# Patient Record
Sex: FEMALE | Race: WHITE | Employment: OTHER | URBAN - METROPOLITAN AREA
[De-identification: names, ages, dates, MRNs, and addresses within clinical notes are randomized per-mention and may not be internally consistent; named-entity substitution may affect disease eponyms.]

---

## 2017-01-06 PROBLEM — I48.0 PAROXYSMAL A-FIB (HCC): Status: ACTIVE | Noted: 2017-01-06

## 2017-02-14 ENCOUNTER — OFFICE VISIT (OUTPATIENT)
Dept: CARDIOLOGY CLINIC | Age: 62
End: 2017-02-14

## 2017-02-14 VITALS
BODY MASS INDEX: 26.87 KG/M2 | HEIGHT: 62 IN | OXYGEN SATURATION: 93 % | WEIGHT: 146 LBS | DIASTOLIC BLOOD PRESSURE: 80 MMHG | HEART RATE: 51 BPM | SYSTOLIC BLOOD PRESSURE: 130 MMHG

## 2017-02-14 DIAGNOSIS — I25.118 CORONARY ARTERY DISEASE OF NATIVE ARTERY OF NATIVE HEART WITH STABLE ANGINA PECTORIS (HCC): Primary | ICD-10-CM

## 2017-02-14 DIAGNOSIS — Z79.899 ON AMIODARONE THERAPY: ICD-10-CM

## 2017-02-14 DIAGNOSIS — E78.2 MIXED HYPERLIPIDEMIA: ICD-10-CM

## 2017-02-14 DIAGNOSIS — R06.02 SOB (SHORTNESS OF BREATH): ICD-10-CM

## 2017-02-14 DIAGNOSIS — I50.42 CHRONIC COMBINED SYSTOLIC AND DIASTOLIC CONGESTIVE HEART FAILURE (HCC): ICD-10-CM

## 2017-02-14 DIAGNOSIS — I48.0 PAROXYSMAL A-FIB (HCC): ICD-10-CM

## 2017-02-14 PROCEDURE — 99205 OFFICE O/P NEW HI 60 MIN: CPT | Performed by: INTERNAL MEDICINE

## 2017-02-14 RX ORDER — LISINOPRIL 40 MG/1
40 TABLET ORAL DAILY
Status: ON HOLD | COMMUNITY
End: 2017-02-22 | Stop reason: HOSPADM

## 2017-02-14 RX ORDER — CARVEDILOL 12.5 MG/1
12.5 TABLET ORAL 2 TIMES DAILY WITH MEALS
Status: ON HOLD | COMMUNITY
End: 2017-02-22 | Stop reason: HOSPADM

## 2017-02-20 PROBLEM — R27.0 ATAXIA: Status: ACTIVE | Noted: 2017-02-20

## 2017-02-20 PROBLEM — I95.9 HYPOTENSION: Status: ACTIVE | Noted: 2017-02-20

## 2017-02-20 PROBLEM — R47.1 DYSARTHRIA: Status: ACTIVE | Noted: 2017-02-20

## 2017-02-20 PROBLEM — R53.1 RIGHT SIDED WEAKNESS: Status: ACTIVE | Noted: 2017-02-20

## 2017-02-20 PROBLEM — I63.9 CVA (CEREBRAL VASCULAR ACCIDENT) (HCC): Status: ACTIVE | Noted: 2017-02-20

## 2017-02-20 PROBLEM — N17.9 ARF (ACUTE RENAL FAILURE) (HCC): Status: ACTIVE | Noted: 2017-02-20

## 2017-02-20 PROBLEM — G47.33 OSA (OBSTRUCTIVE SLEEP APNEA): Status: ACTIVE | Noted: 2017-02-20

## 2017-03-06 ENCOUNTER — TELEPHONE (OUTPATIENT)
Dept: CARDIOLOGY CLINIC | Age: 62
End: 2017-03-06

## 2017-03-09 ENCOUNTER — HOSPITAL ENCOUNTER (OUTPATIENT)
Dept: CARDIOLOGY | Facility: CLINIC | Age: 62
Discharge: HOME | End: 2017-03-09
Attending: INTERNAL MEDICINE | Admitting: INTERNAL MEDICINE

## 2017-03-14 ENCOUNTER — HOSPITAL ENCOUNTER (OUTPATIENT)
Dept: OTHER | Age: 62
Discharge: HOME | End: 2017-03-14
Attending: NEUROLOGICAL SURGERY | Admitting: NEUROLOGICAL SURGERY

## 2017-03-14 LAB
ALBUMIN SERPL-MCNC: 4.7 G/DL (ref 3.4–5)
ANION GAP SERPL CALCULATED.3IONS-SCNC: 7 MMOL/L (ref 3–16)
BUN BLDV-MCNC: 14 MG/DL (ref 7–20)
CALCIUM SERPL-MCNC: 9.6 MG/DL (ref 8.3–10.6)
CHLORIDE BLD-SCNC: 103 MMOL/L (ref 99–110)
CO2: 35 MMOL/L (ref 21–32)
CREAT SERPL-MCNC: 0.6 MG/DL (ref 0.6–1.2)
GFR AFRICAN AMERICAN: >60
GFR NON-AFRICAN AMERICAN: >60
GLUCOSE BLD-MCNC: 97 MG/DL (ref 70–99)
PHOSPHORUS: 3.5 MG/DL (ref 2.5–4.9)
POTASSIUM SERPL-SCNC: 4.4 MMOL/L (ref 3.5–5.1)
SODIUM BLD-SCNC: 145 MMOL/L (ref 136–145)

## 2017-04-10 ENCOUNTER — OFFICE VISIT (OUTPATIENT)
Dept: CARDIOLOGY CLINIC | Age: 62
End: 2017-04-10

## 2017-04-10 VITALS
HEIGHT: 62 IN | HEART RATE: 64 BPM | BODY MASS INDEX: 26.13 KG/M2 | SYSTOLIC BLOOD PRESSURE: 136 MMHG | WEIGHT: 142 LBS | DIASTOLIC BLOOD PRESSURE: 82 MMHG

## 2017-04-10 DIAGNOSIS — I48.0 PAROXYSMAL A-FIB (HCC): Primary | ICD-10-CM

## 2017-04-10 PROCEDURE — 99214 OFFICE O/P EST MOD 30 MIN: CPT | Performed by: INTERNAL MEDICINE

## 2017-04-10 PROCEDURE — 93000 ELECTROCARDIOGRAM COMPLETE: CPT | Performed by: INTERNAL MEDICINE

## 2017-04-10 RX ORDER — CARVEDILOL 3.12 MG/1
3.12 TABLET ORAL 2 TIMES DAILY WITH MEALS
Qty: 60 TABLET | Refills: 11 | Status: ON HOLD | OUTPATIENT
Start: 2017-04-10 | End: 2018-06-11 | Stop reason: SDUPTHER

## 2017-04-25 ENCOUNTER — OFFICE VISIT (OUTPATIENT)
Dept: PULMONOLOGY | Age: 62
End: 2017-04-25

## 2017-04-25 ENCOUNTER — TELEPHONE (OUTPATIENT)
Dept: PULMONOLOGY | Age: 62
End: 2017-04-25

## 2017-04-25 VITALS
WEIGHT: 149.2 LBS | OXYGEN SATURATION: 92 % | RESPIRATION RATE: 16 BRPM | HEIGHT: 62 IN | DIASTOLIC BLOOD PRESSURE: 64 MMHG | TEMPERATURE: 98.6 F | HEART RATE: 64 BPM | BODY MASS INDEX: 27.46 KG/M2 | SYSTOLIC BLOOD PRESSURE: 126 MMHG

## 2017-04-25 DIAGNOSIS — J44.9 CHRONIC OBSTRUCTIVE PULMONARY DISEASE, UNSPECIFIED COPD TYPE (HCC): ICD-10-CM

## 2017-04-25 DIAGNOSIS — R93.89 ABNORMAL CXR: Primary | ICD-10-CM

## 2017-04-25 PROCEDURE — 99204 OFFICE O/P NEW MOD 45 MIN: CPT | Performed by: INTERNAL MEDICINE

## 2017-04-25 RX ORDER — ALBUTEROL SULFATE 90 UG/1
2 AEROSOL, METERED RESPIRATORY (INHALATION) EVERY 6 HOURS PRN
Qty: 1 INHALER | Refills: 5 | Status: SHIPPED | OUTPATIENT
Start: 2017-04-25 | End: 2018-05-17 | Stop reason: SDUPTHER

## 2017-04-25 RX ORDER — ALBUTEROL SULFATE 2.5 MG/3ML
2.5 SOLUTION RESPIRATORY (INHALATION) EVERY 6 HOURS PRN
COMMUNITY

## 2017-05-11 ENCOUNTER — HOSPITAL ENCOUNTER (OUTPATIENT)
Dept: PULMONOLOGY | Age: 62
Discharge: HOME | End: 2017-05-11
Attending: INTERNAL MEDICINE | Admitting: INTERNAL MEDICINE

## 2017-05-11 DIAGNOSIS — R93.89 ABNORMAL CXR: ICD-10-CM

## 2017-05-11 DIAGNOSIS — J44.9 CHRONIC OBSTRUCTIVE PULMONARY DISEASE (HCC): ICD-10-CM

## 2017-05-11 RX ORDER — ALBUTEROL SULFATE 2.5 MG/3ML
2.5 SOLUTION RESPIRATORY (INHALATION) ONCE
Status: COMPLETED | OUTPATIENT
Start: 2017-05-11 | End: 2017-05-11

## 2017-05-11 RX ADMIN — ALBUTEROL SULFATE 2.5 MG: 2.5 SOLUTION RESPIRATORY (INHALATION) at 11:55

## 2017-05-12 ENCOUNTER — OFFICE VISIT (OUTPATIENT)
Dept: PULMONOLOGY | Age: 62
End: 2017-05-12

## 2017-05-12 VITALS
SYSTOLIC BLOOD PRESSURE: 142 MMHG | WEIGHT: 149 LBS | DIASTOLIC BLOOD PRESSURE: 76 MMHG | TEMPERATURE: 98.2 F | HEIGHT: 62 IN | BODY MASS INDEX: 27.42 KG/M2 | OXYGEN SATURATION: 95 % | RESPIRATION RATE: 16 BRPM | HEART RATE: 82 BPM

## 2017-05-12 DIAGNOSIS — J44.9 COPD, SEVERE (HCC): Primary | ICD-10-CM

## 2017-05-12 DIAGNOSIS — J96.11 CHRONIC RESPIRATORY FAILURE WITH HYPOXIA (HCC): ICD-10-CM

## 2017-05-12 PROCEDURE — 99214 OFFICE O/P EST MOD 30 MIN: CPT | Performed by: INTERNAL MEDICINE

## 2017-06-06 ENCOUNTER — TELEPHONE (OUTPATIENT)
Dept: PULMONOLOGY | Age: 62
End: 2017-06-06

## 2017-06-07 ENCOUNTER — HOSPITAL ENCOUNTER (OUTPATIENT)
Dept: OTHER | Age: 62
Discharge: HOME | End: 2017-06-30
Attending: INTERNAL MEDICINE | Admitting: INTERNAL MEDICINE

## 2017-06-13 ENCOUNTER — TELEPHONE (OUTPATIENT)
Dept: PULMONOLOGY | Age: 62
End: 2017-06-13

## 2017-06-13 ENCOUNTER — OFFICE VISIT (OUTPATIENT)
Dept: CARDIOLOGY CLINIC | Age: 62
End: 2017-06-13

## 2017-06-13 VITALS
HEART RATE: 72 BPM | SYSTOLIC BLOOD PRESSURE: 122 MMHG | BODY MASS INDEX: 25.76 KG/M2 | OXYGEN SATURATION: 92 % | WEIGHT: 140 LBS | HEIGHT: 62 IN | DIASTOLIC BLOOD PRESSURE: 78 MMHG

## 2017-06-13 DIAGNOSIS — I48.0 PAROXYSMAL A-FIB (HCC): ICD-10-CM

## 2017-06-13 DIAGNOSIS — I50.42 CHRONIC COMBINED SYSTOLIC AND DIASTOLIC CONGESTIVE HEART FAILURE (HCC): ICD-10-CM

## 2017-06-13 DIAGNOSIS — I25.118 CORONARY ARTERY DISEASE OF NATIVE ARTERY OF NATIVE HEART WITH STABLE ANGINA PECTORIS (HCC): Primary | ICD-10-CM

## 2017-06-13 PROCEDURE — 99214 OFFICE O/P EST MOD 30 MIN: CPT | Performed by: INTERNAL MEDICINE

## 2017-06-13 RX ORDER — ATORVASTATIN CALCIUM 80 MG/1
80 TABLET, FILM COATED ORAL NIGHTLY
Qty: 90 TABLET | Refills: 3 | Status: SHIPPED | OUTPATIENT
Start: 2017-06-13 | End: 2018-07-10 | Stop reason: SDUPTHER

## 2017-06-14 ENCOUNTER — HOSPITAL ENCOUNTER (OUTPATIENT)
Dept: SURGERY | Age: 62
Discharge: HOME | End: 2017-06-14
Attending: ANESTHESIOLOGY | Admitting: ANESTHESIOLOGY

## 2017-06-14 VITALS
DIASTOLIC BLOOD PRESSURE: 73 MMHG | RESPIRATION RATE: 16 BRPM | TEMPERATURE: 98.5 F | BODY MASS INDEX: 25.76 KG/M2 | SYSTOLIC BLOOD PRESSURE: 177 MMHG | HEIGHT: 62 IN | WEIGHT: 140 LBS | OXYGEN SATURATION: 94 % | HEART RATE: 72 BPM

## 2017-06-14 RX ORDER — OXYCODONE HYDROCHLORIDE AND ACETAMINOPHEN 5; 325 MG/1; MG/1
2 TABLET ORAL PRN
Status: ACTIVE | OUTPATIENT
Start: 2017-06-14 | End: 2017-06-14

## 2017-06-14 RX ORDER — OXYCODONE HYDROCHLORIDE 5 MG/1
TABLET ORAL
Status: COMPLETED
Start: 2017-06-14 | End: 2017-06-14

## 2017-06-14 RX ORDER — LABETALOL HYDROCHLORIDE 5 MG/ML
5 INJECTION, SOLUTION INTRAVENOUS EVERY 10 MIN PRN
Status: DISCONTINUED | OUTPATIENT
Start: 2017-06-14 | End: 2017-06-15 | Stop reason: HOSPADM

## 2017-06-14 RX ORDER — OXYCODONE HYDROCHLORIDE 5 MG/1
10 TABLET ORAL EVERY 4 HOURS PRN
Status: DISCONTINUED | OUTPATIENT
Start: 2017-06-14 | End: 2017-06-15 | Stop reason: HOSPADM

## 2017-06-14 RX ORDER — SODIUM CHLORIDE, SODIUM LACTATE, POTASSIUM CHLORIDE, CALCIUM CHLORIDE 600; 310; 30; 20 MG/100ML; MG/100ML; MG/100ML; MG/100ML
INJECTION, SOLUTION INTRAVENOUS CONTINUOUS
Status: DISCONTINUED | OUTPATIENT
Start: 2017-06-14 | End: 2017-06-15 | Stop reason: HOSPADM

## 2017-06-14 RX ORDER — DEXTROSE MONOHYDRATE 50 MG/ML
INJECTION, SOLUTION INTRAVENOUS
Status: DISPENSED
Start: 2017-06-14 | End: 2017-06-14

## 2017-06-14 RX ORDER — MEPERIDINE HYDROCHLORIDE 25 MG/ML
12.5 INJECTION INTRAMUSCULAR; INTRAVENOUS; SUBCUTANEOUS EVERY 5 MIN PRN
Status: DISCONTINUED | OUTPATIENT
Start: 2017-06-14 | End: 2017-06-15 | Stop reason: HOSPADM

## 2017-06-14 RX ORDER — HYDROMORPHONE HCL 110MG/55ML
0.25 PATIENT CONTROLLED ANALGESIA SYRINGE INTRAVENOUS EVERY 5 MIN PRN
Status: DISCONTINUED | OUTPATIENT
Start: 2017-06-14 | End: 2017-06-15 | Stop reason: HOSPADM

## 2017-06-14 RX ORDER — SODIUM CHLORIDE, SODIUM LACTATE, POTASSIUM CHLORIDE, CALCIUM CHLORIDE 600; 310; 30; 20 MG/100ML; MG/100ML; MG/100ML; MG/100ML
INJECTION, SOLUTION INTRAVENOUS
Status: COMPLETED
Start: 2017-06-14 | End: 2017-06-14

## 2017-06-14 RX ORDER — ONDANSETRON 2 MG/ML
4 INJECTION INTRAMUSCULAR; INTRAVENOUS
Status: ACTIVE | OUTPATIENT
Start: 2017-06-14 | End: 2017-06-14

## 2017-06-14 RX ORDER — DIPHENHYDRAMINE HYDROCHLORIDE 50 MG/ML
12.5 INJECTION INTRAMUSCULAR; INTRAVENOUS
Status: ACTIVE | OUTPATIENT
Start: 2017-06-14 | End: 2017-06-14

## 2017-06-14 RX ORDER — MORPHINE SULFATE 2 MG/ML
1 INJECTION, SOLUTION INTRAMUSCULAR; INTRAVENOUS EVERY 5 MIN PRN
Status: DISCONTINUED | OUTPATIENT
Start: 2017-06-14 | End: 2017-06-15 | Stop reason: HOSPADM

## 2017-06-14 RX ORDER — MORPHINE SULFATE 10 MG/ML
2 INJECTION, SOLUTION INTRAMUSCULAR; INTRAVENOUS EVERY 5 MIN PRN
Status: DISCONTINUED | OUTPATIENT
Start: 2017-06-14 | End: 2017-06-15 | Stop reason: HOSPADM

## 2017-06-14 RX ORDER — HYDRALAZINE HYDROCHLORIDE 20 MG/ML
5 INJECTION INTRAMUSCULAR; INTRAVENOUS EVERY 10 MIN PRN
Status: DISCONTINUED | OUTPATIENT
Start: 2017-06-14 | End: 2017-06-15 | Stop reason: HOSPADM

## 2017-06-14 RX ORDER — OXYCODONE HYDROCHLORIDE 10 MG/1
10-20 TABLET ORAL EVERY 4 HOURS PRN
Qty: 36 TABLET | Refills: 0 | Status: SHIPPED | OUTPATIENT
Start: 2017-06-14 | End: 2017-06-17

## 2017-06-14 RX ORDER — HYDROMORPHONE HCL 110MG/55ML
0.5 PATIENT CONTROLLED ANALGESIA SYRINGE INTRAVENOUS EVERY 5 MIN PRN
Status: DISCONTINUED | OUTPATIENT
Start: 2017-06-14 | End: 2017-06-15 | Stop reason: HOSPADM

## 2017-06-14 RX ORDER — OXYCODONE HYDROCHLORIDE AND ACETAMINOPHEN 5; 325 MG/1; MG/1
1 TABLET ORAL PRN
Status: ACTIVE | OUTPATIENT
Start: 2017-06-14 | End: 2017-06-14

## 2017-06-14 RX ORDER — LIDOCAINE HYDROCHLORIDE 10 MG/ML
1 INJECTION, SOLUTION EPIDURAL; INFILTRATION; INTRACAUDAL; PERINEURAL
Status: COMPLETED | OUTPATIENT
Start: 2017-06-14 | End: 2017-06-14

## 2017-06-14 RX ORDER — LIDOCAINE HYDROCHLORIDE 10 MG/ML
INJECTION, SOLUTION EPIDURAL; INFILTRATION; INTRACAUDAL; PERINEURAL
Status: COMPLETED
Start: 2017-06-14 | End: 2017-06-14

## 2017-06-14 RX ADMIN — LIDOCAINE HYDROCHLORIDE 0.1 ML: 10 INJECTION, SOLUTION EPIDURAL; INFILTRATION; INTRACAUDAL; PERINEURAL at 08:26

## 2017-06-14 RX ADMIN — SODIUM CHLORIDE, SODIUM LACTATE, POTASSIUM CHLORIDE, CALCIUM CHLORIDE: 600; 310; 30; 20 INJECTION, SOLUTION INTRAVENOUS at 08:26

## 2017-06-14 RX ADMIN — Medication 0.25 MG: at 12:08

## 2017-06-14 RX ADMIN — OXYCODONE HYDROCHLORIDE 10 MG: 5 TABLET ORAL at 11:59

## 2017-06-14 RX ADMIN — Medication 0.5 MG: at 12:02

## 2017-06-14 ASSESSMENT — PAIN DESCRIPTION - LOCATION
LOCATION: ABDOMEN

## 2017-06-14 ASSESSMENT — PAIN SCALES - GENERAL
PAINLEVEL_OUTOF10: 5
PAINLEVEL_OUTOF10: 3
PAINLEVEL_OUTOF10: 0
PAINLEVEL_OUTOF10: 7
PAINLEVEL_OUTOF10: 7
PAINLEVEL_OUTOF10: 3

## 2017-06-14 ASSESSMENT — PAIN DESCRIPTION - DESCRIPTORS
DESCRIPTORS: ACHING
DESCRIPTORS: ACHING;THROBBING
DESCRIPTORS: ACHING;SHOOTING
DESCRIPTORS: ACHING
DESCRIPTORS: ACHING;SHOOTING
DESCRIPTORS: ACHING

## 2017-06-14 ASSESSMENT — PAIN - FUNCTIONAL ASSESSMENT: PAIN_FUNCTIONAL_ASSESSMENT: 0-10

## 2017-06-14 ASSESSMENT — ENCOUNTER SYMPTOMS: SHORTNESS OF BREATH: 0

## 2017-06-14 ASSESSMENT — PAIN DESCRIPTION - PAIN TYPE: TYPE: SURGICAL PAIN

## 2017-09-19 PROBLEM — E78.5 HYPERLIPEMIA: Status: ACTIVE | Noted: 2017-09-19

## 2017-09-19 PROBLEM — I50.32 CHRONIC DIASTOLIC CHF (CONGESTIVE HEART FAILURE) (HCC): Status: ACTIVE | Noted: 2017-02-14

## 2017-09-19 PROBLEM — I48.20 CHRONIC A-FIB (HCC): Status: ACTIVE | Noted: 2017-09-19

## 2017-09-19 PROBLEM — F41.9 ANXIETY: Status: ACTIVE | Noted: 2017-09-19

## 2017-11-01 ENCOUNTER — HOSPITAL ENCOUNTER (OUTPATIENT)
Dept: GENERAL RADIOLOGY | Age: 62
Discharge: HOME | End: 2017-11-01
Attending: FAMILY MEDICINE | Admitting: FAMILY MEDICINE

## 2017-11-01 DIAGNOSIS — Z13.820 ENCOUNTER FOR SCREENING FOR OSTEOPOROSIS: ICD-10-CM

## 2017-11-01 DIAGNOSIS — Z13.820 OSTEOPOROSIS SCREENING: ICD-10-CM

## 2017-11-30 ENCOUNTER — TELEPHONE (OUTPATIENT)
Dept: PULMONOLOGY | Age: 62
End: 2017-11-30

## 2017-11-30 NOTE — TELEPHONE ENCOUNTER
Patient did not show for 6 month f/u  with Dr. Michaels Call on 11/30/17. Pt called and states that she is not feeling well and she will call back to r/s when she feels better. Patient was also no show on: N/A    LOV 5/12/17:    ASSESSMENT:  · COPD, severe  · Chronic hypoxic respiratory failure  · Prior tobacco abuse: quit 2012. 120 pack years  · PAF on amiodarone and eliquis  · H/o TIA, brain aneurysm that is monitored  · DEVI -not treated per pt unable to tolerate PAP     PLAN:   · Start supplemental 2lpm while walking [facility] portable concentrator with OCD  · She completed pulm rehab in 2014  · PRN albuterol INH and continue nebulizer  · Continue Dulera and Tudorza  · Pt is unsure if she wants to be reevaluated for DEVI  · F/u for test results  · F/u in 6 months  · Screening CT scan was considered in a lung cancer screening counseling and shared decision making visit today that included the following elements:   · Eligibility: Age: 62. There are no signs or symptoms of lung cancer. Tobacco History 120 pack-years, quit 5 years ago  · Verbal counseling has been performed by me to include benefits and harms of screening, follow-up diagnostic testing, over-diagnosis, false positive rate, and total radiation exposure;   · I have counseled on the importance of adherence to annual lung cancer LDCT screening, the impact of comorbidities and patient is willing to undergo diagnosis and treatment;   · I have provided counseling on the importance of maintaining cigarette smoking abstinence if former smoker; or the importance of smoking cessation if current smoker and, if appropriate, furnishing of information about tobacco cessation interventions; and   · I have furnished a written order for lung cancer screening with LDCT.    · Order for Screening chest CT scan should be placed with documentation as below:  · Beneficiary date of birth;   · Actual pack - year smoking history (number) from above;   · Current smoking status, and for

## 2017-12-13 ENCOUNTER — OFFICE VISIT (OUTPATIENT)
Dept: CARDIOLOGY CLINIC | Age: 62
End: 2017-12-13

## 2017-12-13 VITALS
WEIGHT: 138 LBS | HEIGHT: 62 IN | DIASTOLIC BLOOD PRESSURE: 100 MMHG | HEART RATE: 72 BPM | OXYGEN SATURATION: 94 % | SYSTOLIC BLOOD PRESSURE: 170 MMHG | BODY MASS INDEX: 25.4 KG/M2

## 2017-12-13 DIAGNOSIS — I25.118 CORONARY ARTERY DISEASE OF NATIVE ARTERY OF NATIVE HEART WITH STABLE ANGINA PECTORIS (HCC): ICD-10-CM

## 2017-12-13 DIAGNOSIS — I10 HTN (HYPERTENSION), BENIGN: ICD-10-CM

## 2017-12-13 DIAGNOSIS — I48.0 PAROXYSMAL A-FIB (HCC): Primary | ICD-10-CM

## 2017-12-13 PROCEDURE — 99214 OFFICE O/P EST MOD 30 MIN: CPT | Performed by: NURSE PRACTITIONER

## 2017-12-13 PROCEDURE — 93000 ELECTROCARDIOGRAM COMPLETE: CPT | Performed by: NURSE PRACTITIONER

## 2017-12-13 RX ORDER — AMLODIPINE BESYLATE 5 MG/1
5 TABLET ORAL DAILY
Qty: 90 TABLET | Refills: 3 | Status: SHIPPED | OUTPATIENT
Start: 2017-12-13 | End: 2018-12-11 | Stop reason: SDUPTHER
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
## 2017-12-27 NOTE — COMMUNICATION BODY
(PERCOCET)  MG per tablet Take 1 tablet by mouth three times daily . Yes Historical Provider, MD   diclofenac sodium 1 % GEL Apply 2 g topically 4 times daily   Yes Historical Provider, MD   atorvastatin (LIPITOR) 80 MG tablet Take 1 tablet by mouth nightly 6/13/17  Yes Tanisha Salcedo MD   albuterol (PROVENTIL) (2.5 MG/3ML) 0.083% nebulizer solution Take 2.5 mg by nebulization every 6 hours as needed for Wheezing   Yes Historical Provider, MD   albuterol sulfate HFA (PROVENTIL HFA) 108 (90 BASE) MCG/ACT inhaler Inhale 2 puffs into the lungs every 6 hours as needed for Wheezing or Shortness of Breath 4/25/17  Yes Helio Hagen MD   carvedilol (COREG) 3.125 MG tablet Take 1 tablet by mouth 2 times daily (with meals) 4/10/17  Yes Gustavo Marin MD   apixaban (ELIQUIS) 5 MG TABS tablet Take 1 tablet by mouth 2 times daily 4/10/17  Yes Gustavo Marin MD   polyethylene glycol (GLYCOLAX) packet Take 17 g by mouth daily as needed for Constipation   Yes Historical Provider, MD   OXYGEN Inhale 1.5 L into the lungs   Yes Historical Provider, MD   ondansetron (ZOFRAN-ODT) 4 MG disintegrating tablet Take 4 mg by mouth every 8 hours as needed for Nausea or Vomiting   Yes Historical Provider, MD   Mometasone Furo-Formoterol Fum (DULERA IN) Inhale into the lungs 2 times daily   Yes Historical Provider, MD   aclidinium (TUDORZA PRESSAIR) 400 MCG/ACT AEPB Inhale 2 each into the lungs daily    Yes Historical Provider, MD   nitroGLYCERIN (NITROSTAT) 0.4 MG SL tablet Place 0.4 mg under the tongue every 5 minutes as needed for Chest pain   Yes Historical Provider, MD   esomeprazole Magnesium (NEXIUM) 40 MG PACK Take 40 mg by mouth daily   Yes Historical Provider, MD   ALPRAZolam (XANAX) 0.5 MG tablet Take 0.5 mg by mouth nightly as needed for Sleep   Yes Historical Provider, MD   gabapentin (NEURONTIN) 300 MG capsule Take 300 mg by mouth 3 times daily.      Yes Historical Provider, MD       Allergies:  Codeine; Darvocet is normal with an ejection fraction of 55-60%.   No wall motion abnormalities noted. Normal left ventricular size and wall thickness.   Normal left ventricular diastolic filling pressure. Stress test 4/7/2016 [facility] THE [facility]): The result of this study is normal     The risk of this study is low  Normal myocardial perfusion     Normal LV systolic function  No  prior studies available for comparison    CARDIOLOGY LABS:   CBC: No results for input(s): WBC, HGB, HCT, PLT in the last 72 hours. BMP: No results for input(s): NA, K, CO2, BUN, CREATININE, LABGLOM, GLUCOSE in the last 72 hours. PT/INR: No results for input(s): PROTIME, INR in the last 72 hours. APTT:No results for input(s): APTT in the last 72 hours. FASTING LIPID PANEL:  Lab Results   Component Value Date    HDL 36 02/21/2017    LDLCALC 109 02/21/2017    TRIG 132 02/21/2017     LIVER PROFILE:No results for input(s): AST, ALT, ALB in the last 72 hours. Assessment:   1. Paroxysmal atrial fibrillation: in sinus today   -on Eliquis for [id number] score 4 (gender, HTN, TIA)   2. Bradycardia: noted during hospital stay in 2/2017 (amiodarone stopped at that time)  3. CAD: s/p SKYLA x3 to LAD in 2014   -on Coreg, lisinopril, and statin  4. HTN: BP elevated today  5. COPD: followed by Dr. Jaye Lomeli  6. DEVI: unable to tolerate CPAP  7. TIA    Plan:   1. Start amlodipine 5mg po QD for HTN  2. Monitor BP at home and call if consistently out of goal ranges  3. Unclear why patient is not taking ASA. Will discuss with her primary cardiologist, Dr. Durell Gaucher  4.  Follow up in 6 months    Arlin Norton, [address]  [phone]

## 2018-01-04 ENCOUNTER — OFFICE VISIT (OUTPATIENT)
Dept: PULMONOLOGY | Age: 63
End: 2018-01-04

## 2018-01-04 VITALS
TEMPERATURE: 98.3 F | DIASTOLIC BLOOD PRESSURE: 70 MMHG | HEIGHT: 62 IN | BODY MASS INDEX: 25.4 KG/M2 | SYSTOLIC BLOOD PRESSURE: 110 MMHG | RESPIRATION RATE: 18 BRPM | WEIGHT: 138 LBS | OXYGEN SATURATION: 92 % | HEART RATE: 72 BPM

## 2018-01-04 DIAGNOSIS — Z87.891 PERSONAL HISTORY OF TOBACCO USE: Primary | ICD-10-CM

## 2018-01-04 PROCEDURE — 99214 OFFICE O/P EST MOD 30 MIN: CPT | Performed by: INTERNAL MEDICINE

## 2018-01-04 PROCEDURE — G0296 VISIT TO DETERM LDCT ELIG: HCPCS | Performed by: INTERNAL MEDICINE

## 2018-01-04 RX ORDER — GUAIFENESIN/DEXTROMETHORPHAN 100-10MG/5
10 SYRUP ORAL 3 TIMES DAILY PRN
Qty: 120 ML | Refills: 0 | Status: SHIPPED | OUTPATIENT
Start: 2018-01-04 | End: 2018-01-14

## 2018-01-04 NOTE — PROGRESS NOTES
Nausea or Vomiting, Disp: , Rfl:     Mometasone Furo-Formoterol Fum (DULERA IN), Inhale into the lungs 2 times daily, Disp: , Rfl:     aclidinium (TUDORZA PRESSAIR) 400 MCG/ACT AEPB, Inhale 2 each into the lungs daily , Disp: , Rfl:     nitroGLYCERIN (NITROSTAT) 0.4 MG SL tablet, Place 0.4 mg under the tongue every 5 minutes as needed for Chest pain, Disp: , Rfl:     esomeprazole Magnesium (NEXIUM) 40 MG PACK, Take 40 mg by mouth daily, Disp: , Rfl:     ALPRAZolam (XANAX) 0.5 MG tablet, Take 0.5 mg by mouth nightly as needed for Sleep, Disp: , Rfl:     gabapentin (NEURONTIN) 300 MG capsule, Take 300 mg by mouth 3 times daily. , Disp: , Rfl:       Objective:   PHYSICAL EXAM:      VITALS:  /70   Pulse 72   Temp 98.3 °F (36.8 °C) (Oral)   Resp 18   Ht 5' 2\" (1.575 m)   Wt 138 lb (62.6 kg)   SpO2 92% Comment: RA  BMI 25.24 kg/m²   Constitutional: In no acute distress. Appears stated age. Well developed and nourished  Eyes: PERRL. No sclera icterus. No conjunctival injection. ENT: Oropharynx clear  Neck: Trachea midline. No thyroid tenderness. Lymph: No cervical LAD. No supraclavicular LAD. Resp: No accessory muscle use. No crackles. No wheezes. No rhonchi. CV: Regular rate. Regular rhythm. No murmur or rub. No lower extremity edema. Musc: No clubbing. No cyanosis. No synovitis or joint deformity in digits. Psych: Oriented x 3. Mood and affect normal. Intact judgment and insight. LABS:  Reviewed any pertinent new labs that are available.     PFTs 5/11/17  FVC  (78%) FEV1 0.74 (31%) FEV1/FVC ratio 31  TLC  (122%)  RV  (168%)   DLCO (34%) Bronchodilator response: +++     6MWT: 490ft, 2lpm O2    IMAGING: I personally reviewed and interpreted the following imaging today in the office:    5/11/17 HRCT:   Lungs/pleura: Mucous is present within the proximal trachea and subsegmental   right middle lobe bronchi.  Mild bronchial wall thickening involves the   bilateral lower lobes likely due to bronchitis.       There is no pneumothorax or pleural effusion. Zygmunt Mike is biapical scarring and   bibasilar atelectasis with extensive emphysema throughout the bilateral lungs. ASSESSMENT:  · COPD, severe  · Probably a viral URI  · Chronic hypoxic respiratory failure  · Prior tobacco abuse: quit 2012. 120 pack years  · PAF on amiodarone and eliquis  · H/o TIA, brain aneurysm that is monitored  · DEVI -not treated per pt unable to tolerate PAP     PLAN:   · Start supplemental 2lpm while walking wth portable concentrator with OCD  · She completed pulm rehab in 2014  · PRN albuterol INH and continue nebulizer  · Continue Dulera and Tudorza  · Pt is unsure if she wants to be reevaluated for DEVI  · F/u for test results  · F/u in May for LDCT  Call with CT results/ Screening CT scan was considered in a lung cancer screening counseling and shared decision making visit today that included the following elements:   Eligibility: Age: 62. There are no signs or symptoms of lung cancer. Tobacco History 120 pack-years, quit 5 years ago  Verbal counseling has been performed by me to include benefits and harms of screening, follow-up diagnostic testing, over-diagnosis, false positive rate, and total radiation exposure;   I have counseled on the importance of adherence to annual lung cancer LDCT screening, the impact of comorbidities and patient is willing to undergo diagnosis and treatment;   I have provided counseling on the importance of maintaining cigarette smoking abstinence if former smoker; or the importance of smoking cessation if current smoker and, if appropriate, furnishing of information about tobacco cessation interventions; and   I have furnished a written order for lung cancer screening with LDCT. Order for Screening chest CT scan should be placed with documentation as below:  Beneficiary date of birth;    Actual pack - year smoking history (number) from above;   Current smoking status, and for former smokers,

## 2018-02-26 ENCOUNTER — TELEPHONE (OUTPATIENT)
Dept: PULMONOLOGY | Age: 63
End: 2018-02-26

## 2018-02-26 DIAGNOSIS — J44.9 COPD, SEVERE (HCC): Primary | ICD-10-CM

## 2018-04-05 ENCOUNTER — OFFICE VISIT (OUTPATIENT)
Dept: CARDIOLOGY CLINIC | Age: 63
End: 2018-04-05

## 2018-04-05 VITALS
HEART RATE: 72 BPM | OXYGEN SATURATION: 94 % | HEIGHT: 62 IN | SYSTOLIC BLOOD PRESSURE: 120 MMHG | WEIGHT: 135 LBS | BODY MASS INDEX: 24.84 KG/M2 | DIASTOLIC BLOOD PRESSURE: 74 MMHG

## 2018-04-05 DIAGNOSIS — E78.2 MIXED HYPERLIPIDEMIA: ICD-10-CM

## 2018-04-05 DIAGNOSIS — I10 HTN (HYPERTENSION), BENIGN: ICD-10-CM

## 2018-04-05 DIAGNOSIS — I25.118 CORONARY ARTERY DISEASE OF NATIVE ARTERY OF NATIVE HEART WITH STABLE ANGINA PECTORIS (HCC): Primary | ICD-10-CM

## 2018-04-05 DIAGNOSIS — I50.32 CHRONIC DIASTOLIC CHF (CONGESTIVE HEART FAILURE) (HCC): ICD-10-CM

## 2018-04-05 DIAGNOSIS — I48.0 PAROXYSMAL A-FIB (HCC): ICD-10-CM

## 2018-04-05 PROCEDURE — 99214 OFFICE O/P EST MOD 30 MIN: CPT | Performed by: INTERNAL MEDICINE

## 2018-04-06 ENCOUNTER — TELEPHONE (OUTPATIENT)
Dept: CARDIOLOGY CLINIC | Age: 63
End: 2018-04-06

## 2018-04-06 ENCOUNTER — HOSPITAL ENCOUNTER (OUTPATIENT)
Dept: OTHER | Age: 63
Discharge: HOME | End: 2018-04-06
Attending: INTERNAL MEDICINE | Admitting: INTERNAL MEDICINE

## 2018-04-06 LAB
A/G RATIO: 1.7 (ref 1.1–2.2)
ALBUMIN SERPL-MCNC: 4.3 G/DL (ref 3.4–5)
ALP BLD-CCNC: 64 U/L (ref 40–129)
ALT SERPL-CCNC: 9 U/L (ref 10–40)
ANION GAP SERPL CALCULATED.3IONS-SCNC: 10 MMOL/L (ref 3–16)
AST SERPL-CCNC: 16 U/L (ref 15–37)
BILIRUB SERPL-MCNC: 0.5 MG/DL (ref 0–1)
BUN BLDV-MCNC: 17 MG/DL (ref 7–20)
CALCIUM SERPL-MCNC: 9.1 MG/DL (ref 8.3–10.6)
CHLORIDE BLD-SCNC: 103 MMOL/L (ref 99–110)
CHOLESTEROL, FASTING: 181 MG/DL (ref 0–199)
CO2: 32 MMOL/L (ref 21–32)
CREAT SERPL-MCNC: <0.5 MG/DL (ref 0.6–1.2)
GFR AFRICAN AMERICAN: >60
GFR NON-AFRICAN AMERICAN: >60
GLOBULIN: 2.6 G/DL
GLUCOSE FASTING: 106 MG/DL (ref 70–99)
HDLC SERPL-MCNC: 58 MG/DL (ref 40–60)
LDL CHOLESTEROL CALCULATED: 110 MG/DL
POTASSIUM SERPL-SCNC: 4.7 MMOL/L (ref 3.5–5.1)
SODIUM BLD-SCNC: 145 MMOL/L (ref 136–145)
TOTAL PROTEIN: 6.9 G/DL (ref 6.4–8.2)
TRIGLYCERIDE, FASTING: 63 MG/DL (ref 0–150)
VLDLC SERPL CALC-MCNC: 13 MG/DL

## 2018-04-10 ENCOUNTER — TELEPHONE (OUTPATIENT)
Dept: CARDIOLOGY CLINIC | Age: 63
End: 2018-04-10

## 2018-05-17 ENCOUNTER — OFFICE VISIT (OUTPATIENT)
Dept: PULMONOLOGY | Age: 63
End: 2018-05-17

## 2018-05-17 ENCOUNTER — HOSPITAL ENCOUNTER (OUTPATIENT)
Dept: CT IMAGING | Age: 63
Discharge: HOME | End: 2018-05-17
Attending: INTERNAL MEDICINE | Admitting: INTERNAL MEDICINE

## 2018-05-17 ENCOUNTER — TELEPHONE (OUTPATIENT)
Dept: PULMONOLOGY | Age: 63
End: 2018-05-17

## 2018-05-17 VITALS
DIASTOLIC BLOOD PRESSURE: 60 MMHG | SYSTOLIC BLOOD PRESSURE: 120 MMHG | TEMPERATURE: 98.1 F | RESPIRATION RATE: 18 BRPM | OXYGEN SATURATION: 94 % | BODY MASS INDEX: 24.03 KG/M2 | HEART RATE: 66 BPM | WEIGHT: 130.6 LBS | HEIGHT: 62 IN

## 2018-05-17 DIAGNOSIS — J44.9 COPD, SEVERE (HCC): ICD-10-CM

## 2018-05-17 DIAGNOSIS — Z87.891 PERSONAL HISTORY OF TOBACCO USE: ICD-10-CM

## 2018-05-17 DIAGNOSIS — Z87.891 PERSONAL HISTORY OF TOBACCO USE: Primary | ICD-10-CM

## 2018-05-17 DIAGNOSIS — Z87.891 PERSONAL HISTORY OF NICOTINE DEPENDENCE: ICD-10-CM

## 2018-05-17 DIAGNOSIS — R91.1 PULMONARY NODULE: Primary | ICD-10-CM

## 2018-05-17 DIAGNOSIS — J96.11 CHRONIC RESPIRATORY FAILURE WITH HYPOXIA (HCC): ICD-10-CM

## 2018-05-17 PROCEDURE — 99214 OFFICE O/P EST MOD 30 MIN: CPT | Performed by: INTERNAL MEDICINE

## 2018-05-17 PROCEDURE — G0296 VISIT TO DETERM LDCT ELIG: HCPCS | Performed by: INTERNAL MEDICINE

## 2018-05-17 RX ORDER — ALBUTEROL SULFATE 90 UG/1
2 AEROSOL, METERED RESPIRATORY (INHALATION) EVERY 6 HOURS PRN
Qty: 1 INHALER | Refills: 5 | Status: SHIPPED | OUTPATIENT
Start: 2018-05-17 | End: 2020-07-28 | Stop reason: SDUPTHER

## 2018-05-18 ENCOUNTER — TELEPHONE (OUTPATIENT)
Dept: CASE MANAGEMENT | Age: 63
End: 2018-05-18

## 2018-06-10 PROBLEM — R65.10 SIRS (SYSTEMIC INFLAMMATORY RESPONSE SYNDROME) (HCC): Status: ACTIVE | Noted: 2018-06-10

## 2018-06-10 PROBLEM — J18.9 PNEUMONIA: Status: ACTIVE | Noted: 2018-06-10

## 2018-06-10 PROBLEM — J18.9 COMMUNITY ACQUIRED PNEUMONIA OF RIGHT LUNG: Status: ACTIVE | Noted: 2018-06-10

## 2018-06-10 PROBLEM — J44.1 COPD EXACERBATION (HCC): Status: ACTIVE | Noted: 2018-06-10

## 2018-06-11 PROBLEM — R91.8 LUNG NODULES: Status: ACTIVE | Noted: 2018-06-11

## 2018-06-11 RX ORDER — CARVEDILOL 3.12 MG/1
3.12 TABLET ORAL 2 TIMES DAILY WITH MEALS
Qty: 60 TABLET | Refills: 5 | Status: SHIPPED | OUTPATIENT
Start: 2018-06-11 | End: 2018-06-14

## 2018-06-15 ENCOUNTER — TELEPHONE (OUTPATIENT)
Dept: CARDIAC REHAB | Age: 63
End: 2018-06-15

## 2018-07-10 RX ORDER — ATORVASTATIN CALCIUM 80 MG/1
80 TABLET, FILM COATED ORAL NIGHTLY
Qty: 90 TABLET | Refills: 3 | Status: SHIPPED | OUTPATIENT
Start: 2018-07-10 | End: 2019-06-20 | Stop reason: SDUPTHER

## 2018-07-10 NOTE — TELEPHONE ENCOUNTER
Medication Refill    When was your last appointment with cardiology? 04/05/18  (if 1year or longer, please schedule an appointment)    Medication needing refilled:atorvastatin (LIPITOR) 80 MG tablet    Doseage of the medication:80 mg    How are you taking this medication (QD, BID, TID, QID, PRN): 1 tab daily    Patient want a 30 or 90 day supply called in:90    Which Pharmacy are we sending the medication to:[facility] [address] [facility]    Pharmacy Phone number:    Pharmacy Fax number:

## 2018-07-19 ENCOUNTER — OFFICE VISIT (OUTPATIENT)
Dept: CARDIOLOGY CLINIC | Age: 63
End: 2018-07-19

## 2018-07-19 VITALS
HEART RATE: 56 BPM | WEIGHT: 126 LBS | BODY MASS INDEX: 23.19 KG/M2 | DIASTOLIC BLOOD PRESSURE: 68 MMHG | HEIGHT: 62 IN | SYSTOLIC BLOOD PRESSURE: 112 MMHG

## 2018-07-19 DIAGNOSIS — R00.1 BRADYCARDIA: ICD-10-CM

## 2018-07-19 DIAGNOSIS — I10 HTN (HYPERTENSION), BENIGN: ICD-10-CM

## 2018-07-19 DIAGNOSIS — I48.0 PAROXYSMAL A-FIB (HCC): Primary | ICD-10-CM

## 2018-07-19 PROCEDURE — 99213 OFFICE O/P EST LOW 20 MIN: CPT | Performed by: NURSE PRACTITIONER

## 2018-07-19 PROCEDURE — 93000 ELECTROCARDIOGRAM COMPLETE: CPT | Performed by: NURSE PRACTITIONER
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
## 2018-10-10 ENCOUNTER — HOSPITAL ENCOUNTER (EMERGENCY)
Age: 63
Discharge: HOME | End: 2018-10-10
Attending: EMERGENCY MEDICINE
Payer: MEDICARE

## 2018-10-10 ENCOUNTER — APPOINTMENT (OUTPATIENT)
Dept: GENERAL RADIOLOGY | Age: 63
End: 2018-10-10
Payer: MEDICARE

## 2018-10-10 VITALS
WEIGHT: 125 LBS | SYSTOLIC BLOOD PRESSURE: 158 MMHG | RESPIRATION RATE: 20 BRPM | TEMPERATURE: 98.1 F | OXYGEN SATURATION: 94 % | BODY MASS INDEX: 23 KG/M2 | HEIGHT: 62 IN | HEART RATE: 62 BPM | DIASTOLIC BLOOD PRESSURE: 86 MMHG

## 2018-10-10 DIAGNOSIS — J44.1 COPD EXACERBATION (HCC): Primary | ICD-10-CM

## 2018-10-10 LAB
A/G RATIO: 1.6 (ref 1.1–2.2)
ALBUMIN SERPL-MCNC: 4.5 G/DL (ref 3.4–5)
ALP BLD-CCNC: 75 U/L (ref 40–129)
ALT SERPL-CCNC: 17 U/L (ref 10–40)
ANION GAP SERPL CALCULATED.3IONS-SCNC: 10 MMOL/L (ref 3–16)
AST SERPL-CCNC: 22 U/L (ref 15–37)
BASOPHILS ABSOLUTE: 0 K/UL (ref 0–0.2)
BASOPHILS RELATIVE PERCENT: 0.5 %
BILIRUB SERPL-MCNC: 0.7 MG/DL (ref 0–1)
BUN BLDV-MCNC: 11 MG/DL (ref 7–20)
CALCIUM SERPL-MCNC: 9.2 MG/DL (ref 8.3–10.6)
CHLORIDE BLD-SCNC: 98 MMOL/L (ref 99–110)
CO2: 30 MMOL/L (ref 21–32)
CREAT SERPL-MCNC: <0.5 MG/DL (ref 0.6–1.2)
EOSINOPHILS ABSOLUTE: 0.3 K/UL (ref 0–0.6)
EOSINOPHILS RELATIVE PERCENT: 3.4 %
GFR AFRICAN AMERICAN: >60
GFR NON-AFRICAN AMERICAN: >60
GLOBULIN: 2.8 G/DL
GLUCOSE BLD-MCNC: 94 MG/DL (ref 70–99)
HCT VFR BLD CALC: 49.6 % (ref 36–48)
HEMOGLOBIN: 16.9 G/DL (ref 12–16)
INR BLD: 1.04 (ref 0.86–1.14)
LYMPHOCYTES ABSOLUTE: 1.5 K/UL (ref 1–5.1)
LYMPHOCYTES RELATIVE PERCENT: 15.5 %
MCH RBC QN AUTO: 30.8 PG (ref 26–34)
MCHC RBC AUTO-ENTMCNC: 34.1 G/DL (ref 31–36)
MCV RBC AUTO: 90.4 FL (ref 80–100)
MONOCYTES ABSOLUTE: 0.9 K/UL (ref 0–1.3)
MONOCYTES RELATIVE PERCENT: 9.5 %
NEUTROPHILS ABSOLUTE: 6.9 K/UL (ref 1.7–7.7)
NEUTROPHILS RELATIVE PERCENT: 71.1 %
PDW BLD-RTO: 14.1 % (ref 12.4–15.4)
PLATELET # BLD: 192 K/UL (ref 135–450)
PMV BLD AUTO: 9.7 FL (ref 5–10.5)
POTASSIUM SERPL-SCNC: 3.7 MMOL/L (ref 3.5–5.1)
PROTHROMBIN TIME: 11.9 SEC (ref 9.8–13)
RBC # BLD: 5.48 M/UL (ref 4–5.2)
SODIUM BLD-SCNC: 138 MMOL/L (ref 136–145)
TOTAL PROTEIN: 7.3 G/DL (ref 6.4–8.2)
TROPONIN: <0.01 NG/ML
WBC # BLD: 9.7 K/UL (ref 4–11)

## 2018-10-10 PROCEDURE — 85025 COMPLETE CBC W/AUTO DIFF WBC: CPT

## 2018-10-10 PROCEDURE — 6360000002 HC RX W HCPCS: Performed by: EMERGENCY MEDICINE

## 2018-10-10 PROCEDURE — 80053 COMPREHEN METABOLIC PANEL: CPT

## 2018-10-10 PROCEDURE — 93005 ELECTROCARDIOGRAM TRACING: CPT | Performed by: EMERGENCY MEDICINE

## 2018-10-10 PROCEDURE — 6360000002 HC RX W HCPCS

## 2018-10-10 PROCEDURE — 96374 THER/PROPH/DIAG INJ IV PUSH: CPT

## 2018-10-10 PROCEDURE — 99285 EMERGENCY DEPT VISIT HI MDM: CPT

## 2018-10-10 PROCEDURE — 85610 PROTHROMBIN TIME: CPT

## 2018-10-10 PROCEDURE — 71045 X-RAY EXAM CHEST 1 VIEW: CPT

## 2018-10-10 PROCEDURE — 84484 ASSAY OF TROPONIN QUANT: CPT

## 2018-10-10 RX ORDER — ONDANSETRON 2 MG/ML
4 INJECTION INTRAMUSCULAR; INTRAVENOUS ONCE
Status: COMPLETED | OUTPATIENT
Start: 2018-10-10 | End: 2018-10-10

## 2018-10-10 RX ORDER — ONDANSETRON 2 MG/ML
INJECTION INTRAMUSCULAR; INTRAVENOUS
Status: COMPLETED
Start: 2018-10-10 | End: 2018-10-10

## 2018-10-10 RX ORDER — ONDANSETRON 4 MG/1
4 TABLET, FILM COATED ORAL EVERY 8 HOURS PRN
Qty: 10 TABLET | Refills: 0 | Status: SHIPPED | OUTPATIENT
Start: 2018-10-10 | End: 2020-08-04

## 2018-10-10 RX ORDER — IPRATROPIUM BROMIDE AND ALBUTEROL SULFATE 2.5; .5 MG/3ML; MG/3ML
1 SOLUTION RESPIRATORY (INHALATION) ONCE
Status: DISCONTINUED | OUTPATIENT
Start: 2018-10-10 | End: 2018-10-10 | Stop reason: HOSPADM

## 2018-10-10 RX ORDER — DEXAMETHASONE SODIUM PHOSPHATE 4 MG/ML
10 INJECTION, SOLUTION INTRA-ARTICULAR; INTRALESIONAL; INTRAMUSCULAR; INTRAVENOUS; SOFT TISSUE ONCE
Status: COMPLETED | OUTPATIENT
Start: 2018-10-10 | End: 2018-10-10

## 2018-10-10 RX ORDER — PREDNISONE 20 MG/1
40 TABLET ORAL DAILY
Qty: 8 TABLET | Refills: 0 | Status: SHIPPED | OUTPATIENT
Start: 2018-10-10 | End: 2018-10-14

## 2018-10-10 RX ADMIN — DEXAMETHASONE SODIUM PHOSPHATE 10 MG: 4 INJECTION, SOLUTION INTRAMUSCULAR; INTRAVENOUS at 02:09

## 2018-10-10 RX ADMIN — ONDANSETRON 4 MG: 2 INJECTION INTRAMUSCULAR; INTRAVENOUS at 02:31

## 2018-10-10 RX ADMIN — ONDANSETRON HYDROCHLORIDE 4 MG: 2 INJECTION, SOLUTION INTRAMUSCULAR; INTRAVENOUS at 02:31

## 2018-10-10 ASSESSMENT — PAIN DESCRIPTION - LOCATION: LOCATION: ABDOMEN

## 2018-10-10 ASSESSMENT — PAIN SCALES - GENERAL
PAINLEVEL_OUTOF10: 5
PAINLEVEL_OUTOF10: 6
PAINLEVEL_OUTOF10: 5

## 2018-10-10 ASSESSMENT — PAIN DESCRIPTION - PAIN TYPE
TYPE: CHRONIC PAIN
TYPE: ACUTE PAIN
TYPE: CHRONIC PAIN

## 2018-10-10 ASSESSMENT — PAIN DESCRIPTION - ORIENTATION: ORIENTATION: MID;RIGHT

## 2018-10-11 LAB
EKG ATRIAL RATE: 55 BPM
EKG DIAGNOSIS: NORMAL
EKG P AXIS: 64 DEGREES
EKG P-R INTERVAL: 144 MS
EKG Q-T INTERVAL: 426 MS
EKG QRS DURATION: 86 MS
EKG QTC CALCULATION (BAZETT): 407 MS
EKG R AXIS: -66 DEGREES
EKG T AXIS: 61 DEGREES
EKG VENTRICULAR RATE: 55 BPM

## 2018-10-11 PROCEDURE — 93010 ELECTROCARDIOGRAM REPORT: CPT | Performed by: INTERNAL MEDICINE

## 2018-11-14 RX ORDER — CARVEDILOL 3.12 MG/1
3.12 TABLET ORAL 2 TIMES DAILY WITH MEALS
Qty: 180 TABLET | Refills: 2 | Status: SHIPPED | OUTPATIENT
Start: 2018-11-14 | End: 2019-06-20 | Stop reason: SDUPTHER

## 2018-11-27 ENCOUNTER — TELEPHONE (OUTPATIENT)
Dept: CASE MANAGEMENT | Age: 63
End: 2018-11-27

## 2018-11-28 ENCOUNTER — TELEPHONE (OUTPATIENT)
Dept: PULMONOLOGY | Age: 63
End: 2018-11-28

## 2018-11-28 NOTE — TELEPHONE ENCOUNTER
ok
of years since quitting smoking from above  · Beneficiary is asymptomatic   · National Provider Identifier (NPI) for Dr. Christa Mai

## 2018-12-04 ENCOUNTER — TELEPHONE (OUTPATIENT)
Dept: CASE MANAGEMENT | Age: 63
End: 2018-12-04

## 2019-02-01 ENCOUNTER — HOSPITAL ENCOUNTER (OUTPATIENT)
Dept: CT IMAGING | Age: 64
Discharge: HOME | End: 2019-02-01
Payer: MEDICARE

## 2019-02-01 ENCOUNTER — OFFICE VISIT (OUTPATIENT)
Dept: PULMONOLOGY | Age: 64
End: 2019-02-01
Payer: MEDICARE

## 2019-02-01 VITALS
RESPIRATION RATE: 16 BRPM | BODY MASS INDEX: 23.15 KG/M2 | TEMPERATURE: 98.3 F | SYSTOLIC BLOOD PRESSURE: 110 MMHG | OXYGEN SATURATION: 91 % | HEART RATE: 66 BPM | HEIGHT: 62 IN | DIASTOLIC BLOOD PRESSURE: 60 MMHG | WEIGHT: 125.8 LBS

## 2019-02-01 DIAGNOSIS — Z87.891 PERSONAL HISTORY OF TOBACCO USE: ICD-10-CM

## 2019-02-01 DIAGNOSIS — R91.1 PULMONARY NODULE: ICD-10-CM

## 2019-02-01 DIAGNOSIS — J96.11 CHRONIC RESPIRATORY FAILURE WITH HYPOXIA (HCC): ICD-10-CM

## 2019-02-01 DIAGNOSIS — J44.9 COPD, SEVERE (HCC): Primary | ICD-10-CM

## 2019-02-01 PROCEDURE — 71250 CT THORAX DX C-: CPT

## 2019-02-01 PROCEDURE — 99214 OFFICE O/P EST MOD 30 MIN: CPT | Performed by: INTERNAL MEDICINE

## 2019-06-13 RX ORDER — APIXABAN 5 MG/1
TABLET, FILM COATED ORAL
Qty: 60 TABLET | Refills: 0 | Status: SHIPPED | OUTPATIENT
Start: 2019-06-13 | End: 2019-06-20 | Stop reason: SDUPTHER
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
## 2019-06-20 ENCOUNTER — OFFICE VISIT (OUTPATIENT)
Dept: CARDIOLOGY CLINIC | Age: 64
End: 2019-06-20
Payer: MEDICARE

## 2019-06-20 VITALS
HEART RATE: 68 BPM | SYSTOLIC BLOOD PRESSURE: 120 MMHG | HEIGHT: 62 IN | BODY MASS INDEX: 23.19 KG/M2 | DIASTOLIC BLOOD PRESSURE: 74 MMHG | WEIGHT: 126 LBS | OXYGEN SATURATION: 93 %

## 2019-06-20 DIAGNOSIS — I25.10 CORONARY ARTERY DISEASE INVOLVING NATIVE CORONARY ARTERY OF NATIVE HEART WITHOUT ANGINA PECTORIS: Primary | ICD-10-CM

## 2019-06-20 DIAGNOSIS — I50.32 CHRONIC DIASTOLIC CHF (CONGESTIVE HEART FAILURE) (HCC): ICD-10-CM

## 2019-06-20 DIAGNOSIS — I48.0 PAROXYSMAL A-FIB (HCC): ICD-10-CM

## 2019-06-20 DIAGNOSIS — E78.2 MIXED HYPERLIPIDEMIA: ICD-10-CM

## 2019-06-20 DIAGNOSIS — I10 HTN (HYPERTENSION), BENIGN: ICD-10-CM

## 2019-06-20 PROCEDURE — 99214 OFFICE O/P EST MOD 30 MIN: CPT | Performed by: INTERNAL MEDICINE

## 2019-06-20 RX ORDER — CARVEDILOL 3.12 MG/1
3.12 TABLET ORAL 2 TIMES DAILY WITH MEALS
Qty: 180 TABLET | Refills: 2 | Status: SHIPPED | OUTPATIENT
Start: 2019-06-20 | End: 2020-04-15

## 2019-06-20 RX ORDER — ATORVASTATIN CALCIUM 80 MG/1
80 TABLET, FILM COATED ORAL NIGHTLY
Qty: 90 TABLET | Refills: 3 | Status: SHIPPED | OUTPATIENT
Start: 2019-06-20 | End: 2020-06-15

## 2019-06-20 RX ORDER — POTASSIUM CHLORIDE 750 MG/1
10 TABLET, EXTENDED RELEASE ORAL DAILY
Qty: 30 TABLET | Refills: 5 | Status: SHIPPED | OUTPATIENT
Start: 2019-06-20 | End: 2020-06-23 | Stop reason: SDUPTHER

## 2019-06-20 RX ORDER — AMLODIPINE BESYLATE 5 MG/1
5 TABLET ORAL DAILY
Qty: 90 TABLET | Refills: 3 | Status: SHIPPED | OUTPATIENT
Start: 2019-06-20 | End: 2020-06-23 | Stop reason: SDUPTHER

## 2019-06-20 RX ORDER — FUROSEMIDE 40 MG/1
40 TABLET ORAL DAILY
Qty: 30 TABLET | Refills: 5 | Status: SHIPPED | OUTPATIENT
Start: 2019-06-20 | End: 2019-12-12 | Stop reason: SDUPTHER

## 2019-06-27 ENCOUNTER — TELEPHONE (OUTPATIENT)
Dept: CARDIOLOGY CLINIC | Age: 64
End: 2019-06-27

## 2019-06-27 NOTE — TELEPHONE ENCOUNTER
LMOV INFORMING PT THAT SHE WAS APPROVED FOR PATIENT ASSISTANCE WITH ELIQUIS, AND THAT ONCE MEDICATION WAS SENT TO OFFICE I WILL CALL HER TO .

## 2019-07-01 ENCOUNTER — HOSPITAL ENCOUNTER (OUTPATIENT)
Age: 64
Discharge: HOME | End: 2019-07-01
Payer: MEDICARE

## 2019-07-01 DIAGNOSIS — E78.2 MIXED HYPERLIPIDEMIA: ICD-10-CM

## 2019-07-01 DIAGNOSIS — I25.10 CORONARY ARTERY DISEASE INVOLVING NATIVE CORONARY ARTERY OF NATIVE HEART WITHOUT ANGINA PECTORIS: ICD-10-CM

## 2019-07-01 DIAGNOSIS — I48.0 PAROXYSMAL A-FIB (HCC): ICD-10-CM

## 2019-07-01 DIAGNOSIS — I10 HTN (HYPERTENSION), BENIGN: ICD-10-CM

## 2019-07-01 DIAGNOSIS — I50.32 CHRONIC DIASTOLIC CHF (CONGESTIVE HEART FAILURE) (HCC): ICD-10-CM

## 2019-07-01 LAB
A/G RATIO: 1.6 (ref 1.1–2.2)
ALBUMIN SERPL-MCNC: 4.3 G/DL (ref 3.4–5)
ALP BLD-CCNC: 59 U/L (ref 40–129)
ALT SERPL-CCNC: 15 U/L (ref 10–40)
ANION GAP SERPL CALCULATED.3IONS-SCNC: 8 MMOL/L (ref 3–16)
AST SERPL-CCNC: 25 U/L (ref 15–37)
BASOPHILS ABSOLUTE: 0 K/UL (ref 0–0.2)
BASOPHILS RELATIVE PERCENT: 0.6 %
BILIRUB SERPL-MCNC: 0.4 MG/DL (ref 0–1)
BUN BLDV-MCNC: 14 MG/DL (ref 7–20)
CALCIUM SERPL-MCNC: 9.6 MG/DL (ref 8.3–10.6)
CHLORIDE BLD-SCNC: 101 MMOL/L (ref 99–110)
CHOLESTEROL, FASTING: 107 MG/DL (ref 0–199)
CO2: 33 MMOL/L (ref 21–32)
CREAT SERPL-MCNC: <0.5 MG/DL (ref 0.6–1.2)
EOSINOPHILS ABSOLUTE: 0.2 K/UL (ref 0–0.6)
EOSINOPHILS RELATIVE PERCENT: 3.5 %
GFR AFRICAN AMERICAN: >60
GFR NON-AFRICAN AMERICAN: >60
GLOBULIN: 2.7 G/DL
GLUCOSE FASTING: 120 MG/DL (ref 70–99)
HCT VFR BLD CALC: 43.2 % (ref 36–48)
HDLC SERPL-MCNC: 53 MG/DL (ref 40–60)
HEMOGLOBIN: 14.3 G/DL (ref 12–16)
LDL CHOLESTEROL CALCULATED: 45 MG/DL
LYMPHOCYTES ABSOLUTE: 1.7 K/UL (ref 1–5.1)
LYMPHOCYTES RELATIVE PERCENT: 27.8 %
MCH RBC QN AUTO: 30.5 PG (ref 26–34)
MCHC RBC AUTO-ENTMCNC: 33 G/DL (ref 31–36)
MCV RBC AUTO: 92.4 FL (ref 80–100)
MONOCYTES ABSOLUTE: 0.5 K/UL (ref 0–1.3)
MONOCYTES RELATIVE PERCENT: 8.5 %
NEUTROPHILS ABSOLUTE: 3.6 K/UL (ref 1.7–7.7)
NEUTROPHILS RELATIVE PERCENT: 59.6 %
PDW BLD-RTO: 13.4 % (ref 12.4–15.4)
PLATELET # BLD: 180 K/UL (ref 135–450)
PMV BLD AUTO: 10.4 FL (ref 5–10.5)
POTASSIUM SERPL-SCNC: 4.7 MMOL/L (ref 3.5–5.1)
RBC # BLD: 4.68 M/UL (ref 4–5.2)
SODIUM BLD-SCNC: 142 MMOL/L (ref 136–145)
TOTAL PROTEIN: 7 G/DL (ref 6.4–8.2)
TRIGLYCERIDE, FASTING: 47 MG/DL (ref 0–150)
VLDLC SERPL CALC-MCNC: 9 MG/DL
WBC # BLD: 6 K/UL (ref 4–11)

## 2019-07-01 PROCEDURE — 80061 LIPID PANEL: CPT

## 2019-07-01 PROCEDURE — 80053 COMPREHEN METABOLIC PANEL: CPT

## 2019-07-01 PROCEDURE — 85025 COMPLETE CBC W/AUTO DIFF WBC: CPT

## 2019-07-01 PROCEDURE — 36415 COLL VENOUS BLD VENIPUNCTURE: CPT
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
## 2019-07-16 ENCOUNTER — OFFICE VISIT (OUTPATIENT)
Dept: CARDIOLOGY CLINIC | Age: 64
End: 2019-07-16
Payer: MEDICARE

## 2019-07-16 VITALS
HEART RATE: 61 BPM | WEIGHT: 124 LBS | HEIGHT: 62 IN | SYSTOLIC BLOOD PRESSURE: 120 MMHG | BODY MASS INDEX: 22.82 KG/M2 | DIASTOLIC BLOOD PRESSURE: 74 MMHG | OXYGEN SATURATION: 97 %

## 2019-07-16 DIAGNOSIS — I48.0 PAROXYSMAL A-FIB (HCC): ICD-10-CM

## 2019-07-16 DIAGNOSIS — R60.0 LOCALIZED EDEMA: ICD-10-CM

## 2019-07-16 DIAGNOSIS — I50.32 CHRONIC DIASTOLIC CHF (CONGESTIVE HEART FAILURE) (HCC): Primary | ICD-10-CM

## 2019-07-16 PROCEDURE — 99214 OFFICE O/P EST MOD 30 MIN: CPT | Performed by: NURSE PRACTITIONER

## 2019-07-16 RX ORDER — NITROGLYCERIN 0.4 MG/1
0.4 TABLET SUBLINGUAL EVERY 5 MIN PRN
Qty: 25 TABLET | Refills: 1 | Status: SHIPPED | OUTPATIENT
Start: 2019-07-16 | End: 2020-06-23 | Stop reason: SDUPTHER

## 2019-07-16 ASSESSMENT — ENCOUNTER SYMPTOMS
CHEST TIGHTNESS: 0
SHORTNESS OF BREATH: 0
COUGH: 0
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
## 2019-08-08 ENCOUNTER — OFFICE VISIT (OUTPATIENT)
Dept: PULMONOLOGY | Age: 64
End: 2019-08-08
Payer: MEDICARE

## 2019-08-08 VITALS
BODY MASS INDEX: 22.45 KG/M2 | DIASTOLIC BLOOD PRESSURE: 72 MMHG | TEMPERATURE: 98.3 F | OXYGEN SATURATION: 96 % | SYSTOLIC BLOOD PRESSURE: 122 MMHG | WEIGHT: 122 LBS | RESPIRATION RATE: 16 BRPM | HEIGHT: 62 IN | HEART RATE: 62 BPM

## 2019-08-08 DIAGNOSIS — Z87.891 PERSONAL HISTORY OF TOBACCO USE: Primary | ICD-10-CM

## 2019-08-08 DIAGNOSIS — J96.11 CHRONIC RESPIRATORY FAILURE WITH HYPOXIA (HCC): ICD-10-CM

## 2019-08-08 DIAGNOSIS — J44.9 COPD, SEVERE (HCC): ICD-10-CM

## 2019-08-08 PROCEDURE — 99214 OFFICE O/P EST MOD 30 MIN: CPT | Performed by: INTERNAL MEDICINE

## 2019-08-08 NOTE — PROGRESS NOTES
Low Dose CT (LDCT) Lung Screening criteria met   Age 50-69   Pack year smoking >30   Still smoking or less than 15 year since quit   No sign or symptoms of lung cancer   > 11 months since last LDCT     Risks and benefits of lung cancer screening with LDCT scans discussed:    Significance of positive screen - False-positive LDCT results often occur. 95% of all positive results do not lead to a diagnosis of cancer. Usually further imaging can resolve most false-positive results; however, some patients may require invasive procedures. Over diagnosis risk - 10% to 12% of screen-detected lung cancer cases are over diagnosed--that is, the cancer would not have been detected in the patient's lifetime without the screening. Need for follow up screens annually to continue lung cancer screening effectiveness     Risks associated with radiation from annual LDCT- Radiation exposure is about the same as for a mammogram, which is about 1/3 of the annual background radiation exposure from everyday life. Starting screening at age 54 is not likely to increase cancer risk from radiation exposure. Patients with comorbidities resulting in life expectancy of < 10 years, or that would preclude treatment of an abnormality identified on CT, should not be screened due to lack of benefit.     To obtain maximal benefit from this screening, smoking cessation and long-term abstinence from smoking is critical
MCG/ACT inhaler, Inhale 2 puffs into the lungs every 6 hours as needed for Wheezing or Shortness of Breath, Disp: 1 Inhaler, Rfl: 5    aspirin 81 MG tablet, Take 81 mg by mouth daily, Disp: , Rfl:     oxyCODONE-acetaminophen (PERCOCET)  MG per tablet, Take 1 tablet by mouth three times daily . , Disp: , Rfl:     diclofenac sodium 1 % GEL, Apply 2 g topically 4 times daily, Disp: , Rfl:     albuterol (PROVENTIL) (2.5 MG/3ML) 0.083% nebulizer solution, Take 2.5 mg by nebulization every 6 hours as needed for Wheezing, Disp: , Rfl:     polyethylene glycol (GLYCOLAX) packet, Take 17 g by mouth daily as needed for Constipation, Disp: , Rfl:     OXYGEN, Inhale 2 L into the lungs , Disp: , Rfl:     ALPRAZolam (XANAX) 0.5 MG tablet, Take 0.5 mg by mouth three times daily. ., Disp: , Rfl:     gabapentin (NEURONTIN) 300 MG capsule, Take 300 mg by mouth 2 times daily. ., Disp: , Rfl:     Objective:   PHYSICAL EXAM:      VITALS:  /72   Pulse 62   Temp 98.3 °F (36.8 °C) (Oral)   Resp 16   Ht 5' 2\" (1.575 m)   Wt 122 lb (55.3 kg)   SpO2 96% Comment: ra  BMI 22.31 kg/m²   Constitutional: In no acute distress. Appears stated age. Well developed and nourished  Eyes: PERRL. No sclera icterus. No conjunctival injection. ENT: Oropharynx clear  Neck: Trachea midline. No thyroid tenderness. Lymph: No cervical LAD. No supraclavicular LAD. Resp: No accessory muscle use. Decreased Breath sounds. No crackles. No wheezes. No rhonchi. CV: Regular rate. Regular rhythm. No murmur or rub. No lower extremity edema. Musc: No clubbing. No cyanosis. No synovitis or joint deformity in digits. Psych: Oriented x 3. Mood and affect normal. Intact judgment and insight. LABS:  Reviewed any pertinent new labs that are available.     PFTs 5/11/17  FVC  (78%) FEV1 0.74 (31%) FEV1/FVC ratio 31  TLC  (122%)  RV  (168%)   DLCO (34%) Bronchodilator response: +++     6MWT: 490ft, 2lpm O2    IMAGING: I personally reviewed and

## 2019-12-12 RX ORDER — FUROSEMIDE 40 MG/1
40 TABLET ORAL DAILY
Qty: 30 TABLET | Refills: 5 | Status: SHIPPED | OUTPATIENT
Start: 2019-12-12 | End: 2020-06-23 | Stop reason: SDUPTHER

## 2020-02-04 ENCOUNTER — HOSPITAL ENCOUNTER (OUTPATIENT)
Dept: CT IMAGING | Age: 65
Discharge: HOME | End: 2020-02-04
Payer: MEDICARE

## 2020-02-04 ENCOUNTER — OFFICE VISIT (OUTPATIENT)
Dept: PULMONOLOGY | Age: 65
End: 2020-02-04
Payer: MEDICARE

## 2020-02-04 VITALS
RESPIRATION RATE: 14 BRPM | HEART RATE: 67 BPM | BODY MASS INDEX: 23.74 KG/M2 | OXYGEN SATURATION: 91 % | WEIGHT: 129 LBS | SYSTOLIC BLOOD PRESSURE: 132 MMHG | DIASTOLIC BLOOD PRESSURE: 70 MMHG | HEIGHT: 62 IN

## 2020-02-04 PROCEDURE — 99214 OFFICE O/P EST MOD 30 MIN: CPT | Performed by: INTERNAL MEDICINE

## 2020-02-04 PROCEDURE — G0297 LDCT FOR LUNG CA SCREEN: HCPCS

## 2020-02-04 NOTE — PROGRESS NOTES
[facility] Pulmonary, Critical Care, and Sleep    Outpatient Follow Up Note    CC: COPD  Consulting provider: Shelbie Lawson MD    Interval History: 62 y.o. female  No exacerbations. REIS at baseline. No cough or wheeze. Using inhalers as prescribed. Initial HPI: regarding COPD. The patient has a history of COPD, stroke. Wheezes intermittently. Has a daily intermittent cough that is usually dry or sometimes productive of white to yellow sputum. The patient does not have SOB at rest but has REIS. Exercise tolerance on level ground is < 1 block. Her mother  of lung cancer.   Current Medications:    Current Outpatient Medications:     furosemide (LASIX) 40 MG tablet, Take 1 tablet by mouth daily, Disp: 30 tablet, Rfl: 5    nitroGLYCERIN (NITROSTAT) 0.4 MG SL tablet, Place 1 tablet under the tongue every 5 minutes as needed for Chest pain, Disp: 25 tablet, Rfl: 1    apixaban (ELIQUIS) 5 MG TABS tablet, Take 1 tablet by mouth 2 times daily, Disp: 60 tablet, Rfl: 11    amLODIPine (NORVASC) 5 MG tablet, Take 1 tablet by mouth daily, Disp: 90 tablet, Rfl: 3    carvedilol (COREG) 3.125 MG tablet, Take 1 tablet by mouth 2 times daily (with meals), Disp: 180 tablet, Rfl: 2    atorvastatin (LIPITOR) 80 MG tablet, Take 1 tablet by mouth nightly, Disp: 90 tablet, Rfl: 3    potassium chloride (KLOR-CON M) 10 MEQ extended release tablet, Take 1 tablet by mouth daily, Disp: 30 tablet, Rfl: 5    ondansetron (ZOFRAN) 4 MG tablet, Take 1 tablet by mouth every 8 hours as needed for Nausea, Disp: 10 tablet, Rfl: 0    aclidinium (TUDORZA PRESSAIR) 400 MCG/ACT AEPB inhaler, Inhale 1 puff into the lungs daily, Disp: 1 each, Rfl: 5    mometasone-formoterol (DULERA) 100-5 MCG/ACT inhaler, Inhale 2 puffs into the lungs 2 times daily, Disp: 1 Inhaler, Rfl: 5    albuterol sulfate HFA (PROVENTIL HFA) 108 (90 Base) MCG/ACT inhaler, Inhale 2 puffs into the lungs every 6 hours as needed for Wheezing or Shortness of Breath, Disp: 1 Inhaler, Rfl: 5    aspirin 81 MG tablet, Take 81 mg by mouth daily, Disp: , Rfl:     oxyCODONE-acetaminophen (PERCOCET)  MG per tablet, Take 1 tablet by mouth three times daily . , Disp: , Rfl:     diclofenac sodium 1 % GEL, Apply 2 g topically 4 times daily, Disp: , Rfl:     albuterol (PROVENTIL) (2.5 MG/3ML) 0.083% nebulizer solution, Take 2.5 mg by nebulization every 6 hours as needed for Wheezing, Disp: , Rfl:     polyethylene glycol (GLYCOLAX) packet, Take 17 g by mouth daily as needed for Constipation, Disp: , Rfl:     OXYGEN, Inhale 2 L into the lungs , Disp: , Rfl:     ALPRAZolam (XANAX) 0.5 MG tablet, Take 0.5 mg by mouth three times daily. ., Disp: , Rfl:     gabapentin (NEURONTIN) 300 MG capsule, Take 300 mg by mouth 2 times daily. ., Disp: , Rfl:     apixaban (ELIQUIS) 5 MG TABS tablet, Take 1 tablet by mouth 2 times daily (Patient not taking: Reported on 2/4/2020), Disp: 180 tablet, Rfl: 3    Objective:   PHYSICAL EXAM:      VITALS:  /70   Pulse 67   Resp 14   Ht 5' 2\" (1.575 m)   Wt 129 lb (58.5 kg)   SpO2 91% Comment: 2lpm  BMI 23.59 kg/m²   Constitutional: In no acute distress. Appears stated age. Well developed and nourished  Eyes: PERRL. No sclera icterus. No conjunctival injection. ENT: Oropharynx clear  Neck: Trachea midline. No thyroid tenderness. Lymph: No cervical LAD. No supraclavicular LAD. Resp: No accessory muscle use. Decreased Breath sounds. No crackles. No wheezes. No rhonchi. CV: Regular rate. Regular rhythm. No murmur or rub. No lower extremity edema. Musc: No clubbing. No cyanosis. No synovitis or joint deformity in digits. Psych: Oriented x 3. Mood and affect normal. Intact judgment and insight. LABS:  Reviewed any pertinent new labs that are available.     PFTs 5/11/17  FVC  (78%) FEV1 0.74 (31%) FEV1/FVC ratio 31  TLC  (122%)  RV  (168%)   DLCO (34%) Bronchodilator response: +++     6MWT: 490ft, 2lpm O2    IMAGING: I personally reviewed and interpreted the following imaging today in the office:    5/11/17 HRCT:   Lungs/pleura: Mucous is present within the proximal trachea and subsegmental   right middle lobe bronchi.  Mild bronchial wall thickening involves the   bilateral lower lobes likely due to bronchitis.       There is no pneumothorax or pleural effusion. Arva Blunt is biapical scarring and   bibasilar atelectasis with extensive emphysema throughout the bilateral lungs. 2/1/19 LDCT Chest: emphysema  2/4/20 LDCT: stable    ASSESSMENT:  · COPD, severe  · Chronic hypoxic respiratory failure  · Prior tobacco abuse: quit 2012. 120 pack years  · PAF on amiodarone and eliquis  · Chronic Anticoagulation  · H/o TIA, brain aneurysm that is monitored  · DEVI -not treated per pt unable to tolerate PAP     PLAN:   · supplemental 2lpm with exertion  · She completed pulm rehab in 2014  · PRN albuterol INH and continue nebulizer  · Continue Dulera 100 and Tudorza  · On Eliquis for anticoagulation. · 6 mo for COPD and in 12 months for LDCT  Screening CT scan was considered in a lung cancer screening counseling and shared decision making visit today that included the following elements:   Eligibility: Age: 62. There are no signs or symptoms of lung cancer.   Tobacco History 120 pack-years, quit 8 years ago  Verbal counseling has been performed by me to include benefits and harms of screening, follow-up diagnostic testing, over-diagnosis, false positive rate, and total radiation exposure;   I have counseled on the importance of adherence to annual lung cancer LDCT screening, the impact of comorbidities and patient is willing to undergo diagnosis and treatment;   I have provided counseling on the importance of maintaining cigarette smoking abstinence if former smoker; or the importance of smoking cessation if current smoker and, if appropriate, furnishing of information about tobacco cessation interventions; and   I have furnished a written order for lung cancer screening with LDCT. Order for Screening chest CT scan should be placed with documentation as below:  Beneficiary date of birth;    Actual pack - year smoking history (number) from above;   Current smoking status, and for former smokers, the number of years since quitting smoking from above  Beneficiary is asymptomatic   National Provider Identifier (NPI) for Dr. Jalil Camarillo

## 2020-03-08 ENCOUNTER — HOSPITAL ENCOUNTER (INPATIENT)
Age: 65
LOS: 2 days | Discharge: HOME | DRG: 392 | End: 2020-03-10
Attending: EMERGENCY MEDICINE | Admitting: INTERNAL MEDICINE
Payer: MEDICARE

## 2020-03-08 ENCOUNTER — APPOINTMENT (OUTPATIENT)
Dept: CT IMAGING | Age: 65
DRG: 392 | End: 2020-03-08
Payer: MEDICARE

## 2020-03-08 PROBLEM — R19.7 DIARRHEA: Status: ACTIVE | Noted: 2020-03-08

## 2020-03-08 LAB
A/G RATIO: 1.5 (ref 1.1–2.2)
ALBUMIN SERPL-MCNC: 4.7 G/DL (ref 3.4–5)
ALP BLD-CCNC: 71 U/L (ref 40–129)
ALT SERPL-CCNC: 23 U/L (ref 10–40)
AMORPHOUS: ABNORMAL /HPF
ANION GAP SERPL CALCULATED.3IONS-SCNC: 12 MMOL/L (ref 3–16)
AST SERPL-CCNC: 36 U/L (ref 15–37)
BACTERIA: ABNORMAL /HPF
BASOPHILS ABSOLUTE: 0 K/UL (ref 0–0.2)
BASOPHILS RELATIVE PERCENT: 0.1 %
BILIRUB SERPL-MCNC: 0.8 MG/DL (ref 0–1)
BILIRUBIN URINE: NEGATIVE
BLOOD, URINE: ABNORMAL
BUN BLDV-MCNC: 15 MG/DL (ref 7–20)
C DIFF TOXIN/ANTIGEN: NORMAL
CALCIUM SERPL-MCNC: 9.7 MG/DL (ref 8.3–10.6)
CHLORIDE BLD-SCNC: 98 MMOL/L (ref 99–110)
CLARITY: CLEAR
CO2: 30 MMOL/L (ref 21–32)
COLOR: YELLOW
CREAT SERPL-MCNC: <0.5 MG/DL (ref 0.6–1.2)
EKG ATRIAL RATE: 64 BPM
EKG DIAGNOSIS: NORMAL
EKG P AXIS: 72 DEGREES
EKG P-R INTERVAL: 150 MS
EKG Q-T INTERVAL: 428 MS
EKG QRS DURATION: 86 MS
EKG QTC CALCULATION (BAZETT): 441 MS
EKG R AXIS: -64 DEGREES
EKG T AXIS: 60 DEGREES
EKG VENTRICULAR RATE: 64 BPM
EOSINOPHILS ABSOLUTE: 0.1 K/UL (ref 0–0.6)
EOSINOPHILS RELATIVE PERCENT: 0.9 %
EPITHELIAL CELLS, UA: ABNORMAL /HPF (ref 0–5)
GFR AFRICAN AMERICAN: >60
GFR NON-AFRICAN AMERICAN: >60
GLOBULIN: 3.2 G/DL
GLUCOSE BLD-MCNC: 163 MG/DL (ref 70–99)
GLUCOSE URINE: NEGATIVE MG/DL
HCT VFR BLD CALC: 47.5 % (ref 36–48)
HEMOGLOBIN: 15.3 G/DL (ref 12–16)
KETONES, URINE: 15 MG/DL
LACTIC ACID: 1.3 MMOL/L (ref 0.4–2)
LEUKOCYTE ESTERASE, URINE: NEGATIVE
LIPASE: 24 U/L (ref 13–60)
LYMPHOCYTES ABSOLUTE: 0.4 K/UL (ref 1–5.1)
LYMPHOCYTES RELATIVE PERCENT: 2.9 %
MCH RBC QN AUTO: 29.9 PG (ref 26–34)
MCHC RBC AUTO-ENTMCNC: 32.2 G/DL (ref 31–36)
MCV RBC AUTO: 92.6 FL (ref 80–100)
MICROSCOPIC EXAMINATION: YES
MONOCYTES ABSOLUTE: 0.7 K/UL (ref 0–1.3)
MONOCYTES RELATIVE PERCENT: 5.7 %
MUCUS: ABNORMAL /LPF
NEUTROPHILS ABSOLUTE: 11.2 K/UL (ref 1.7–7.7)
NEUTROPHILS RELATIVE PERCENT: 90.4 %
NITRITE, URINE: NEGATIVE
PDW BLD-RTO: 13.2 % (ref 12.4–15.4)
PH UA: 8.5 (ref 5–8)
PLATELET # BLD: 162 K/UL (ref 135–450)
PMV BLD AUTO: 9.6 FL (ref 5–10.5)
POTASSIUM REFLEX MAGNESIUM: 4.5 MMOL/L (ref 3.5–5.1)
PROTEIN UA: NEGATIVE MG/DL
RAPID INFLUENZA  B AGN: NEGATIVE
RAPID INFLUENZA A AGN: NEGATIVE
RBC # BLD: 5.13 M/UL (ref 4–5.2)
RBC UA: ABNORMAL /HPF (ref 0–4)
SODIUM BLD-SCNC: 140 MMOL/L (ref 136–145)
SPECIFIC GRAVITY UA: 1.02 (ref 1–1.03)
TOTAL PROTEIN: 7.9 G/DL (ref 6.4–8.2)
TROPONIN: <0.01 NG/ML
URINE REFLEX TO CULTURE: ABNORMAL
URINE TYPE: ABNORMAL
UROBILINOGEN, URINE: 0.2 E.U./DL
WBC # BLD: 12.4 K/UL (ref 4–11)
WBC UA: ABNORMAL /HPF (ref 0–5)

## 2020-03-08 PROCEDURE — 6370000000 HC RX 637 (ALT 250 FOR IP): Performed by: INTERNAL MEDICINE

## 2020-03-08 PROCEDURE — 96361 HYDRATE IV INFUSION ADD-ON: CPT

## 2020-03-08 PROCEDURE — 93010 ELECTROCARDIOGRAM REPORT: CPT | Performed by: INTERNAL MEDICINE

## 2020-03-08 PROCEDURE — 96365 THER/PROPH/DIAG IV INF INIT: CPT

## 2020-03-08 PROCEDURE — 96367 TX/PROPH/DG ADDL SEQ IV INF: CPT

## 2020-03-08 PROCEDURE — 96375 TX/PRO/DX INJ NEW DRUG ADDON: CPT

## 2020-03-08 PROCEDURE — 99285 EMERGENCY DEPT VISIT HI MDM: CPT

## 2020-03-08 PROCEDURE — 1200000000 HC SEMI PRIVATE

## 2020-03-08 PROCEDURE — 87493 C DIFF AMPLIFIED PROBE: CPT

## 2020-03-08 PROCEDURE — 2500000003 HC RX 250 WO HCPCS: Performed by: INTERNAL MEDICINE

## 2020-03-08 PROCEDURE — 80053 COMPREHEN METABOLIC PANEL: CPT

## 2020-03-08 PROCEDURE — 74176 CT ABD & PELVIS W/O CONTRAST: CPT

## 2020-03-08 PROCEDURE — 87324 CLOSTRIDIUM AG IA: CPT

## 2020-03-08 PROCEDURE — G0378 HOSPITAL OBSERVATION PER HR: HCPCS

## 2020-03-08 PROCEDURE — 83605 ASSAY OF LACTIC ACID: CPT

## 2020-03-08 PROCEDURE — 2500000003 HC RX 250 WO HCPCS: Performed by: EMERGENCY MEDICINE

## 2020-03-08 PROCEDURE — 96366 THER/PROPH/DIAG IV INF ADDON: CPT

## 2020-03-08 PROCEDURE — 6360000002 HC RX W HCPCS: Performed by: INTERNAL MEDICINE

## 2020-03-08 PROCEDURE — 81001 URINALYSIS AUTO W/SCOPE: CPT

## 2020-03-08 PROCEDURE — 93005 ELECTROCARDIOGRAM TRACING: CPT | Performed by: EMERGENCY MEDICINE

## 2020-03-08 PROCEDURE — 6360000002 HC RX W HCPCS: Performed by: EMERGENCY MEDICINE

## 2020-03-08 PROCEDURE — 85025 COMPLETE CBC W/AUTO DIFF WBC: CPT

## 2020-03-08 PROCEDURE — 84484 ASSAY OF TROPONIN QUANT: CPT

## 2020-03-08 PROCEDURE — 96376 TX/PRO/DX INJ SAME DRUG ADON: CPT

## 2020-03-08 PROCEDURE — 83690 ASSAY OF LIPASE: CPT

## 2020-03-08 PROCEDURE — 94761 N-INVAS EAR/PLS OXIMETRY MLT: CPT

## 2020-03-08 PROCEDURE — 94150 VITAL CAPACITY TEST: CPT

## 2020-03-08 PROCEDURE — 94640 AIRWAY INHALATION TREATMENT: CPT

## 2020-03-08 PROCEDURE — 87449 NOS EACH ORGANISM AG IA: CPT

## 2020-03-08 PROCEDURE — 2580000003 HC RX 258: Performed by: INTERNAL MEDICINE

## 2020-03-08 PROCEDURE — 2580000003 HC RX 258: Performed by: EMERGENCY MEDICINE

## 2020-03-08 PROCEDURE — 2700000000 HC OXYGEN THERAPY PER DAY

## 2020-03-08 PROCEDURE — 87804 INFLUENZA ASSAY W/OPTIC: CPT

## 2020-03-08 RX ORDER — SODIUM CHLORIDE 0.9 % (FLUSH) 0.9 %
10 SYRINGE (ML) INJECTION PRN
Status: DISCONTINUED | OUTPATIENT
Start: 2020-03-08 | End: 2020-03-10 | Stop reason: HOSPADM

## 2020-03-08 RX ORDER — ALBUTEROL SULFATE 2.5 MG/3ML
2.5 SOLUTION RESPIRATORY (INHALATION) EVERY 6 HOURS PRN
Status: DISCONTINUED | OUTPATIENT
Start: 2020-03-08 | End: 2020-03-08

## 2020-03-08 RX ORDER — OXYCODONE AND ACETAMINOPHEN 10; 325 MG/1; MG/1
1 TABLET ORAL EVERY 8 HOURS PRN
Status: DISCONTINUED | OUTPATIENT
Start: 2020-03-08 | End: 2020-03-10 | Stop reason: HOSPADM

## 2020-03-08 RX ORDER — BUDESONIDE AND FORMOTEROL FUMARATE DIHYDRATE 160; 4.5 UG/1; UG/1
2 AEROSOL RESPIRATORY (INHALATION) 2 TIMES DAILY
Status: DISCONTINUED | OUTPATIENT
Start: 2020-03-08 | End: 2020-03-10 | Stop reason: HOSPADM

## 2020-03-08 RX ORDER — SODIUM CHLORIDE 9 MG/ML
INJECTION, SOLUTION INTRAVENOUS CONTINUOUS
Status: DISCONTINUED | OUTPATIENT
Start: 2020-03-08 | End: 2020-03-10 | Stop reason: HOSPADM

## 2020-03-08 RX ORDER — CIPROFLOXACIN 2 MG/ML
400 INJECTION, SOLUTION INTRAVENOUS EVERY 12 HOURS SCHEDULED
Status: DISCONTINUED | OUTPATIENT
Start: 2020-03-08 | End: 2020-03-10 | Stop reason: HOSPADM

## 2020-03-08 RX ORDER — ONDANSETRON 2 MG/ML
4 INJECTION INTRAMUSCULAR; INTRAVENOUS ONCE
Status: COMPLETED | OUTPATIENT
Start: 2020-03-08 | End: 2020-03-08

## 2020-03-08 RX ORDER — POLYETHYLENE GLYCOL 3350 17 G/17G
17 POWDER, FOR SOLUTION ORAL DAILY PRN
Status: DISCONTINUED | OUTPATIENT
Start: 2020-03-08 | End: 2020-03-10 | Stop reason: HOSPADM

## 2020-03-08 RX ORDER — ACETAMINOPHEN 325 MG/1
650 TABLET ORAL EVERY 6 HOURS PRN
Status: DISCONTINUED | OUTPATIENT
Start: 2020-03-08 | End: 2020-03-10 | Stop reason: HOSPADM

## 2020-03-08 RX ORDER — POLYETHYLENE GLYCOL 3350 17 G/17G
17 POWDER, FOR SOLUTION ORAL DAILY PRN
Status: DISCONTINUED | OUTPATIENT
Start: 2020-03-08 | End: 2020-03-08 | Stop reason: SDUPTHER

## 2020-03-08 RX ORDER — ONDANSETRON 4 MG/1
4 TABLET, ORALLY DISINTEGRATING ORAL EVERY 8 HOURS PRN
Status: DISCONTINUED | OUTPATIENT
Start: 2020-03-08 | End: 2020-03-10 | Stop reason: HOSPADM

## 2020-03-08 RX ORDER — ALPRAZOLAM 0.5 MG/1
0.5 TABLET ORAL 3 TIMES DAILY
Status: DISCONTINUED | OUTPATIENT
Start: 2020-03-08 | End: 2020-03-10

## 2020-03-08 RX ORDER — SODIUM CHLORIDE 0.9 % (FLUSH) 0.9 %
10 SYRINGE (ML) INJECTION EVERY 12 HOURS SCHEDULED
Status: DISCONTINUED | OUTPATIENT
Start: 2020-03-08 | End: 2020-03-10 | Stop reason: HOSPADM

## 2020-03-08 RX ORDER — 0.9 % SODIUM CHLORIDE 0.9 %
500 INTRAVENOUS SOLUTION INTRAVENOUS ONCE
Status: COMPLETED | OUTPATIENT
Start: 2020-03-08 | End: 2020-03-08

## 2020-03-08 RX ORDER — ACETAMINOPHEN 650 MG/1
650 SUPPOSITORY RECTAL EVERY 6 HOURS PRN
Status: DISCONTINUED | OUTPATIENT
Start: 2020-03-08 | End: 2020-03-10 | Stop reason: HOSPADM

## 2020-03-08 RX ORDER — ALBUTEROL SULFATE 2.5 MG/3ML
2.5 SOLUTION RESPIRATORY (INHALATION) EVERY 4 HOURS PRN
Status: DISCONTINUED | OUTPATIENT
Start: 2020-03-08 | End: 2020-03-10 | Stop reason: HOSPADM

## 2020-03-08 RX ORDER — GABAPENTIN 300 MG/1
300 CAPSULE ORAL 2 TIMES DAILY
Status: DISCONTINUED | OUTPATIENT
Start: 2020-03-08 | End: 2020-03-10 | Stop reason: HOSPADM

## 2020-03-08 RX ORDER — ONDANSETRON 2 MG/ML
INJECTION INTRAMUSCULAR; INTRAVENOUS
Status: DISPENSED
Start: 2020-03-08 | End: 2020-03-09

## 2020-03-08 RX ORDER — ATORVASTATIN CALCIUM 40 MG/1
80 TABLET, FILM COATED ORAL NIGHTLY
Status: DISCONTINUED | OUTPATIENT
Start: 2020-03-08 | End: 2020-03-10 | Stop reason: HOSPADM

## 2020-03-08 RX ORDER — PROMETHAZINE HYDROCHLORIDE 25 MG/1
12.5 TABLET ORAL EVERY 6 HOURS PRN
Status: DISCONTINUED | OUTPATIENT
Start: 2020-03-08 | End: 2020-03-10 | Stop reason: HOSPADM

## 2020-03-08 RX ORDER — METOCLOPRAMIDE HYDROCHLORIDE 5 MG/ML
5 INJECTION INTRAMUSCULAR; INTRAVENOUS ONCE
Status: COMPLETED | OUTPATIENT
Start: 2020-03-08 | End: 2020-03-08

## 2020-03-08 RX ORDER — AMLODIPINE BESYLATE 5 MG/1
5 TABLET ORAL DAILY
Status: DISCONTINUED | OUTPATIENT
Start: 2020-03-08 | End: 2020-03-10 | Stop reason: HOSPADM

## 2020-03-08 RX ORDER — ONDANSETRON 2 MG/ML
4 INJECTION INTRAMUSCULAR; INTRAVENOUS EVERY 6 HOURS PRN
Status: DISCONTINUED | OUTPATIENT
Start: 2020-03-08 | End: 2020-03-10 | Stop reason: HOSPADM

## 2020-03-08 RX ORDER — CARVEDILOL 3.12 MG/1
3.12 TABLET ORAL 2 TIMES DAILY WITH MEALS
Status: DISCONTINUED | OUTPATIENT
Start: 2020-03-08 | End: 2020-03-10 | Stop reason: HOSPADM

## 2020-03-08 RX ADMIN — OXYCODONE HYDROCHLORIDE AND ACETAMINOPHEN 1 TABLET: 10; 325 TABLET ORAL at 23:36

## 2020-03-08 RX ADMIN — Medication 125 MG: at 21:08

## 2020-03-08 RX ADMIN — Medication 10 ML: at 21:08

## 2020-03-08 RX ADMIN — AMLODIPINE BESYLATE 5 MG: 5 TABLET ORAL at 18:10

## 2020-03-08 RX ADMIN — ACETAMINOPHEN 650 MG: 325 TABLET ORAL at 18:09

## 2020-03-08 RX ADMIN — ONDANSETRON HYDROCHLORIDE 4 MG: 2 INJECTION, SOLUTION INTRAMUSCULAR; INTRAVENOUS at 09:36

## 2020-03-08 RX ADMIN — PROMETHAZINE HYDROCHLORIDE 12.5 MG: 25 TABLET ORAL at 18:09

## 2020-03-08 RX ADMIN — METRONIDAZOLE 500 MG: 500 INJECTION, SOLUTION INTRAVENOUS at 23:34

## 2020-03-08 RX ADMIN — METRONIDAZOLE 500 MG: 500 INJECTION, SOLUTION INTRAVENOUS at 12:18

## 2020-03-08 RX ADMIN — APIXABAN 5 MG: 5 TABLET, FILM COATED ORAL at 21:08

## 2020-03-08 RX ADMIN — CIPROFLOXACIN 400 MG: 2 INJECTION, SOLUTION INTRAVENOUS at 21:34

## 2020-03-08 RX ADMIN — ONDANSETRON HYDROCHLORIDE 4 MG: 2 INJECTION, SOLUTION INTRAMUSCULAR; INTRAVENOUS at 21:32

## 2020-03-08 RX ADMIN — Medication 2 PUFF: at 19:59

## 2020-03-08 RX ADMIN — METOCLOPRAMIDE 5 MG: 5 INJECTION, SOLUTION INTRAMUSCULAR; INTRAVENOUS at 12:18

## 2020-03-08 RX ADMIN — SODIUM CHLORIDE: 900 INJECTION, SOLUTION INTRAVENOUS at 19:53

## 2020-03-08 RX ADMIN — SODIUM CHLORIDE 500 ML: 9 INJECTION, SOLUTION INTRAVENOUS at 12:47

## 2020-03-08 RX ADMIN — SODIUM CHLORIDE 500 ML: 9 INJECTION, SOLUTION INTRAVENOUS at 09:43

## 2020-03-08 RX ADMIN — ALPRAZOLAM 0.5 MG: 0.5 TABLET ORAL at 21:08

## 2020-03-08 RX ADMIN — ATORVASTATIN CALCIUM 80 MG: 40 TABLET, FILM COATED ORAL at 21:07

## 2020-03-08 RX ADMIN — ONDANSETRON HYDROCHLORIDE 4 MG: 2 INJECTION, SOLUTION INTRAMUSCULAR; INTRAVENOUS at 14:34

## 2020-03-08 ASSESSMENT — PAIN SCALES - GENERAL
PAINLEVEL_OUTOF10: 5
PAINLEVEL_OUTOF10: 5
PAINLEVEL_OUTOF10: 4
PAINLEVEL_OUTOF10: 7

## 2020-03-08 ASSESSMENT — PAIN DESCRIPTION - DESCRIPTORS: DESCRIPTORS: CONSTANT

## 2020-03-08 ASSESSMENT — PAIN DESCRIPTION - PAIN TYPE: TYPE: ACUTE PAIN

## 2020-03-08 ASSESSMENT — PAIN DESCRIPTION - LOCATION: LOCATION: ABDOMEN
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
## 2020-03-08 NOTE — ED NOTES
Report given to Jessenia Marinelli RN.  Transport  Put in for transport to [facility]  03/08/20 4661
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
## 2020-03-09 LAB
ALBUMIN SERPL-MCNC: 3.9 G/DL (ref 3.4–5)
ANION GAP SERPL CALCULATED.3IONS-SCNC: 11 MMOL/L (ref 3–16)
BASOPHILS ABSOLUTE: 0 K/UL (ref 0–0.2)
BASOPHILS RELATIVE PERCENT: 0.4 %
BUN BLDV-MCNC: 18 MG/DL (ref 7–20)
C. DIFFICILE TOXIN MOLECULAR: NORMAL
CALCIUM SERPL-MCNC: 8.7 MG/DL (ref 8.3–10.6)
CHLORIDE BLD-SCNC: 99 MMOL/L (ref 99–110)
CO2: 28 MMOL/L (ref 21–32)
CREAT SERPL-MCNC: 0.7 MG/DL (ref 0.6–1.2)
EOSINOPHILS ABSOLUTE: 0 K/UL (ref 0–0.6)
EOSINOPHILS RELATIVE PERCENT: 0 %
GFR AFRICAN AMERICAN: >60
GFR NON-AFRICAN AMERICAN: >60
GLUCOSE BLD-MCNC: 112 MG/DL (ref 70–99)
HCT VFR BLD CALC: 45 % (ref 36–48)
HEMOGLOBIN: 14.6 G/DL (ref 12–16)
LYMPHOCYTES ABSOLUTE: 0.6 K/UL (ref 1–5.1)
LYMPHOCYTES RELATIVE PERCENT: 6.6 %
MCH RBC QN AUTO: 30 PG (ref 26–34)
MCHC RBC AUTO-ENTMCNC: 32.4 G/DL (ref 31–36)
MCV RBC AUTO: 92.6 FL (ref 80–100)
MONOCYTES ABSOLUTE: 0.8 K/UL (ref 0–1.3)
MONOCYTES RELATIVE PERCENT: 8.8 %
NEUTROPHILS ABSOLUTE: 8 K/UL (ref 1.7–7.7)
NEUTROPHILS RELATIVE PERCENT: 84.2 %
PDW BLD-RTO: 13.5 % (ref 12.4–15.4)
PHOSPHORUS: 3.5 MG/DL (ref 2.5–4.9)
PLATELET # BLD: 162 K/UL (ref 135–450)
PMV BLD AUTO: 9.7 FL (ref 5–10.5)
POTASSIUM SERPL-SCNC: 3.6 MMOL/L (ref 3.5–5.1)
RBC # BLD: 4.86 M/UL (ref 4–5.2)
SODIUM BLD-SCNC: 138 MMOL/L (ref 136–145)
WBC # BLD: 9.4 K/UL (ref 4–11)

## 2020-03-09 PROCEDURE — 80069 RENAL FUNCTION PANEL: CPT

## 2020-03-09 PROCEDURE — 99232 SBSQ HOSP IP/OBS MODERATE 35: CPT | Performed by: INTERNAL MEDICINE

## 2020-03-09 PROCEDURE — 2500000003 HC RX 250 WO HCPCS: Performed by: INTERNAL MEDICINE

## 2020-03-09 PROCEDURE — 94640 AIRWAY INHALATION TREATMENT: CPT

## 2020-03-09 PROCEDURE — 94761 N-INVAS EAR/PLS OXIMETRY MLT: CPT

## 2020-03-09 PROCEDURE — 6360000002 HC RX W HCPCS: Performed by: INTERNAL MEDICINE

## 2020-03-09 PROCEDURE — 2700000000 HC OXYGEN THERAPY PER DAY

## 2020-03-09 PROCEDURE — 1200000000 HC SEMI PRIVATE

## 2020-03-09 PROCEDURE — 96376 TX/PRO/DX INJ SAME DRUG ADON: CPT

## 2020-03-09 PROCEDURE — 36415 COLL VENOUS BLD VENIPUNCTURE: CPT

## 2020-03-09 PROCEDURE — 85025 COMPLETE CBC W/AUTO DIFF WBC: CPT

## 2020-03-09 PROCEDURE — 6370000000 HC RX 637 (ALT 250 FOR IP): Performed by: INTERNAL MEDICINE

## 2020-03-09 PROCEDURE — 96366 THER/PROPH/DIAG IV INF ADDON: CPT

## 2020-03-09 PROCEDURE — 2580000003 HC RX 258: Performed by: INTERNAL MEDICINE

## 2020-03-09 PROCEDURE — G0378 HOSPITAL OBSERVATION PER HR: HCPCS

## 2020-03-09 PROCEDURE — 96368 THER/DIAG CONCURRENT INF: CPT

## 2020-03-09 RX ADMIN — METRONIDAZOLE 500 MG: 500 INJECTION, SOLUTION INTRAVENOUS at 15:39

## 2020-03-09 RX ADMIN — METRONIDAZOLE 500 MG: 500 INJECTION, SOLUTION INTRAVENOUS at 09:18

## 2020-03-09 RX ADMIN — GABAPENTIN 300 MG: 300 CAPSULE ORAL at 09:18

## 2020-03-09 RX ADMIN — SODIUM CHLORIDE: 900 INJECTION, SOLUTION INTRAVENOUS at 19:16

## 2020-03-09 RX ADMIN — Medication 2 PUFF: at 20:18

## 2020-03-09 RX ADMIN — APIXABAN 5 MG: 5 TABLET, FILM COATED ORAL at 09:09

## 2020-03-09 RX ADMIN — CIPROFLOXACIN 400 MG: 2 INJECTION, SOLUTION INTRAVENOUS at 09:18

## 2020-03-09 RX ADMIN — ONDANSETRON HYDROCHLORIDE 4 MG: 2 INJECTION, SOLUTION INTRAMUSCULAR; INTRAVENOUS at 09:17

## 2020-03-09 RX ADMIN — ONDANSETRON 4 MG: 4 TABLET, ORALLY DISINTEGRATING ORAL at 23:32

## 2020-03-09 RX ADMIN — Medication 10 ML: at 09:09

## 2020-03-09 RX ADMIN — OXYCODONE HYDROCHLORIDE AND ACETAMINOPHEN 1 TABLET: 10; 325 TABLET ORAL at 23:32

## 2020-03-09 RX ADMIN — OXYCODONE HYDROCHLORIDE AND ACETAMINOPHEN 1 TABLET: 10; 325 TABLET ORAL at 07:32

## 2020-03-09 RX ADMIN — CARVEDILOL 3.12 MG: 3.12 TABLET, FILM COATED ORAL at 17:10

## 2020-03-09 RX ADMIN — Medication 10 ML: at 20:27

## 2020-03-09 RX ADMIN — ALPRAZOLAM 0.5 MG: 0.5 TABLET ORAL at 09:09

## 2020-03-09 RX ADMIN — ONDANSETRON HYDROCHLORIDE 4 MG: 2 INJECTION, SOLUTION INTRAMUSCULAR; INTRAVENOUS at 04:07

## 2020-03-09 RX ADMIN — CIPROFLOXACIN 400 MG: 2 INJECTION, SOLUTION INTRAVENOUS at 20:26

## 2020-03-09 RX ADMIN — Medication 125 MG: at 17:10

## 2020-03-09 RX ADMIN — Medication 125 MG: at 06:15

## 2020-03-09 RX ADMIN — ONDANSETRON HYDROCHLORIDE 4 MG: 2 INJECTION, SOLUTION INTRAMUSCULAR; INTRAVENOUS at 15:38

## 2020-03-09 RX ADMIN — APIXABAN 5 MG: 5 TABLET, FILM COATED ORAL at 20:26

## 2020-03-09 RX ADMIN — Medication 2 PUFF: at 07:28

## 2020-03-09 RX ADMIN — GABAPENTIN 300 MG: 300 CAPSULE ORAL at 20:26

## 2020-03-09 RX ADMIN — ATORVASTATIN CALCIUM 80 MG: 40 TABLET, FILM COATED ORAL at 20:26

## 2020-03-09 RX ADMIN — CARVEDILOL 3.12 MG: 3.12 TABLET, FILM COATED ORAL at 09:08

## 2020-03-09 RX ADMIN — AMLODIPINE BESYLATE 5 MG: 5 TABLET ORAL at 09:09

## 2020-03-09 RX ADMIN — ALPRAZOLAM 0.5 MG: 0.5 TABLET ORAL at 13:08

## 2020-03-09 RX ADMIN — OXYCODONE HYDROCHLORIDE AND ACETAMINOPHEN 1 TABLET: 10; 325 TABLET ORAL at 15:38

## 2020-03-09 RX ADMIN — Medication 125 MG: at 12:37

## 2020-03-09 RX ADMIN — ALPRAZOLAM 0.5 MG: 0.5 TABLET ORAL at 20:26

## 2020-03-09 ASSESSMENT — PAIN DESCRIPTION - PAIN TYPE
TYPE: ACUTE PAIN
TYPE: ACUTE PAIN

## 2020-03-09 ASSESSMENT — PAIN SCALES - GENERAL
PAINLEVEL_OUTOF10: 0
PAINLEVEL_OUTOF10: 4
PAINLEVEL_OUTOF10: 2
PAINLEVEL_OUTOF10: 2
PAINLEVEL_OUTOF10: 4
PAINLEVEL_OUTOF10: 7

## 2020-03-09 ASSESSMENT — PAIN DESCRIPTION - LOCATION
LOCATION: ABDOMEN
LOCATION: ABDOMEN

## 2020-03-09 NOTE — FLOWSHEET NOTE
03/09/20 0900   Vital Signs   Temp 98.6 °F (37 °C)   Temp Source Oral   Pulse 67   Heart Rate Source Monitor   Resp 16   /64   BP Location Left upper arm   BP Upper/Lower Upper   Patient Position Semi fowlers   Level of Consciousness 0   MEWS Score 1   Oxygen Therapy   SpO2 97 %   O2 Device Nasal cannula   O2 Flow Rate (L/min) 2 L/min   Pt resting in bed, denies need. AM assessment complete. Call light in reach.

## 2020-03-09 NOTE — PROGRESS NOTES
Pt is resting in bed. No s/s of distress at this time. Call light and bedside table within reach. Will continue to monitor.

## 2020-03-09 NOTE — CARE COORDINATION
Case Management Assessment  Initial Evaluation    Date/Time of Evaluation: 3/9/2020 9:35 AM  Assessment Completed by: Shannan Snyder    Patient Name: Bhakti Cheek  YOB: 1955  Diagnosis: Diarrhea [R19.7]  Date / Time: 3/8/2020  9:05 AM  Admission status/Date: IP 03/08/2019 1400  Chart Reviewed: Yes      Patient Interviewed: Yes   Family Interviewed:  No      Hospitalization in the last 30 days:  No    Contacts  :     Relationship to Patient:   Phone Number:    Alternate Contact:     Relationship to Patient:     Phone Number:       Met with: patient    Current PCP: MD Pastora Dorsey required for SNF : YES       3 night stay required - NO    ADLS  Support Systems: Other (Comment), Home Care Staff  Transportation: self    Meal Preparation: self/common dining for dinner    Housing  Home Environment: [address] [place] [address] apt at [facility]  Steps:   Plans to Return to Present Housing: Yes  Other Identified Issues: If need STR wants to go to [facility] [place] [id number]  Currently active with [address] : No  Type of Home Care Services: Nursing Services  Passport/Waiver : No  :                      Phone Number:    Passport/Waiver Services: NA          Durable Medical Equipment   DME Provider: NA  Equipment: Walker_X__Cane___RTS___ BSC___Shower Chair___  02_X_ at ____Liter(s)---Uses________HHN___ CPAP___ BiPap___ Hospital Bed___W/C_X___Other________      Has Home O2 in place on admit:  yes  Informed of need to bring portable home O2 tank on day of discharge for nursing to connect prior to leaving:   Needs reinforcement - portable tank not with patient  Verbalized agreement/Understanding:   Needs reinforcement    Community Service Affiliation  Dialysis:  No    · Name:  · Location  · Dialysis Schedule:  · Phone:   · Fax:     Outpatient PT/OT: No    Cancer Center: No     CHF Clinic: No     Pulmonary Rehab: No  Pain Clinic: No  Community Mental Health: No    Wound Clinic: No     Other: NA    DISCHARGE PLAN: Chart reviewed. Met with pt at bedside and explained the role of the CM. IPTA. Lives in [place] [address] at [facility]. Unsure of DC needs. If she is in need of STR upon DC would prefer to go to [facility]. Will need pre-cert for rehab. +CM following. Explained Case Management role/services.

## 2020-03-09 NOTE — PROGRESS NOTES
Progress Note    Admit Date:  3/8/2020    Subjective:  Ms. Sanchez Presley has had just a small amount of diarrhea today, no vomiting, nausea persists    Objective:   Patient Vitals for the past 4 hrs:   BP Temp Temp src Pulse Resp SpO2   03/09/20 0900 112/64 98.6 °F (37 °C) Oral 67 16 97 %         Intake/Output Summary (Last 24 hours) at 3/9/2020 1209  Last data filed at 3/9/2020 0951  Gross per 24 hour   Intake 0 ml   Output 300 ml   Net -300 ml       Physical Exam:    Gen: Elderly female who appears older than stated age, chronic ill appearance. No distress. Alert. Eyes: PERRL. No sclera icterus. No conjunctival injection. ENT: No discharge. Pharynx clear. Neck: No JVD. Trachea midline. Resp: No accessory muscle use. No crackles. No wheezes. No rhonchi. Diminished breath sounds throughout. CV: Regular rate. Regular rhythm. No murmur. No rub. No edema. GI: Non-tender. Non-distended. Normal bowel sounds. Skin: Warm and dry. No nodule on exposed extremities. No rash on exposed extremities. M/S: No cyanosis. No joint deformity. No clubbing. Neuro: Awake. Grossly nonfocal    Psych: Oriented x 3. No anxiety or agitation. I Silvia Fleming have reviewed the chart on Namrata Watt and personally interviewed and examined patient, reviewed the data (labs and imaging) and after discussion with my PA formulated the plan. Agree with note with the following edits. HPI:     I reviewed the patient's Past Medical History, Past Surgical History, Medications, and Allergies. Still with diarrhea and nausea        General:  Elderly female sick appearing  Awake, alert and oriented.  Appears to be not in any distress  Mucous Membranes:  Pink , anicteric  Neck: No JVD, no carotid bruit, no thyromegaly  Chest:  Clear to auscultation bilaterally, no added sounds  Cardiovascular:  RRR S1S2 heard, no murmurs or gallops  Abdomen:  Soft, undistended, non tender, no organomegaly, BS present  Extremities: No edema or cyanosis. Distal pulses well felt  Neurological : grossly normal              Scheduled Meds:   tiotropium  2 puff Inhalation Daily    ALPRAZolam  0.5 mg Oral TID    amLODIPine  5 mg Oral Daily    apixaban  5 mg Oral BID    atorvastatin  80 mg Oral Nightly    carvedilol  3.125 mg Oral BID WC    gabapentin  300 mg Oral BID    budesonide-formoterol  2 puff Inhalation BID    vancomycin  125 mg Oral 4 times per day    sodium chloride flush  10 mL Intravenous 2 times per day    ciprofloxacin  400 mg Intravenous 2 times per day    metroNIDAZOLE  500 mg Intravenous Q8H       Continuous Infusions:   sodium chloride 75 mL/hr at 03/08/20 1953       PRN Meds:  ondansetron, oxyCODONE-acetaminophen, polyethylene glycol, sodium chloride flush, acetaminophen **OR** acetaminophen, promethazine **OR** ondansetron, albuterol      Data:  CBC:   Recent Labs     03/08/20  1040 03/09/20  0536   WBC 12.4* 9.4   HGB 15.3 14.6   HCT 47.5 45.0   MCV 92.6 92.6    162     BMP:   Recent Labs     03/08/20  0931 03/09/20  0536    138   K 4.5 3.6   CL 98* 99   CO2 30 28   PHOS  --  3.5   BUN 15 18   CREATININE <0.5* 0.7     LIVER PROFILE:   Recent Labs     03/08/20  0931   AST 36   ALT 23   LIPASE 24.0   BILITOT 0.8   ALKPHOS 71       CULTURES  C diff Ag: indeterminate  Rapid flu: negative      RADIOLOGY  CT ABDOMEN PELVIS WO CONTRAST Additional Contrast? None   Final Result   Limited exam due to artifact      Fluid-filled bowel loops in the abdomen and pelvis. This is indeterminate   may be related to ileus. Subtle wall thickening in the colon would be   difficult to exclude. Correlate clinically as relates to potential colitis      Hiatal hernia. Assessment/Plan:    #Nausea, vomiting, Diarrhea - high clinical suspicion for Cdiff. - C diff ag is indeterminate.   PCR pending.   - Get GI bact pathogens- pending   - Started Vanc PO.   - Will cont with cipro and flagyl IV pending C diff testing.   - Hold diuretic and gentle IVF to keep up with fluid loss.      #Chronic Diastolic CHF - no exacerbation.   - Lasix on hold. - IVF as above.      #COPD  - stable, uses O2 QHS and PRN.   Cont home inhalers    #HTN  - cont norvasc    #Chronic Pain   - cont PTA regimen, she has a morphine pump      #Hx of Paroxysmal Afib   - on Eliquis continued     DVT Prophylaxis: Eliquis   Diet: DIET FULL LIQUID;  Code Status: Full Code    Dakota Cannon PA-C  3/9/2020 12:09 PM      Agree with above  Changes made to note    Lali De Los Santos MD 3/9/2020 12:18 PM

## 2020-03-09 NOTE — PROGRESS NOTES
4 Eyes Skin Assessment     The patient is being assess for   low sam with Hever Genao RN    I agree that 2 RN's have performed a thorough Head to Toe Skin Assessment on the patient. ALL assessment sites listed below have been assessed. Areas assessed by both nurses:   [x]   Head, Face, and Ears   [x]   Shoulders, Back, and Chest, Abdomen  [x]   Arms, Elbows, and Hands   [x]   Coccyx, Sacrum, and Ischium  [x]   Legs, Feet, and Heels        Scattered bruises    **SHARE this note so that the co-signing nurse is able to place an eSignature**    Co-signer eSignature: Electronically signed by Fela Braxton RN on 2/0/61 at 3:03 PM EDT    Does the Patient have Skin Breakdown?   No          Sam Prevention initiated:  No   Wound Care Orders initiated:  No      [facility] nurse consulted for Pressure Injury (Stage 3,4, Unstageable, DTI, NWPT, Complex wounds)and New or Established Ostomies:  No      Primary Nurse eSignature: Electronically signed by Curt London RN on 3/9/20 at 11:37 AM EDT

## 2020-03-09 NOTE — PROGRESS NOTES
RESPIRATORY THERAPY ASSESSMENT    Name:  Norman Maya  Medical Record Number:  [id number]  Age: 62 y.o. Gender: female  : 1955  Today's Date:  3/8/2020  Room:  0221/0221-02    Assessment     Is the patient being admitted for a COPD or Asthma exacerbation? No   (If yes the patient will be seen every 4 hours for the first 24 hours and then reassessed)    Patient Admission Diagnosis      Allergies  Allergies   Allergen Reactions    Codeine      GI upset    Darvocet [Propoxyphene N-Acetaminophen]      GI upset    Navane [Thiothixene] Other (See Comments)     Lack of muscle control    Penicillins Hives    Trilafon [Perphenazine]     Aspirin Nausea And Vomiting     325 mg     Dye [Iodides] Rash       Minimum Predicted Vital Capacity:     748          Actual Vital Capacity:      860              Pulmonary History:COPD and CHF/Pulmonary Edema,  PNA, DEVI. Home Oxygen Therapy:  2 liters/min via nasal cannula PRN. Home Respiratory Therapy:Albuterol, Mometasone/Formoterol and Tudoroza   Current Respiratory Therapy:  Albuterol Q6PRN,  Spiriva Daily,  Symbicort 160-4.5   2puffs BID. Treatment Type: MDI  Medications: Budesonide/Formoterol    Respiratory Severity Index(RSI)   Patients with orders for inhalation medications, oxygen, or any therapeutic treatment modality will be placed on Respiratory Protocol. They will be assessed with the first treatment and at least every 72 hours thereafter. The following severity scale will be used to determine frequency of treatment intervention.     Smoking History: Pulmonary Disease or Smoking History, Greater than 15 pack year = 2    Social History  Social History     Tobacco Use    Smoking status: Former Smoker     Packs/day: 2.00     Years: 30.00     Pack years: 60.00     Types: Cigarettes     Last attempt to quit: 2012     Years since quittin.5    Smokeless tobacco: Never Used   Substance Use Topics    Alcohol use: No    Drug use: Never Recent Surgical History: None = 0  Past Surgical History  Past Surgical History:   Procedure Laterality Date    BREAST SURGERY      CARDIAC CATHETERIZATION      stents x3     COLONOSCOPY      DENTAL SURGERY  4/18/16    multiple teeth extracted/ Removal of BRANDIN    FRACTURE SURGERY Right     femur    HYSTERECTOMY      JOINT REPLACEMENT Bilateral     hips    NOSE SURGERY      for a broken nose    OTHER SURGICAL HISTORY      INTRATHECAL PAIN PUMP IMPLANT       Level of Consciousness: Alert, Oriented, and Cooperative = 0    Level of Activity: Walking with assistance = 1    Respiratory Pattern: Regular Pattern; RR 8-20 = 0    Breath Sounds: Diminshed bilaterally and/or crackles = 2    Sputum   ,  , Sputum How Obtained: Spontaneous cough  Cough: Strong, spontaneous, non-productive = 0    Vital Signs   /60   Pulse 62   Temp 99.4 °F (37.4 °C) (Oral)   Resp 16   Ht 5' 2\" (1.575 m)   Wt 127 lb 3.2 oz (57.7 kg)   SpO2 94%   BMI 23.27 kg/m²   SPO2 (COPD values may differ): 90-91% on room air or greater than 92% on FiO2 24- 28% = 1    Peak Flow (asthma only): not applicable = 0    RSI: 5-6 = Q4hr PRN (every four hours as needed) for dyspnea        Plan       Goals: medication delivery    Patient/caregiver was educated on the proper method of use for Respiratory Care Devices:  Yes      Level of patient/caregiver understanding able to:   ? Verbalize understanding   ? Demonstrate understanding       ? Teach back        ? Needs reinforcement       ? No available caregiver               ? Other:     Response to education:  Good     Is patient being placed on Home Treatment Regimen? No     Does the patient have everything they need prior to discharge? NA     Comments: Chart reviewed and patient assessed. Plan of Care: Albuterol Q4PRN,  Spiriva Daily,  Symbicort 160- 4.5  2PUFFS BID.      Electronically signed by Magda Carrasquillo RCP on 3/8/2020 at 9:30 PM    Respiratory Protocol Guidelines scheduled or as needed treatment for 72  Hrs. Patients Ordered on a Mucolytic Agent:    1. Must always be administered with a bronchodilator. 2. Discontinue if patient experiences worsened bronchospasm, or secretions have lessened to the point that the patient is able to clear them with a cough. Anti-inflammatory and Combination Medications:    1. If the patient lacks prior history of lung disease, is not using inhaled anti-inflammatory medication at home, and lacks wheezing by examination or by history for at least 24 hours, contact physician for possible discontinuation.

## 2020-03-09 NOTE — PLAN OF CARE
Problem: Falls - Risk of:  Goal: Will remain free from falls  Description: Will remain free from falls  3/9/2020 1019 by Kelly Coppola RN  Outcome: Ongoing  3/8/2020 2226 by Geoffrey Rousseau RN  Outcome: Ongoing     Problem: OXYGENATION/RESPIRATORY FUNCTION  Goal: Patient will maintain patent airway  Outcome: Ongoing     Problem: HEMODYNAMIC STATUS  Goal: Patient has stable vital signs and fluid balance  Outcome: Ongoing

## 2020-03-09 NOTE — PROGRESS NOTES
Shift assessment complete. Pt is alert and oriented. Vital signs are WNL. Respirations are even and easy. Complains of nausea, PRN Zofran administered. Pt denies other needs at this time. SR up x 2 and bed in low position. Call light is within reach. Will continue to monitor.

## 2020-03-10 VITALS
BODY MASS INDEX: 23.41 KG/M2 | HEART RATE: 59 BPM | SYSTOLIC BLOOD PRESSURE: 126 MMHG | HEIGHT: 62 IN | OXYGEN SATURATION: 94 % | DIASTOLIC BLOOD PRESSURE: 71 MMHG | WEIGHT: 127.2 LBS | TEMPERATURE: 97.3 F | RESPIRATION RATE: 16 BRPM

## 2020-03-10 LAB
ANION GAP SERPL CALCULATED.3IONS-SCNC: 8 MMOL/L (ref 3–16)
BASOPHILS ABSOLUTE: 0 K/UL (ref 0–0.2)
BASOPHILS RELATIVE PERCENT: 0.3 %
BUN BLDV-MCNC: 10 MG/DL (ref 7–20)
CALCIUM SERPL-MCNC: 8 MG/DL (ref 8.3–10.6)
CHLORIDE BLD-SCNC: 104 MMOL/L (ref 99–110)
CO2: 26 MMOL/L (ref 21–32)
CREAT SERPL-MCNC: <0.5 MG/DL (ref 0.6–1.2)
EOSINOPHILS ABSOLUTE: 0.1 K/UL (ref 0–0.6)
EOSINOPHILS RELATIVE PERCENT: 1.9 %
GFR AFRICAN AMERICAN: >60
GFR NON-AFRICAN AMERICAN: >60
GLUCOSE BLD-MCNC: 92 MG/DL (ref 70–99)
HCT VFR BLD CALC: 42.1 % (ref 36–48)
HEMOGLOBIN: 13.7 G/DL (ref 12–16)
LYMPHOCYTES ABSOLUTE: 1.7 K/UL (ref 1–5.1)
LYMPHOCYTES RELATIVE PERCENT: 26 %
MCH RBC QN AUTO: 30.5 PG (ref 26–34)
MCHC RBC AUTO-ENTMCNC: 32.5 G/DL (ref 31–36)
MCV RBC AUTO: 93.9 FL (ref 80–100)
MONOCYTES ABSOLUTE: 1.1 K/UL (ref 0–1.3)
MONOCYTES RELATIVE PERCENT: 16.8 %
NEUTROPHILS ABSOLUTE: 3.6 K/UL (ref 1.7–7.7)
NEUTROPHILS RELATIVE PERCENT: 55 %
PDW BLD-RTO: 13.4 % (ref 12.4–15.4)
PLATELET # BLD: 134 K/UL (ref 135–450)
PMV BLD AUTO: 9.6 FL (ref 5–10.5)
POTASSIUM REFLEX MAGNESIUM: 3.8 MMOL/L (ref 3.5–5.1)
RBC # BLD: 4.48 M/UL (ref 4–5.2)
SODIUM BLD-SCNC: 138 MMOL/L (ref 136–145)
WBC # BLD: 6.6 K/UL (ref 4–11)

## 2020-03-10 PROCEDURE — 94640 AIRWAY INHALATION TREATMENT: CPT

## 2020-03-10 PROCEDURE — 6370000000 HC RX 637 (ALT 250 FOR IP): Performed by: INTERNAL MEDICINE

## 2020-03-10 PROCEDURE — 94760 N-INVAS EAR/PLS OXIMETRY 1: CPT

## 2020-03-10 PROCEDURE — G0378 HOSPITAL OBSERVATION PER HR: HCPCS

## 2020-03-10 PROCEDURE — 6360000002 HC RX W HCPCS: Performed by: INTERNAL MEDICINE

## 2020-03-10 PROCEDURE — 36415 COLL VENOUS BLD VENIPUNCTURE: CPT

## 2020-03-10 PROCEDURE — 2700000000 HC OXYGEN THERAPY PER DAY

## 2020-03-10 PROCEDURE — 85025 COMPLETE CBC W/AUTO DIFF WBC: CPT

## 2020-03-10 PROCEDURE — 80048 BASIC METABOLIC PNL TOTAL CA: CPT

## 2020-03-10 PROCEDURE — 96368 THER/DIAG CONCURRENT INF: CPT

## 2020-03-10 PROCEDURE — 99238 HOSP IP/OBS DSCHRG MGMT 30/<: CPT | Performed by: INTERNAL MEDICINE

## 2020-03-10 PROCEDURE — 96366 THER/PROPH/DIAG IV INF ADDON: CPT

## 2020-03-10 PROCEDURE — 2500000003 HC RX 250 WO HCPCS: Performed by: INTERNAL MEDICINE

## 2020-03-10 RX ORDER — ALPRAZOLAM 0.5 MG/1
0.5 TABLET ORAL 3 TIMES DAILY PRN
Status: DISCONTINUED | OUTPATIENT
Start: 2020-03-10 | End: 2020-03-10 | Stop reason: HOSPADM

## 2020-03-10 RX ORDER — METRONIDAZOLE 500 MG/1
500 TABLET ORAL 3 TIMES DAILY
Qty: 15 TABLET | Refills: 0 | Status: SHIPPED | OUTPATIENT
Start: 2020-03-10 | End: 2020-03-15

## 2020-03-10 RX ORDER — CIPROFLOXACIN 500 MG/1
500 TABLET, FILM COATED ORAL 2 TIMES DAILY
Qty: 10 TABLET | Refills: 0 | Status: SHIPPED | OUTPATIENT
Start: 2020-03-10 | End: 2020-03-15

## 2020-03-10 RX ADMIN — ONDANSETRON HYDROCHLORIDE 4 MG: 2 INJECTION, SOLUTION INTRAMUSCULAR; INTRAVENOUS at 09:16

## 2020-03-10 RX ADMIN — Medication 125 MG: at 00:20

## 2020-03-10 RX ADMIN — CIPROFLOXACIN 400 MG: 2 INJECTION, SOLUTION INTRAVENOUS at 10:23

## 2020-03-10 RX ADMIN — METRONIDAZOLE 500 MG: 500 INJECTION, SOLUTION INTRAVENOUS at 08:56

## 2020-03-10 RX ADMIN — AMLODIPINE BESYLATE 5 MG: 5 TABLET ORAL at 10:12

## 2020-03-10 RX ADMIN — Medication 125 MG: at 05:29

## 2020-03-10 RX ADMIN — ALPRAZOLAM 0.5 MG: 0.5 TABLET ORAL at 08:58

## 2020-03-10 RX ADMIN — Medication 2 PUFF: at 07:56

## 2020-03-10 RX ADMIN — METRONIDAZOLE 500 MG: 500 INJECTION, SOLUTION INTRAVENOUS at 00:14

## 2020-03-10 RX ADMIN — CARVEDILOL 3.12 MG: 3.12 TABLET, FILM COATED ORAL at 10:12

## 2020-03-10 RX ADMIN — GABAPENTIN 300 MG: 300 CAPSULE ORAL at 08:58

## 2020-03-10 RX ADMIN — OXYCODONE HYDROCHLORIDE AND ACETAMINOPHEN 1 TABLET: 10; 325 TABLET ORAL at 09:16

## 2020-03-10 RX ADMIN — APIXABAN 5 MG: 5 TABLET, FILM COATED ORAL at 08:58

## 2020-03-10 RX ADMIN — TIOTROPIUM BROMIDE INHALATION SPRAY 2 PUFF: 3.12 SPRAY, METERED RESPIRATORY (INHALATION) at 07:56

## 2020-03-10 ASSESSMENT — PAIN SCALES - GENERAL
PAINLEVEL_OUTOF10: 4
PAINLEVEL_OUTOF10: 5

## 2020-03-10 NOTE — PROGRESS NOTES
28 26   PHOS  --  3.5  --    BUN 15 18 10   CREATININE <0.5* 0.7 <0.5*     LIVER PROFILE:   Recent Labs     03/08/20  0931   AST 36   ALT 23   LIPASE 24.0   BILITOT 0.8   ALKPHOS 71       CULTURES  C diff Ag: indeterminate  Rapid flu: negative      RADIOLOGY  CT ABDOMEN PELVIS WO CONTRAST Additional Contrast? None   Final Result   Limited exam due to artifact      Fluid-filled bowel loops in the abdomen and pelvis. This is indeterminate   may be related to ileus. Subtle wall thickening in the colon would be   difficult to exclude. Correlate clinically as relates to potential colitis      Hiatal hernia. Assessment/Plan:    #Nausea, vomiting, Diarrhea -  - likely gastroenteritis  - C diff ag is indeterminate. PCR neg  - Get GI bact pathogens- pending   - treated with cipro and flagyl  - improved symptoms , tolerating diet well  - dc home      #Chronic Diastolic CHF - no exacerbation.   - Lasix on hold. - IVF given while having volume loss, resume at dc     #COPD  - stable, uses O2 QHS and PRN.   Cont home inhalers    #HTN  - cont norvasc    #Chronic Pain   - cont PTA regimen, she has a morphine pump      #Hx of Paroxysmal Afib   - on Eliquis continued     DVT Prophylaxis: Eliquis   Diet: DIET GENERAL;  Code Status: Full Code        Isabela Gay MD 3/10/2020 2:10 PM

## 2020-03-10 NOTE — DISCHARGE SUMMARY
No edema or cyanosis. Distal pulses well felt  Neurological : grossly normal      Radiology (Please Review Full Report for Details)     c diff neg     CULTURES  C diff Ag- neg   Rapid flu: negative       RADIOLOGY  CT ABDOMEN PELVIS WO CONTRAST Additional Contrast? None   Final Result   Limited exam due to artifact       Fluid-filled bowel loops in the abdomen and pelvis. This is indeterminate   may be related to ileus. Subtle wall thickening in the colon would be   difficult to exclude. Correlate clinically as relates to potential colitis       Hiatal hernia.                Recent Labs     03/08/20  1040 03/09/20  0536 03/10/20  0634   WBC 12.4* 9.4 6.6   HGB 15.3 14.6 13.7   HCT 47.5 45.0 42.1   MCV 92.6 92.6 93.9    162 134*       Recent Labs     03/08/20  0931 03/09/20  0536 03/10/20  0634    138 138   K 4.5 3.6 3.8   CL 98* 99 104   CO2 30 28 26   PHOS  --  3.5  --    BUN 15 18 10   CREATININE <0.5* 0.7 <0.5*       CBC:  Lab Results   Component Value Date    WBC 6.6 03/10/2020    HGB 13.7 03/10/2020    HCT 42.1 03/10/2020    MCV 93.9 03/10/2020     03/10/2020    NEUTOPHILPCT 55.0 03/10/2020    LYMPHOPCT 26.0 03/10/2020    MONOPCT 16.8 03/10/2020    BASOPCT 0.3 03/10/2020    NEUTROABS 3.6 03/10/2020    LYMPHSABS 1.7 03/10/2020    MONOSABS 1.1 03/10/2020    EOSABS 0.1 03/10/2020    BASOSABS 0.0 03/10/2020     BNP:   Lab Results   Component Value Date     03/10/2020    K 3.8 03/10/2020    CO2 26 03/10/2020    BUN 10 03/10/2020    CREATININE <0.5 03/10/2020    CALCIUM 8.0 03/10/2020                Medication List      START taking these medications    ciprofloxacin 500 MG tablet  Commonly known as:  Cipro  Take 1 tablet by mouth 2 times daily for 5 days     metroNIDAZOLE 500 MG tablet  Commonly known as:  Flagyl  Take 1 tablet by mouth 3 times daily for 5 days        CHANGE how you take these medications    apixaban 5 MG Tabs tablet  Commonly known as:  Eliquis  Take 1 tablet by mouth 2 times daily  What changed:  Another medication with the same name was removed. Continue taking this medication, and follow the directions you see here. CONTINUE taking these medications    aclidinium 400 MCG/ACT Aepb inhaler  Commonly known as: Tudorza Pressair  Inhale 1 puff into the lungs daily     * albuterol (2.5 MG/3ML) 0.083% nebulizer solution  Commonly known as:  PROVENTIL     * albuterol sulfate  (90 Base) MCG/ACT inhaler  Commonly known as:  Proventil HFA  Inhale 2 puffs into the lungs every 6 hours as needed for Wheezing or Shortness of Breath     ALPRAZolam 0.5 MG tablet  Commonly known as:  XANAX     amLODIPine 5 MG tablet  Commonly known as:  NORVASC  Take 1 tablet by mouth daily     aspirin 81 MG tablet     atorvastatin 80 MG tablet  Commonly known as:  LIPITOR  Take 1 tablet by mouth nightly     carvedilol 3.125 MG tablet  Commonly known as:  COREG  Take 1 tablet by mouth 2 times daily (with meals)     diclofenac sodium 1 % Gel  Commonly known as:  VOLTAREN     furosemide 40 MG tablet  Commonly known as:  LASIX  Take 1 tablet by mouth daily     gabapentin 300 MG capsule  Commonly known as:  NEURONTIN     mometasone-formoterol 100-5 MCG/ACT inhaler  Commonly known as:  Dulera  Inhale 2 puffs into the lungs 2 times daily     nitroGLYCERIN 0.4 MG SL tablet  Commonly known as:  NITROSTAT  Place 1 tablet under the tongue every 5 minutes as needed for Chest pain     ondansetron 4 MG tablet  Commonly known as:  Zofran  Take 1 tablet by mouth every 8 hours as needed for Nausea     oxyCODONE-acetaminophen  MG per tablet  Commonly known as:  PERCOCET     OXYGEN     polyethylene glycol packet  Commonly known as:  GLYCOLAX     potassium chloride 10 MEQ extended release tablet  Commonly known as:  KLOR-CON M  Take 1 tablet by mouth daily         * This list has 2 medication(s) that are the same as other medications prescribed for you.  Read the directions carefully, and ask your doctor or other care provider to review them with you. Where to Get Your Medications      [facility] [address] [facility]  [address] [facility], [address] [place] [address]    Phone:  [phone]   · ciprofloxacin 500 MG tablet  · metroNIDAZOLE 500 MG tablet           Discharge Condition/Location: stable/home     Follow Up:    PCP in 1 weeks      Time Spent on discharge is more than 28 minutes in the examination, evaluation, counseling and review of medications and discharge plan.       Cristobal Ozuna MD 3/10/2020 2:12 PM

## 2020-03-10 NOTE — PLAN OF CARE
Problem: Falls - Risk of:  Goal: Will remain free from falls  Description: Will remain free from falls  8/12/6360 8202 by Roxanna Reed RN  Outcome: Ongoing  3/9/2020 2348 by Linda Leonard RN  Outcome: Ongoing  Goal: Absence of physical injury  Description: Absence of physical injury  7/97/9450 9110 by Roxanna Reed RN  Outcome: Ongoing  3/9/2020 2348 by Linda Leonard RN  Outcome: Ongoing     Problem: OXYGENATION/RESPIRATORY FUNCTION  Goal: Patient will maintain patent airway  2/85/5622 7870 by Roxanna Reed RN  Outcome: Ongoing  3/9/2020 2348 by Linda Leonard RN  Outcome: Ongoing  Goal: Patient will achieve/maintain normal respiratory rate/effort  Description: Respiratory rate and effort will be within normal limits for the patient  7/81/8170 8971 by Roxanna Reed RN  Outcome: Ongoing  3/9/2020 2348 by Linda Leonard RN  Outcome: Ongoing     Problem: HEMODYNAMIC STATUS  Goal: Patient has stable vital signs and fluid balance  3/00/8124 3671 by Roxanna Reed RN  Outcome: Ongoing  3/9/2020 2348 by Linda Leonard RN  Outcome: Ongoing     Problem: FLUID AND ELECTROLYTE IMBALANCE  Goal: Fluid and electrolyte balance are achieved/maintained  2/72/2598 9260 by Roxanna Reed RN  Outcome: Ongoing  3/9/2020 2348 by Linda Leonard RN  Outcome: Ongoing     Problem: ACTIVITY INTOLERANCE/IMPAIRED MOBILITY  Goal: Mobility/activity is maintained at optimum level for patient  4/20/3668 6680 by Roxanna Reed RN  Outcome: Ongoing  3/9/2020 2348 by Linda Leonard RN  Outcome: Ongoing     Problem: Pain:  Goal: Pain level will decrease  Description: Pain level will decrease  Outcome: Ongoing  Goal: Control of acute pain  Description: Control of acute pain  Outcome: Ongoing  Goal: Control of chronic pain  Description: Control of chronic pain  Outcome: Ongoing

## 2020-03-10 NOTE — PLAN OF CARE
Problem: Falls - Risk of:  Goal: Will remain free from falls  Description: Will remain free from falls  3/9/2020 2348 by Alie Davis RN  Outcome: Ongoing     Problem: OXYGENATION/RESPIRATORY FUNCTION  Goal: Patient will maintain patent airway  3/9/2020 2348 by Alie Davis RN  Outcome: Ongoing     Problem: FLUID AND ELECTROLYTE IMBALANCE  Goal: Fluid and electrolyte balance are achieved/maintained  Outcome: Ongoing

## 2020-03-10 NOTE — PROGRESS NOTES
Pt requested that her new meds be filled with meds to beds. Spoke with Lella Cockayne in pharmacy she will call [facility] to release electronic script release.

## 2020-03-10 NOTE — PROGRESS NOTES
Pt up to chair. Nausea has subsided. Currently no vomiting. Unable to collect stool sample at this time.

## 2020-03-11 ENCOUNTER — CARE COORDINATION (OUTPATIENT)
Dept: CASE MANAGEMENT | Age: 65
End: 2020-03-11

## 2020-03-11 NOTE — CARE COORDINATION
[facility] Transitions Initial Follow Up Call    Call within 2 business days of discharge: Yes    Patient:  Amandeep Keller Patient :  1955  MRN:  [id number]   Reason for Admission: CHF    Discharge Date:  3/10/20  [facility]:  [facility]      Non-face-to-face services provided:      1ST CTC attempt to reach Pt regarding recent hospital discharge. CTC left voice recording with call back number requesting a call back.     Follow up appointments:    Future Appointments   Date Time Provider Genna Rizo   2020  1:00 PM Blayne Amanda MD [facility]   2020 10:45 AM Jazmin Ahumada MD [facility]       Thank You,    Marcelene Kehr, RN  Care Transition Coordinator  Contact MRJAYF:[phone]

## 2020-03-12 ENCOUNTER — CARE COORDINATION (OUTPATIENT)
Dept: CASE MANAGEMENT | Age: 65
End: 2020-03-12

## 2020-03-12 PROCEDURE — 1111F DSCHRG MED/CURRENT MED MERGE: CPT | Performed by: FAMILY MEDICINE

## 2020-03-12 NOTE — CARE COORDINATION
[facility] Transitions Initial Follow Up Call    Call within 2 business days of discharge: Yes    Patient:  Amandeep Keller Patient :  1955  MRN:  [id number]   Reason for Admission: CHF   Discharge Date:  3/10/20  RARS:  [facility]      Non-face-to-face services provided:      2ND CTC attempt to reach Pt regarding recent hospital discharge. CTC left voice recording with call back number requesting a call back.     Follow up appointments:    Future Appointments   Date Time Provider Genna Rizo   2020  1:00 PM Blayne Amanda MD [facility]   2020 10:45 AM Jazmin Ahumada MD [facility]       Thank You,    Marcelene Kehr, RN  Care Transition Coordinator  Contact VAPRCU:[phone]

## 2020-03-12 NOTE — CARE COORDINATION
[facility] Transitions Initial Follow Up Call    Call within 2 business days of discharge: Yes    Patient: Ovi Garcia Patient : 1955   MRN: [id number]   Reason for Admission: CHF   Discharge Date: 3/10/20 RARS: Readmission Risk Score: 15      Last Discharge [facility]       Complaint Diagnosis Description Type Department Provider    3/8/20 Illness Nausea vomiting and diarrhea ED to Hosp-Admission (Discharged) (ADMITTED) [facility] Isac Turk MD; [place]. .. Spoke with: [address]: [facility] [employer]     Non-face-to-face services provided:  Obtained and reviewed discharge summary and/or continuity of care documents  Education of patient/family/caregiver/guardian to support self-management-. Assessment and support for treatment adherence and medication management-.    Care Transitions 24 Hour Call    Schedule Follow Up Appointment with PCP:  Declined  Do you have any ongoing symptoms?:  Yes  Patient-reported symptoms:  Weakness, Other  Do you have a copy of your discharge instructions?:  Yes  Do you have all of your prescriptions and are they filled?:  Yes  Have you been contacted by a [address]?:  No  Have you scheduled your follow up appointment?:  No  Were you discharged with any Home Care or Post Acute Services:  No  Do you feel like you have everything you need to keep you well at home?:  Yes  Care Transitions Interventions  No Identified Needs       Summary  CTN LVM for patient yesterday and this am, patient returned CTN call this afternoon. Patient states she is doing well, still very tired and weak. CTN instructed patient to rest as needed, not to over exert herself and try and eat at least 3 meals per day, as well as drinking plenty of fluids, staying within any fluid restrictions, of course. No complaints of any nausea, vomiting, fevers, chills, SOB or chest pain. SOB is at baseline, Home O2 at night is still effective.    No LE Edema, weight this am was 131 lbs, states she does weigh daily and keeps a log. No difficulty with urination, BM's or Appetite. CTN and Patient completed 1111f, all medications filled and in home. CTN provided education on s/s that require medical attention and when to seek medical attention. CTN advised Pt of use of urgent care or physicians 24 hr access line if assistance is needed after hours or weekend. Pt denies any needs or concerns and is agreeable with additional calls.     Follow Up  Future Appointments   Date Time Provider Genna Rizo   6/23/2020  1:00 PM Blanka Loyola MD P [facility]   8/4/2020 10:45 AM [facility]       Yusuf Lubin RN

## 2020-03-17 ENCOUNTER — CARE COORDINATION (OUTPATIENT)
Dept: CASE MANAGEMENT | Age: 65
End: 2020-03-17

## 2020-03-17 NOTE — CARE COORDINATION
[facility] Transitions Follow Up Call    3/17/2020    Patient: Connor Lowe  Patient : 1955   MRN: [id number]  Reason for Admission: gastroenteritis  Discharge Date: 3/10/20 RARS: Readmission Risk Score: 15         Spoke with: Connor Lowe (patient)    Connor Lowe returned CTN call. Reports she feels well. Occasional diarrhea twice daily at most - usually after morning coffee. Doesn't feel concerned. Denies abd pain/cramping, fever, chills, SOB, edema. Weighs daily - states stable weights. States she has not eaten in the common area since her discharge back to [facility]. She has been preparing her own meals in her apartment. She always eats a low salt diet. CHF education:  -weigh daily in the morning at the same time and in the same clothing  -report weight gain of >3#/day or >5#/week  -report increased SOB, edema, abd fullness, difficulty lying flat  -report palpitations, cough, difficulty urinating  -review of red/yellow/green CHF Zone Tool    She will call Dr Bernard Mittal office Vicente Mclean with above s/s. She follows PCP Dr Preethi Amaro in [facility] and does not plan to follow up with him until after the COVID-19 pandemic calms. Reviewed with her hand hygiene. She confirms her facility is already observing the governor's recommendation for no visitors and no outgoing unless medically necessary. Reviewed s/s of flu to report and provided her with CTN contact in the event she needs recommendation since her PCP is so far away. She voices understanding, thankful for information, denies needs. CTN outreach complete. Patient sees non-[facility] PCP.          Follow Up  Future Appointments   Date Time Provider Genna Rizo   2020  1:00 PM Kami Remy MD P [facility]   2020 10:45 AM Massiel Temple[facility]       Vivek Martínez RN

## 2020-03-17 NOTE — CARE COORDINATION
[facility] Transitions Follow Up Call    3/17/2020    Patient: Dea Gallego  Patient : 1955   MRN: [id number]  Reason for Admission: n/v/d, gastroenteritis  Discharge Date: 3/10/20 RARS: Readmission Risk Score: 15       CTN outreach unsuccessful. Message left requesting return call.        Follow Up  Future Appointments   Date Time Provider Genna Rizo   2020  1:00 PM Tolu Resendez MD [facility] CAR [facility]   2020 10:45 AM Pedro Grewal MD [facility]       Jose M Odonnell RN

## 2020-03-24 ENCOUNTER — TELEPHONE (OUTPATIENT)
Dept: CARDIOLOGY CLINIC | Age: 65
End: 2020-03-24

## 2020-03-25 NOTE — TELEPHONE ENCOUNTER
Spoke with patient. She is ok, she is not sure why we received the communication. She is calling the insurance company today.

## 2020-04-15 RX ORDER — CARVEDILOL 3.12 MG/1
TABLET ORAL
Qty: 180 TABLET | Refills: 1 | Status: SHIPPED | OUTPATIENT
Start: 2020-04-15 | End: 2020-10-23

## 2020-06-15 RX ORDER — ATORVASTATIN CALCIUM 80 MG/1
TABLET, FILM COATED ORAL
Qty: 90 TABLET | Refills: 3 | Status: SHIPPED | OUTPATIENT
Start: 2020-06-15 | End: 2021-06-09
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
## 2020-06-23 ENCOUNTER — OFFICE VISIT (OUTPATIENT)
Dept: CARDIOLOGY CLINIC | Age: 65
End: 2020-06-23
Payer: MEDICARE

## 2020-06-23 VITALS
OXYGEN SATURATION: 95 % | WEIGHT: 127 LBS | TEMPERATURE: 97.5 F | SYSTOLIC BLOOD PRESSURE: 120 MMHG | BODY MASS INDEX: 23.37 KG/M2 | DIASTOLIC BLOOD PRESSURE: 70 MMHG | HEART RATE: 72 BPM | HEIGHT: 62 IN

## 2020-06-23 PROCEDURE — 99214 OFFICE O/P EST MOD 30 MIN: CPT | Performed by: INTERNAL MEDICINE

## 2020-06-23 RX ORDER — AMLODIPINE BESYLATE 5 MG/1
5 TABLET ORAL DAILY
Qty: 90 TABLET | Refills: 3 | Status: SHIPPED | OUTPATIENT
Start: 2020-06-23 | End: 2021-10-06

## 2020-06-23 RX ORDER — POTASSIUM CHLORIDE 750 MG/1
10 TABLET, EXTENDED RELEASE ORAL DAILY PRN
Qty: 30 TABLET | Refills: 5 | Status: SHIPPED | OUTPATIENT
Start: 2020-06-23 | End: 2021-08-03 | Stop reason: SDUPTHER

## 2020-06-23 RX ORDER — FUROSEMIDE 40 MG/1
40 TABLET ORAL DAILY PRN
Qty: 30 TABLET | Refills: 5 | Status: SHIPPED | OUTPATIENT
Start: 2020-06-23 | End: 2021-08-03 | Stop reason: SDUPTHER

## 2020-06-23 RX ORDER — NITROGLYCERIN 0.4 MG/1
0.4 TABLET SUBLINGUAL EVERY 5 MIN PRN
Qty: 25 TABLET | Refills: 3 | Status: SHIPPED | OUTPATIENT
Start: 2020-06-23

## 2020-06-23 NOTE — PATIENT INSTRUCTIONS
Plan:  1. Will recheck fasting lipids  2. OK to take Lasix as needed for swelling or weight gain  3. Advised to watch low salt diet, no more than 2 liters fluid per day, keep feet elevated while sitting. 4.  Healthy lifestyle education reviewed including nutrition, controlling BP, lipids, exercise  activity,      5. meds reviewed and refilled as warranted  6.  Follow up with me in 12 months

## 2020-07-08 ENCOUNTER — HOSPITAL ENCOUNTER (OUTPATIENT)
Age: 65
Discharge: HOME | End: 2020-07-08
Payer: MEDICARE

## 2020-07-08 LAB
ALBUMIN SERPL-MCNC: 4.3 G/DL (ref 3.4–5)
ALP BLD-CCNC: 67 U/L (ref 40–129)
ALT SERPL-CCNC: 13 U/L (ref 10–40)
AST SERPL-CCNC: 22 U/L (ref 15–37)
BILIRUB SERPL-MCNC: 0.4 MG/DL (ref 0–1)
BILIRUBIN DIRECT: <0.2 MG/DL (ref 0–0.3)
BILIRUBIN, INDIRECT: NORMAL MG/DL (ref 0–1)
CHOLESTEROL, TOTAL: 108 MG/DL (ref 0–199)
FERRITIN: 294.4 NG/ML (ref 15–150)
HDLC SERPL-MCNC: 61 MG/DL (ref 40–60)
LDL CHOLESTEROL CALCULATED: 39 MG/DL
TOTAL PROTEIN: 6.9 G/DL (ref 6.4–8.2)
TRIGL SERPL-MCNC: 40 MG/DL (ref 0–150)
VLDLC SERPL CALC-MCNC: 8 MG/DL

## 2020-07-08 PROCEDURE — 82728 ASSAY OF FERRITIN: CPT

## 2020-07-08 PROCEDURE — 80076 HEPATIC FUNCTION PANEL: CPT

## 2020-07-08 PROCEDURE — 36415 COLL VENOUS BLD VENIPUNCTURE: CPT

## 2020-07-08 PROCEDURE — 80061 LIPID PANEL: CPT

## 2020-07-27 ENCOUNTER — TELEPHONE (OUTPATIENT)
Dept: PULMONOLOGY | Age: 65
End: 2020-07-27

## 2020-07-27 NOTE — TELEPHONE ENCOUNTER
Within this Telehealth Consent, the terms you and yours refer to the person using the Telehealth Service (Service), or in the case of a use of the Service by or on behalf of a minor, you and yours refer to and include (i) the parent or legal guardian who provides consent to the use of the Service by such minor or uses the Service on behalf of such minor, and (ii) the minor for whom consent is being provided or on whose behalf the Service is being utilized. When using Service, you will be consulting with your health care providers via the use of Telehealth.   Telehealth involves the delivery of healthcare services using electronic communications, information technology or other means between a healthcare provider and a patient who are not in the same physical location. Telehealth may be used for diagnosis, treatment, follow-up and/or patient education, and may include, but is not limited to, one or more of the following:    Electronic transmission of medical records, photo images, personal health information or other data between a patient and a healthcare provider    Interactions between a patient and healthcare provider via audio, video and/or data communications    Use of output data from medical devices, sound and video files    Anticipated Benefits   The use of Telehealth by your Provider(s) through the Service may have the following possible benefits:    Making it easier and more efficient for you to access medical care and treatment for the conditions treated by such Provider(s) utilizing the Service    Allowing you to obtain medical care and treatment by Provider(s) at times that are convenient for you    Enabling you to interact with Provider(s) without the necessity of an in-office appointment     Possible Risks   While the use of Telehealth can provide potential benefits for you, there are also potential risks associated with the use of Telehealth.  These risks include, but may not be limited to the following:    Your Provider(s) may not able to provide medical treatment for your particular condition and you may be required to seek alternative healthcare or emergency care services.  The electronic systems or other security protocols or safeguards used in the Service could fail, causing a breach of privacy of your medical or other information.  Given regulatory requirements in certain jurisdictions, your Provider(s) diagnosis and/or treatment options, especially pertaining to certain prescriptions, may be limited. Acceptance   1. You understand that Services will be provided via Telehealth. This process involves the use of HIPAA compliant and secure, real-time audio-visual interfacing with a qualified and appropriately trained provider located at [facility]. 2. You understand that, under no circumstances, will this session be recorded. 3. You understand that the Provider(s) at [facility] and other clinical participants will be party to the information obtained during the Telehealth session in accordance with best medical practices. 4. You understand that the information obtained during the Telehealth session will be used to help determine the most appropriate treatment options. 5. You understand that You have the right to revoke this consent at any point in time. 6. You understand that Telehealth is voluntary, and that continued treatment is not dependent upon consent. 7. You understand that, in the event of non-consent to Telehealth services and/or technical difficulties, you will obtain services as typically provided in the absence of Telehealth technology. 8. You understand that this consent will be kept in Your medical record. 9. No potential benefits from the use of Telehealth or specific results can be guaranteed. Your condition may not be cured or improved and, in some cases, may get worse.    10. There are limitations in the provision of medical care and treatment via Telehealth and the Service and you may not be able to receive diagnosis and/or treatment through the Service for every condition for which you seek diagnosis and/or treatment. 11. There are potential risks to the use of Telehealth, including but not limited to the risks described in this Telehealth Consent. 12. Your Provider(s) have discussed the use of Telehealth and the Service with you, including the benefits and risks of such and you have provided oral consent to your Provider(s) for the use of Telehealth and the Service. 15. You understand that it is your duty to provide your Provider(s) truthful, accurate and complete information, including all relevant information regarding care that you may have received or may be receiving from other healthcare providers outside of the Service. 14. You understand that each of your Provider(s) may determine in his or sole discretion that your condition is not suitable for diagnosis and/or treatment using the Service, and that you may need to seek medical care and treatment a specialist or other healthcare provider, outside of the Service. 15. You understand that you are fully responsible for payment for all services provided by Provider(s) or through use of the Service and that you may not be able to use third-party insurance. 16. You represent that (a) you have read this Telehealth Consent carefully, (b) you understand the risks and benefits of the Service and the use of Telehealth in the medical care and treatment provided to you by Provider(s) using the Service, and (c) you have the legal capacity and authority to provide this consent for yourself and/or the minor for which you are consenting under applicable federal and state laws, including laws relating to the age of [de-identified] and/or parental/guardian consent.    17. You give your informed consent to the use of Telehealth by Provider(s) using the Service under the terms described in the Terms of Service and this Telehealth Consent. The patient was read the following statement and has consented to the visit as of 7/27/20. The patient has been scheduled for their first telehealth visit on 7/28/20 with Dr cooper.

## 2020-07-28 ENCOUNTER — TELEPHONE (OUTPATIENT)
Dept: INTERNAL MEDICINE CLINIC | Age: 65
End: 2020-07-28

## 2020-07-28 ENCOUNTER — VIRTUAL VISIT (OUTPATIENT)
Dept: PULMONOLOGY | Age: 65
End: 2020-07-28
Payer: MEDICARE

## 2020-07-28 PROCEDURE — 99214 OFFICE O/P EST MOD 30 MIN: CPT | Performed by: INTERNAL MEDICINE

## 2020-07-28 RX ORDER — ACLIDINIUM BROMIDE 400 UG/1
1 POWDER, METERED RESPIRATORY (INHALATION) DAILY
Qty: 1 EACH | Refills: 5 | Status: SHIPPED | OUTPATIENT
Start: 2020-07-28 | End: 2022-07-05 | Stop reason: SDUPTHER

## 2020-07-28 RX ORDER — ALBUTEROL SULFATE 90 UG/1
2 AEROSOL, METERED RESPIRATORY (INHALATION) EVERY 6 HOURS PRN
Qty: 1 INHALER | Refills: 5 | Status: SHIPPED | OUTPATIENT
Start: 2020-07-28

## 2020-07-28 NOTE — TELEPHONE ENCOUNTER
----- Message from Jose Antonio Vo MD sent at 7/28/2020  1:29 PM EDT -----  Contact: [phone]  Ok to do at the flu clinic  ----- Message -----  From: Corona Beto  Sent: 7/28/2020  12:33 PM EDT  To: Jose Antonio Vo MD    Pt called stating that she just got back from pain management and needs to get her morphone pump refilled on Thursday. Pt said she may need to get tested for COVID before she can get her pump filled. Pt wanted to know if you would go ahead and order a test for her. Please advise.       [phone]    SE

## 2020-07-28 NOTE — TELEPHONE ENCOUNTER
----- Message from Lashonda Perea sent at 7/28/2020  4:57 PM EDT -----  Contact: [phone]    ----- Message -----  From: Lashonda Perea  Sent: 7/28/2020   1:46 PM EDT  To: Brady Gonzalez MD    Pt wants to know if she should quarantine herself? Also states she has her new pt appt with you on 8/4 and wanted to know if you wanted her to come in for that since she is a new pt or televisit?  ----- Message -----  From: Brady Gonzalez MD  Sent: 7/28/2020   1:29 PM EDT  To: Luann Ribera to do at the flu clinic  ----- Message -----  From: Andre Miller  Sent: 7/28/2020  12:33 PM EDT  To: Brady Gonzalez MD    Pt called stating that she just got back from pain management and needs to get her morphone pump refilled on Thursday. Pt said she may need to get tested for COVID before she can get her pump filled. Pt wanted to know if you would go ahead and order a test for her. Please advise.       [phone]    SE

## 2020-07-28 NOTE — PROGRESS NOTES
[facility] Pulmonary, Critical Care, and Sleep    Outpatient Follow Up Note    CC: COPD  Consulting provider: No ref. provider found    Interval History: 62 y.o. female  No exacerbations. REIS at baseline. No cough or wheeze. Using inhalers as prescribed. Initial HPI: regarding COPD. The patient has a history of COPD, stroke. Wheezes intermittently. Has a daily intermittent cough that is usually dry or sometimes productive of white to yellow sputum. The patient does not have SOB at rest but has REIS. Exercise tolerance on level ground is < 1 block. Her mother  of lung cancer.   Current Medications:    Current Outpatient Medications:     furosemide (LASIX) 40 MG tablet, Take 1 tablet by mouth daily as needed (weight gain or swelling in feet and legs), Disp: 30 tablet, Rfl: 5    apixaban (ELIQUIS) 5 MG TABS tablet, Take 1 tablet by mouth 2 times daily, Disp: 60 tablet, Rfl: 11    amLODIPine (NORVASC) 5 MG tablet, Take 1 tablet by mouth daily, Disp: 90 tablet, Rfl: 3    potassium chloride (KLOR-CON M) 10 MEQ extended release tablet, Take 1 tablet by mouth daily as needed (only take on days you take Lasix pill), Disp: 30 tablet, Rfl: 5    nitroGLYCERIN (NITROSTAT) 0.4 MG SL tablet, Place 1 tablet under the tongue every 5 minutes as needed for Chest pain, Disp: 25 tablet, Rfl: 3    atorvastatin (LIPITOR) 80 MG tablet, TAKE 1 TABLET BY MOUTH EVERY DAY AT NIGHT, Disp: 90 tablet, Rfl: 3    carvedilol (COREG) 3.125 MG tablet, TAKE 1 TABLET BY MOUTH TWICE A DAY WITH MEALS, Disp: 180 tablet, Rfl: 1    ondansetron (ZOFRAN) 4 MG tablet, Take 1 tablet by mouth every 8 hours as needed for Nausea, Disp: 10 tablet, Rfl: 0    aclidinium (TUDORZA PRESSAIR) 400 MCG/ACT AEPB inhaler, Inhale 1 puff into the lungs daily, Disp: 1 each, Rfl: 5    mometasone-formoterol (DULERA) 100-5 MCG/ACT inhaler, Inhale 2 puffs into the lungs 2 times daily, Disp: 1 Inhaler, Rfl: 5    albuterol sulfate HFA (PROVENTIL HFA) 108 (90 Base) MCG/ACT inhaler, Inhale 2 puffs into the lungs every 6 hours as needed for Wheezing or Shortness of Breath, Disp: 1 Inhaler, Rfl: 5    aspirin 81 MG tablet, Take 81 mg by mouth daily, Disp: , Rfl:     oxyCODONE-acetaminophen (PERCOCET)  MG per tablet, Take 1 tablet by mouth three times daily . , Disp: , Rfl:     diclofenac sodium 1 % GEL, Apply 2 g topically 4 times daily, Disp: , Rfl:     albuterol (PROVENTIL) (2.5 MG/3ML) 0.083% nebulizer solution, Take 2.5 mg by nebulization every 6 hours as needed for Wheezing, Disp: , Rfl:     polyethylene glycol (GLYCOLAX) packet, Take 17 g by mouth daily as needed for Constipation, Disp: , Rfl:     OXYGEN, Inhale 2 L into the lungs , Disp: , Rfl:     ALPRAZolam (XANAX) 0.5 MG tablet, Take 0.5 mg by mouth three times daily. ., Disp: , Rfl:     gabapentin (NEURONTIN) 300 MG capsule, Take 300 mg by mouth 2 times daily. ., Disp: , Rfl:     Objective:   PHYSICAL EXAM:      VITALS:  There were no vitals taken for this visit. Constitutional:  No acute distress. Appears well developed and nourished. Eyes: EOM intact. Conjunctivae anicteric. No visible discharge. HENT: Head is normocephalic and atraumatic. Mucus membranes are moist and the tongue appears normal. Normal appearing nose. External Ears normal.   Neck: No obvious mass and the trachea is midline. Respiratory: No accessory muscle usage. Respiratory effort normal. No visualized signs of difficulty breathing or respiratory distress  Psychiatric: No anxiety or Agitation. Alert and Oriented to person, place and time. Normal judgement and insight. LABS:  Reviewed any pertinent new labs that are available.     PFTs 5/11/17  FVC  (78%) FEV1 0.74 (31%) FEV1/FVC ratio 31  TLC  (122%)  RV  (168%)   DLCO (34%) Bronchodilator response: +++     6MWT: 490ft, 2lpm O2    IMAGING: I personally reviewed and interpreted the following imaging today in the office:    5/11/17 HRCT:   Lungs/pleura: Mucous is present within the proximal trachea and subsegmental   right middle lobe bronchi.  Mild bronchial wall thickening involves the   bilateral lower lobes likely due to bronchitis.       There is no pneumothorax or pleural effusion. Levada Glaser is biapical scarring and   bibasilar atelectasis with extensive emphysema throughout the bilateral lungs. 2/1/19 LDCT Chest: emphysema  2/4/20 LDCT: stable    ASSESSMENT:  · COPD, severe  · Chronic hypoxic respiratory failure  · Prior tobacco abuse: quit 2012. 120 pack years  · PAF on amiodarone and eliquis  · Chronic Anticoagulation  · H/o TIA, brain aneurysm that is monitored  · DEVI -not treated per pt unable to tolerate PAP     PLAN:   · supplemental 2lpm with exertion  · She completed pulm rehab in 2014  · PRN albuterol INH and continue nebulizer  · Continue Dulera 100 and Tudorza - rx for refills  · On Eliquis for anticoagulation. · 6 mo for COPD and LDCT in Feb  Screening CT scan was considered in a lung cancer screening counseling and shared decision making visit today that included the following elements:   Eligibility: Age: 62. There are no signs or symptoms of lung cancer. Tobacco History 120 pack-years, quit 8 years ago  Verbal counseling has been performed by me to include benefits and harms of screening, follow-up diagnostic testing, over-diagnosis, false positive rate, and total radiation exposure;   I have counseled on the importance of adherence to annual lung cancer LDCT screening, the impact of comorbidities and patient is willing to undergo diagnosis and treatment;   I have provided counseling on the importance of maintaining cigarette smoking abstinence if former smoker; or the importance of smoking cessation if current smoker and, if appropriate, furnishing of information about tobacco cessation interventions; and   I have furnished a written order for lung cancer screening with LDCT.    Order for Screening chest CT scan should be placed with documentation as below:  Beneficiary date of birth; Actual pack - year smoking history (number) from above;   Current smoking status, and for former smokers, the number of years since quitting smoking from above  Beneficiary is asymptomatic   National Provider Identifier (NPI) for Dr. Inga Lechuga. [place]  Pursuant to the emergency declaration under the [facility] waiver authority and the [employer] Act, this Virtual  Visit was conducted, with patient's consent, to reduce the patient's risk of exposure to COVID-19 and provide continuity of care for an established patient. Services were provided through a video synchronous discussion virtually to substitute for in-person clinic visit.

## 2020-07-29 ENCOUNTER — TELEPHONE (OUTPATIENT)
Dept: PULMONOLOGY | Age: 65
End: 2020-07-29

## 2020-07-29 ENCOUNTER — OFFICE VISIT (OUTPATIENT)
Dept: PRIMARY CARE CLINIC | Age: 65
End: 2020-07-29
Payer: MEDICARE

## 2020-07-29 PROCEDURE — 99211 OFF/OP EST MAY X REQ PHY/QHP: CPT | Performed by: NURSE PRACTITIONER

## 2020-07-30 LAB
REPORT: NORMAL
SARS-COV-2: NOT DETECTED
THIS TEST SENT TO: NORMAL

## 2020-08-04 ENCOUNTER — VIRTUAL VISIT (OUTPATIENT)
Dept: INTERNAL MEDICINE CLINIC | Age: 65
End: 2020-08-04

## 2020-08-04 PROBLEM — J18.9 PNEUMONIA: Status: RESOLVED | Noted: 2018-06-10 | Resolved: 2020-08-04

## 2020-08-04 PROBLEM — J18.9 COMMUNITY ACQUIRED PNEUMONIA OF RIGHT LUNG: Status: RESOLVED | Noted: 2018-06-10 | Resolved: 2020-08-04

## 2020-08-04 PROBLEM — N17.9 ARF (ACUTE RENAL FAILURE) (HCC): Status: RESOLVED | Noted: 2017-02-20 | Resolved: 2020-08-04

## 2020-08-04 PROBLEM — I95.9 HYPOTENSION: Status: RESOLVED | Noted: 2017-02-20 | Resolved: 2020-08-04

## 2020-08-04 PROBLEM — Z86.73 HISTORY OF CVA (CEREBROVASCULAR ACCIDENT): Status: ACTIVE | Noted: 2017-02-20

## 2020-08-04 PROBLEM — R65.10 SIRS (SYSTEMIC INFLAMMATORY RESPONSE SYNDROME) (HCC): Status: RESOLVED | Noted: 2018-06-10 | Resolved: 2020-08-04

## 2020-08-04 PROCEDURE — 99204 OFFICE O/P NEW MOD 45 MIN: CPT | Performed by: INTERNAL MEDICINE

## 2020-08-04 ASSESSMENT — ENCOUNTER SYMPTOMS
NAUSEA: 0
SHORTNESS OF BREATH: 0
VOMITING: 0
RHINORRHEA: 0
WHEEZING: 0
ABDOMINAL PAIN: 0
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
## 2020-08-14 ENCOUNTER — TELEPHONE (OUTPATIENT)
Dept: INTERNAL MEDICINE CLINIC | Age: 65
End: 2020-08-14

## 2020-08-14 NOTE — TELEPHONE ENCOUNTER
----- Message from Barbara Toro sent at 8/14/2020  1:47 PM EDT -----  Contact: [phone]  Waiting on Dr. Rocael York to sign.  ----- Message -----  From: Leigh Del Cid MD  Sent: 8/14/2020  12:50 PM EDT  To: Jimenez Cortez    ok  ----- Message -----  From: Chavo Johns  Sent: 8/14/2020  12:24 PM EDT  To: Leigh Del Cid MD    Pt called because she needs a prescription for a handicapped placard. She will pick it up at the office.      [phone]

## 2020-10-23 RX ORDER — CARVEDILOL 3.12 MG/1
TABLET ORAL
Qty: 180 TABLET | Refills: 1 | Status: SHIPPED | OUTPATIENT
Start: 2020-10-23 | End: 2021-04-23

## 2020-11-05 ENCOUNTER — HOSPITAL ENCOUNTER (EMERGENCY)
Age: 65
Discharge: HOME | End: 2020-11-05
Attending: EMERGENCY MEDICINE
Payer: MEDICARE

## 2020-11-05 ENCOUNTER — APPOINTMENT (OUTPATIENT)
Dept: GENERAL RADIOLOGY | Age: 65
End: 2020-11-05
Payer: MEDICARE

## 2020-11-05 VITALS
TEMPERATURE: 98.5 F | DIASTOLIC BLOOD PRESSURE: 85 MMHG | HEIGHT: 62 IN | RESPIRATION RATE: 18 BRPM | HEART RATE: 73 BPM | SYSTOLIC BLOOD PRESSURE: 140 MMHG | BODY MASS INDEX: 23.92 KG/M2 | WEIGHT: 130 LBS | OXYGEN SATURATION: 93 %

## 2020-11-05 PROCEDURE — 6360000002 HC RX W HCPCS: Performed by: EMERGENCY MEDICINE

## 2020-11-05 PROCEDURE — 73501 X-RAY EXAM HIP UNI 1 VIEW: CPT

## 2020-11-05 PROCEDURE — 96372 THER/PROPH/DIAG INJ SC/IM: CPT

## 2020-11-05 PROCEDURE — 99283 EMERGENCY DEPT VISIT LOW MDM: CPT

## 2020-11-05 RX ORDER — HYDROMORPHONE HCL 110MG/55ML
2 PATIENT CONTROLLED ANALGESIA SYRINGE INTRAVENOUS ONCE
Status: COMPLETED | OUTPATIENT
Start: 2020-11-05 | End: 2020-11-05

## 2020-11-05 RX ADMIN — HYDROMORPHONE HYDROCHLORIDE 2 MG: 2 INJECTION, SOLUTION INTRAMUSCULAR; INTRAVENOUS; SUBCUTANEOUS at 10:28

## 2020-11-05 ASSESSMENT — PAIN DESCRIPTION - LOCATION: LOCATION: HIP

## 2020-11-05 ASSESSMENT — PAIN DESCRIPTION - PAIN TYPE: TYPE: ACUTE PAIN

## 2020-11-05 ASSESSMENT — PAIN SCALES - GENERAL
PAINLEVEL_OUTOF10: 5
PAINLEVEL_OUTOF10: 6

## 2020-11-05 ASSESSMENT — PAIN DESCRIPTION - ORIENTATION: ORIENTATION: RIGHT

## 2020-11-05 ASSESSMENT — PAIN DESCRIPTION - DESCRIPTORS: DESCRIPTORS: SHOOTING

## 2020-11-05 NOTE — ED NOTES
Pt stable for DC to home with friend. Reviewed DC instructions; pt verbalized understanding and exited unit via wheelchair under no duress.       Sara Grewal RN  11/05/20 [id number]

## 2020-11-05 NOTE — ED TRIAGE NOTES
Chief Complaint   Patient presents with    Hip Pain     pt c/o right hip pain x 1 week, states pain is intermittently worse, hx of several hip surgeries
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
## 2020-11-06 ENCOUNTER — OFFICE VISIT (OUTPATIENT)
Dept: INTERNAL MEDICINE CLINIC | Age: 65
End: 2020-11-06

## 2020-11-06 ENCOUNTER — CARE COORDINATION (OUTPATIENT)
Dept: CARE COORDINATION | Age: 65
End: 2020-11-06

## 2020-11-06 VITALS
HEART RATE: 70 BPM | DIASTOLIC BLOOD PRESSURE: 80 MMHG | BODY MASS INDEX: 23.78 KG/M2 | RESPIRATION RATE: 12 BRPM | HEIGHT: 62 IN | SYSTOLIC BLOOD PRESSURE: 130 MMHG

## 2020-11-06 DIAGNOSIS — I25.10 CORONARY ARTERY DISEASE INVOLVING NATIVE CORONARY ARTERY OF NATIVE HEART WITHOUT ANGINA PECTORIS: ICD-10-CM

## 2020-11-06 DIAGNOSIS — M25.551 RIGHT HIP PAIN: ICD-10-CM

## 2020-11-06 LAB
A/G RATIO: 2 (ref 1.1–2.2)
ALBUMIN SERPL-MCNC: 4.2 G/DL (ref 3.4–5)
ALP BLD-CCNC: 74 U/L (ref 40–129)
ALT SERPL-CCNC: 11 U/L (ref 10–40)
ANION GAP SERPL CALCULATED.3IONS-SCNC: 9 MMOL/L (ref 3–16)
AST SERPL-CCNC: 20 U/L (ref 15–37)
BASOPHILS ABSOLUTE: 0 K/UL (ref 0–0.2)
BASOPHILS RELATIVE PERCENT: 0.3 %
BILIRUB SERPL-MCNC: 0.4 MG/DL (ref 0–1)
BUN BLDV-MCNC: 18 MG/DL (ref 7–20)
C-REACTIVE PROTEIN: 0.9 MG/L (ref 0–5.1)
CALCIUM SERPL-MCNC: 9.1 MG/DL (ref 8.3–10.6)
CHLORIDE BLD-SCNC: 100 MMOL/L (ref 99–110)
CO2: 32 MMOL/L (ref 21–32)
CREAT SERPL-MCNC: <0.5 MG/DL (ref 0.6–1.2)
EOSINOPHILS ABSOLUTE: 0.2 K/UL (ref 0–0.6)
EOSINOPHILS RELATIVE PERCENT: 3.1 %
GFR AFRICAN AMERICAN: >60
GFR NON-AFRICAN AMERICAN: >60
GLOBULIN: 2.1 G/DL
GLUCOSE BLD-MCNC: 125 MG/DL (ref 70–99)
HCT VFR BLD CALC: 43 % (ref 36–48)
HEMOGLOBIN: 14.1 G/DL (ref 12–16)
LYMPHOCYTES ABSOLUTE: 1.8 K/UL (ref 1–5.1)
LYMPHOCYTES RELATIVE PERCENT: 26.2 %
MCH RBC QN AUTO: 29.7 PG (ref 26–34)
MCHC RBC AUTO-ENTMCNC: 32.7 G/DL (ref 31–36)
MCV RBC AUTO: 90.7 FL (ref 80–100)
MONOCYTES ABSOLUTE: 0.6 K/UL (ref 0–1.3)
MONOCYTES RELATIVE PERCENT: 8.6 %
NEUTROPHILS ABSOLUTE: 4.2 K/UL (ref 1.7–7.7)
NEUTROPHILS RELATIVE PERCENT: 61.8 %
PDW BLD-RTO: 13.4 % (ref 12.4–15.4)
PLATELET # BLD: 180 K/UL (ref 135–450)
PMV BLD AUTO: 10 FL (ref 5–10.5)
POTASSIUM SERPL-SCNC: 4.3 MMOL/L (ref 3.5–5.1)
RBC # BLD: 4.74 M/UL (ref 4–5.2)
SODIUM BLD-SCNC: 141 MMOL/L (ref 136–145)
TOTAL PROTEIN: 6.3 G/DL (ref 6.4–8.2)
WBC # BLD: 6.8 K/UL (ref 4–11)

## 2020-11-06 PROCEDURE — 99214 OFFICE O/P EST MOD 30 MIN: CPT | Performed by: INTERNAL MEDICINE

## 2020-11-06 RX ORDER — ALPRAZOLAM 0.25 MG/1
TABLET ORAL
Qty: 21 TABLET | Refills: 0 | Status: SHIPPED | OUTPATIENT
Start: 2020-11-06 | End: 2020-12-04

## 2020-11-06 ASSESSMENT — PATIENT HEALTH QUESTIONNAIRE - PHQ9
1. LITTLE INTEREST OR PLEASURE IN DOING THINGS: 0
SUM OF ALL RESPONSES TO PHQ QUESTIONS 1-9: 0
SUM OF ALL RESPONSES TO PHQ QUESTIONS 1-9: 0
2. FEELING DOWN, DEPRESSED OR HOPELESS: 0
SUM OF ALL RESPONSES TO PHQ9 QUESTIONS 1 & 2: 0
SUM OF ALL RESPONSES TO PHQ QUESTIONS 1-9: 0

## 2020-11-06 ASSESSMENT — ENCOUNTER SYMPTOMS
SHORTNESS OF BREATH: 0
VOMITING: 0
NAUSEA: 0
WHEEZING: 0
RHINORRHEA: 0
ABDOMINAL PAIN: 0

## 2020-11-06 NOTE — CARE COORDINATION
Brief outreach completed. Patient was seen in ER on 11/5/20 for right hip pain and continues to be uncomfortable. She was provided orthopedic follow up. Patient was on her way to follow up with PCP. Denies any flu like symptoms at this time. Reviewed s/s of covid to watch for due to ER visit. Plan to follow up next week to further discuss care coordination enrollment.

## 2020-11-07 LAB — SEDIMENTATION RATE, ERYTHROCYTE: 9 MM/HR (ref 0–30)

## 2020-11-09 ENCOUNTER — TELEPHONE (OUTPATIENT)
Dept: INTERNAL MEDICINE CLINIC | Age: 65
End: 2020-11-09

## 2020-11-09 NOTE — TELEPHONE ENCOUNTER
----- Message from Tanya Hawkins MD sent at 11/9/2020 10:37 AM EST -----  First one  ----- Message -----  From: Tim Paz  Sent: 11/9/2020  10:20 AM EST  To: Tanya Hawkins MD    Pharmacy wanting to know which set of directions is correct for pt's alprazolam    1 tablet daily for 14 days then 1 tablet every other day for 14 days     0.5 mg tid prn    [facility] [phone]

## 2020-11-09 NOTE — TELEPHONE ENCOUNTER
----- Message from Sudhakar Guerrero MD sent at 11/9/2020 11:20 AM EST -----  See previous message. Tell patient we called pharmacy and fixed it  ----- Message -----  From: Vasquez Hillman  Sent: 11/9/2020  10:40 AM EST  To: Sudhakar Guerrero MD    When her medicine was called in last week there were two different directions on how to take she said pharmacy sent over paper to leave instructions on how to take.  Her number is [phone]  Thanks [facility]

## 2020-11-11 ENCOUNTER — HOSPITAL ENCOUNTER (OUTPATIENT)
Age: 65
Discharge: HOME | End: 2020-11-11
Payer: MEDICARE

## 2020-11-11 LAB
C-REACTIVE PROTEIN: 1 MG/L (ref 0–5.1)
HCT VFR BLD CALC: 42.6 % (ref 36–48)
HEMOGLOBIN: 14 G/DL (ref 12–16)
MCH RBC QN AUTO: 30.3 PG (ref 26–34)
MCHC RBC AUTO-ENTMCNC: 32.8 G/DL (ref 31–36)
MCV RBC AUTO: 92.2 FL (ref 80–100)
PDW BLD-RTO: 13.4 % (ref 12.4–15.4)
PLATELET # BLD: 180 K/UL (ref 135–450)
PMV BLD AUTO: 9.9 FL (ref 5–10.5)
RBC # BLD: 4.62 M/UL (ref 4–5.2)
SEDIMENTATION RATE, ERYTHROCYTE: 12 MM/HR (ref 0–30)
WBC # BLD: 8.7 K/UL (ref 4–11)

## 2020-11-11 PROCEDURE — 36415 COLL VENOUS BLD VENIPUNCTURE: CPT

## 2020-11-11 PROCEDURE — 85027 COMPLETE CBC AUTOMATED: CPT

## 2020-11-11 PROCEDURE — 85652 RBC SED RATE AUTOMATED: CPT

## 2020-11-11 PROCEDURE — 86140 C-REACTIVE PROTEIN: CPT

## 2020-11-17 ENCOUNTER — CARE COORDINATION (OUTPATIENT)
Dept: CARE COORDINATION | Age: 65
End: 2020-11-17

## 2020-11-18 ENCOUNTER — CARE COORDINATION (OUTPATIENT)
Dept: CARE COORDINATION | Age: 65
End: 2020-11-18

## 2020-11-23 ENCOUNTER — CARE COORDINATION (OUTPATIENT)
Dept: CARE COORDINATION | Age: 65
End: 2020-11-23

## 2020-11-23 SDOH — SOCIAL STABILITY: SOCIAL INSECURITY
WITHIN THE LAST YEAR, HAVE TO BEEN RAPED OR FORCED TO HAVE ANY KIND OF SEXUAL ACTIVITY BY YOUR PARTNER OR EX-PARTNER?: NO

## 2020-11-23 SDOH — ECONOMIC STABILITY: FOOD INSECURITY: WITHIN THE PAST 12 MONTHS, YOU WORRIED THAT YOUR FOOD WOULD RUN OUT BEFORE YOU GOT MONEY TO BUY MORE.: NEVER TRUE

## 2020-11-23 SDOH — ECONOMIC STABILITY: FOOD INSECURITY: WITHIN THE PAST 12 MONTHS, THE FOOD YOU BOUGHT JUST DIDN'T LAST AND YOU DIDN'T HAVE MONEY TO GET MORE.: NEVER TRUE

## 2020-11-23 SDOH — SOCIAL STABILITY: SOCIAL NETWORK: ARE YOU MARRIED, WIDOWED, DIVORCED, SEPARATED, NEVER MARRIED, OR LIVING WITH A PARTNER?: WIDOWED

## 2020-11-23 SDOH — SOCIAL STABILITY: SOCIAL INSECURITY: WITHIN THE LAST YEAR, HAVE YOU BEEN HUMILIATED OR EMOTIONALLY ABUSED IN OTHER WAYS BY YOUR PARTNER OR EX-PARTNER?: NO

## 2020-11-23 SDOH — SOCIAL STABILITY: SOCIAL INSECURITY
WITHIN THE LAST YEAR, HAVE YOU BEEN KICKED, HIT, SLAPPED, OR OTHERWISE PHYSICALLY HURT BY YOUR PARTNER OR EX-PARTNER?: NO

## 2020-11-23 SDOH — SOCIAL STABILITY: SOCIAL NETWORK: IN A TYPICAL WEEK, HOW MANY TIMES DO YOU TALK ON THE PHONE WITH FAMILY, FRIENDS, OR NEIGHBORS?: THREE TIMES A WEEK

## 2020-11-23 SDOH — HEALTH STABILITY: MENTAL HEALTH: HOW OFTEN DO YOU HAVE A DRINK CONTAINING ALCOHOL?: NEVER

## 2020-11-23 SDOH — HEALTH STABILITY: MENTAL HEALTH
STRESS IS WHEN SOMEONE FEELS TENSE, NERVOUS, ANXIOUS, OR CAN'T SLEEP AT NIGHT BECAUSE THEIR MIND IS TROUBLED. HOW STRESSED ARE YOU?: NOT AT ALL

## 2020-11-23 SDOH — ECONOMIC STABILITY: INCOME INSECURITY: HOW HARD IS IT FOR YOU TO PAY FOR THE VERY BASICS LIKE FOOD, HOUSING, MEDICAL CARE, AND HEATING?: NOT HARD AT ALL

## 2020-11-23 SDOH — ECONOMIC STABILITY: TRANSPORTATION INSECURITY
IN THE PAST 12 MONTHS, HAS THE LACK OF TRANSPORTATION KEPT YOU FROM MEDICAL APPOINTMENTS OR FROM GETTING MEDICATIONS?: NO

## 2020-11-23 SDOH — SOCIAL STABILITY: SOCIAL NETWORK: HOW OFTEN DO YOU GET TOGETHER WITH FRIENDS OR RELATIVES?: MORE THAN THREE TIMES A WEEK

## 2020-11-23 SDOH — SOCIAL STABILITY: SOCIAL INSECURITY: WITHIN THE LAST YEAR, HAVE YOU BEEN AFRAID OF YOUR PARTNER OR EX-PARTNER?: NO

## 2020-11-23 SDOH — ECONOMIC STABILITY: TRANSPORTATION INSECURITY
IN THE PAST 12 MONTHS, HAS LACK OF TRANSPORTATION KEPT YOU FROM MEETINGS, WORK, OR FROM GETTING THINGS NEEDED FOR DAILY LIVING?: NO

## 2020-11-23 SDOH — HEALTH STABILITY: PHYSICAL HEALTH: ON AVERAGE, HOW MANY MINUTES DO YOU ENGAGE IN EXERCISE AT THIS LEVEL?: 0 MIN

## 2020-11-23 SDOH — HEALTH STABILITY: PHYSICAL HEALTH: ON AVERAGE, HOW MANY DAYS PER WEEK DO YOU ENGAGE IN MODERATE TO STRENUOUS EXERCISE (LIKE A BRISK WALK)?: 0 DAYS

## 2020-11-23 NOTE — CARE COORDINATION
Ambulatory Care Coordination Note  11/23/2020  CM Risk Score: 2  Charlson 10 Year Mortality Risk Score: 79%     ACC: Carlitos Ricketts RN    Summary Note: ACM was able to complete enrollment call with the patient. She is going to need hip surgery most likely upcoming. Being followed Dr. Bart Meyers for this. She resides at the [facility] in [place] and currently is managing and tolerating pain. We discussed role of care coordination. She hopes that she is able to have surgery and she will be able to rehab at the [facility]. Currently she is independent there. Her daughter has been driving her to appointments. Patient still drives but is not able to. Using walker and no new falls. We discussed future needs from senior service. Currently denies any issues with CHF and COPD. Agreeable to care coordination for additional supports and to monitor for changes in care needs. Plan   Fall safety assessment ongoing   Possible future transportation needs  Refresh education on CHF and COPD       Ambulatory Care Coordination Assessment    Care Coordination Protocol  Program Enrollment:  Complex Care  Referral from Primary Care Provider:  No  Week 1 - Initial Assessment     Do you have all of your prescriptions and are they filled?:  Yes  Barriers to medication adherence:  None  Are you able to afford your medications?:  Yes  How often do you have trouble taking your medications the way you have been told to take them?:  I always take them as prescribed. Do you have Home O2 Therapy?:  No      Ability to seek help/take action for Emergent Urgent situations i.e. fire, crime, inclement weather or health crisis. :  Independent  Ability to ambulate to restroom:  Independent  Ability handle personal hygeine needs (bathing/dressing/grooming): Independent  Ability to manage Medications: Independent  Ability to prepare Food Preparation:  Independent  Ability to maintain home (clean home, laundry):   Independent  Ability to drive and/or has transportation:  Dependent  Ability to do shopping:  Needs Assistance  Ability to manage finances: Independent  Is patient able to live independently?:  Yes     Current Housing:  Independent Living/Senior Housing        Per the [address], did the patient have 2 or more falls or 1 fall with injury in the past year?:  Yes  How often do you think you are about to fall and you do NOT fall? For example, you grab something to stabilize yourself or hold onto a wall/furniture?:  Occasionally  Use of a Mobility Aid:  Yes (Comment: walker)  Difficulty walking/impaired gait:  Yes (Comment: needs hip replacment )  Issues with feet or shoes like numbness, edema, shoes not fitting:  No  Changes in vision, poor vision or poor lighting in environment:  No  Dizziness:  No  Other Fall Risk:  No     Frequent urination at night?:  No  Do you use rails/bars?:  Yes  Do you have a non-slip tub mat?:  Yes     Are you experiencing loss of meaning?:  No  Are you experiencing loss of hope and peace?:  No     Thinking about your patient's physical health needs, are there any symptoms or problems (risk indicators) you are unsure about that require further investigation?:  Moderate to severe symptoms or problems that impact on daily life   Are the patients physical health problems impacting on their mental well-being?:  No identified areas of concern   Are there any problems with your patients lifestyle behaviors (alcohol, drugs, diet, exercise) that are impacting on physical or mental well-being?:  Some mild concern of potential negative impact on well-being   Do you have any other concerns about your patients mental well-being?  How would you rate their severity and impact on the patient?:  No identified areas of concern   How would you rate their home environment in terms of safety and stability (including domestic violence, insecure housing, neighbor harassment)?:  Consistently safe, supportive, stable, no identified problems How do daily activities impact on the patient's well-being? (include current or anticipated unemployment, work, caregiving, access to transportation or other):  No identified problems or perceived positive benefits   How would you rate their social network (family, work, friends)?:  Adequate participation with social networks   How would you rate their financial resources (including ability to afford all required medical care)?:  Financially secure, some resource challenges   How wells does the patient now understand their health and well-being (symptoms, signs or risk factors) and what they need to do to manage their health?:  Reasonable to good understanding and already engages in managing health or is willing to undertake better management   How well do you think your patient can engage in healthcare discussions? (Barriers include language, deafness, aphasia, alcohol or drug problems, learning difficulties, concentration):  Clear and open communication, no identified barriers   Do other services need to be involved to help this patient?:  Other care/services not required at this time   Are current services involved with this patient well-coordinated? (Include coordination with other services you are now recommendation): All required care/services in place and well-coordinated   Suggested Interventions and Community Resources  Fall Risk Prevention: In Process Other Services or Interventions:  upcoming possible hip surgery and care needs are going to change. Lives in [facility] in [address] or Pill Pack:  Declined   Senior Services:  Not Started   Social Work:  Declined   Transportation Services:  Declined   Zone Management Tools:  Not Started                  Prior to Admission medications    Medication Sig Start Date End Date Taking? Authorizing Provider   ALPRAZolam (XANAX) 0.25 MG tablet Take 1 tablet by mouth daily for 14 days, THEN 1 tablet every other day for 14 days.  Take 0.5 mg by mouth 3 times daily as needed. . 11/6/20 12/4/20  Ton Park MD   carvedilol (COREG) 3.125 MG tablet TAKE 1 TABLET BY MOUTH TWICE A DAY WITH MEALS 10/23/20   Rebeka Otero MD   albuterol sulfate HFA (PROVENTIL HFA) 108 (90 Base) MCG/ACT inhaler Inhale 2 puffs into the lungs every 6 hours as needed for Wheezing or Shortness of Breath 7/28/20   Loretto Najjar, MD   mometasone-formoterol [facility]) 100-5 MCG/ACT inhaler Inhale 2 puffs into the lungs 2 times daily 7/28/20   Loretto Najjar, MD   aclidinium (TUDORZA PRESSAIR) 400 MCG/ACT AEPB inhaler Inhale 1 puff into the lungs daily 7/28/20   Loretto Najjar, MD   furosemide (LASIX) 40 MG tablet Take 1 tablet by mouth daily as needed (weight gain or swelling in feet and legs) 6/23/20   Rebeka Otero MD   apixaban [facility]) 5 MG TABS tablet Take 1 tablet by mouth 2 times daily 6/23/20   Rebeka Otero MD   amLODIPine (NORVASC) 5 MG tablet Take 1 tablet by mouth daily 6/23/20   Rebeka Otero MD   potassium chloride (KLOR-CON M) 10 MEQ extended release tablet Take 1 tablet by mouth daily as needed (only take on days you take Lasix pill) 6/23/20   Rebeka Otero MD   nitroGLYCERIN (NITROSTAT) 0.4 MG SL tablet Place 1 tablet under the tongue every 5 minutes as needed for Chest pain 6/23/20   Rebeka Otero MD   atorvastatin (LIPITOR) 80 MG tablet TAKE 1 TABLET BY MOUTH EVERY DAY AT NIGHT 6/15/20   Rebeka Otero MD   aspirin 81 MG tablet Take 81 mg by mouth daily    Historical Provider, MD   oxyCODONE-acetaminophen (PERCOCET)  MG per tablet Take 1 tablet by mouth three times daily .     Historical Provider, MD   diclofenac sodium 1 % GEL Apply 2 g topically 4 times daily    Historical Provider, MD   albuterol (PROVENTIL) (2.5 MG/3ML) 0.083% nebulizer solution Take 2.5 mg by nebulization every 6 hours as needed for Wheezing    Historical Provider, MD   polyethylene glycol (GLYCOLAX) packet Take 17 g by mouth daily as needed for Constipation    Historical Provider, MD   OXYGEN Inhale 2 L into the lungs     Historical Provider, MD   gabapentin (NEURONTIN) 300 MG capsule Take 300 mg by mouth 2 times daily. Heddy      Historical Provider, MD       Future Appointments   Date Time Provider Genna Sallie   2/12/2021  2:00 PM Mario Elliott MD [facility] None   2/16/2021 10:00 AM MHC CT [facility]   2/16/2021 10:45 AM Viktoria An MD [facility]

## 2020-11-30 ENCOUNTER — HOSPITAL ENCOUNTER (OUTPATIENT)
Dept: CT IMAGING | Age: 65
Discharge: HOME | End: 2020-11-30
Payer: MEDICARE

## 2020-11-30 PROCEDURE — 73700 CT LOWER EXTREMITY W/O DYE: CPT

## 2021-01-25 ENCOUNTER — CARE COORDINATION (OUTPATIENT)
Dept: CARE COORDINATION | Age: 66
End: 2021-01-25
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
## 2021-02-09 ENCOUNTER — TELEPHONE (OUTPATIENT)
Dept: INTERNAL MEDICINE CLINIC | Age: 66
End: 2021-02-09

## 2021-02-09 NOTE — TELEPHONE ENCOUNTER
----- Message from Judyann Moritz, MD sent at 2/9/2021  2:56 PM EST -----  Contact: [phone]  When is her pre op  ----- Message -----  From: Francheska Cheung  Sent: 2/9/2021  12:52 PM EST  To: Judyann Moritz, MD    Should she keep the appointment on 2/12/21?   ----- Message -----  From: Judyann Moritz, MD  Sent: 2/9/2021  12:44 PM EST  To: Francheska Cheung    Around medicare  ----- Message -----  From: Luis Antonio Martinez  Sent: 2/8/2021   1:41 PM EST  To: Judyann Moritz, MD    What date would you like me to schedule her?  ----- Message -----  From: Judyann Moritz, MD  Sent: 2/8/2021   1:19 PM EST  To: Luis Antonio Martinez    It will have to be within 30 days of her surgery. ----- Message -----  From: Luis Antonio Martinez  Sent: 2/8/2021  12:28 PM EST  To: Judyann Moritz, MD    Pt needs pre op she has hip surgery 3/29/21 she has an appointment scheduled with you already on 2-12-21 but I know the pre op needs to be within 30 days can we reschedule her later closer to the date of surgery or will the 2-12-21 appointment work.

## 2021-02-12 ENCOUNTER — TELEPHONE (OUTPATIENT)
Dept: PULMONOLOGY | Age: 66
End: 2021-02-12

## 2021-02-21 ENCOUNTER — TELEPHONE (OUTPATIENT)
Dept: CASE MANAGEMENT | Age: 66
End: 2021-02-21

## 2021-02-21 NOTE — TELEPHONE ENCOUNTER
Patient due for annual CT Lung Screening. Reminder letter mailed.     Melania Alberto [address] Lung Navigator  [phone]

## 2021-02-22 ENCOUNTER — TELEPHONE (OUTPATIENT)
Dept: CARDIOLOGY CLINIC | Age: 66
End: 2021-02-22

## 2021-02-22 NOTE — TELEPHONE ENCOUNTER
Nicole Job, 1955    Cardiac Risk Assessment    What type of procedure are you having?:  RIGHT HIP SX    PT NEEDS TO STOP BLOOD THINNERS ELIQUIS /ASA 5 DAYS PRIOR TO PROCEDURE. IS THIS OK? When is your procedure scheduled for?:  3.29.21    What physician is performing your procedure?:  DR. Evelyn Ryder    Phone Number:  [phone]    Fax number to send the letter:  [phone]. LAST OV 6.23.20          Assessment:      1. Coronary artery disease of native artery of native heart with stable angina pectoris [facility]):   Hx CAD s/p 3 SKYLA to LAD 7/14. Her most recent lexiscan myoview stress test from 04/07/16 was negative for ischemia. There are no concerning symptoms for angina currently. Most recent ECHO 6/10/18 EF 55-60%; Definity  contrast was used. No regional wall motion abnormalities are seen. Grade I DD. There are no concerning symptoms for angina currently.        2. Paroxysmal a-fib ([address]):  Diagnosed by PCP in 2015 per her report. Given COPD history amiodarone was d/c'd and continued on coreg. Eliquis started per Dr. Td Hutchison in 2014 for CHADS-vasc=5 with PAF history. Most recent EKG 03/08/20 Sinus rhythm with marked sinus arrhythmia Left axis deviation; nonspecific ST change.      3. Chronic combined systolic and diastolic congestive heart failure [facility]):  She uses lasix PRN and legs are much improved today following low salt diet and watching fluids.      4. Mixed hyperlipidemia:  Most recent Lipids 07/01/19 I personally reviewed labs results in epic (see above) and discussed with her. Need to recheck and adjust if necessary.        5. SOB (shortness of breath): Only baseline due to COPD. Sees Dr. Jim Varela routinely.         Plan:  1. Will recheck fasting lipids  2. OK to take Lasix as needed for swelling or weight gain  3. Advised to watch low salt diet, no more than 2 liters fluid per day, keep feet elevated while sitting.   4.  Healthy lifestyle education reviewed including nutrition,

## 2021-03-01 NOTE — TELEPHONE ENCOUNTER
Pt called back message/results given. Pt verbalized understanding. Pt will call Dr. Alex Devine ([facility]) and speak with them.

## 2021-03-01 NOTE — TELEPHONE ENCOUNTER
Intermediate risk clinically for intermediate risk type of surgery. OK to proceed from cardiac standpoint and to hold eliquis for least amount of time possible. Note she has severe COPD and should ask her pulm doc about lung status prior to surgery as well. Thanks.

## 2021-03-01 NOTE — TELEPHONE ENCOUNTER
Letter printed and will be faxed at the number provided. Also pt will be called concerning her getting clearance from pulm doctor.

## 2021-03-02 ENCOUNTER — APPOINTMENT (OUTPATIENT)
Dept: CT IMAGING | Age: 66
End: 2021-03-02
Payer: MEDICARE

## 2021-03-02 ENCOUNTER — APPOINTMENT (OUTPATIENT)
Dept: GENERAL RADIOLOGY | Age: 66
End: 2021-03-02
Payer: MEDICARE

## 2021-03-02 ENCOUNTER — HOSPITAL ENCOUNTER (EMERGENCY)
Age: 66
Discharge: HOME | End: 2021-03-02
Payer: MEDICARE

## 2021-03-02 ENCOUNTER — HOSPITAL ENCOUNTER (EMERGENCY)
Age: 66
Discharge: ANOTHER HOSPITAL | End: 2021-03-03
Attending: EMERGENCY MEDICINE
Payer: MEDICARE

## 2021-03-02 VITALS
BODY MASS INDEX: 25.4 KG/M2 | HEART RATE: 69 BPM | TEMPERATURE: 98.2 F | OXYGEN SATURATION: 94 % | HEIGHT: 62 IN | WEIGHT: 138 LBS | RESPIRATION RATE: 18 BRPM | DIASTOLIC BLOOD PRESSURE: 67 MMHG | SYSTOLIC BLOOD PRESSURE: 121 MMHG

## 2021-03-02 DIAGNOSIS — M25.552 LEFT HIP PAIN: Primary | ICD-10-CM

## 2021-03-02 DIAGNOSIS — D72.829 LEUKOCYTOSIS, UNSPECIFIED TYPE: ICD-10-CM

## 2021-03-02 DIAGNOSIS — Z96.642 HISTORY OF LEFT HIP REPLACEMENT: ICD-10-CM

## 2021-03-02 DIAGNOSIS — R26.2 INABILITY TO AMBULATE DUE TO HIP: ICD-10-CM

## 2021-03-02 LAB
ANION GAP SERPL CALCULATED.3IONS-SCNC: 11 MMOL/L (ref 3–16)
BASOPHILS ABSOLUTE: 0.1 K/UL (ref 0–0.2)
BASOPHILS RELATIVE PERCENT: 0.7 %
BUN BLDV-MCNC: 13 MG/DL (ref 7–20)
CALCIUM SERPL-MCNC: 10.1 MG/DL (ref 8.3–10.6)
CHLORIDE BLD-SCNC: 98 MMOL/L (ref 99–110)
CO2: 30 MMOL/L (ref 21–32)
CREAT SERPL-MCNC: <0.5 MG/DL (ref 0.6–1.2)
EOSINOPHILS ABSOLUTE: 0 K/UL (ref 0–0.6)
EOSINOPHILS RELATIVE PERCENT: 0.2 %
GFR AFRICAN AMERICAN: >60
GFR NON-AFRICAN AMERICAN: >60
GLUCOSE BLD-MCNC: 117 MG/DL (ref 70–99)
HCT VFR BLD CALC: 44.8 % (ref 36–48)
HEMOGLOBIN: 14.4 G/DL (ref 12–16)
LYMPHOCYTES ABSOLUTE: 1 K/UL (ref 1–5.1)
LYMPHOCYTES RELATIVE PERCENT: 6.5 %
MCH RBC QN AUTO: 29.2 PG (ref 26–34)
MCHC RBC AUTO-ENTMCNC: 32.2 G/DL (ref 31–36)
MCV RBC AUTO: 90.7 FL (ref 80–100)
MONOCYTES ABSOLUTE: 1.2 K/UL (ref 0–1.3)
MONOCYTES RELATIVE PERCENT: 7.8 %
NEUTROPHILS ABSOLUTE: 13.1 K/UL (ref 1.7–7.7)
NEUTROPHILS RELATIVE PERCENT: 84.8 %
PDW BLD-RTO: 14 % (ref 12.4–15.4)
PLATELET # BLD: 198 K/UL (ref 135–450)
PMV BLD AUTO: 10.7 FL (ref 5–10.5)
POTASSIUM REFLEX MAGNESIUM: 3.9 MMOL/L (ref 3.5–5.1)
RBC # BLD: 4.94 M/UL (ref 4–5.2)
SODIUM BLD-SCNC: 139 MMOL/L (ref 136–145)
WBC # BLD: 15.5 K/UL (ref 4–11)

## 2021-03-02 PROCEDURE — 96372 THER/PROPH/DIAG INJ SC/IM: CPT

## 2021-03-02 PROCEDURE — 73502 X-RAY EXAM HIP UNI 2-3 VIEWS: CPT

## 2021-03-02 PROCEDURE — 99285 EMERGENCY DEPT VISIT HI MDM: CPT

## 2021-03-02 PROCEDURE — 71045 X-RAY EXAM CHEST 1 VIEW: CPT

## 2021-03-02 PROCEDURE — 73700 CT LOWER EXTREMITY W/O DYE: CPT

## 2021-03-02 PROCEDURE — 96375 TX/PRO/DX INJ NEW DRUG ADDON: CPT

## 2021-03-02 PROCEDURE — 6370000000 HC RX 637 (ALT 250 FOR IP): Performed by: PHYSICIAN ASSISTANT

## 2021-03-02 PROCEDURE — 96374 THER/PROPH/DIAG INJ IV PUSH: CPT

## 2021-03-02 PROCEDURE — 6360000002 HC RX W HCPCS: Performed by: PHYSICIAN ASSISTANT

## 2021-03-02 PROCEDURE — 6370000000 HC RX 637 (ALT 250 FOR IP): Performed by: NURSE PRACTITIONER

## 2021-03-02 PROCEDURE — 96376 TX/PRO/DX INJ SAME DRUG ADON: CPT

## 2021-03-02 PROCEDURE — 6360000002 HC RX W HCPCS: Performed by: NURSE PRACTITIONER

## 2021-03-02 PROCEDURE — 85025 COMPLETE CBC W/AUTO DIFF WBC: CPT

## 2021-03-02 PROCEDURE — 80048 BASIC METABOLIC PNL TOTAL CA: CPT

## 2021-03-02 RX ORDER — CYCLOBENZAPRINE HCL 10 MG
10 TABLET ORAL 3 TIMES DAILY PRN
Qty: 21 TABLET | Refills: 0 | Status: SHIPPED | OUTPATIENT
Start: 2021-03-02 | End: 2021-03-12

## 2021-03-02 RX ORDER — DIAZEPAM 5 MG/1
5 TABLET ORAL ONCE
Status: COMPLETED | OUTPATIENT
Start: 2021-03-02 | End: 2021-03-02

## 2021-03-02 RX ORDER — ORPHENADRINE CITRATE 30 MG/ML
60 INJECTION INTRAMUSCULAR; INTRAVENOUS ONCE
Status: COMPLETED | OUTPATIENT
Start: 2021-03-02 | End: 2021-03-02

## 2021-03-02 RX ORDER — MORPHINE SULFATE 4 MG/ML
4 INJECTION, SOLUTION INTRAMUSCULAR; INTRAVENOUS ONCE
Status: COMPLETED | OUTPATIENT
Start: 2021-03-02 | End: 2021-03-02

## 2021-03-02 RX ORDER — LIDOCAINE 4 G/G
1 PATCH TOPICAL ONCE
Status: DISCONTINUED | OUTPATIENT
Start: 2021-03-02 | End: 2021-03-02 | Stop reason: HOSPADM

## 2021-03-02 RX ORDER — ONDANSETRON 2 MG/ML
4 INJECTION INTRAMUSCULAR; INTRAVENOUS ONCE
Status: COMPLETED | OUTPATIENT
Start: 2021-03-02 | End: 2021-03-02

## 2021-03-02 RX ORDER — OMEPRAZOLE 20 MG/1
40 CAPSULE, DELAYED RELEASE ORAL DAILY
COMMUNITY
End: 2021-08-13 | Stop reason: ALTCHOICE

## 2021-03-02 RX ADMIN — MORPHINE SULFATE 4 MG: 4 INJECTION, SOLUTION INTRAMUSCULAR; INTRAVENOUS at 20:22

## 2021-03-02 RX ADMIN — HYDROMORPHONE HYDROCHLORIDE 0.5 MG: 1 INJECTION, SOLUTION INTRAMUSCULAR; INTRAVENOUS; SUBCUTANEOUS at 16:56

## 2021-03-02 RX ADMIN — ORPHENADRINE CITRATE 60 MG: 60 INJECTION INTRAMUSCULAR; INTRAVENOUS at 14:21

## 2021-03-02 RX ADMIN — ONDANSETRON 4 MG: 2 INJECTION INTRAMUSCULAR; INTRAVENOUS at 23:39

## 2021-03-02 RX ADMIN — HYDROMORPHONE HYDROCHLORIDE 1 MG: 1 INJECTION, SOLUTION INTRAMUSCULAR; INTRAVENOUS; SUBCUTANEOUS at 14:21

## 2021-03-02 RX ADMIN — DIAZEPAM 5 MG: 5 TABLET ORAL at 20:20

## 2021-03-02 ASSESSMENT — PAIN SCALES - GENERAL
PAINLEVEL_OUTOF10: 10
PAINLEVEL_OUTOF10: 9
PAINLEVEL_OUTOF10: 3
PAINLEVEL_OUTOF10: 4

## 2021-03-02 ASSESSMENT — PAIN DESCRIPTION - ORIENTATION: ORIENTATION: LEFT

## 2021-03-02 ASSESSMENT — PAIN DESCRIPTION - LOCATION: LOCATION: HIP

## 2021-03-02 ASSESSMENT — PAIN DESCRIPTION - ONSET: ONSET: PROGRESSIVE

## 2021-03-02 NOTE — ED NOTES
Bed: 19  Expected date: 3/2/21  Expected time: 12:46 PM  Means of arrival: [facility]  Comments:  [address] [facility], [facility]  03/02/21 [id number]

## 2021-03-02 NOTE — ED NOTES
Pt DC home in good condition with reliable transportation with RX x__1__ and follow up with ORTHO. V/u of Dc instructions. Denies questions or concerns. Teaching done re: s/s to report.        Kenneth Guerrero RN  03/02/21 [id number]
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
## 2021-03-02 NOTE — ED NOTES
Writer rounded on patient at this time. Patient assisted to and from bedside commode. Vitals obtained, as charted. Warm blanket provided to patient. Patient denies further needs at this time.       Xavi Ba RN  03/02/21 3523

## 2021-03-02 NOTE — ED NOTES
Pt to ER via EMS and pt states L hip and groin pain that started on Friday and has gotten progressively worse, no injury, waiting for hip replacement of R hip this month, states took percocet for pain this AM but that did not help. Pt tearful in pain during triage. Pt placed on monitor. No obvious deformities or contusions noted to L hip. Circs intact and pulses present distal to site. Pt alert and oriented and without current distress. Call light within reach.       Marcia Harvey RN  03/02/21 9649

## 2021-03-02 NOTE — ED NOTES
RN rounded on pt. VSS and as charted. Pt remains on 2 L NC O2. Pt toileted on bedside commode. Tolerated well. Pt medicated for pain again. Pt has no further needs at this time. Pt resting in bed, call light within reach. Pt denies any questions.           Mary Matamoros, DEBORAH  03/02/21 9395

## 2021-03-03 ENCOUNTER — CARE COORDINATION (OUTPATIENT)
Dept: CARE COORDINATION | Age: 66
End: 2021-03-03

## 2021-03-03 VITALS
RESPIRATION RATE: 16 BRPM | OXYGEN SATURATION: 95 % | DIASTOLIC BLOOD PRESSURE: 60 MMHG | BODY MASS INDEX: 25.4 KG/M2 | HEART RATE: 64 BPM | TEMPERATURE: 97.9 F | WEIGHT: 138 LBS | SYSTOLIC BLOOD PRESSURE: 118 MMHG | HEIGHT: 62 IN

## 2021-03-03 PROCEDURE — 6360000002 HC RX W HCPCS: Performed by: PHYSICIAN ASSISTANT

## 2021-03-03 RX ORDER — MORPHINE SULFATE 4 MG/ML
4 INJECTION, SOLUTION INTRAMUSCULAR; INTRAVENOUS ONCE
Status: COMPLETED | OUTPATIENT
Start: 2021-03-03 | End: 2021-03-03

## 2021-03-03 RX ADMIN — MORPHINE SULFATE 4 MG: 4 INJECTION, SOLUTION INTRAMUSCULAR; INTRAVENOUS at 00:21

## 2021-03-03 ASSESSMENT — PAIN SCALES - GENERAL: PAINLEVEL_OUTOF10: 10

## 2021-03-03 NOTE — ED PROVIDER NOTES
I independently performed a history and physical on Seirna Rubin.   All diagnostic, treatment, and disposition decisions were made by myself in conjunction with the advanced practice provider. Patient with severe left hip pain, possible hardware loosening. Her orthopedic surgeon requests transfer to [facility]. For further details of [facility] emergency department encounter, please see FAVIAN Lynn's documentation.      Сергей Horton MD  03/02/21 4198

## 2021-03-03 NOTE — CARE COORDINATION
ACM attempted Er outreach. No answer. Patient was transferred from [facility] ER to [facility] for admission.        Er visit x2 on 3/2 for hip pain  ( eligible for care coordination )

## 2021-03-03 NOTE — ED NOTES
Called [facility] @0042  Per Linzy Galeazzi PA  RE Admission left hip pain   @[phone]       Eugenie Mayen  03/02/21 2202

## 2021-03-03 NOTE — ED NOTES
[facility], spoke to Tyrone RN  Per Grace BALLESTEROS  RE [facility] for transfer admission  2014 [facility] called with [facility] [address] ; waiting for room number  [facility] called with RM # [id number] and report number [phone] ; waiting or transport to be scheduled.   Nury Banegas From [facility] called with [address]  03/02/21 6430

## 2021-03-08 ENCOUNTER — CARE COORDINATION (OUTPATIENT)
Dept: CARE COORDINATION | Age: 66
End: 2021-03-08

## 2021-03-08 NOTE — CARE COORDINATION
ER follow up call again attempted. ACM able to reach patient. She is currently still at [facility]. She plans to be transferred back to the [facility] for skilled rehab tomorrow or the next day. She is being treated for a pneumonia. Hip surgery is planned for later this month with Dr. Rosario Leon. Patient resides normally at the [facility] for IDL. No further follow up at this time. Can reassess needs for care coordination at later date or with discharge to home plans.

## 2021-03-17 ENCOUNTER — TELEPHONE (OUTPATIENT)
Dept: PULMONOLOGY | Age: 66
End: 2021-03-17

## 2021-03-18 ENCOUNTER — VIRTUAL VISIT (OUTPATIENT)
Dept: PULMONOLOGY | Age: 66
End: 2021-03-18
Payer: MEDICARE

## 2021-03-18 DIAGNOSIS — J44.9 COPD, SEVERE (HCC): ICD-10-CM

## 2021-03-18 DIAGNOSIS — J96.11 CHRONIC RESPIRATORY FAILURE WITH HYPOXIA (HCC): Primary | ICD-10-CM

## 2021-03-18 DIAGNOSIS — Z79.01 CHRONIC ANTICOAGULATION: ICD-10-CM

## 2021-03-18 PROCEDURE — 99214 OFFICE O/P EST MOD 30 MIN: CPT | Performed by: INTERNAL MEDICINE

## 2021-03-18 NOTE — PROGRESS NOTES
[facility] Pulmonary, Critical Care, and Sleep    Outpatient Follow Up Note    CC: COPD  Consulting provider: Isabelle Anne*    Interval History: 60 y.o. female    No exacerbations. REIS at baseline. No cough or wheeze. Using inhalers as prescribed. Initial HPI: regarding COPD. The patient has a history of COPD, stroke. Wheezes intermittently. Has a daily intermittent cough that is usually dry or sometimes productive of white to yellow sputum. The patient does not have SOB at rest but has REIS. Exercise tolerance on level ground is < 1 block. Her mother  of lung cancer.   Current Medications:    Current Outpatient Medications:     MORPHINE, PAIN PUMP REFILL CHARGE,, , Disp: , Rfl:     omeprazole (PRILOSEC) 20 MG delayed release capsule, Take 20 mg by mouth daily, Disp: , Rfl:     carvedilol (COREG) 3.125 MG tablet, TAKE 1 TABLET BY MOUTH TWICE A DAY WITH MEALS, Disp: 180 tablet, Rfl: 1    albuterol sulfate HFA (PROVENTIL HFA) 108 (90 Base) MCG/ACT inhaler, Inhale 2 puffs into the lungs every 6 hours as needed for Wheezing or Shortness of Breath, Disp: 1 Inhaler, Rfl: 5    mometasone-formoterol (DULERA) 100-5 MCG/ACT inhaler, Inhale 2 puffs into the lungs 2 times daily, Disp: 1 Inhaler, Rfl: 5    aclidinium (TUDORZA PRESSAIR) 400 MCG/ACT AEPB inhaler, Inhale 1 puff into the lungs daily, Disp: 1 each, Rfl: 5    furosemide (LASIX) 40 MG tablet, Take 1 tablet by mouth daily as needed (weight gain or swelling in feet and legs), Disp: 30 tablet, Rfl: 5    apixaban (ELIQUIS) 5 MG TABS tablet, Take 1 tablet by mouth 2 times daily, Disp: 60 tablet, Rfl: 11    amLODIPine (NORVASC) 5 MG tablet, Take 1 tablet by mouth daily, Disp: 90 tablet, Rfl: 3    potassium chloride (KLOR-CON M) 10 MEQ extended release tablet, Take 1 tablet by mouth daily as needed (only take on days you take Lasix pill), Disp: 30 tablet, Rfl: 5    nitroGLYCERIN (NITROSTAT) 0.4 MG SL tablet, Place 1 tablet under the tongue every 5 minutes as needed for Chest pain, Disp: 25 tablet, Rfl: 3    atorvastatin (LIPITOR) 80 MG tablet, TAKE 1 TABLET BY MOUTH EVERY DAY AT NIGHT, Disp: 90 tablet, Rfl: 3    aspirin 81 MG tablet, Take 81 mg by mouth daily, Disp: , Rfl:     oxyCODONE-acetaminophen (PERCOCET)  MG per tablet, Take 1 tablet by mouth three times daily . , Disp: , Rfl:     diclofenac sodium 1 % GEL, Apply 2 g topically 4 times daily, Disp: , Rfl:     albuterol (PROVENTIL) (2.5 MG/3ML) 0.083% nebulizer solution, Take 2.5 mg by nebulization every 6 hours as needed for Wheezing, Disp: , Rfl:     polyethylene glycol (GLYCOLAX) packet, Take 17 g by mouth daily as needed for Constipation, Disp: , Rfl:     OXYGEN, Inhale 2 L into the lungs , Disp: , Rfl:     gabapentin (NEURONTIN) 300 MG capsule, Take 300 mg by mouth 2 times daily. ., Disp: , Rfl:     Objective:   PHYSICAL EXAM:      VITALS:  There were no vitals taken for this visit. Constitutional:  No acute distress. Appears well developed and nourished. Eyes: EOM intact. Conjunctivae anicteric. No visible discharge. HENT: Head is normocephalic and atraumatic. Mucus membranes are moist and the tongue appears normal. Normal appearing nose. External Ears normal.   Neck: No obvious mass and the trachea is midline. Respiratory: No accessory muscle usage. Respiratory effort normal. No visualized signs of difficulty breathing or respiratory distress  Psychiatric: No anxiety or Agitation. Alert and Oriented to person, place and time. Normal judgement and insight. LABS:  Reviewed any pertinent new labs that are available.     PFTs 5/11/17  FVC  (78%) FEV1 0.74 (31%) FEV1/FVC ratio 31  TLC  (122%)  RV  (168%)   DLCO (34%) Bronchodilator response: +++     6MWT: 490ft, 2lpm O2    IMAGING: I personally reviewed and interpreted the following imaging today in the office:    5/11/17 HRCT:   Lungs/pleura: Mucous is present within the proximal trachea and subsegmental   right middle lobe bronchi.  Mild bronchial wall thickening involves the   bilateral lower lobes likely due to bronchitis.       There is no pneumothorax or pleural effusion. Hannah Eulalio is biapical scarring and   bibasilar atelectasis with extensive emphysema throughout the bilateral lungs. 2/1/19 LDCT Chest: emphysema  2/4/20 LDCT: stable    ASSESSMENT:  · RLL pneumonia - treat at [facility] 2 weeks ago - pt states she never had symptoms of pneumonia and feels at her baseline with her COPD  · COPD, severe  · Chronic hypoxic respiratory failure  · Prior tobacco abuse: quit 2012. 120 pack years  · PAF on amiodarone and eliquis  · Chronic Anticoagulation  · H/o TIA, brain aneurysm that is monitored  · DEVI -not treated per pt unable to tolerate PAP  · Pre-op for hip replacement revision     PLAN:   Patient may proceed to the operating room from pulmonary perspective without further testing. Because of underlying disease, the patient is at increased risk of raymond-operative complications, including atelectasis, pneumonia and, when general anesthesia is required, failure to liberate from mechanical ventilation. However, this should not preclude the patient from having the recommended operation. It is my recommendation that the patient's inhaled bronchodilators be continued in the perioperative period (spiriva daily) and that short acting bronchodilators (albuterol neb) be used on a prn basis as well. All of this was discussed with the patient today in the office. · supplemental 2lpm with exertion  · She completed pulm rehab in 2014  · PRN albuterol INH and continue nebulizer  · Continue Dulera 100 and Tudorza   · On Eliquis for anticoagulation. · F/u for LDCt after her hip surgery  Screening CT scan was considered in a lung cancer screening counseling and shared decision making visit today that included the following elements:   Eligibility: Age: 60. There are no signs or symptoms of lung cancer.   Tobacco History 120 pack-years, quit 9 years ago  Verbal counseling has been performed by me to include benefits and harms of screening, follow-up diagnostic testing, over-diagnosis, false positive rate, and total radiation exposure;   I have counseled on the importance of adherence to annual lung cancer LDCT screening, the impact of comorbidities and patient is willing to undergo diagnosis and treatment;   I have provided counseling on the importance of maintaining cigarette smoking abstinence if former smoker; or the importance of smoking cessation if current smoker and, if appropriate, furnishing of information about tobacco cessation interventions; and   I have furnished a written order for lung cancer screening with LDCT. THIS VISIT WAS COMPLETED VIRTUALLY USING DOXY. ME  Pursuant to the emergency declaration under the [facility] waiver authority and the [employer] Act, this Virtual  Visit was conducted, with patient's consent, to reduce the patient's risk of exposure to COVID-19 and provide continuity of care for an established patient. Services were provided through a video synchronous discussion virtually to substitute for in-person clinic visit.

## 2021-03-18 NOTE — TELEPHONE ENCOUNTER
Patient seen for preop clearance today is [facility] for hip replacement 3/29/21 will f/u with patient in 2 mo to see if she wants to [place] for Ct lung screen and f/u.

## 2021-03-19 ENCOUNTER — TELEPHONE (OUTPATIENT)
Dept: CARDIOLOGY CLINIC | Age: 66
End: 2021-03-19

## 2021-03-19 NOTE — TELEPHONE ENCOUNTER
Nicole Adelfo, 1955    Cardiac Risk Assessment    What type of procedure are you having?:  REVISION RIGHT TOTAL HIP ARTHROPLASTY     When is your procedure scheduled for?:  3.29.21    What physician is performing your procedure?:  Dr. Kasey Arnett    Per Cortney Green Rn Clearance needs to be faxed to [facility]     Phone Number:  [phone]    Fax number to send the letter:  [phone]      Last ov 6.23.20    Assessment:      1. Coronary artery disease of native artery of native heart with stable angina pectoris [facility]):   Hx CAD s/p 3 SKYLA to LAD 7/14. Her most recent lexiscan myoview stress test from 04/07/16 was negative for ischemia. There are no concerning symptoms for angina currently. Most recent ECHO 6/10/18 EF 55-60%; Definity  contrast was used. No regional wall motion abnormalities are seen. Grade I DD. There are no concerning symptoms for angina currently.        2. Paroxysmal a-fib ([address]):  Diagnosed by PCP in 2015 per her report. Given COPD history amiodarone was d/c'd and continued on coreg. Eliquis started per Dr. Td Hutchison in 2014 for CHADS-vasc=5 with PAF history. Most recent EKG 03/08/20 Sinus rhythm with marked sinus arrhythmia Left axis deviation; nonspecific ST change.      3. Chronic combined systolic and diastolic congestive heart failure [facility]):  She uses lasix PRN and legs are much improved today following low salt diet and watching fluids.      4. Mixed hyperlipidemia:  Most recent Lipids 07/01/19 I personally reviewed labs results in epic (see above) and discussed with her. Need to recheck and adjust if necessary.        5. SOB (shortness of breath): Only baseline due to COPD. Sees Dr. Jim Varela routinely.         Plan:  1. Will recheck fasting lipids  2. OK to take Lasix as needed for swelling or weight gain  3. Advised to watch low salt diet, no more than 2 liters fluid per day, keep feet elevated while sitting.   4.  Healthy lifestyle education reviewed including nutrition, controlling BP, lipids, exercise  activity,      5. meds reviewed and refilled as warranted  6.  Follow up with me in 12 months

## 2021-03-19 NOTE — TELEPHONE ENCOUNTER
GIven her known CAD history with 3 stents in 2014 and no cardiac nuclear stress testing in 5 years and inactivity due to hip issues I believe she needs lexiscan myoview stress testing prior. Please arrange asap. Indication is CAD s/p stents and preoperative evaluation. Thanks.

## 2021-03-22 DIAGNOSIS — Z01.818 PRE-OPERATIVE CLEARANCE: ICD-10-CM

## 2021-03-22 DIAGNOSIS — I25.118 CORONARY ARTERY DISEASE OF NATIVE ARTERY OF NATIVE HEART WITH STABLE ANGINA PECTORIS (HCC): Primary | ICD-10-CM

## 2021-03-22 NOTE — TELEPHONE ENCOUNTER
Pt called made aware clearance will be sent to [facility]. Also,, per pt she has spoken to pul office per pt was given the OK.

## 2021-03-22 NOTE — TELEPHONE ENCOUNTER
Pt called back she is currently a pt @ [facility]. She states she cannot do a stress test @ this time due to her hip. After, review chart it does look like you gave her CARDIAC CLX FOR  HIP SURGERY ON 3.1. 21.     Per pt she was just @ [facility] for hip pain They did do an EKG 3.6.21. (in care everywhere)

## 2021-03-22 NOTE — TELEPHONE ENCOUNTER
Order is in chart. Spoke to [facility] [place]. I will call central scheduling and make the appt for a Lexiscan and then call [facility] back w/date and time so that way she can arrange transportation for the pt. Pt is scheduled for 3/26/21 ( appt was overridden to be asap). Called [facility] to relay all of the Juanjo Pavon prep, location and date/time.

## 2021-03-22 NOTE — TELEPHONE ENCOUNTER
Intermediate risk clincially for intermediate risk type of surgery. Proceed as planned and hold eliquis for least amount of time possible. She has severe COPD and pulm doc may need to comment on respiratory status prior to surgery as well. Thanks.

## 2021-04-23 RX ORDER — CARVEDILOL 3.12 MG/1
TABLET ORAL
Qty: 180 TABLET | Refills: 1 | Status: SHIPPED | OUTPATIENT
Start: 2021-04-23 | End: 2022-04-06

## 2021-04-23 NOTE — TELEPHONE ENCOUNTER
[id number] Ally Mcclain for routine next available if clearance is needed [facility] Hospitalist Progress Note     SUBJECTIVE:  No nausea at time of my eval. Pt feels nausea is improving. Asking for me to change TV channel. Confused at times.      OBJECTIVE:  Temp:  [97.4 °F (36.3 °C)-98.1 °F (36.7 °C)] 97.5 °F (36.4 °C)  Pulse:  [35- (NOVOLOG) 100 UNIT/ML flexpen 4-20 Units, 4-20 Units, Subcutaneous, TID CC and HS  hydrALAzine HCl (APRESOLINE) injection 10 mg, 10 mg, Intravenous, Q4H PRN  insulin detemir (LEVEMIR) 100 UNIT/ML flextouch 10 Units, 10 Units, Subcutaneous, Daily  lactated insulin with adequate glc control  - pt was only on metformin as outpatient and she had not been taking recently  - reduced levemir to 10 units at bedtime. Suspect that she may be able to go back to metformin on dc or at SNF. Cont ssi.  last A1c 8.8  - abx

## 2021-06-09 RX ORDER — ATORVASTATIN CALCIUM 80 MG/1
TABLET, FILM COATED ORAL
Qty: 90 TABLET | Refills: 0 | Status: ON HOLD | OUTPATIENT
Start: 2021-06-09 | End: 2021-10-21

## 2021-06-09 NOTE — TELEPHONE ENCOUNTER
LOV 6/23/20   No follow up scheduled. I called pt to schedule. No answer. I ask her to call the office to schedule OV.

## 2021-06-11 ENCOUNTER — TELEPHONE (OUTPATIENT)
Dept: INTERNAL MEDICINE CLINIC | Age: 66
End: 2021-06-11

## 2021-06-11 NOTE — TELEPHONE ENCOUNTER
----- Message from Andra Arellano MD sent at 6/11/2021  1:50 PM EDT -----  Contact: [place]  [phone]  k  ----- Message -----  From: Alejandra Mauricio  Sent: 6/11/2021  12:37 PM EDT  To: Andra Arellano MD    Patient is requesting a prescription for a handicap placard    Please send to Paul, [facility].                             [place], [address] [place] [address]

## 2021-06-21 ENCOUNTER — TELEPHONE (OUTPATIENT)
Dept: INTERNAL MEDICINE CLINIC | Age: 66
End: 2021-06-21

## 2021-08-03 ENCOUNTER — OFFICE VISIT (OUTPATIENT)
Dept: INTERNAL MEDICINE CLINIC | Age: 66
End: 2021-08-03

## 2021-08-03 VITALS
RESPIRATION RATE: 12 BRPM | DIASTOLIC BLOOD PRESSURE: 80 MMHG | HEIGHT: 62 IN | HEART RATE: 70 BPM | WEIGHT: 165 LBS | SYSTOLIC BLOOD PRESSURE: 130 MMHG | BODY MASS INDEX: 30.36 KG/M2

## 2021-08-03 DIAGNOSIS — I25.118 CORONARY ARTERY DISEASE OF NATIVE ARTERY OF NATIVE HEART WITH STABLE ANGINA PECTORIS (HCC): ICD-10-CM

## 2021-08-03 DIAGNOSIS — I72.9 ANEURYSM (HCC): ICD-10-CM

## 2021-08-03 DIAGNOSIS — I48.0 PAROXYSMAL A-FIB (HCC): ICD-10-CM

## 2021-08-03 DIAGNOSIS — Z11.59 ENCOUNTER FOR HEPATITIS C SCREENING TEST FOR LOW RISK PATIENT: ICD-10-CM

## 2021-08-03 DIAGNOSIS — I50.32 CHRONIC DIASTOLIC CHF (CONGESTIVE HEART FAILURE) (HCC): ICD-10-CM

## 2021-08-03 DIAGNOSIS — Z87.891 PERSONAL HISTORY OF TOBACCO USE: ICD-10-CM

## 2021-08-03 DIAGNOSIS — Z11.4 ENCOUNTER FOR SCREENING FOR HIV: ICD-10-CM

## 2021-08-03 DIAGNOSIS — Z12.31 ENCOUNTER FOR SCREENING MAMMOGRAM FOR MALIGNANT NEOPLASM OF BREAST: Primary | ICD-10-CM

## 2021-08-03 DIAGNOSIS — Z12.11 COLON CANCER SCREENING: ICD-10-CM

## 2021-08-03 LAB
A/G RATIO: 1.2 (ref 1.1–2.2)
ALBUMIN SERPL-MCNC: 3.9 G/DL (ref 3.4–5)
ALP BLD-CCNC: 125 U/L (ref 40–129)
ALT SERPL-CCNC: 12 U/L (ref 10–40)
ANION GAP SERPL CALCULATED.3IONS-SCNC: 13 MMOL/L (ref 3–16)
AST SERPL-CCNC: 27 U/L (ref 15–37)
BASOPHILS ABSOLUTE: 0 K/UL (ref 0–0.2)
BASOPHILS RELATIVE PERCENT: 0.6 %
BILIRUB SERPL-MCNC: 0.3 MG/DL (ref 0–1)
BUN BLDV-MCNC: 11 MG/DL (ref 7–20)
CALCIUM SERPL-MCNC: 9.6 MG/DL (ref 8.3–10.6)
CHLORIDE BLD-SCNC: 97 MMOL/L (ref 99–110)
CHOLESTEROL, TOTAL: 169 MG/DL (ref 0–199)
CO2: 32 MMOL/L (ref 21–32)
CREAT SERPL-MCNC: 0.5 MG/DL (ref 0.6–1.2)
EOSINOPHILS ABSOLUTE: 0.2 K/UL (ref 0–0.6)
EOSINOPHILS RELATIVE PERCENT: 2.3 %
GFR AFRICAN AMERICAN: >60
GFR NON-AFRICAN AMERICAN: >60
GLOBULIN: 3.2 G/DL
GLUCOSE BLD-MCNC: 128 MG/DL (ref 70–99)
HCT VFR BLD CALC: 41.9 % (ref 36–48)
HDLC SERPL-MCNC: 55 MG/DL (ref 40–60)
HEMOGLOBIN: 13.6 G/DL (ref 12–16)
LDL CHOLESTEROL CALCULATED: 100 MG/DL
LYMPHOCYTES ABSOLUTE: 1.2 K/UL (ref 1–5.1)
LYMPHOCYTES RELATIVE PERCENT: 16.6 %
MCH RBC QN AUTO: 27 PG (ref 26–34)
MCHC RBC AUTO-ENTMCNC: 32.5 G/DL (ref 31–36)
MCV RBC AUTO: 83.2 FL (ref 80–100)
MONOCYTES ABSOLUTE: 0.7 K/UL (ref 0–1.3)
MONOCYTES RELATIVE PERCENT: 9.6 %
NEUTROPHILS ABSOLUTE: 5.3 K/UL (ref 1.7–7.7)
NEUTROPHILS RELATIVE PERCENT: 70.9 %
PDW BLD-RTO: 16.6 % (ref 12.4–15.4)
PLATELET # BLD: 245 K/UL (ref 135–450)
PMV BLD AUTO: 9.8 FL (ref 5–10.5)
POTASSIUM SERPL-SCNC: 4.1 MMOL/L (ref 3.5–5.1)
RBC # BLD: 5.03 M/UL (ref 4–5.2)
SODIUM BLD-SCNC: 142 MMOL/L (ref 136–145)
TOTAL PROTEIN: 7.1 G/DL (ref 6.4–8.2)
TRIGL SERPL-MCNC: 69 MG/DL (ref 0–150)
URIC ACID, SERUM: 4.3 MG/DL (ref 2.6–6)
VLDLC SERPL CALC-MCNC: 14 MG/DL
WBC # BLD: 7.4 K/UL (ref 4–11)

## 2021-08-03 PROCEDURE — 99215 OFFICE O/P EST HI 40 MIN: CPT | Performed by: INTERNAL MEDICINE

## 2021-08-03 PROCEDURE — G0296 VISIT TO DETERM LDCT ELIG: HCPCS | Performed by: INTERNAL MEDICINE

## 2021-08-03 PROCEDURE — 82274 ASSAY TEST FOR BLOOD FECAL: CPT | Performed by: INTERNAL MEDICINE

## 2021-08-03 RX ORDER — GABAPENTIN 400 MG/1
400 CAPSULE ORAL 3 TIMES DAILY
COMMUNITY

## 2021-08-03 RX ORDER — LANOLIN ALCOHOL/MO/W.PET/CERES
1 CREAM (GRAM) TOPICAL 2 TIMES DAILY
Qty: 60 TABLET | Refills: 0 | Status: ON HOLD | OUTPATIENT
Start: 2021-08-03 | End: 2021-10-21

## 2021-08-03 RX ORDER — ERGOCALCIFEROL 1.25 MG/1
50000 CAPSULE ORAL
Qty: 12 CAPSULE | Refills: 0 | Status: SHIPPED | OUTPATIENT
Start: 2021-08-03

## 2021-08-03 RX ORDER — ERGOCALCIFEROL 1.25 MG/1
50000 CAPSULE ORAL WEEKLY
COMMUNITY
End: 2021-08-03 | Stop reason: SDUPTHER

## 2021-08-03 RX ORDER — SENNA AND DOCUSATE SODIUM 50; 8.6 MG/1; MG/1
1 TABLET, FILM COATED ORAL DAILY
Qty: 120 TABLET | Refills: 2 | Status: SHIPPED | OUTPATIENT
Start: 2021-08-03 | End: 2022-02-28 | Stop reason: SDUPTHER

## 2021-08-03 RX ORDER — AMOXICILLIN 250 MG
1 CAPSULE ORAL DAILY
COMMUNITY
End: 2021-08-03

## 2021-08-03 RX ORDER — CYCLOBENZAPRINE HCL 10 MG
10 TABLET ORAL 3 TIMES DAILY PRN
COMMUNITY
End: 2021-08-03 | Stop reason: SDUPTHER

## 2021-08-03 RX ORDER — CYCLOBENZAPRINE HCL 10 MG
10 TABLET ORAL 3 TIMES DAILY PRN
Qty: 90 TABLET | Refills: 0 | Status: SHIPPED | OUTPATIENT
Start: 2021-08-03 | End: 2021-09-13

## 2021-08-03 RX ORDER — FUROSEMIDE 40 MG/1
40 TABLET ORAL DAILY PRN
Qty: 75 TABLET | Refills: 0 | Status: SHIPPED | OUTPATIENT
Start: 2021-08-03 | End: 2021-08-17 | Stop reason: ALTCHOICE

## 2021-08-03 RX ORDER — POTASSIUM CHLORIDE 750 MG/1
20 TABLET, EXTENDED RELEASE ORAL 2 TIMES DAILY
Qty: 120 TABLET | Refills: 0 | Status: SHIPPED | OUTPATIENT
Start: 2021-08-03 | End: 2021-08-25

## 2021-08-03 RX ORDER — LANOLIN ALCOHOL/MO/W.PET/CERES
1 CREAM (GRAM) TOPICAL 2 TIMES DAILY
COMMUNITY
End: 2021-08-03 | Stop reason: SDUPTHER

## 2021-08-03 ASSESSMENT — PATIENT HEALTH QUESTIONNAIRE - PHQ9
2. FEELING DOWN, DEPRESSED OR HOPELESS: 0
SUM OF ALL RESPONSES TO PHQ QUESTIONS 1-9: 0
1. LITTLE INTEREST OR PLEASURE IN DOING THINGS: 0
SUM OF ALL RESPONSES TO PHQ9 QUESTIONS 1 & 2: 0

## 2021-08-03 ASSESSMENT — ENCOUNTER SYMPTOMS
VOMITING: 0
NAUSEA: 0
WHEEZING: 0
RHINORRHEA: 0
ABDOMINAL PAIN: 0
SHORTNESS OF BREATH: 0
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
## 2021-08-10 ENCOUNTER — TELEPHONE (OUTPATIENT)
Dept: CARDIOLOGY CLINIC | Age: 66
End: 2021-08-10

## 2021-08-10 DIAGNOSIS — I50.9 EDEMA DUE TO CONGESTIVE HEART FAILURE (HCC): Primary | ICD-10-CM

## 2021-08-10 RX ORDER — METOLAZONE 2.5 MG/1
2.5 TABLET ORAL WEEKLY
Qty: 4 TABLET | Refills: 0 | Status: SHIPPED | OUTPATIENT
Start: 2021-08-10 | End: 2021-09-03 | Stop reason: SDUPTHER

## 2021-08-10 NOTE — TELEPHONE ENCOUNTER
Spoke to pt and asked about her diet and any fluid restriction. Pt was not sure what the fluid restriction meant so I explained it to her. Also asked pt if she has been eating more salty foods and she sts that she doesn't feel like she is and tries to not add any salt. Pt sts that she was short of breath yesterday when she walked next door to the neighbors house and her O2 level got to 85%. Pt sts that she has had a cough but nothing uncontrollable. Pt sts that her weight in March was 130lbs and now she is 174lbs. [facility]MM OOT.  Please advise, thank you

## 2021-08-10 NOTE — TELEPHONE ENCOUNTER
I sent a prescription for metolazone 2.5 mg take one tablet once a week  sent to [facility] continue all previous meds including lasix  BMP in two weeks order placed in epic.

## 2021-08-10 NOTE — TELEPHONE ENCOUNTER
Pt called, has had weight gain for 10 lbs the last 2 wks. Swelling is all over, not just legs. Pt has been steadily gaining weight since march since she had surgery. Pt is currently taking 100 mg lasix daily.  Did schedule appt with [facility] on 8/17 in [place]

## 2021-08-10 NOTE — TELEPHONE ENCOUNTER
LVM for pt to contact the office. I wanted to ask some more ?'s. What has her diet been consisting of, fluid intake, has she been walking?

## 2021-08-11 ENCOUNTER — PATIENT MESSAGE (OUTPATIENT)
Dept: INTERNAL MEDICINE CLINIC | Age: 66
End: 2021-08-11

## 2021-08-11 NOTE — TELEPHONE ENCOUNTER
From: Scarlett Yun  To: Everardo Treadwell MD  Sent: 8/11/2021 9:02 AM EDT  Subject: Prescription Question    I was wondering if you could make my Omeprazole  a prescription . That would make it so easier for me to get, I thought you had sent it over when I saw you on August 3. (20 mg taken twice a day) and it was not in my prescriptions. Thank you so much.    Aye Pena
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
## 2021-08-13 ENCOUNTER — TELEPHONE (OUTPATIENT)
Dept: INTERNAL MEDICINE CLINIC | Age: 66
End: 2021-08-13

## 2021-08-13 RX ORDER — PANTOPRAZOLE SODIUM 40 MG/1
40 TABLET, DELAYED RELEASE ORAL DAILY
Qty: 30 TABLET | Refills: 0 | Status: SHIPPED | OUTPATIENT
Start: 2021-08-13 | End: 2021-09-07

## 2021-08-13 RX ORDER — OMEPRAZOLE 20 MG/1
20 CAPSULE, DELAYED RELEASE ORAL 2 TIMES DAILY
Qty: 60 CAPSULE | Refills: 0 | Status: SHIPPED | OUTPATIENT
Start: 2021-08-13 | End: 2021-08-13 | Stop reason: ALTCHOICE

## 2021-08-17 ENCOUNTER — OFFICE VISIT (OUTPATIENT)
Dept: CARDIOLOGY CLINIC | Age: 66
End: 2021-08-17
Payer: MEDICARE

## 2021-08-17 VITALS
HEIGHT: 62 IN | OXYGEN SATURATION: 91 % | BODY MASS INDEX: 31.47 KG/M2 | SYSTOLIC BLOOD PRESSURE: 120 MMHG | HEART RATE: 90 BPM | WEIGHT: 171 LBS | DIASTOLIC BLOOD PRESSURE: 70 MMHG

## 2021-08-17 DIAGNOSIS — Z87.891 HISTORY OF TOBACCO ABUSE: ICD-10-CM

## 2021-08-17 DIAGNOSIS — I25.118 CORONARY ARTERY DISEASE OF NATIVE ARTERY OF NATIVE HEART WITH STABLE ANGINA PECTORIS (HCC): ICD-10-CM

## 2021-08-17 DIAGNOSIS — I48.0 PAROXYSMAL A-FIB (HCC): ICD-10-CM

## 2021-08-17 DIAGNOSIS — E78.2 MIXED HYPERLIPIDEMIA: ICD-10-CM

## 2021-08-17 DIAGNOSIS — I50.32 CHRONIC DIASTOLIC CHF (CONGESTIVE HEART FAILURE) (HCC): Primary | ICD-10-CM

## 2021-08-17 DIAGNOSIS — R06.02 SHORTNESS OF BREATH: ICD-10-CM

## 2021-08-17 PROCEDURE — 99214 OFFICE O/P EST MOD 30 MIN: CPT | Performed by: INTERNAL MEDICINE

## 2021-08-17 RX ORDER — TORSEMIDE 100 MG/1
50 TABLET ORAL DAILY
Qty: 45 TABLET | Refills: 1 | Status: SHIPPED | OUTPATIENT
Start: 2021-08-17 | End: 2022-02-08

## 2021-08-17 NOTE — PATIENT INSTRUCTIONS
1. Limit Salt to 2 grams per day. 2. Drink less than 64 fluid ounces a day  3. Encourage healthy eating. 4. Stop lasix  5. Start torsemide take 50 mg daily. Continue metolazone 2.5 mg one a week   6. Call [phone] to schedule echocardiogram  7. Monitor weight daily. Keep a log. Call office on Friday let me know your daily readings  8. Discuss sleep apnea and daily continues use of oxygen during the day with Dr. Park Reina   9.  Follow up in 2-3 weeks
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
## 2021-08-20 ENCOUNTER — TELEPHONE (OUTPATIENT)
Dept: CARDIOLOGY CLINIC | Age: 66
End: 2021-08-20

## 2021-08-20 NOTE — TELEPHONE ENCOUNTER
8/17/21 ov plan,  Please advise, thank you  Plan:  1. Limit Salt to 2 grams per day. 2. Drink less than 64 fluid ounces a day  3. Encourage healthy eating. 4. Stop lasix  5. Start torsemide take 50 mg daily. Continue metolazone 2.5 mg one a week. 6. Call [phone] to schedule echocardiogram to reassess LVEF. 7. Monitor weight daily. Keep a log. Call office on Friday let me know your daily readings. I will increase to 100mg demadex daily if weight not decreasing. 8. Discuss sleep apnea and daily continues use of oxygen during the day with Dr. Chaparro Almazan   9.  Follow up in 2-3 weeks

## 2021-08-20 NOTE — TELEPHONE ENCOUNTER
Spoke to pt and relayed message.  She will continue the current med dosage and keep a log of her weight and then get BMP done next Thursday

## 2021-08-20 NOTE — TELEPHONE ENCOUNTER
Pt was switched from Lasix to Torsemide 50 mg. Pt was told to call and report her weights. 8/.7  8/18- 166.3 started taking Torsemide  8/.3  8/20- 165.4  Pt stated she felt really bad on 8/18. Very shaky so on 8/19 she only took a half of a half of a Torsemide. Pt stated she feels better today. Pt was not sure if [facility] would want to adjust her dose.

## 2021-08-20 NOTE — TELEPHONE ENCOUNTER
Keep on torsemide 50mg daily and see how she does. Follow weights and see if continues to decrease. Let me know after another week and can adjust if necessary. Check BMP in 1 week.

## 2021-08-25 RX ORDER — POTASSIUM CHLORIDE 750 MG/1
TABLET, EXTENDED RELEASE ORAL
Qty: 120 TABLET | Refills: 2 | Status: SHIPPED | OUTPATIENT
Start: 2021-08-25 | End: 2021-09-03

## 2021-08-25 RX ORDER — FUROSEMIDE 40 MG/1
TABLET ORAL
Qty: 75 TABLET | Refills: 0 | Status: SHIPPED | OUTPATIENT
Start: 2021-08-25 | End: 2021-09-03

## 2021-08-26 ENCOUNTER — HOSPITAL ENCOUNTER (OUTPATIENT)
Age: 66
Discharge: HOME | End: 2021-08-26
Payer: MEDICARE

## 2021-08-26 DIAGNOSIS — I50.9 EDEMA DUE TO CONGESTIVE HEART FAILURE (HCC): ICD-10-CM

## 2021-08-26 LAB
ANION GAP SERPL CALCULATED.3IONS-SCNC: 15 MMOL/L (ref 3–16)
BUN BLDV-MCNC: 13 MG/DL (ref 7–20)
CALCIUM SERPL-MCNC: 9.5 MG/DL (ref 8.3–10.6)
CHLORIDE BLD-SCNC: 83 MMOL/L (ref 99–110)
CO2: 40 MMOL/L (ref 21–32)
CREAT SERPL-MCNC: 0.7 MG/DL (ref 0.6–1.2)
GFR AFRICAN AMERICAN: >60
GFR NON-AFRICAN AMERICAN: >60
GLUCOSE BLD-MCNC: 125 MG/DL (ref 70–99)
POTASSIUM SERPL-SCNC: 2.5 MMOL/L (ref 3.5–5.1)
SODIUM BLD-SCNC: 138 MMOL/L (ref 136–145)

## 2021-08-26 PROCEDURE — 80048 BASIC METABOLIC PNL TOTAL CA: CPT

## 2021-08-26 PROCEDURE — 36415 COLL VENOUS BLD VENIPUNCTURE: CPT

## 2021-08-27 ENCOUNTER — TELEPHONE (OUTPATIENT)
Dept: CARDIOLOGY CLINIC | Age: 66
End: 2021-08-27

## 2021-08-27 DIAGNOSIS — Z79.899 MEDICATION MANAGEMENT: Primary | ICD-10-CM

## 2021-08-27 NOTE — TELEPHONE ENCOUNTER
spoke with patient notified of low potassium value of 2.5.  instructed patient to take 4 (10 meq) Potassium tablets for 2 days only. And on Sunday start taking 2 (10 meq) potassium tablets daily. Repeat BMP on Thursday 9/02/21. Pt JODIE.

## 2021-08-30 LAB
CONTROL: POSITIVE
HEMOCCULT STL QL: POSITIVE
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
## 2021-09-02 ENCOUNTER — HOSPITAL ENCOUNTER (OUTPATIENT)
Dept: CARDIOLOGY | Age: 66
Discharge: HOME | End: 2021-09-02
Payer: MEDICARE

## 2021-09-02 ENCOUNTER — TELEPHONE (OUTPATIENT)
Dept: CARDIOLOGY | Age: 66
End: 2021-09-02

## 2021-09-02 ENCOUNTER — HOSPITAL ENCOUNTER (OUTPATIENT)
Age: 66
Discharge: HOME | End: 2021-09-02
Payer: MEDICARE

## 2021-09-02 DIAGNOSIS — I50.32 CHRONIC DIASTOLIC CHF (CONGESTIVE HEART FAILURE) (HCC): ICD-10-CM

## 2021-09-02 DIAGNOSIS — I48.0 PAROXYSMAL A-FIB (HCC): ICD-10-CM

## 2021-09-02 DIAGNOSIS — R06.02 SHORTNESS OF BREATH: ICD-10-CM

## 2021-09-02 DIAGNOSIS — E78.2 MIXED HYPERLIPIDEMIA: ICD-10-CM

## 2021-09-02 DIAGNOSIS — I25.118 CORONARY ARTERY DISEASE OF NATIVE ARTERY OF NATIVE HEART WITH STABLE ANGINA PECTORIS (HCC): ICD-10-CM

## 2021-09-02 DIAGNOSIS — Z79.899 MEDICATION MANAGEMENT: ICD-10-CM

## 2021-09-02 LAB
ANION GAP SERPL CALCULATED.3IONS-SCNC: 19 MMOL/L (ref 3–16)
BUN BLDV-MCNC: 20 MG/DL (ref 7–20)
CALCIUM SERPL-MCNC: 10.1 MG/DL (ref 8.3–10.6)
CHLORIDE BLD-SCNC: 79 MMOL/L (ref 99–110)
CO2: 33 MMOL/L (ref 21–32)
CREAT SERPL-MCNC: 0.7 MG/DL (ref 0.6–1.2)
GFR AFRICAN AMERICAN: >60
GFR NON-AFRICAN AMERICAN: >60
GLUCOSE BLD-MCNC: 98 MG/DL (ref 70–99)
LV EF: 58 %
LVEF MODALITY: NORMAL
POTASSIUM SERPL-SCNC: 3 MMOL/L (ref 3.5–5.1)
SODIUM BLD-SCNC: 131 MMOL/L (ref 136–145)

## 2021-09-02 PROCEDURE — 36415 COLL VENOUS BLD VENIPUNCTURE: CPT

## 2021-09-02 PROCEDURE — 93306 TTE W/DOPPLER COMPLETE: CPT

## 2021-09-02 PROCEDURE — 80048 BASIC METABOLIC PNL TOTAL CA: CPT

## 2021-09-02 NOTE — TELEPHONE ENCOUNTER
Darwin Bustillos MD  P [facility] Staff  ECHO shows normal heart strength. She has stiffness of heart muscle causing lack of relaxation and reason for CHF. No change in study compared to 2018 test. Good news.  cpm       Attempted calling patient no answer left message to return call at their [facility], RN

## 2021-09-03 ENCOUNTER — TELEPHONE (OUTPATIENT)
Dept: CARDIOLOGY CLINIC | Age: 66
End: 2021-09-03

## 2021-09-03 ENCOUNTER — OFFICE VISIT (OUTPATIENT)
Dept: CARDIOLOGY CLINIC | Age: 66
End: 2021-09-03
Payer: MEDICARE

## 2021-09-03 VITALS
HEART RATE: 94 BPM | HEIGHT: 62 IN | OXYGEN SATURATION: 94 % | TEMPERATURE: 97.9 F | SYSTOLIC BLOOD PRESSURE: 116 MMHG | WEIGHT: 160 LBS | DIASTOLIC BLOOD PRESSURE: 80 MMHG | BODY MASS INDEX: 29.44 KG/M2

## 2021-09-03 DIAGNOSIS — I10 HTN (HYPERTENSION), BENIGN: ICD-10-CM

## 2021-09-03 DIAGNOSIS — I50.32 CHRONIC DIASTOLIC CHF (CONGESTIVE HEART FAILURE) (HCC): Primary | ICD-10-CM

## 2021-09-03 DIAGNOSIS — E78.2 MIXED HYPERLIPIDEMIA: ICD-10-CM

## 2021-09-03 DIAGNOSIS — Z87.891 HISTORY OF TOBACCO ABUSE: ICD-10-CM

## 2021-09-03 DIAGNOSIS — I25.118 CORONARY ARTERY DISEASE OF NATIVE ARTERY OF NATIVE HEART WITH STABLE ANGINA PECTORIS (HCC): ICD-10-CM

## 2021-09-03 PROCEDURE — 99214 OFFICE O/P EST MOD 30 MIN: CPT | Performed by: INTERNAL MEDICINE

## 2021-09-03 RX ORDER — POTASSIUM CHLORIDE 750 MG/1
40 TABLET, EXTENDED RELEASE ORAL DAILY
Qty: 120 TABLET | Refills: 2 | Status: SHIPPED | OUTPATIENT
Start: 2021-09-03 | End: 2022-01-07 | Stop reason: SDUPTHER

## 2021-09-03 RX ORDER — METOLAZONE 2.5 MG/1
2.5 TABLET ORAL WEEKLY
Qty: 4 TABLET | Refills: 2 | Status: SHIPPED | OUTPATIENT
Start: 2021-09-03 | End: 2021-11-29

## 2021-09-03 NOTE — TELEPHONE ENCOUNTER
----- Message from Johan Zamudio MD sent at 9/3/2021  7:27 AM EDT -----  Kidney function stable and K+ improved but still low. What dose of KCl is she taking currently?

## 2021-09-03 NOTE — PATIENT INSTRUCTIONS
1. Increase potassium to 40 meq daily  2. Recheck labs ([facility]) in a week  3.  Follow up in 3 months and repeat fasting cholesterol prior

## 2021-09-03 NOTE — TELEPHONE ENCOUNTER
Spoke with patient, verbalized understanding of lab work. She states she is taking 20 meq daily of potassium. She states she is on the way to scheduled office visit with  and will discuss there.

## 2021-09-07 RX ORDER — PANTOPRAZOLE SODIUM 40 MG/1
TABLET, DELAYED RELEASE ORAL
Qty: 30 TABLET | Refills: 0 | Status: SHIPPED | OUTPATIENT
Start: 2021-09-07 | End: 2021-10-04

## 2021-09-13 RX ORDER — CYCLOBENZAPRINE HCL 10 MG
TABLET ORAL
Qty: 90 TABLET | Refills: 0 | Status: SHIPPED | OUTPATIENT
Start: 2021-09-13 | End: 2021-11-09

## 2021-09-20 ENCOUNTER — APPOINTMENT (OUTPATIENT)
Dept: NON INVASIVE DIAGNOSTICS | Age: 66
End: 2021-09-20
Payer: MEDICARE

## 2021-09-20 ENCOUNTER — HOSPITAL ENCOUNTER (OUTPATIENT)
Dept: CT IMAGING | Age: 66
Discharge: HOME | End: 2021-09-20
Payer: MEDICARE

## 2021-09-20 ENCOUNTER — HOSPITAL ENCOUNTER (OUTPATIENT)
Dept: MAMMOGRAPHY | Age: 66
Discharge: HOME | End: 2021-09-20
Payer: MEDICARE

## 2021-09-20 DIAGNOSIS — Z87.891 PERSONAL HISTORY OF TOBACCO USE: ICD-10-CM

## 2021-09-20 DIAGNOSIS — Z12.31 ENCOUNTER FOR SCREENING MAMMOGRAM FOR MALIGNANT NEOPLASM OF BREAST: ICD-10-CM

## 2021-09-20 PROCEDURE — 77063 BREAST TOMOSYNTHESIS BI: CPT

## 2021-09-20 PROCEDURE — 71271 CT THORAX LUNG CANCER SCR C-: CPT

## 2021-09-21 ENCOUNTER — TELEPHONE (OUTPATIENT)
Dept: MAMMOGRAPHY | Age: 66
End: 2021-09-21

## 2021-09-21 NOTE — TELEPHONE ENCOUNTER
Spoke with patient regarding abnormal screening mammogram and the radiologist recommendation for additional imaging and possible ultrasound on both breasts. The patient said she would call back to schedule appointments. Given the number to central scheduling and to the department.

## 2021-09-30 ENCOUNTER — OFFICE VISIT (OUTPATIENT)
Dept: PULMONOLOGY | Age: 66
End: 2021-09-30
Payer: MEDICARE

## 2021-09-30 VITALS
RESPIRATION RATE: 16 BRPM | DIASTOLIC BLOOD PRESSURE: 70 MMHG | SYSTOLIC BLOOD PRESSURE: 122 MMHG | BODY MASS INDEX: 29.26 KG/M2 | HEIGHT: 62 IN | OXYGEN SATURATION: 91 % | WEIGHT: 159 LBS | HEART RATE: 94 BPM | TEMPERATURE: 96.3 F

## 2021-09-30 DIAGNOSIS — J96.11 CHRONIC RESPIRATORY FAILURE WITH HYPOXIA (HCC): ICD-10-CM

## 2021-09-30 DIAGNOSIS — Z79.01 CHRONIC ANTICOAGULATION: ICD-10-CM

## 2021-09-30 DIAGNOSIS — J44.9 COPD, SEVERE (HCC): Primary | ICD-10-CM

## 2021-09-30 PROCEDURE — 99214 OFFICE O/P EST MOD 30 MIN: CPT | Performed by: INTERNAL MEDICINE

## 2021-09-30 NOTE — PROGRESS NOTES
[facility] Pulmonary, Critical Care, and Sleep    Outpatient Follow Up Note    CC: COPD  Consulting provider: No ref. provider found    Interval History: 73 y.o. female    No exacerbations. REIS at baseline. No cough or wheeze. Using inhalers as prescribed. Initial HPI: regarding COPD. The patient has a history of COPD, stroke. Wheezes intermittently. Has a daily intermittent cough that is usually dry or sometimes productive of white to yellow sputum. The patient does not have SOB at rest but has REIS. Exercise tolerance on level ground is < 1 block. Her mother  of lung cancer. Current Medications:    Current Outpatient Medications:     cyclobenzaprine (FLEXERIL) 10 MG tablet, TAKE 1 TABLET BY MOUTH THREE TIMES A DAY AS NEEDED FOR MUSCLE SPASMS, Disp: 90 tablet, Rfl: 0    pantoprazole (PROTONIX) 40 MG tablet, TAKE 1 TABLET BY MOUTH EVERY DAY, Disp: 30 tablet, Rfl: 0    metOLazone (ZAROXOLYN) 2.5 MG tablet, Take 1 tablet by mouth once a week, Disp: 4 tablet, Rfl: 2    potassium chloride (KLOR-CON M10) 10 MEQ extended release tablet, Take 4 tablets by mouth daily, Disp: 120 tablet, Rfl: 2    torsemide (DEMADEX) 100 MG tablet, Take 0.5 tablets by mouth daily, Disp: 45 tablet, Rfl: 1    calcium citrate-vitamin D (CITRACAL+D) 315-200 MG-UNIT per tablet, Take 1 tablet by mouth 2 times daily, Disp: 60 tablet, Rfl: 0    vitamin D (ERGOCALCIFEROL) 1.25 MG (83561 UT) CAPS capsule, Take 1 capsule by mouth every 30 days, Disp: 12 capsule, Rfl: 0    gabapentin (NEURONTIN) 400 MG capsule, Take 400 mg by mouth 3 times daily. , Disp: , Rfl:     sennosides-docusate sodium (SENOKOT-S) 8.6-50 MG tablet, Take 1 tablet by mouth daily, Disp: 120 tablet, Rfl: 2    atorvastatin (LIPITOR) 80 MG tablet, TAKE 1 TABLET BY MOUTH EVERY DAY AT NIGHT, Disp: 90 tablet, Rfl: 0    carvedilol (COREG) 3.125 MG tablet, TAKE 1 TABLET BY MOUTH TWICE A DAY WITH MEALS, Disp: 180 tablet, Rfl: 1    MORPHINE, PAIN PUMP REFILL CHARGE,, , Disp: , Rfl:     albuterol sulfate HFA (PROVENTIL HFA) 108 (90 Base) MCG/ACT inhaler, Inhale 2 puffs into the lungs every 6 hours as needed for Wheezing or Shortness of Breath, Disp: 1 Inhaler, Rfl: 5    mometasone-formoterol (DULERA) 100-5 MCG/ACT inhaler, Inhale 2 puffs into the lungs 2 times daily, Disp: 1 Inhaler, Rfl: 5    aclidinium (TUDORZA PRESSAIR) 400 MCG/ACT AEPB inhaler, Inhale 1 puff into the lungs daily, Disp: 1 each, Rfl: 5    apixaban (ELIQUIS) 5 MG TABS tablet, Take 1 tablet by mouth 2 times daily, Disp: 60 tablet, Rfl: 11    amLODIPine (NORVASC) 5 MG tablet, Take 1 tablet by mouth daily, Disp: 90 tablet, Rfl: 3    nitroGLYCERIN (NITROSTAT) 0.4 MG SL tablet, Place 1 tablet under the tongue every 5 minutes as needed for Chest pain, Disp: 25 tablet, Rfl: 3    aspirin 81 MG tablet, Take 81 mg by mouth daily, Disp: , Rfl:     oxyCODONE-acetaminophen (PERCOCET)  MG per tablet, Take 1 tablet by mouth three times daily . , Disp: , Rfl:     albuterol (PROVENTIL) (2.5 MG/3ML) 0.083% nebulizer solution, Take 2.5 mg by nebulization every 6 hours as needed for Wheezing, Disp: , Rfl:     polyethylene glycol (GLYCOLAX) packet, Take 17 g by mouth daily as needed for Constipation, Disp: , Rfl:     OXYGEN, Inhale 2 L into the lungs , Disp: , Rfl:     Objective:   PHYSICAL EXAM:      VITALS:  There were no vitals taken for this visit. Constitutional:  No acute distress. Eyes: PERRL. Conjunctivae anicteric. ENT: Normal nose. Normal tongue. Neck:  Trachea is midline. No thyroid tenderness. Respiratory: No accessory muscle usage. decreased breath sounds. No wheezes. No rales. No Rhonchi. Cardiovascular: Normal S1S2. No digit clubbing. No digit cyanosis. No LE edema. Psychiatric: No anxiety or Agitation. Alert and Oriented to person, place and time. LABS:  Reviewed any pertinent new labs that are available.     PFTs 5/11/17  FVC  (78%) FEV1 0.74 (31%) FEV1/FVC ratio 31  TLC  (122%)  RV  (168%) DLCO (34%) Bronchodilator response: +++     6MWT: 490ft, 2lpm O2    IMAGING: I personally reviewed and interpreted the following imaging today in the office:    5/11/17 HRCT:   Lungs/pleura: Mucous is present within the proximal trachea and subsegmental   right middle lobe bronchi.  Mild bronchial wall thickening involves the   bilateral lower lobes likely due to bronchitis.       There is no pneumothorax or pleural effusion. Juris Inge is biapical scarring and   bibasilar atelectasis with extensive emphysema throughout the bilateral lungs. 9/20/21 LDCT Chest: stable 3mm cole nodule  2. LungRADS Category S: Moderate centrilobular emphysema. 3. Biapical fibrotic change and granulomatous changes in the chest.  There is   moderate atherosclerotic calcification of the LAD. ASSESSMENT:  · COPD, severe  · Chronic hypoxic respiratory failure  · Prior tobacco abuse: quit 2012. 120 pack years  · PAF on amiodarone and eliquis  · Chronic Anticoagulation  · H/o TIA, brain aneurysm that is monitored  · DEVI -not treated per pt unable to tolerate PAP     PLAN:   · supplemental 2lpm with exertion and nocturnal  · PRN albuterol INH and continue nebulizer  · Continue Dulera 100 and Tudorza   · On Eliquis for anticoagulation. · Pt interested in excite device  · F/u in 6 mo for COPD and in 12 months for her annual LDCT  Screening CT scan was considered in a lung cancer screening counseling and shared decision making visit today that included the following elements:   Eligibility: Age: 73. There are no signs or symptoms of lung cancer.   Tobacco History 120 pack-years, quit 10 years ago  Verbal counseling has been performed by me to include benefits and harms of screening, follow-up diagnostic testing, over-diagnosis, false positive rate, and total radiation exposure;   I have counseled on the importance of adherence to annual lung cancer LDCT screening, the impact of comorbidities and patient is willing to undergo diagnosis and treatment;   I have provided counseling on the importance of maintaining cigarette smoking abstinence if former smoker; or the importance of smoking cessation if current smoker and, if appropriate, furnishing of information about tobacco cessation interventions

## 2021-10-04 RX ORDER — PANTOPRAZOLE SODIUM 40 MG/1
TABLET, DELAYED RELEASE ORAL
Qty: 30 TABLET | Refills: 0 | Status: SHIPPED | OUTPATIENT
Start: 2021-10-04 | End: 2021-10-27

## 2021-10-06 RX ORDER — AMLODIPINE BESYLATE 5 MG/1
TABLET ORAL
Qty: 90 TABLET | Refills: 3 | Status: SHIPPED | OUTPATIENT
Start: 2021-10-06 | End: 2022-06-14 | Stop reason: SDUPTHER

## 2021-10-06 RX ORDER — APIXABAN 5 MG/1
TABLET, FILM COATED ORAL
Qty: 60 TABLET | Refills: 11 | Status: SHIPPED | OUTPATIENT
Start: 2021-10-06 | End: 2022-09-27

## 2021-10-11 ENCOUNTER — HOSPITAL ENCOUNTER (OUTPATIENT)
Dept: WOMENS IMAGING | Age: 66
Discharge: HOME | End: 2021-10-11
Payer: MEDICARE

## 2021-10-11 DIAGNOSIS — R92.8 ABNORMAL MAMMOGRAM: ICD-10-CM

## 2021-10-11 PROCEDURE — 76642 ULTRASOUND BREAST LIMITED: CPT

## 2021-10-11 PROCEDURE — G0279 TOMOSYNTHESIS, MAMMO: HCPCS

## 2021-10-21 ENCOUNTER — HOSPITAL ENCOUNTER (INPATIENT)
Age: 66
LOS: 2 days | Discharge: HOME | DRG: 871 | End: 2021-10-23
Attending: STUDENT IN AN ORGANIZED HEALTH CARE EDUCATION/TRAINING PROGRAM | Admitting: INTERNAL MEDICINE
Payer: MEDICARE

## 2021-10-21 ENCOUNTER — APPOINTMENT (OUTPATIENT)
Dept: GENERAL RADIOLOGY | Age: 66
DRG: 871 | End: 2021-10-21
Payer: MEDICARE

## 2021-10-21 ENCOUNTER — APPOINTMENT (OUTPATIENT)
Dept: CT IMAGING | Age: 66
DRG: 871 | End: 2021-10-21
Payer: MEDICARE

## 2021-10-21 DIAGNOSIS — E87.6 HYPOKALEMIA: ICD-10-CM

## 2021-10-21 DIAGNOSIS — R11.2 NAUSEA AND VOMITING, INTRACTABILITY OF VOMITING NOT SPECIFIED, UNSPECIFIED VOMITING TYPE: ICD-10-CM

## 2021-10-21 DIAGNOSIS — J18.9 PNEUMONIA OF RIGHT LOWER LOBE DUE TO INFECTIOUS ORGANISM: Primary | ICD-10-CM

## 2021-10-21 DIAGNOSIS — A41.9 SEPTICEMIA (HCC): ICD-10-CM

## 2021-10-21 DIAGNOSIS — R94.31 PROLONGED Q-T INTERVAL ON ECG: ICD-10-CM

## 2021-10-21 LAB
A/G RATIO: 1.4 (ref 1.1–2.2)
ALBUMIN SERPL-MCNC: 4.7 G/DL (ref 3.4–5)
ALP BLD-CCNC: 162 U/L (ref 40–129)
ALT SERPL-CCNC: 12 U/L (ref 10–40)
ANION GAP SERPL CALCULATED.3IONS-SCNC: 19 MMOL/L (ref 3–16)
AST SERPL-CCNC: 29 U/L (ref 15–37)
BACTERIA: ABNORMAL /HPF
BASOPHILS ABSOLUTE: 0 K/UL (ref 0–0.2)
BASOPHILS RELATIVE PERCENT: 0.2 %
BILIRUB SERPL-MCNC: 1.2 MG/DL (ref 0–1)
BILIRUBIN URINE: NEGATIVE
BLOOD, URINE: NEGATIVE
BUN BLDV-MCNC: 27 MG/DL (ref 7–20)
CALCIUM SERPL-MCNC: 10.1 MG/DL (ref 8.3–10.6)
CHLORIDE BLD-SCNC: 78 MMOL/L (ref 99–110)
CLARITY: CLEAR
CO2: 40 MMOL/L (ref 21–32)
COLOR: YELLOW
CREAT SERPL-MCNC: 0.9 MG/DL (ref 0.6–1.2)
EKG ATRIAL RATE: 97 BPM
EKG DIAGNOSIS: NORMAL
EKG P AXIS: 63 DEGREES
EKG P-R INTERVAL: 160 MS
EKG Q-T INTERVAL: 492 MS
EKG QRS DURATION: 82 MS
EKG QTC CALCULATION (BAZETT): 624 MS
EKG R AXIS: -73 DEGREES
EKG T AXIS: 62 DEGREES
EKG VENTRICULAR RATE: 97 BPM
EOSINOPHILS ABSOLUTE: 0 K/UL (ref 0–0.6)
EOSINOPHILS RELATIVE PERCENT: 0 %
GFR AFRICAN AMERICAN: >60
GFR NON-AFRICAN AMERICAN: >60
GLOBULIN: 3.3 G/DL
GLUCOSE BLD-MCNC: 189 MG/DL (ref 70–99)
GLUCOSE URINE: NEGATIVE MG/DL
HCT VFR BLD CALC: 48 % (ref 36–48)
HEMOGLOBIN: 15.5 G/DL (ref 12–16)
INFLUENZA A: NOT DETECTED
INFLUENZA B: NOT DETECTED
KETONES, URINE: 15 MG/DL
LACTIC ACID, SEPSIS: 2.2 MMOL/L (ref 0.4–1.9)
LEUKOCYTE ESTERASE, URINE: ABNORMAL
LIPASE: 27 U/L (ref 13–60)
LYMPHOCYTES ABSOLUTE: 0.6 K/UL (ref 1–5.1)
LYMPHOCYTES RELATIVE PERCENT: 5 %
MAGNESIUM: 1.8 MG/DL (ref 1.8–2.4)
MCH RBC QN AUTO: 27 PG (ref 26–34)
MCHC RBC AUTO-ENTMCNC: 32.3 G/DL (ref 31–36)
MCV RBC AUTO: 83.5 FL (ref 80–100)
MICROSCOPIC EXAMINATION: YES
MONOCYTES ABSOLUTE: 1.2 K/UL (ref 0–1.3)
MONOCYTES RELATIVE PERCENT: 9.2 %
NEUTROPHILS ABSOLUTE: 10.8 K/UL (ref 1.7–7.7)
NEUTROPHILS RELATIVE PERCENT: 85.6 %
NITRITE, URINE: NEGATIVE
PDW BLD-RTO: 15.4 % (ref 12.4–15.4)
PH UA: 8 (ref 5–8)
PLATELET # BLD: 321 K/UL (ref 135–450)
PMV BLD AUTO: 9.5 FL (ref 5–10.5)
POTASSIUM REFLEX MAGNESIUM: 2.8 MMOL/L (ref 3.5–5.1)
POTASSIUM SERPL-SCNC: 2.5 MMOL/L (ref 3.5–5.1)
PRO-BNP: 341 PG/ML (ref 0–124)
PROCALCITONIN: 0.09 NG/ML (ref 0–0.15)
PROTEIN UA: ABNORMAL MG/DL
RBC # BLD: 5.74 M/UL (ref 4–5.2)
RBC UA: ABNORMAL /HPF (ref 0–4)
SARS-COV-2 RNA, RT PCR: NOT DETECTED
SODIUM BLD-SCNC: 137 MMOL/L (ref 136–145)
SPECIFIC GRAVITY UA: 1.01 (ref 1–1.03)
TOTAL PROTEIN: 8 G/DL (ref 6.4–8.2)
TROPONIN: <0.01 NG/ML
URINE REFLEX TO CULTURE: ABNORMAL
URINE TYPE: ABNORMAL
UROBILINOGEN, URINE: 0.2 E.U./DL
WBC # BLD: 12.6 K/UL (ref 4–11)
WBC UA: ABNORMAL /HPF (ref 0–5)
YEAST: PRESENT /HPF

## 2021-10-21 PROCEDURE — 84145 PROCALCITONIN (PCT): CPT

## 2021-10-21 PROCEDURE — 36415 COLL VENOUS BLD VENIPUNCTURE: CPT

## 2021-10-21 PROCEDURE — 81001 URINALYSIS AUTO W/SCOPE: CPT

## 2021-10-21 PROCEDURE — 6360000002 HC RX W HCPCS: Performed by: NURSE PRACTITIONER

## 2021-10-21 PROCEDURE — 83880 ASSAY OF NATRIURETIC PEPTIDE: CPT

## 2021-10-21 PROCEDURE — 6360000002 HC RX W HCPCS: Performed by: STUDENT IN AN ORGANIZED HEALTH CARE EDUCATION/TRAINING PROGRAM

## 2021-10-21 PROCEDURE — 83605 ASSAY OF LACTIC ACID: CPT

## 2021-10-21 PROCEDURE — 74176 CT ABD & PELVIS W/O CONTRAST: CPT

## 2021-10-21 PROCEDURE — 6370000000 HC RX 637 (ALT 250 FOR IP): Performed by: NURSE PRACTITIONER

## 2021-10-21 PROCEDURE — 99284 EMERGENCY DEPT VISIT MOD MDM: CPT

## 2021-10-21 PROCEDURE — 83735 ASSAY OF MAGNESIUM: CPT

## 2021-10-21 PROCEDURE — 2580000003 HC RX 258: Performed by: STUDENT IN AN ORGANIZED HEALTH CARE EDUCATION/TRAINING PROGRAM

## 2021-10-21 PROCEDURE — 84132 ASSAY OF SERUM POTASSIUM: CPT

## 2021-10-21 PROCEDURE — 2580000003 HC RX 258: Performed by: NURSE PRACTITIONER

## 2021-10-21 PROCEDURE — 96361 HYDRATE IV INFUSION ADD-ON: CPT

## 2021-10-21 PROCEDURE — 93005 ELECTROCARDIOGRAM TRACING: CPT | Performed by: STUDENT IN AN ORGANIZED HEALTH CARE EDUCATION/TRAINING PROGRAM

## 2021-10-21 PROCEDURE — 96365 THER/PROPH/DIAG IV INF INIT: CPT

## 2021-10-21 PROCEDURE — 83690 ASSAY OF LIPASE: CPT

## 2021-10-21 PROCEDURE — 93010 ELECTROCARDIOGRAM REPORT: CPT | Performed by: INTERNAL MEDICINE

## 2021-10-21 PROCEDURE — 6370000000 HC RX 637 (ALT 250 FOR IP): Performed by: STUDENT IN AN ORGANIZED HEALTH CARE EDUCATION/TRAINING PROGRAM

## 2021-10-21 PROCEDURE — 71045 X-RAY EXAM CHEST 1 VIEW: CPT

## 2021-10-21 PROCEDURE — 99222 1ST HOSP IP/OBS MODERATE 55: CPT | Performed by: NURSE PRACTITIONER

## 2021-10-21 PROCEDURE — 1200000000 HC SEMI PRIVATE

## 2021-10-21 PROCEDURE — 85025 COMPLETE CBC W/AUTO DIFF WBC: CPT

## 2021-10-21 PROCEDURE — 84484 ASSAY OF TROPONIN QUANT: CPT

## 2021-10-21 PROCEDURE — 87636 SARSCOV2 & INF A&B AMP PRB: CPT

## 2021-10-21 PROCEDURE — 80053 COMPREHEN METABOLIC PANEL: CPT

## 2021-10-21 RX ORDER — SODIUM CHLORIDE 9 MG/ML
INJECTION, SOLUTION INTRAVENOUS CONTINUOUS
Status: DISCONTINUED | OUTPATIENT
Start: 2021-10-21 | End: 2021-10-22

## 2021-10-21 RX ORDER — SODIUM CHLORIDE 0.9 % (FLUSH) 0.9 %
5-40 SYRINGE (ML) INJECTION EVERY 12 HOURS SCHEDULED
Status: DISCONTINUED | OUTPATIENT
Start: 2021-10-21 | End: 2021-10-23 | Stop reason: HOSPADM

## 2021-10-21 RX ORDER — OXYCODONE AND ACETAMINOPHEN 10; 325 MG/1; MG/1
1 TABLET ORAL 3 TIMES DAILY
Status: DISCONTINUED | OUTPATIENT
Start: 2021-10-21 | End: 2021-10-23 | Stop reason: HOSPADM

## 2021-10-21 RX ORDER — ATORVASTATIN CALCIUM 40 MG/1
80 TABLET, FILM COATED ORAL NIGHTLY
Status: DISCONTINUED | OUTPATIENT
Start: 2021-10-21 | End: 2021-10-23 | Stop reason: HOSPADM

## 2021-10-21 RX ORDER — SENNA AND DOCUSATE SODIUM 50; 8.6 MG/1; MG/1
1 TABLET, FILM COATED ORAL DAILY
Status: DISCONTINUED | OUTPATIENT
Start: 2021-10-21 | End: 2021-10-23 | Stop reason: HOSPADM

## 2021-10-21 RX ORDER — POTASSIUM CHLORIDE 7.45 MG/ML
10 INJECTION INTRAVENOUS
Status: DISPENSED | OUTPATIENT
Start: 2021-10-21 | End: 2021-10-21

## 2021-10-21 RX ORDER — SODIUM CHLORIDE 9 MG/ML
25 INJECTION, SOLUTION INTRAVENOUS PRN
Status: DISCONTINUED | OUTPATIENT
Start: 2021-10-21 | End: 2021-10-23 | Stop reason: HOSPADM

## 2021-10-21 RX ORDER — ONDANSETRON 4 MG/1
4 TABLET, ORALLY DISINTEGRATING ORAL EVERY 8 HOURS PRN
Status: DISCONTINUED | OUTPATIENT
Start: 2021-10-21 | End: 2021-10-23 | Stop reason: HOSPADM

## 2021-10-21 RX ORDER — POTASSIUM CHLORIDE 20 MEQ/1
40 TABLET, EXTENDED RELEASE ORAL ONCE
Status: COMPLETED | OUTPATIENT
Start: 2021-10-21 | End: 2021-10-21

## 2021-10-21 RX ORDER — SODIUM CHLORIDE 0.9 % (FLUSH) 0.9 %
5-40 SYRINGE (ML) INJECTION PRN
Status: DISCONTINUED | OUTPATIENT
Start: 2021-10-21 | End: 2021-10-23 | Stop reason: HOSPADM

## 2021-10-21 RX ORDER — ASPIRIN 81 MG/1
81 TABLET, CHEWABLE ORAL DAILY
Status: DISCONTINUED | OUTPATIENT
Start: 2021-10-21 | End: 2021-10-23 | Stop reason: HOSPADM

## 2021-10-21 RX ORDER — CALCIUM CARBONATE-CHOLECALCIFEROL TAB 250 MG-125 UNIT 250-125 MG-UNIT
1 TAB ORAL 2 TIMES DAILY
Status: DISCONTINUED | OUTPATIENT
Start: 2021-10-21 | End: 2021-10-22

## 2021-10-21 RX ORDER — POTASSIUM CHLORIDE 7.45 MG/ML
10 INJECTION INTRAVENOUS PRN
Status: DISCONTINUED | OUTPATIENT
Start: 2021-10-21 | End: 2021-10-23 | Stop reason: HOSPADM

## 2021-10-21 RX ORDER — BUDESONIDE AND FORMOTEROL FUMARATE DIHYDRATE 80; 4.5 UG/1; UG/1
2 AEROSOL RESPIRATORY (INHALATION) 2 TIMES DAILY
Status: DISCONTINUED | OUTPATIENT
Start: 2021-10-21 | End: 2021-10-22

## 2021-10-21 RX ORDER — AMLODIPINE BESYLATE 5 MG/1
5 TABLET ORAL DAILY
Status: DISCONTINUED | OUTPATIENT
Start: 2021-10-21 | End: 2021-10-23 | Stop reason: HOSPADM

## 2021-10-21 RX ORDER — ACETAMINOPHEN 325 MG/1
650 TABLET ORAL EVERY 6 HOURS PRN
Status: DISCONTINUED | OUTPATIENT
Start: 2021-10-21 | End: 2021-10-23 | Stop reason: HOSPADM

## 2021-10-21 RX ORDER — MAGNESIUM SULFATE IN WATER 40 MG/ML
2000 INJECTION, SOLUTION INTRAVENOUS PRN
Status: DISCONTINUED | OUTPATIENT
Start: 2021-10-21 | End: 2021-10-23 | Stop reason: HOSPADM

## 2021-10-21 RX ORDER — ONDANSETRON 2 MG/ML
4 INJECTION INTRAMUSCULAR; INTRAVENOUS EVERY 6 HOURS PRN
Status: DISCONTINUED | OUTPATIENT
Start: 2021-10-21 | End: 2021-10-23 | Stop reason: HOSPADM

## 2021-10-21 RX ORDER — SODIUM CHLORIDE, SODIUM LACTATE, POTASSIUM CHLORIDE, AND CALCIUM CHLORIDE .6; .31; .03; .02 G/100ML; G/100ML; G/100ML; G/100ML
30 INJECTION, SOLUTION INTRAVENOUS ONCE
Status: COMPLETED | OUTPATIENT
Start: 2021-10-21 | End: 2021-10-21

## 2021-10-21 RX ORDER — CARVEDILOL 3.12 MG/1
3.12 TABLET ORAL 2 TIMES DAILY WITH MEALS
Status: DISCONTINUED | OUTPATIENT
Start: 2021-10-21 | End: 2021-10-23 | Stop reason: HOSPADM

## 2021-10-21 RX ORDER — ACETAMINOPHEN 650 MG/1
650 SUPPOSITORY RECTAL EVERY 6 HOURS PRN
Status: DISCONTINUED | OUTPATIENT
Start: 2021-10-21 | End: 2021-10-23 | Stop reason: HOSPADM

## 2021-10-21 RX ORDER — POTASSIUM CHLORIDE 20 MEQ/1
40 TABLET, EXTENDED RELEASE ORAL PRN
Status: DISCONTINUED | OUTPATIENT
Start: 2021-10-21 | End: 2021-10-23 | Stop reason: HOSPADM

## 2021-10-21 RX ORDER — POLYETHYLENE GLYCOL 3350 17 G/17G
17 POWDER, FOR SOLUTION ORAL DAILY PRN
Status: DISCONTINUED | OUTPATIENT
Start: 2021-10-21 | End: 2021-10-22 | Stop reason: DRUGHIGH

## 2021-10-21 RX ORDER — PANTOPRAZOLE SODIUM 40 MG/1
40 TABLET, DELAYED RELEASE ORAL DAILY
Status: DISCONTINUED | OUTPATIENT
Start: 2021-10-21 | End: 2021-10-23 | Stop reason: HOSPADM

## 2021-10-21 RX ORDER — GABAPENTIN 400 MG/1
400 CAPSULE ORAL 3 TIMES DAILY
Status: DISCONTINUED | OUTPATIENT
Start: 2021-10-21 | End: 2021-10-23 | Stop reason: HOSPADM

## 2021-10-21 RX ORDER — ALBUTEROL SULFATE 2.5 MG/3ML
2.5 SOLUTION RESPIRATORY (INHALATION) EVERY 6 HOURS PRN
Status: DISCONTINUED | OUTPATIENT
Start: 2021-10-21 | End: 2021-10-23 | Stop reason: HOSPADM

## 2021-10-21 RX ORDER — POTASSIUM CHLORIDE 20 MEQ/1
40 TABLET, EXTENDED RELEASE ORAL DAILY
Status: DISCONTINUED | OUTPATIENT
Start: 2021-10-21 | End: 2021-10-23 | Stop reason: HOSPADM

## 2021-10-21 RX ADMIN — POTASSIUM CHLORIDE 10 MEQ: 7.46 INJECTION, SOLUTION INTRAVENOUS at 14:45

## 2021-10-21 RX ADMIN — Medication 250 MG: at 17:14

## 2021-10-21 RX ADMIN — POTASSIUM CHLORIDE 10 MEQ: 7.46 INJECTION, SOLUTION INTRAVENOUS at 23:09

## 2021-10-21 RX ADMIN — ASPIRIN 81 MG: 81 TABLET, CHEWABLE ORAL at 15:46

## 2021-10-21 RX ADMIN — SODIUM CHLORIDE, PRESERVATIVE FREE 10 ML: 5 INJECTION INTRAVENOUS at 21:33

## 2021-10-21 RX ADMIN — SODIUM CHLORIDE, POTASSIUM CHLORIDE, SODIUM LACTATE AND CALCIUM CHLORIDE 2000 ML: 600; 310; 30; 20 INJECTION, SOLUTION INTRAVENOUS at 11:39

## 2021-10-21 RX ADMIN — CEFEPIME 2000 MG: 2 INJECTION, POWDER, FOR SOLUTION INTRAVENOUS at 12:29

## 2021-10-21 RX ADMIN — GABAPENTIN 400 MG: 400 CAPSULE ORAL at 21:32

## 2021-10-21 RX ADMIN — POTASSIUM CHLORIDE 10 MEQ: 7.46 INJECTION, SOLUTION INTRAVENOUS at 13:19

## 2021-10-21 RX ADMIN — VANCOMYCIN HYDROCHLORIDE 1750 MG: 1 INJECTION, POWDER, LYOPHILIZED, FOR SOLUTION INTRAVENOUS at 13:22

## 2021-10-21 RX ADMIN — POTASSIUM CHLORIDE 40 MEQ: 1500 TABLET, EXTENDED RELEASE ORAL at 11:43

## 2021-10-21 RX ADMIN — OXYCODONE HYDROCHLORIDE AND ACETAMINOPHEN 1 TABLET: 10; 325 TABLET ORAL at 21:33

## 2021-10-21 RX ADMIN — CARVEDILOL 3.12 MG: 3.12 TABLET, FILM COATED ORAL at 17:14

## 2021-10-21 RX ADMIN — OXYCODONE HYDROCHLORIDE AND ACETAMINOPHEN 1 TABLET: 10; 325 TABLET ORAL at 15:46

## 2021-10-21 RX ADMIN — POTASSIUM CHLORIDE 10 MEQ: 7.46 INJECTION, SOLUTION INTRAVENOUS at 11:42

## 2021-10-21 RX ADMIN — AMLODIPINE BESYLATE 5 MG: 5 TABLET ORAL at 15:46

## 2021-10-21 RX ADMIN — ATORVASTATIN CALCIUM 80 MG: 40 TABLET, FILM COATED ORAL at 21:32

## 2021-10-21 RX ADMIN — Medication 250 MG: at 21:32

## 2021-10-21 RX ADMIN — APIXABAN 5 MG: 5 TABLET, FILM COATED ORAL at 21:32

## 2021-10-21 RX ADMIN — DOCUSATE SODIUM 50MG AND SENNOSIDES 8.6MG 1 TABLET: 8.6; 5 TABLET, FILM COATED ORAL at 17:14

## 2021-10-21 RX ADMIN — SODIUM CHLORIDE: 9 INJECTION, SOLUTION INTRAVENOUS at 17:15

## 2021-10-21 RX ADMIN — SODIUM CHLORIDE: 9 INJECTION, SOLUTION INTRAVENOUS at 19:21

## 2021-10-21 ASSESSMENT — PAIN DESCRIPTION - FREQUENCY
FREQUENCY: CONTINUOUS
FREQUENCY: CONTINUOUS

## 2021-10-21 ASSESSMENT — PAIN DESCRIPTION - LOCATION
LOCATION: BACK
LOCATION: BACK

## 2021-10-21 ASSESSMENT — PAIN DESCRIPTION - PAIN TYPE
TYPE: CHRONIC PAIN
TYPE: CHRONIC PAIN

## 2021-10-21 ASSESSMENT — PAIN DESCRIPTION - DESCRIPTORS
DESCRIPTORS: ACHING
DESCRIPTORS: ACHING

## 2021-10-21 ASSESSMENT — PAIN SCALES - GENERAL
PAINLEVEL_OUTOF10: 4
PAINLEVEL_OUTOF10: 5
PAINLEVEL_OUTOF10: 10
PAINLEVEL_OUTOF10: 0

## 2021-10-21 NOTE — PROGRESS NOTES
Pt arrived to [facility]. donovan NP notified of pt current mental status which was a change from Olga CABRERA's known orientation status. Pt meghan occasionally repeat the same words. Denies pain but moans and groans at times? 2l of home oxygen. /75   Pulse 85   Temp 99.1 °F (37.3 °C) (Oral)   Resp 18   Ht 5' 2\" (1.575 m)   Wt 162 lb 6.4 oz (73.7 kg)   SpO2 96%   BMI 29.70 kg/m²     Vitals as shown.  Pt arrive to [facility] in stable condition

## 2021-10-21 NOTE — PROGRESS NOTES
Patient admitted to room 229 ER. Patient oriented to room, call light, bed rails, phone, lights and bathroom. Patient instructed about the schedule of the day including: vital sign frequency, lab draws, possible tests, frequency of MD and staff rounds, daily weights, I &O's and prescribed diet. Bed alarm deferred patient low fall risk and refuses alarm. Telemetry box in place, patient aware of placement and reason. Bed locked, in lowest position, side rails up 2/4, call light within reach. Recliner Assessment:     Patient is not able to demonstrate the ability to move from a reclining position to an upright position within the recliner due to AMS. 4 Eyes Skin Assessment     The patient is being assess for   Admission    I agree that 2 RN's have performed a thorough Head to Toe Skin Assessment on the patient. ALL assessment sites listed below have been assessed. Areas assessed for pressure by both nurses:   [x]   Head, Face, and Ears   [x]   Shoulders, Back, and Chest, Abdomen  [x]   Arms, Elbows, and Hands   [x]   Coccyx, Sacrum, and Ischium  [x]   Legs, Feet, and Heels         Skin Assessed Under all Medical Devices by both nurses:  O2 device tubing              Scattered bruising and abrasions. Missing left great toenail             **SHARE this note so that the co-signing nurse is able to place an eSignature**    Co-signer eSignature: Electronically signed by Mercy Roman RN on 10/21/21 at 7:23 PM EDT    Does the Patient have Skin Breakdown related to pressure?   No            Caleb Prevention initiated:  Yes   Wound Care Orders initiated:  Yes      [id number]  nurse consulted for Pressure Injury (Stage 3,4, Unstageable, DTI, NWPT, Complex wounds)and New or Established Ostomies:  No      Primary Nurse eSignature: Electronically signed by Gama Jolly RN on 10/21/21 at 7:12 PM EDT

## 2021-10-21 NOTE — FLOWSHEET NOTE
Attempted to make visit, pt unable to express herself. \"I am o o ok. Ashok Park I got myself here. Ashok Park \" used the same words for all questions.  spoke to RN about pt. Will follow up as needed.

## 2021-10-21 NOTE — PROGRESS NOTES
Admit to med-surg  Nausea, vomiting  Possible pneumonia  Prolonged QTc  Hypokalemia    Gustavo Hashimoto FNP-C  10/21/2021
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
## 2021-10-21 NOTE — ED NOTES
Bed: 05  Expected date:   Expected time:   Means of arrival:   Comments:  Melo Osorio  10/21/21 5509

## 2021-10-21 NOTE — PROGRESS NOTES
Pharmacy Note  Vancomycin Consult    Wendy Oliveros is a 73 y.o. female started on Vancomycin for PNA consult received from KAY Rodriguez to manage therapy. Also receiving the following antibiotics: cefepime. Patient Active Problem List   Diagnosis    Coronary artery disease of native artery of native heart with stable angina pectoris (HCC)    General weakness    H/O oral surgery    Chronic pain    Bradycardia    Paroxysmal A-fib (HCC)    Chronic diastolic CHF (congestive heart failure) ([facility] [address]    History of CVA (cerebrovascular accident)    Dysarthria    Ataxia    Right sided weakness    DEVI (obstructive sleep apnea)    TIA involving left internal carotid artery    Encephalopathy, metabolic    Aneurysm (HCC)    HTN (hypertension), benign    Chronic respiratory failure with hypoxia (HCC)    COPD, severe (HCC)    Anxiety    Hyperlipemia    History of tobacco abuse    Lung nodules    Diarrhea    Nausea vomiting and diarrhea    Colitis    Shortness of breath    Nausea and vomiting     Allergies:  Codeine, Darvocet [propoxyphene n-acetaminophen], Navane [thiothixene], Penicillins, Propoxyphene, Trilafon [perphenazine], Aspirin, and Dye [iodides]     Temp max:    Recent Labs     10/21/21  1011   BUN 27*   CREATININE 0.9   WBC 12.6*     No intake or output data in the 24 hours ending 10/21/21 1342  Culture Date      Source                       Results  NGTD    Ht Readings from Last 1 Encounters:   10/21/21 5' 2\" (1.575 m)        Wt Readings from Last 1 Encounters:   10/21/21 150 lb (68 kg)       Body mass index is 27.44 kg/m². Estimated Creatinine Clearance: 56 mL/min (based on SCr of 0.9 mg/dL). Goal AUC: 400-600     Assessment/Plan:  Will initiate Vancomycin with a one time loading dose of 1750 mg x1, followed by 1250 mg IV every 24 hours. Level on 10/23 at 1330    Thank you for the consult. Will continue to follow. George Reynolds Pharm D 10/21/09215:46 PM  .

## 2021-10-21 NOTE — ED NOTES
1205 - Perfect serve sent. Severiano Laughter  10/21/21 928 [facility] called back.       Severiano Laughter  10/21/21 1216

## 2021-10-22 LAB
A/G RATIO: 1.6 (ref 1.1–2.2)
ALBUMIN SERPL-MCNC: 3.7 G/DL (ref 3.4–5)
ALP BLD-CCNC: 110 U/L (ref 40–129)
ALT SERPL-CCNC: 8 U/L (ref 10–40)
ANION GAP SERPL CALCULATED.3IONS-SCNC: 12 MMOL/L (ref 3–16)
AST SERPL-CCNC: 25 U/L (ref 15–37)
BASOPHILS ABSOLUTE: 0 K/UL (ref 0–0.2)
BASOPHILS RELATIVE PERCENT: 0.6 %
BILIRUB SERPL-MCNC: 0.6 MG/DL (ref 0–1)
BUN BLDV-MCNC: 12 MG/DL (ref 7–20)
CALCIUM SERPL-MCNC: 9.3 MG/DL (ref 8.3–10.6)
CHLORIDE BLD-SCNC: 96 MMOL/L (ref 99–110)
CO2: 32 MMOL/L (ref 21–32)
CREAT SERPL-MCNC: <0.5 MG/DL (ref 0.6–1.2)
EKG ATRIAL RATE: 78 BPM
EKG DIAGNOSIS: NORMAL
EKG P AXIS: 81 DEGREES
EKG P-R INTERVAL: 180 MS
EKG Q-T INTERVAL: 426 MS
EKG QRS DURATION: 82 MS
EKG QTC CALCULATION (BAZETT): 485 MS
EKG R AXIS: -78 DEGREES
EKG T AXIS: 66 DEGREES
EKG VENTRICULAR RATE: 78 BPM
EOSINOPHILS ABSOLUTE: 0.1 K/UL (ref 0–0.6)
EOSINOPHILS RELATIVE PERCENT: 1.4 %
GFR AFRICAN AMERICAN: >60
GFR NON-AFRICAN AMERICAN: >60
GLOBULIN: 2.3 G/DL
GLUCOSE BLD-MCNC: 84 MG/DL (ref 70–99)
HCT VFR BLD CALC: 40.3 % (ref 36–48)
HEMOGLOBIN: 12.8 G/DL (ref 12–16)
LYMPHOCYTES ABSOLUTE: 1.3 K/UL (ref 1–5.1)
LYMPHOCYTES RELATIVE PERCENT: 20.2 %
MAGNESIUM: 2.4 MG/DL (ref 1.8–2.4)
MCH RBC QN AUTO: 27.3 PG (ref 26–34)
MCHC RBC AUTO-ENTMCNC: 31.7 G/DL (ref 31–36)
MCV RBC AUTO: 86.1 FL (ref 80–100)
MONOCYTES ABSOLUTE: 0.9 K/UL (ref 0–1.3)
MONOCYTES RELATIVE PERCENT: 14.2 %
NEUTROPHILS ABSOLUTE: 4.2 K/UL (ref 1.7–7.7)
NEUTROPHILS RELATIVE PERCENT: 63.6 %
PDW BLD-RTO: 15.5 % (ref 12.4–15.4)
PLATELET # BLD: 220 K/UL (ref 135–450)
PMV BLD AUTO: 9.4 FL (ref 5–10.5)
POTASSIUM REFLEX MAGNESIUM: 3.4 MMOL/L (ref 3.5–5.1)
RBC # BLD: 4.69 M/UL (ref 4–5.2)
SODIUM BLD-SCNC: 140 MMOL/L (ref 136–145)
TOTAL PROTEIN: 6 G/DL (ref 6.4–8.2)
WBC # BLD: 6.7 K/UL (ref 4–11)

## 2021-10-22 PROCEDURE — 92526 ORAL FUNCTION THERAPY: CPT

## 2021-10-22 PROCEDURE — 94640 AIRWAY INHALATION TREATMENT: CPT

## 2021-10-22 PROCEDURE — 93005 ELECTROCARDIOGRAM TRACING: CPT | Performed by: NURSE PRACTITIONER

## 2021-10-22 PROCEDURE — 97116 GAIT TRAINING THERAPY: CPT

## 2021-10-22 PROCEDURE — 6370000000 HC RX 637 (ALT 250 FOR IP): Performed by: NURSE PRACTITIONER

## 2021-10-22 PROCEDURE — 85025 COMPLETE CBC W/AUTO DIFF WBC: CPT

## 2021-10-22 PROCEDURE — 92610 EVALUATE SWALLOWING FUNCTION: CPT

## 2021-10-22 PROCEDURE — 94761 N-INVAS EAR/PLS OXIMETRY MLT: CPT

## 2021-10-22 PROCEDURE — 97161 PT EVAL LOW COMPLEX 20 MIN: CPT

## 2021-10-22 PROCEDURE — 6370000000 HC RX 637 (ALT 250 FOR IP): Performed by: HOSPITALIST

## 2021-10-22 PROCEDURE — 1200000000 HC SEMI PRIVATE

## 2021-10-22 PROCEDURE — 97530 THERAPEUTIC ACTIVITIES: CPT

## 2021-10-22 PROCEDURE — 6360000002 HC RX W HCPCS: Performed by: NURSE PRACTITIONER

## 2021-10-22 PROCEDURE — 97535 SELF CARE MNGMENT TRAINING: CPT

## 2021-10-22 PROCEDURE — 2580000003 HC RX 258: Performed by: NURSE PRACTITIONER

## 2021-10-22 PROCEDURE — 36415 COLL VENOUS BLD VENIPUNCTURE: CPT

## 2021-10-22 PROCEDURE — 97165 OT EVAL LOW COMPLEX 30 MIN: CPT

## 2021-10-22 PROCEDURE — 6370000000 HC RX 637 (ALT 250 FOR IP): Performed by: INTERNAL MEDICINE

## 2021-10-22 PROCEDURE — 80053 COMPREHEN METABOLIC PANEL: CPT

## 2021-10-22 PROCEDURE — 83735 ASSAY OF MAGNESIUM: CPT

## 2021-10-22 PROCEDURE — 93010 ELECTROCARDIOGRAM REPORT: CPT | Performed by: INTERNAL MEDICINE

## 2021-10-22 PROCEDURE — 2700000000 HC OXYGEN THERAPY PER DAY

## 2021-10-22 RX ORDER — BUDESONIDE AND FORMOTEROL FUMARATE DIHYDRATE 80; 4.5 UG/1; UG/1
2 AEROSOL RESPIRATORY (INHALATION) 2 TIMES DAILY
Status: DISCONTINUED | OUTPATIENT
Start: 2021-10-22 | End: 2021-10-23 | Stop reason: HOSPADM

## 2021-10-22 RX ORDER — POLYETHYLENE GLYCOL 3350 17 G/17G
17 POWDER, FOR SOLUTION ORAL 2 TIMES DAILY
Status: DISCONTINUED | OUTPATIENT
Start: 2021-10-22 | End: 2021-10-23 | Stop reason: HOSPADM

## 2021-10-22 RX ADMIN — POTASSIUM CHLORIDE 10 MEQ: 7.46 INJECTION, SOLUTION INTRAVENOUS at 02:10

## 2021-10-22 RX ADMIN — SODIUM CHLORIDE, PRESERVATIVE FREE 10 ML: 5 INJECTION INTRAVENOUS at 20:00

## 2021-10-22 RX ADMIN — ASPIRIN 81 MG: 81 TABLET, CHEWABLE ORAL at 09:10

## 2021-10-22 RX ADMIN — Medication 2 PUFF: at 08:20

## 2021-10-22 RX ADMIN — CARVEDILOL 3.12 MG: 3.12 TABLET, FILM COATED ORAL at 17:09

## 2021-10-22 RX ADMIN — POTASSIUM CHLORIDE 10 MEQ: 7.46 INJECTION, SOLUTION INTRAVENOUS at 03:13

## 2021-10-22 RX ADMIN — OXYCODONE HYDROCHLORIDE AND ACETAMINOPHEN 1 TABLET: 10; 325 TABLET ORAL at 13:15

## 2021-10-22 RX ADMIN — POLYETHYLENE GLYCOL (3350) 17 G: 17 POWDER, FOR SOLUTION ORAL at 13:15

## 2021-10-22 RX ADMIN — GABAPENTIN 400 MG: 400 CAPSULE ORAL at 13:15

## 2021-10-22 RX ADMIN — SODIUM CHLORIDE: 9 INJECTION, SOLUTION INTRAVENOUS at 09:12

## 2021-10-22 RX ADMIN — GABAPENTIN 400 MG: 400 CAPSULE ORAL at 20:00

## 2021-10-22 RX ADMIN — TIOTROPIUM BROMIDE INHALATION SPRAY 2 PUFF: 3.12 SPRAY, METERED RESPIRATORY (INHALATION) at 08:19

## 2021-10-22 RX ADMIN — AMLODIPINE BESYLATE 5 MG: 5 TABLET ORAL at 09:11

## 2021-10-22 RX ADMIN — GABAPENTIN 400 MG: 400 CAPSULE ORAL at 09:11

## 2021-10-22 RX ADMIN — VANCOMYCIN HYDROCHLORIDE 1250 MG: 10 INJECTION, POWDER, LYOPHILIZED, FOR SOLUTION INTRAVENOUS at 13:18

## 2021-10-22 RX ADMIN — POLYETHYLENE GLYCOL (3350) 17 G: 17 POWDER, FOR SOLUTION ORAL at 20:00

## 2021-10-22 RX ADMIN — OXYCODONE HYDROCHLORIDE AND ACETAMINOPHEN 1 TABLET: 10; 325 TABLET ORAL at 20:00

## 2021-10-22 RX ADMIN — APIXABAN 5 MG: 5 TABLET, FILM COATED ORAL at 20:00

## 2021-10-22 RX ADMIN — POTASSIUM CHLORIDE 10 MEQ: 7.46 INJECTION, SOLUTION INTRAVENOUS at 01:10

## 2021-10-22 RX ADMIN — CARVEDILOL 3.12 MG: 3.12 TABLET, FILM COATED ORAL at 09:17

## 2021-10-22 RX ADMIN — DOCUSATE SODIUM 50MG AND SENNOSIDES 8.6MG 1 TABLET: 8.6; 5 TABLET, FILM COATED ORAL at 09:11

## 2021-10-22 RX ADMIN — PANTOPRAZOLE SODIUM 40 MG: 40 TABLET, DELAYED RELEASE ORAL at 09:11

## 2021-10-22 RX ADMIN — APIXABAN 5 MG: 5 TABLET, FILM COATED ORAL at 09:11

## 2021-10-22 RX ADMIN — POTASSIUM CHLORIDE 40 MEQ: 1500 TABLET, EXTENDED RELEASE ORAL at 09:11

## 2021-10-22 RX ADMIN — POTASSIUM CHLORIDE 10 MEQ: 7.46 INJECTION, SOLUTION INTRAVENOUS at 04:10

## 2021-10-22 RX ADMIN — POTASSIUM CHLORIDE 10 MEQ: 7.46 INJECTION, SOLUTION INTRAVENOUS at 00:06

## 2021-10-22 RX ADMIN — ACETAMINOPHEN 650 MG: 325 TABLET ORAL at 00:19

## 2021-10-22 RX ADMIN — Medication 250 MG: at 09:11

## 2021-10-22 RX ADMIN — OXYCODONE HYDROCHLORIDE AND ACETAMINOPHEN 1 TABLET: 10; 325 TABLET ORAL at 09:12

## 2021-10-22 RX ADMIN — ATORVASTATIN CALCIUM 80 MG: 40 TABLET, FILM COATED ORAL at 20:00

## 2021-10-22 RX ADMIN — Medication 2 PUFF: at 20:18

## 2021-10-22 ASSESSMENT — PAIN DESCRIPTION - FREQUENCY: FREQUENCY: CONTINUOUS

## 2021-10-22 ASSESSMENT — PAIN SCALES - GENERAL
PAINLEVEL_OUTOF10: 0
PAINLEVEL_OUTOF10: 5
PAINLEVEL_OUTOF10: 10
PAINLEVEL_OUTOF10: 10
PAINLEVEL_OUTOF10: 7
PAINLEVEL_OUTOF10: 0

## 2021-10-22 ASSESSMENT — PAIN DESCRIPTION - LOCATION
LOCATION: BACK

## 2021-10-22 ASSESSMENT — PAIN DESCRIPTION - DESCRIPTORS
DESCRIPTORS: ACHING

## 2021-10-22 ASSESSMENT — PULMONARY FUNCTION TESTS: PEFR_L/MIN: 87

## 2021-10-22 ASSESSMENT — PAIN DESCRIPTION - PAIN TYPE
TYPE: CHRONIC PAIN
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
## 2021-10-22 NOTE — PROGRESS NOTES
Pt sitting up in bed, denies needs. Oxygen was off for while, but saturation dropped with ambulation to 80, placed back on 2 liters. Uses 2 liters at home at night and PRN.

## 2021-10-22 NOTE — ACP (ADVANCE CARE PLANNING)
Advance Care Planning   Healthcare Decision Maker:    Primary Decision Maker: Damien Arroyo - [phone]    Click here to complete Healthcare Decision Makers including selection of the Healthcare Decision Maker Relationship (ie \"Primary\").

## 2021-10-22 NOTE — PLAN OF CARE
Problem: Falls - Risk of:  Goal: Will remain free from falls  Description: Will remain free from falls  Outcome: Met This Shift  Goal: Absence of physical injury  Description: Absence of physical injury  Outcome: Met This Shift     Problem: Skin Integrity:  Goal: Will show no infection signs and symptoms  Description: Will show no infection signs and symptoms  Outcome: Ongoing  Goal: Absence of new skin breakdown  Description: Absence of new skin breakdown  Outcome: Ongoing     Problem: Infection:  Goal: Will remain free from infection  Description: Will remain free from infection  Outcome: Ongoing     Problem: Safety:  Goal: Free from accidental physical injury  Description: Free from accidental physical injury  Outcome: Ongoing  Goal: Free from intentional harm  Description: Free from intentional harm  Outcome: Ongoing     Problem: Daily Care:  Goal: Daily care needs are met  Description: Daily care needs are met  Outcome: Ongoing     Problem: Pain:  Goal: Patient's pain/discomfort is manageable  Description: Patient's pain/discomfort is manageable  Outcome: Ongoing  Goal: Pain level will decrease  Description: Pain level will decrease  Outcome: Ongoing  Goal: Control of acute pain  Description: Control of acute pain  Outcome: Ongoing  Goal: Control of chronic pain  Description: Control of chronic pain  Outcome: Ongoing     Problem: Skin Integrity:  Goal: Skin integrity will stabilize  Description: Skin integrity will stabilize  Outcome: Ongoing     Problem: Discharge Planning:  Goal: Patients continuum of care needs are met  Description: Patients continuum of care needs are met  Outcome: Ongoing

## 2021-10-22 NOTE — PLAN OF CARE
Problem: Falls - Risk of:  Goal: Will remain free from falls  Description: Will remain free from falls  10/22/2021 1254 by Dominguez Brown RN  Outcome: Ongoing  10/22/2021 0040 by Jenelle Mcqueen RN  Outcome: Met This Shift     Problem: Skin Integrity:  Goal: Will show no infection signs and symptoms  Description: Will show no infection signs and symptoms  10/22/2021 1254 by Dominguez Brown RN  Outcome: Ongoing  10/22/2021 0040 by Jenelle Mcqueen RN  Outcome: Ongoing     Problem: Infection:  Goal: Will remain free from infection  Description: Will remain free from infection  10/22/2021 1254 by Dominguez Brown RN  Outcome: Ongoing  10/22/2021 0040 by Jenelle Mcqueen RN  Outcome: Ongoing

## 2021-10-22 NOTE — PLAN OF CARE
Problem: Nutrition  Intervention: Swallowing evaluation  Note: SLP completed evaluation. Please refer to notes in EMR. Intervention: Aspiration precautions  Note: SLP completed evaluation. Please refer to notes in EMR.     Mely Bobo M.A., [id number] [facility] #[id number]  Speech-Language Pathologist

## 2021-10-22 NOTE — PROGRESS NOTES
Hospitalist Progress Note      PCP: Yudy Serrano MD    Date of Admission: 10/21/2021        Hospital Course: 76-year-old female who lives in an independent living brought in because nausea vomiting patient has not been feeling well. Patient felt fatigued chest x-ray shows questionable right basilar opacity patient denies any fever   was taking diuretics, he felt dehydrated    Subjective:   Patient is hypokalemic no further nausea vomiting denies any bowel movement   no diarrhea  Patient denies any fever no productive phlegm no cough    Medications:  Reviewed    Infusion Medications    sodium chloride       Scheduled Medications    budesonide-formoterol  2 puff Inhalation BID    tiotropium  2 puff Inhalation Daily    polyethylene glycol  17 g Oral BID    amLODIPine  5 mg Oral Daily    aspirin  81 mg Oral Daily    atorvastatin  80 mg Oral Nightly    carvedilol  3.125 mg Oral BID WC    apixaban  5 mg Oral BID    gabapentin  400 mg Oral TID    oxyCODONE-acetaminophen  1 tablet Oral TID    pantoprazole  40 mg Oral Daily    potassium chloride  40 mEq Oral Daily    sennosides-docusate sodium  1 tablet Oral Daily    sodium chloride flush  5-40 mL IntraVENous 2 times per day    vancomycin  1,250 mg IntraVENous Q24H     PRN Meds: albuterol, sodium chloride flush, sodium chloride, ondansetron **OR** ondansetron, acetaminophen **OR** acetaminophen, potassium chloride **OR** potassium alternative oral replacement **OR** potassium chloride, magnesium sulfate, bisacodyl      Intake/Output Summary (Last 24 hours) at 10/22/2021 1928  Last data filed at 10/22/2021 1315  Gross per 24 hour   Intake    Output 2700 ml   Net -2700 ml       Physical Exam Performed:    /66   Pulse 98   Temp 98 °F (36.7 °C) (Oral)   Resp 18   Ht 5' 2\" (1.575 m)   Wt 162 lb 6.4 oz (73.7 kg)   SpO2 98%   BMI 29.70 kg/m²     General appearance: No apparent distress, appears stated age and cooperative.   HEENT: Pupils equal, round, and reactive to light. Conjunctivae/corneas clear. Neck: Supple, with full range of motion. No jugular venous distention. Trachea midline. Respiratory:  Normal respiratory effort. Clear to auscultation, bilaterally without Rales/Wheezes/Rhonchi. Cardiovascular: Regular rate and rhythm with normal S1/S2 without murmurs, rubs or gallops. Abdomen: Soft, non-tender, non-distended with normal bowel sounds. Musculoskeletal: No clubbing, cyanosis or edema bilaterally. Full range of motion without deformity. Skin: Skin color, texture, turgor normal.  No rashes or lesions. Neurologic:  Neurovascularly intact without any focal sensory/motor deficits. Cranial nerves: II-XII intact, grossly non-focal.  Psychiatric: Alert and oriented, thought content appropriate, normal insight  Capillary Refill: Brisk,3 seconds, normal   Peripheral Pulses: +2 palpable, equal bilaterally       Labs:   Recent Labs     10/21/21  1011 10/22/21  0828   WBC 12.6* 6.7   HGB 15.5 12.8   HCT 48.0 40.3    220     Recent Labs     10/21/21  1011 10/21/21  2236 10/22/21  0828     --  140   K 2.8* 2.5* 3.4*   CL 78*  --  96*   CO2 40*  --  32   BUN 27*  --  12   CREATININE 0.9  --  <0.5*   CALCIUM 10.1  --  9.3     Recent Labs     10/21/21  1011 10/22/21  0828   AST 29 25   ALT 12 8*   BILITOT 1.2* 0.6   ALKPHOS 162* 110         Radiology:  CT ABDOMEN PELVIS WO CONTRAST Additional Contrast? None   Final Result   Within the limitations of the exam no acute intra-abdominal or intrapelvic   abnormalities are noted. Age-indeterminate compression deformities involving L5 and T11, new from   prior CT abdomen and pelvis 03/08/2020. Clinical correlation can be made. Given presence of spinal stimulation device versus pain pump device with   lead/catheter extending into the spinal canal MRI may be precluded in this   patient. Correlation with MRI safety profile of the device can be made.    Nuclear medicine bone scan would be an alternative. XR CHEST PORTABLE   Final Result   Right basilar airspace opacity could represent atelectasis or pneumonia                 Assessment/Plan:    Active Hospital Problems    Diagnosis     Nausea and vomiting [R11.2]     Pneumonia of right lower lobe due to infectious organism [J259]      71-year-old female who has chronic back pain in spinal stimulator felt weak followed by nausea vomiting   Patient does take Zaroxolyn and torsemide   On admission patient was found to have severe hypokalemia   - hold Demadex. Replace electrolytes. Repeat BMP  Will get magnesium   Has not had any bowel movement for 4 days with start on MiraLAX  Advance diet    Sepsis not sure patient was dry and hypokalemic on admission (tachypnea, Leukocytosis, Lactic acidosis)  - POA  Urinalysis showedyeast   continue antibiotic for now  Chest x-ray possible atelectasis patient denies any fever cough or productive phlegm  - Vanc, Cefepime D#2    COPD  - continue Spiriva, Symbicort  - albuterol prn    Hypertension  - BP stable. Continue Amlodipine, Coreg    Hyperlipidemia  - on Atorvastatin. Paroxysmal   A-fib  - continue Coreg  - AC: Eliquis    Chronic Diastolic CHF  - stable. No s/s decompensation  - Hold Demadex forD2      Chronic pain  - continue Percocet, Gabapentin  We will get PT OT      DVT Prophylaxis:lov  Diet: ADULT DIET;  Regular; Low Fat/Low Chol/High Fiber/RAISA  Code Status: Full Code    PT/OT Eval Status: p    Dispo - ms    See Rubio MD

## 2021-10-22 NOTE — PROGRESS NOTES
RT Inhaler-Nebulizer Bronchodilator Protocol Note    There is a bronchodilator order in the chart from a provider indicating to follow the RT Bronchodilator Protocol and there is an Initiate RT Inhaler-Nebulizer Bronchodilator Protocol order as well (see protocol at bottom of note). CXR Findings:  XR CHEST PORTABLE    Result Date: 10/21/2021  Right basilar airspace opacity could represent atelectasis or pneumonia       The findings from the last RT Protocol Assessment were as follows:   History Pulmonary Disease: Chronic pulmonary disease  Respiratory Pattern: Regular pattern and RR 12-20 bpm  Breath Sounds: Slightly diminished and/or crackles  Cough: Strong, spontaneous, non-productive  Indication for Bronchodilator Therapy: On home bronchodilators  Bronchodilator Assessment Score: 4    Aerosolized bronchodilator medication orders have been revised according to the RT Inhaler-Nebulizer Bronchodilator Protocol below. Respiratory Therapist to perform RT Therapy Protocol Assessment initially then follow the protocol. Repeat RT Therapy Protocol Assessment PRN for score 0-3 or on second treatment, BID, and PRN for scores above 3. No Indications  adjust the frequency to every 6 hours PRN wheezing or bronchospasm, if no treatments needed after 48 hours then discontinue using Per Protocol order mode. If indication present, adjust the RT bronchodilator orders based on the Bronchodilator Assessment Score as indicated below. Use Inhaler orders unless patient has one or more of the following: on home nebulizer, not able to hold breath for 10 seconds, is not alert and oriented, cannot activate and use MDI correctly, or respiratory rate 25 breaths per minute or more, then use the equivalent nebulizer order(s) with same Frequency and PRN reasons based on the score. If a patient is on this medication at home then do not decrease Frequency below that used at home.     0-3  enter or revise RT bronchodilator order(s) to equivalent RT Bronchodilator order with Frequency of every 4 hours PRN for wheezing or increased work of breathing using Per Protocol order mode. 4-6  enter or revise RT Bronchodilator order(s) to two equivalent RT bronchodilator orders with one order with BID Frequency and one order with Frequency of every 4 hours PRN wheezing or increased work of breathing using Per Protocol order mode. 7-10  enter or revise RT Bronchodilator order(s) to two equivalent RT bronchodilator orders with one order with TID Frequency and one order with Frequency of every 4 hours PRN wheezing or increased work of breathing using Per Protocol order mode. 11-13  enter or revise RT Bronchodilator order(s) to one equivalent RT bronchodilator order with QID Frequency and an Albuterol order with Frequency of every 4 hours PRN wheezing or increased work of breathing using Per Protocol order mode. Greater than 13  enter or revise RT Bronchodilator order(s) to one equivalent RT bronchodilator order with every 4 hours Frequency and an Albuterol order with Frequency of every 2 hours PRN wheezing or increased work of breathing using Per Protocol order mode.        Electronically signed by Christian Duarte RCP on 10/22/2021 at 1:46 AM

## 2021-10-22 NOTE — PROGRESS NOTES
Inpatient Occupational Therapy  Evaluation and Treatment    Unit: 2 Ward  Date:  10/22/2021  Patient Name:    Dilan Kenney  Admitting diagnosis:  Nausea and vomiting [R11.2]  Admit Date:  10/21/2021  Precautions/Restrictions/WB Status/ Lines/ Wounds/ Oxygen: fall risk, IV and bed/chair alarm    Treatment Time:  13:26-14:09  Treatment Number: 1     Billable Treatment Time:  33 minutes   Total Treatment Time:   43   minutes    Patient Goals for Therapy:  \" to go home \"      Discharge Recommendations: Home with PRN assist and outpatient OT/PT  DME needs for discharge: Needs Met       Therapy recommendations for staff:   Kerrie Slight with use of rolling walker (RW) for all ambulation to/from bathroom    History of Present Illness: 73 y.o. female with hypertension, hyperlipidemia, COPD, CHF, a-fib, back pain, and arthritis who presents to [facility] with c/o nausea, vomiting. She states she is not feeling well at all. She c/o fever, cough, shortness of breath, body aches, and abdominal pain. She is on a diuretic. She feels weak and fatigued. She states she developed epigastric abdominal pain, then nausea and vomiting. She states she hurts all over, feels weak, and fatigued.       Patient is tachypneic, hypoxic. She wears 2 L O2 at baseline. Labs with AGMA, hypokalemia, and leukocytosis. CT abdomen/pelvis with no acute abnormality. It does show age-indeterminate compression deformities at L5 and T11. CXR with right basilar airspace opacity. Could represent atelectasis or pneumonia. Admitted to med-surg. Home Health S4 Level Recommendation:  Level 1 Standard  AM-PAC Score: AM-PAC Inpatient Daily Activity Raw Score: 20    Preadmission Environment    Pt.  Lives alone at the [place] [id number] environment:  apartment   Steps to enter first floor:   No steps    Bathroom:  Walk-in Shower, Grab bars, Shower Chair  and Raised toilet seat w/ arms  Equipment owned:  [address] [facility], [address] [employer] ([facility]), Rolling Walker (RW), Rollator , manual W/C and BSC, adjustable bed, lift chair, pulse ox, 2L of O2 at night, reacher, sock aide, life alert    Preadmission Status / PLOF:  History of falls   No  Pt. Able to drive   Yes (has not driven since hip surgery)   Pt Fully independent with ADL's  Yes (except shoes and felicity hose. Patient has been wearing slippers)   Pt. Required assistance from aide for:  Cleaning and Cooking    Pt. Is able to do her laundry   Pt. Fully independent for transfers and gait and walked with: Rollator. Uses wheelchair for longer distances. Receives outpatient OT/PT 3x a week since hip surgery this year. Pain  No  Rating:NA  Location:  Pain Medicine Status: Denies need      Cognition    A&O x4   Able to follow 2 step commands    Subjective  Patient lying supine in bed with no family present. Pt agreeable to this OT eval & tx. Upper Extremity ROM:    WFL,  pt able to perform all bed mobility, transfers, and gait without ROM limitation. Upper Extremity Strength:    BUE strength WFL, but not formally assessed w/ MMT    Upper Extremity Sensation    WFL    Upper Extremity Proprioception:  WFL    Coordination and Tone  Diminished    Balance  Functional Sitting Balance:  WFL  Functional Standing Balance:Diminished    Bed mobility:    Supine to sit: Independent  Sit to supine:   Not Tested  Rolling:    Not Tested  Scooting in sitting:  Independent  Scooting to head of bed:   Not Tested    Bridging:   Not Tested    Transfers:    Sit to stand:  Supervision  Stand to sit:  Supervision  Bed to chair:   SBA and with use of RW  Standard toilet: SBA and with use of RW  Bed to [facility]:  Not Tested    Dressing:      UE:   Not Tested  LE:    Min A (patient uses reacher at home)    Bathing:    UE:  Not Tested  LE:  Not Tested    Eating:   Not Tested    Toileting:  Not Tested    Activity Tolerance   Pt completed therapy session with No adverse symptoms noted w/activity  SpO2: 94% on 2L of O2.  Magui GRACE. SpO2 dropped to 89% with mobility in room. Recovered in 60 seconds. HR:   BP:     Positioning Needs:   Up in chair, call light and needs in reach. Alarm Set    Exercise / Activities Initiated:   N/A    Patient/Family Education:   Role of OT  Recommendations for DC  Safe RW use/hand placement    Assessment of Deficits: Pt seen for Occupational therapy evaluation in acute care setting. Pt demonstrated decreased Activity tolerance, ADLs, IADLs, Balance , Bathing, Bed mobility, Dressing, ROM, Safety Awareness, Strength, Transfers and Coping Skills. Pt functioning below baseline and will likely benefit from skilled occupational therapy services to maximize safety and independence. Goal(s) : To be met in 3 Visits:  1). Bed to toilet/BSC: Independent    To be met in 5 Visits:  1). Supine to/from Sit:  Independent  2). Upper Body Bathing:   Independent  3). Lower Body Bathing:   SBA  4). Upper Body Dressing:  Independent  5). Lower Body Dressing:  SBA  6). Pt to demonstrate UE exs x 15 reps with minimal cues    Rehabilitation Potential:  Good for goals listed above. Strengths for achieving goals include: Pt motivated, PLOF and Pt cooperative  Barriers to achieving goals include:  Complexity of condition     Plan: To be seen 3-5 x/wk while in acute care setting for therapeutic exercises, bed mobility, transfers, dressing, bathing, family/patient education, ADL/IADL retraining, energy conservation training.        Ness Calles OTR/L #[id number]      If patient discharges from this facility prior to next visit, this note will serve as the Discharge Summary

## 2021-10-22 NOTE — PROGRESS NOTES
Inpatient Physical Therapy Evaluation and Treatment    Unit: [facility]  Date:  10/22/2021  Patient Name:    Salvador Vazquez  Admitting diagnosis:  Nausea and vomiting [R11.2]  Admit Date:  10/21/2021  Precautions/Restrictions/WB Status/ Lines/ Wounds/ Oxygen: Fall risk, Bed/chair alarm, Lines -IV and Telemetry, 2 L NP at conclusion of session (per RN)    Treatment Time:  16:30-17:28  Treatment Number:  1   Timed Code Treatment Minutes: 48 minutes  Total Treatment Minutes:  58  minutes    Patient Goals for Therapy: \" to get back home \"          Discharge Recommendations: Home PRN assist and OP PT/OT   DME needs for discharge: Needs Met       Therapy recommendation for EMS Transport: NA    Therapy recommendations for staff:   Assist of 1 with use of rolling walker (RW) and gait belt for all transfers and ambulation to/from bathroom  within room    2 L NP per RN     History of Present Illness:   per [place]:   \"80 y. o. female with hypertension, hyperlipidemia, COPD, CHF, a-fib, back pain, and arthritis who presents to [facility] with c/o nausea, vomiting.  She states she is not feeling well at all. Zuly Forts c/o fever, cough, shortness of breath, body aches, and abdominal pain.  She is on a diuretic.  She feels weak and fatigued. She states she developed epigastric abdominal pain, then nausea and vomiting.  She states she hurts all over, feels weak, and fatigued.       Patient is tachypneic, hypoxic.  She wears 2 L O2 at baseline. Labs with AGMA, hypokalemia, and leukocytosis.  CT abdomen/pelvis with no acute abnormality.  It does show age-indeterminate compression deformities at L5 and T11.  CXR with right basilar airspace opacity.  Could represent atelectasis or pneumonia.  Admitted to med-surg. \"     Home Health S4 Level Recommendation:  Level 1 Standard  AM-PAC Mobility Score    AM-PAC Inpatient Mobility Raw Score : 21   Serina Rubin scored a 21/24 on the AM-PAC short mobility form.  Current research shows that an sitting: Independent  Scooting in supine:  Not Tested    Transfer Training     Sit to stand:   Supervision from [address] to sit:   Supervision to toilet ,chair  Bed to Chair:   Not Tested with use of N/A    Gait gait completed as indicated below  Distance:      20,20 ft  Deviations (firm surface/linoleum):  decreased jerry, narrow ANTHONY, step through pattern and decreased step length bilaterally  Assistive Device Used:    gait belt and rolling walker (RW)  Level of Assist:    SBA  Comment: steady,no LOB    Stair Training deferred, pt does not have stairs in home environment    Activity Tolerance   Pt completed therapy session with \"funny feeling\" in chest at conclusion. RN present and aware. Added 2 L NP,resolved. See VS below   BP (mmHg) HR (bpm) SpO2 (%) Comments   Supine, at rest 126/74 72 90-97 No c/o   Seated at EOB 88-no arrythmia or irregularities on monitor 138/83 79  Improved with 2 L to 95% HR felt funny,RN in room and aware;resolved with rest     Positioning Needs   Pt up in chair, alarm set, positioned in proper neutral alignment and pressure relief provided. Call light provided and all needs within reach    Exercises Initiated  Lory deferred secondary to treatment focus on functional mobility    Other  RN aware of Spo2, requested to add 2 L NP    Patient/Family Education   Pt educated on role of inpatient PT, POC, importance of continued activity, DC recommendations, safety awareness, transfer techniques and calling for assist with mobility. Assessment  Pt seen for Physical Therapy evaluation in acute care setting. Pt demonstrated decreased Activity tolerance as well as decreased independence with Ambulation and Transfers. Patient will benefit from skilled intervention while in acute care. Recommending Home PRN assist upon discharge as patient functioning would benefit from continued therapy services. Patient to resume OP PT. Goals : To be met in 3 visits:  1).  Independent with LE Ex x 10 reps    To be met in 6 visits:  1). Supine to/from sit: Independent  2). Sit to/from stand: Independent  3). Bed to chair: Independent  4). Gait: Ambulate 100 ft.   with  Supervision and use of rolling walker (RW) and adequate Spo2  5). Tolerate B LE exercises 3 sets of 10-15 reps      Rehabilitation Potential: Good  Strengths for achieving goals include:   Pt motivated, PLOF, Family Support and Pt cooperative   Barriers to achieving goals include:    No Barriers    Plan    To be seen 2-3 x / week  while in acute care setting for therapeutic exercises, bed mobility, transfers, progressive gait training, balance training, and family/patient education. Signature: Jarett King, PT #[id number]    If patient discharges from this facility prior to next visit, this note will serve as the Discharge Summary.

## 2021-10-22 NOTE — FLOWSHEET NOTE
10/22/21 0819   Vital Signs   Temp 98 °F (36.7 °C)   Temp Source Oral   Pulse 103   Heart Rate Source Monitor   Resp 18   /66   BP Location Left upper arm   Patient Position Right side   Level of Consciousness Alert (0)   MEWS Score 2   Oxygen Therapy   SpO2 98 %   O2 Device Nasal cannula   O2 Flow Rate (L/min) 2 L/min   Pt resting in bed, c/o pain to back chronically. Pain meds given per order. AM assessment complete.

## 2021-10-22 NOTE — PROGRESS NOTES
Vancomycin Day: 2    Patient's labs, cultures, vitals, and vancomycin regimen reviewed. No changes today.   Level due on 10/23 at [facility] [facility]. D 10/22/61931:36 AM  .

## 2021-10-22 NOTE — CARE COORDINATION
Case Management Assessment  Initial Evaluation      Patient Name: Lucio Mccall  YOB: 1955  Diagnosis: Nausea and vomiting [R11.2]  Date / Time: 10/21/2021  9:35 AM    Admission status/Date:10/21/2021 Inpatient   Chart Reviewed: Yes      Patient Interviewed: Yes   Family Interviewed:  No      Hospitalization in the last 30 days:  No      Health Care Decision Maker :   Primary Decision Maker: Brie Kole - Child - [phone]    (CM - must 1st enter selection under Navigator - emergency contact- Health Care Decision Maker Relationship and pick relationship)   Who do you trust or have selected to make healthcare decisions for you      Met with: pt   Interview conducted  (bedside/phone): bedside    Current PCP: Dr. Anabela Ramirez required for SNF : Y          3 night stay required -  N    ADLS  Support Systems/Care Needs: Children  Transportation: self and the [place] mostly    Meal Preparation: [place] and self    Housing  Living Arrangements: pt lives at home alone at the [place] [address]   Steps: [id number] [facility] for return to present living arrangements: Yes  Identified Issues: [address] [address] [facility] with [address] : No Agency:(Services)  Type of Home Care Services: Nursing Services, Housekeeping  Passport/Waiver : No  :                      Phone Number:    Passport/Waiver Services: [address]   DME Provider: Eugenio-she bought it herself  Equipment:   Walker_x__Cane___RTS___ BSC___Shower Chair___Hospital Bed___W/C_x___Other________  02 at _2___Liter(s)---wears(frequency)____at night and as needed___ HHN ___ CPAP___ BiPap___   N/A____      Home O2 Use :  Yes at night and as needed  If No for home O2---if presently on O2 during hospitalization:  Yes  if yes CM to follow for potential DC O2 need  Informed of need for care provider to bring portable home O2 tank on day of discharge for nursing to connect prior to leaving:   Yes  Verbalized agreement/Understanding:   Yes    Community Service Affiliation  Dialysis:  No    · Agency:  · Location:  · Dialysis Schedule:  · Phone:   · Fax: Other Community Services: Outpatient PT/OPT through the [facility]. Spoke with Macarena Grullon, Therapy Manager at the [facility] who stated pt is on hold and has one session for outpatient therapy. She stated pt needs nothing to return to continue her outpatient therapy as she has been approved for 12 visits. DISCHARGE PLAN: Explained Case Management role/services. Chart review completed. Met with pt at bedside. She stated she is independent at home and plans on returning home. She stated she may need transportation home if her son is unable to come get her on discharge. Discussed transportation options such as a Cab if staff felt was appropriate or ambulance transportation home which could be self pay. She stated she has had ambulance transportation to doctors appointments and it hasn't been covered so she doubts it would be covered for transport home. She stated she would talk with her son and let him know the options. She denied needs or questions for CM. Spoke with Mitchell Sharp at the [facility] who stated pt can return when discharged. CM will follow. Please notify CM if needs or concerns arise.

## 2021-10-23 VITALS
HEART RATE: 96 BPM | TEMPERATURE: 98.7 F | WEIGHT: 162.4 LBS | HEIGHT: 62 IN | RESPIRATION RATE: 18 BRPM | SYSTOLIC BLOOD PRESSURE: 115 MMHG | DIASTOLIC BLOOD PRESSURE: 70 MMHG | BODY MASS INDEX: 29.88 KG/M2 | OXYGEN SATURATION: 94 %

## 2021-10-23 LAB
ANION GAP SERPL CALCULATED.3IONS-SCNC: 13 MMOL/L (ref 3–16)
BUN BLDV-MCNC: 10 MG/DL (ref 7–20)
CALCIUM SERPL-MCNC: 9.2 MG/DL (ref 8.3–10.6)
CHLORIDE BLD-SCNC: 99 MMOL/L (ref 99–110)
CO2: 27 MMOL/L (ref 21–32)
CREAT SERPL-MCNC: <0.5 MG/DL (ref 0.6–1.2)
GFR AFRICAN AMERICAN: >60
GFR NON-AFRICAN AMERICAN: >60
GLUCOSE BLD-MCNC: 129 MG/DL (ref 70–99)
POTASSIUM SERPL-SCNC: 3.4 MMOL/L (ref 3.5–5.1)
POTASSIUM SERPL-SCNC: 4.5 MMOL/L (ref 3.5–5.1)
SODIUM BLD-SCNC: 139 MMOL/L (ref 136–145)
VANCOMYCIN TROUGH: 4.5 UG/ML (ref 10–20)

## 2021-10-23 PROCEDURE — 6370000000 HC RX 637 (ALT 250 FOR IP): Performed by: HOSPITALIST

## 2021-10-23 PROCEDURE — 6370000000 HC RX 637 (ALT 250 FOR IP): Performed by: NURSE PRACTITIONER

## 2021-10-23 PROCEDURE — 94761 N-INVAS EAR/PLS OXIMETRY MLT: CPT

## 2021-10-23 PROCEDURE — 80202 ASSAY OF VANCOMYCIN: CPT

## 2021-10-23 PROCEDURE — 2580000003 HC RX 258: Performed by: NURSE PRACTITIONER

## 2021-10-23 PROCEDURE — 84132 ASSAY OF SERUM POTASSIUM: CPT

## 2021-10-23 PROCEDURE — 36415 COLL VENOUS BLD VENIPUNCTURE: CPT

## 2021-10-23 PROCEDURE — 80048 BASIC METABOLIC PNL TOTAL CA: CPT

## 2021-10-23 PROCEDURE — 2700000000 HC OXYGEN THERAPY PER DAY

## 2021-10-23 PROCEDURE — 94640 AIRWAY INHALATION TREATMENT: CPT

## 2021-10-23 RX ORDER — SPIRONOLACTONE 25 MG/1
25 TABLET ORAL DAILY
Status: DISCONTINUED | OUTPATIENT
Start: 2021-10-23 | End: 2021-10-23 | Stop reason: HOSPADM

## 2021-10-23 RX ADMIN — CARVEDILOL 3.12 MG: 3.12 TABLET, FILM COATED ORAL at 08:17

## 2021-10-23 RX ADMIN — GABAPENTIN 400 MG: 400 CAPSULE ORAL at 14:28

## 2021-10-23 RX ADMIN — OXYCODONE HYDROCHLORIDE AND ACETAMINOPHEN 1 TABLET: 10; 325 TABLET ORAL at 14:28

## 2021-10-23 RX ADMIN — Medication 2 PUFF: at 08:02

## 2021-10-23 RX ADMIN — PANTOPRAZOLE SODIUM 40 MG: 40 TABLET, DELAYED RELEASE ORAL at 08:15

## 2021-10-23 RX ADMIN — SPIRONOLACTONE 25 MG: 25 TABLET ORAL at 11:25

## 2021-10-23 RX ADMIN — OXYCODONE HYDROCHLORIDE AND ACETAMINOPHEN 1 TABLET: 10; 325 TABLET ORAL at 08:16

## 2021-10-23 RX ADMIN — TIOTROPIUM BROMIDE INHALATION SPRAY 2 PUFF: 3.12 SPRAY, METERED RESPIRATORY (INHALATION) at 08:02

## 2021-10-23 RX ADMIN — DOCUSATE SODIUM 50MG AND SENNOSIDES 8.6MG 1 TABLET: 8.6; 5 TABLET, FILM COATED ORAL at 08:15

## 2021-10-23 RX ADMIN — POTASSIUM CHLORIDE 40 MEQ: 1500 TABLET, EXTENDED RELEASE ORAL at 08:15

## 2021-10-23 RX ADMIN — GABAPENTIN 400 MG: 400 CAPSULE ORAL at 08:15

## 2021-10-23 RX ADMIN — SODIUM CHLORIDE, PRESERVATIVE FREE 10 ML: 5 INJECTION INTRAVENOUS at 08:15

## 2021-10-23 RX ADMIN — ASPIRIN 81 MG: 81 TABLET, CHEWABLE ORAL at 08:17

## 2021-10-23 RX ADMIN — POLYETHYLENE GLYCOL (3350) 17 G: 17 POWDER, FOR SOLUTION ORAL at 08:14

## 2021-10-23 RX ADMIN — APIXABAN 5 MG: 5 TABLET, FILM COATED ORAL at 08:15

## 2021-10-23 ASSESSMENT — PAIN SCALES - GENERAL
PAINLEVEL_OUTOF10: 0
PAINLEVEL_OUTOF10: 6
PAINLEVEL_OUTOF10: 6

## 2021-10-23 NOTE — PLAN OF CARE
Problem: Falls - Risk of:  Goal: Will remain free from falls  Description: Will remain free from falls  10/23/2021 0048 by Traci Handy RN  Outcome: Met This Shift  10/22/2021 1254 by Sergio Silva RN  Outcome: Ongoing  Goal: Absence of physical injury  Description: Absence of physical injury  Outcome: Met This Shift     Problem: Skin Integrity:  Goal: Will show no infection signs and symptoms  Description: Will show no infection signs and symptoms  10/23/2021 0048 by Traci Handy RN  Outcome: Ongoing  10/22/2021 1254 by Sergio Silva RN  Outcome: Ongoing  Goal: Absence of new skin breakdown  Description: Absence of new skin breakdown  Outcome: Ongoing     Problem: Infection:  Goal: Will remain free from infection  Description: Will remain free from infection  10/23/2021 0048 by Traci Handy RN  Outcome: Ongoing  10/22/2021 1254 by Sergio Silva RN  Outcome: Ongoing     Problem: Safety:  Goal: Free from accidental physical injury  Description: Free from accidental physical injury  Outcome: Met This Shift  Goal: Free from intentional harm  Description: Free from intentional harm  Outcome: Met This Shift     Problem: Daily Care:  Goal: Daily care needs are met  Description: Daily care needs are met  Outcome: Met This Shift     Problem: Pain:  Goal: Patient's pain/discomfort is manageable  Description: Patient's pain/discomfort is manageable  Outcome: Ongoing  Goal: Pain level will decrease  Description: Pain level will decrease  Outcome: Ongoing  Goal: Control of acute pain  Description: Control of acute pain  Outcome: Ongoing  Goal: Control of chronic pain  Description: Control of chronic pain  Outcome: Ongoing     Problem: Skin Integrity:  Goal: Skin integrity will stabilize  Description: Skin integrity will stabilize  Outcome: Ongoing     Problem: Discharge Planning:  Goal: Patients continuum of care needs are met  Description: Patients continuum of care needs are met  Outcome: Ongoing Problem: Nutrition  Intervention: Swallowing evaluation  10/22/2021 1445 by Haylie Ferrara, SLP  Note: SLP completed evaluation. Please refer to notes in EMR. Intervention: Aspiration precautions  10/22/2021 1445 by Haylie Ferrara, SLP  Note: SLP completed evaluation. Please refer to notes in EMR.

## 2021-10-23 NOTE — PROGRESS NOTES
Pt a/o. Am assessment completed see flow sheet. Scheduled percocet given for chronic back pain rated 6/10. K 3.4, 40 mEq scheduled K given. Ice water provided. Call light within reach.

## 2021-10-23 NOTE — DISCHARGE SUMMARY
Hospital Medicine Discharge Summary    Patient ID: Salvador Vazquez      Patient's PCP: Amy Castrejon MD    Admit Date: 10/21/2021     Discharge Date:   10/23/21    Admitting Physician: Martir Flores MD     Discharge Physician: Yvon Oconnell MD     Discharge Diagnoses: Active Hospital Problems    Diagnosis     Nausea and vomiting [R11.2]     Pneumonia of right lower lobe due to infectious organism [J18.9]        The patient was seen and examined on day of discharge and this discharge summary is in conjunction with any daily progress note from day of discharge. Hospital Course:  71-year-old female who lives in an independent living brought in because nausea vomiting  chest x-ray shows questionable right basilar opacity   patient denies any fever   was taking diuretics,torsemide /zaropsylyn   felt dehydrated    has chronic back pain in spinal stimulator    Patient does take Zaroxolyn and torsemide    severe hypokalemia   - hold Demadex. Replaced electrolytes. Hold zaroxylyn if no weight gain  K2.5   On admit          Sepsis not sure patient was dry and hypokalemic on admission   (tachypnea, Leukocytosis, Lactic acidosis)  - POA  Urinalysis showed yeast  Chest x-ray possible atelectasis patient denies any fever cough or productive phlegm  - Vanc, Cefepime D#3 complete     COPD  - continue Spiriva, Symbicort  - albuterol prn     Hypertension  - BP stable. Continue Amlodipine, Coreg     Hyperlipidemia  - on Atorvastatin.      Paroxysmal   A-fib  - continue Coreg  - AC: Eliquis    Chronic Diastolic CHF  - stable. No s/s decompensation  Chronic pain  - continue Percocet, Gabapentin        Physical Exam Performed:     /61   Pulse 108   Temp 98.6 °F (37 °C) (Oral)   Resp 18   Ht 5' 2\" (1.575 m)   Wt 162 lb 6.4 oz (73.7 kg)   SpO2 95%   BMI 29.70 kg/m²       General appearance:  No apparent distress, appears stated age and cooperative.   HEENT:  Normal cephalic, .  Respiratory:  Normal respiratory effort. Clear to auscultation, bilaterally without Rales/Wheezes/Rhonchi. Cardiovascular:  Regular rate and rhythm with normal S1/S2 without murmurs, rubs or gallops. Abdomen: Soft, non-tender, non-distended with normal bowel sounds. Musculoskeletal:  No clubbing, cyanosis or edema bilaterally. Full range of motion without deformity. Skin: Skin color, texture, turgor normal.  No rashes or lesions. Neurologic:  Neurovascularly intact without any focal sensory/motor deficits. Cranial nerves: II-XII intact, grossly non-focal.  Psychiatric:  Alert and oriented      Labs: For convenience and continuity at follow-up the following most recent labs are provided:      CBC:    Lab Results   Component Value Date    WBC 6.7 10/22/2021    HGB 12.8 10/22/2021    HCT 40.3 10/22/2021     10/22/2021       Renal:    Lab Results   Component Value Date     10/22/2021    K 3.4 10/23/2021    K 3.4 10/22/2021    CL 96 10/22/2021    CO2 32 10/22/2021    BUN 12 10/22/2021    CREATININE <0.5 10/22/2021    CALCIUM 9.3 10/22/2021    PHOS 3.5 03/09/2020         Significant Diagnostic Studies    Radiology:   CT ABDOMEN PELVIS WO CONTRAST Additional Contrast? None   Final Result   Within the limitations of the exam no acute intra-abdominal or intrapelvic   abnormalities are noted. Age-indeterminate compression deformities involving L5 and T11, new from   prior CT abdomen and pelvis 03/08/2020. Clinical correlation can be made. Given presence of spinal stimulation device versus pain pump device with   lead/catheter extending into the spinal canal MRI may be precluded in this   patient. Correlation with MRI safety profile of the device can be made. Nuclear medicine bone scan would be an alternative.          XR CHEST PORTABLE   Final Result   Right basilar airspace opacity could represent atelectasis or pneumonia                Consults:     IP CONSULT TO HOSPITALIST  PHARMACY TO DOSE VANCOMYCIN    Disposition:  Home     Condition at Discharge: Stable    Discharge Instructions/Follow-up:  pcp    Code Status:  Full Code     Activity: activity as tolerated    Diet: cardiac diet      Discharge Medications:     Current Discharge Medication List           Details   amLODIPine (NORVASC) 5 MG tablet TAKE 1 TABLET BY MOUTH EVERY DAY  Qty: 90 tablet, Refills: 3    Comments: . Bryan Farris ELIQUIS 5 MG TABS tablet TAKE 1 TABLET BY MOUTH TWICE A DAY  Qty: 60 tablet, Refills: 11      pantoprazole (PROTONIX) 40 MG tablet TAKE 1 TABLET BY MOUTH EVERY DAY  Qty: 30 tablet, Refills: 0      cyclobenzaprine (FLEXERIL) 10 MG tablet TAKE 1 TABLET BY MOUTH THREE TIMES A DAY AS NEEDED FOR MUSCLE SPASMS  Qty: 90 tablet, Refills: 0      metOLazone (ZAROXOLYN) 2.5 MG tablet Take 1 tablet by mouth once a week  Qty: 4 tablet, Refills: 2      potassium chloride (KLOR-CON M10) 10 MEQ extended release tablet Take 4 tablets by mouth daily  Qty: 120 tablet, Refills: 2      torsemide (DEMADEX) 100 MG tablet Take 0.5 tablets by mouth daily  Qty: 45 tablet, Refills: 1      vitamin D (ERGOCALCIFEROL) 1.25 MG (83566 UT) CAPS capsule Take 1 capsule by mouth every 30 days  Qty: 12 capsule, Refills: 0      gabapentin (NEURONTIN) 400 MG capsule Take 400 mg by mouth 3 times daily.       sennosides-docusate sodium (SENOKOT-S) 8.6-50 MG tablet Take 1 tablet by mouth daily  Qty: 120 tablet, Refills: 2      carvedilol (COREG) 3.125 MG tablet TAKE 1 TABLET BY MOUTH TWICE A DAY WITH MEALS  Qty: 180 tablet, Refills: 1      MORPHINE, PAIN PUMP REFILL CHARGE,       albuterol sulfate HFA (PROVENTIL HFA) 108 (90 Base) MCG/ACT inhaler Inhale 2 puffs into the lungs every 6 hours as needed for Wheezing or Shortness of Breath  Qty: 1 Inhaler, Refills: 5      mometasone-formoterol (DULERA) 100-5 MCG/ACT inhaler Inhale 2 puffs into the lungs 2 times daily  Qty: 1 Inhaler, Refills: 5      aclidinium (TUDORZA PRESSAIR) 400 MCG/ACT AEPB inhaler Inhale 1 puff into the lungs daily  Qty: 1 each, Refills: 5      nitroGLYCERIN (NITROSTAT) 0.4 MG SL tablet Place 1 tablet under the tongue every 5 minutes as needed for Chest pain  Qty: 25 tablet, Refills: 3      aspirin 81 MG tablet Take 81 mg by mouth daily      oxyCODONE-acetaminophen (PERCOCET)  MG per tablet Take 1 tablet by mouth 3 times daily as needed. albuterol (PROVENTIL) (2.5 MG/3ML) 0.083% nebulizer solution Take 2.5 mg by nebulization every 6 hours as needed for Wheezing      OXYGEN Inhale 2 L into the lungs              Time Spent on discharge is more than 45 minutes in the examination, evaluation, counseling and review of medications and discharge plan. Signed:    Clenton Closs, MD   10/23/2021      Thank you Yandel Yarbrough MD for the opportunity to be involved in this patient's care. If you have any questions or concerns please feel free to contact me at [phone].

## 2021-10-23 NOTE — CONSULTS
10-23-21 (1330) vancomycin trough 4.5. Please increase vancomycin to 1250mg ivpb q12h at 1500 on 10-23-21. Please recheck vancomycin trough at 1400 on 10-24-21. [facility]. .  10/23/2021 2:44 PM

## 2021-10-25 ENCOUNTER — CARE COORDINATION (OUTPATIENT)
Dept: CASE MANAGEMENT | Age: 66
End: 2021-10-25

## 2021-10-25 DIAGNOSIS — I50.32 CHRONIC DIASTOLIC CHF (CONGESTIVE HEART FAILURE) (HCC): Primary | ICD-10-CM

## 2021-10-25 PROCEDURE — 1111F DSCHRG MED/CURRENT MED MERGE: CPT | Performed by: INTERNAL MEDICINE

## 2021-10-25 NOTE — CARE COORDINATION
verbalized understanding. Patient given an opportunity to ask questions and does not have any further questions or concerns at this time. Were discharge instructions available to patient? Yes. Reviewed appropriate site of care based on symptoms and resources available to patient including: PCP and Specialist. The patient agrees to contact the PCP office for questions related to their healthcare. Medication reconciliation was performed with patient, who verbalizes understanding of administration of home medications. COVID Risk Education    COVID-19 and Influenza A&B Not Detected on 10/21/2021. Patient completed [employer] 2-step COVID-19 vaccination on 2/11/2021. Patient was given an opportunity to verbalize any questions and concerns and agrees to contact CTN or health care provider for questions related to their healthcare. She plans to call her cardiologist (Dr Jaci Del Valle) to discuss diuretics. Today's weight 155#  - was 157. 9#. states hospitalist mentioning using metolazone prn and CTN notes it is to be continued once weekly (she takes on Tuesdays) and she will discuss with Dr Jaci Del Valle. CHF education:  -weigh daily in the morning at the same time and in the same clothing  -report weight gain of >3#/day or >5#/week  -report increased SOB, edema, abd fullness, difficulty lying flat  -report palpitations, cough, difficulty urinating  -eat a low sodium diet and limit fluid intake to 64 ounces daily    Has morphine pump with prn Percocet. Takes stool softeners and her bowels were loose once home from Miralax doses inpatient. Has not resumed her senna yet. CTN encouraged her to resume to prevent constipation. She will do so tonight. She wears oxygen 2lpm HS and prn - monitors with home pulse ox. Plans to resume outpatient therapy tomorrow at her independent living at [facility]. Declines scheduling TCM visit because her PCP is [place]. She will call Dr Jaci Del Valle on her own.  CTN provided contact information for future needs. Plan for follow-up call in 7-14 days based on severity of symptoms and risk factors. Care Transitions 24 Hour Call    Do you have any ongoing symptoms?: No  Do you have a copy of your discharge instructions?: Yes  Do you have all of your prescriptions and are they filled?: Yes  Have you been contacted by a [facility] Pharmacist?: No  Have you scheduled your follow up appointment?: No  Were you discharged with any Home Care or Post Acute Services: Yes  Post Acute Services:  Outpatient/Community Services (Comment: PT at [facility] where she resides in [address] [place] [address])  Patient DME: [employer], Wheelchair  Do you have support at home?: Alone  Do you feel like you have everything you need to keep you well at home?: Yes  Are you an active caregiver in your home?: No  Care Transitions Interventions  No Identified Needs         Follow Up  Future Appointments   Date Time Provider Genna Rizo   11/9/2021  2:20 Prashanth Henry MD [facility] None   12/2/2021  2:15 PM Jeremías Adame MD P [facility] CAR [facility]   4/14/2022 10:20 AM [facility]       Linden Alexander RN

## 2021-10-27 ENCOUNTER — TELEPHONE (OUTPATIENT)
Dept: CARDIOLOGY CLINIC | Age: 66
End: 2021-10-27

## 2021-10-27 RX ORDER — PANTOPRAZOLE SODIUM 40 MG/1
TABLET, DELAYED RELEASE ORAL
Qty: 30 TABLET | Refills: 0 | Status: SHIPPED | OUTPATIENT
Start: 2021-10-27 | End: 2021-12-01 | Stop reason: SDUPTHER

## 2021-10-27 NOTE — TELEPHONE ENCOUNTER
Patient was in [facility] 10/21 - 10/23    She was told to stop taking the metolazone. She was taking 2.5 ONCE a week. She was told to only take the metolazone if she had a weight gain of 2 1/2 - 5 lbs in a week. She is still taking the torsemide 50 mg qd. Notes from hospital stay in [facility]. I let the patient know [facility] was OOT but that I would froward the message to Dr. Betzy Hassan, she stated that was fine, and that Dr. Betzy Hassan used to be her PCP.

## 2021-11-01 ENCOUNTER — HOSPITAL ENCOUNTER (OUTPATIENT)
Dept: GENERAL RADIOLOGY | Age: 66
Discharge: HOME | End: 2021-11-01
Payer: MEDICARE

## 2021-11-01 ENCOUNTER — HOSPITAL ENCOUNTER (OUTPATIENT)
Age: 66
Discharge: HOME | End: 2021-11-01
Payer: MEDICARE

## 2021-11-01 DIAGNOSIS — M79.671 RIGHT FOOT PAIN: ICD-10-CM

## 2021-11-01 DIAGNOSIS — M25.571 ACUTE RIGHT ANKLE PAIN: ICD-10-CM

## 2021-11-01 PROCEDURE — 73630 X-RAY EXAM OF FOOT: CPT

## 2021-11-01 PROCEDURE — 73610 X-RAY EXAM OF ANKLE: CPT

## 2021-11-02 ENCOUNTER — CARE COORDINATION (OUTPATIENT)
Dept: CASE MANAGEMENT | Age: 66
End: 2021-11-02

## 2021-11-02 NOTE — CARE COORDINATION
[facility] Transitions Follow Up Call    2021    Patient: Dilan Kenney  Patient : 1955   MRN: [id number]  Reason for Admission: chronic back pain s/p spinal stimulator, n/v, weakness, severe hypokalemia, constipation, sepsis, COPD, HTN, HLD, Paroxysmal A Fib, chronic dCHF, chronic pain  Discharge Date: 10/23/21 RARS: Readmission Risk Score: 11.3         Spoke with: Dilan Kenney (patient)    She reports she was making her bed on Saturday and got tangled in her side table wearing slippers and hit the legs with her R foot. Her R foot is black with bruising and very swollen. She is unable to wear a real shoe. She had appt at pain management yesterday. The NP there ordered xrays of her R foot. She completed them today and knows from [facility] the imaging was negative. She has not been icing. CTN recommended icing - 20 min on / 20 min off with a towel or layer of protection between her foot and the ice. Instructed her to elevate above the level of her heart as often as possible to help with the edema. She is a patient of Dr Arias Montoya at [address] [place] [address] and states if not improved she will contact his office. Reviewed she has TCM visit one week from today as well and PCP can evaluate if additional imaging is needed. She otherwise feels well and denies needs at this time.       Care Transitions Subsequent and Final Call    Schedule Follow Up Appointment with PCP: Completed  Subsequent and Final Calls  Do you have any ongoing symptoms?: Yes  Onset of Patient-reported symptoms: Other  Patient-reported symptoms: Pain  Interventions for patient-reported symptoms: Other  Have your medications changed?: No  Do you have any questions related to your medications?: No  Do you currently have any active services?: Yes  Are you currently active with any services?: Outpatient/Community Services  Do you have any needs or concerns that I can assist you with?: No  Identified Barriers: Impairment  Care Transitions

## 2021-11-05 ENCOUNTER — CARE COORDINATION (OUTPATIENT)
Dept: CASE MANAGEMENT | Age: 66
End: 2021-11-05

## 2021-11-05 NOTE — CARE COORDINATION
[facility] Transitions Follow Up Call    2021    Patient: Marie Fang  Patient : 1955   MRN: [id number]  Reason for Admission: chronic back pain s/p spinal stimulator, n/v, weakness, severe hypokalemia, constipation, sepsis, COPD, HTN, HLD, Paroxysmal A Fib, chronic dCHF, chronic pain  Discharge Date: 10/23/21 RARS: Readmission Risk Score: 11.3         Spoke with: Marie Means    Patient answered call and verified . Patient pleasant and agreeable to transition call. Patient discussed recent fall and bruised foot. Patient got xray results back and nothing was broken. She continues to elevate and ice for 20 minutes on and 20 minutes off, but still painful. Patient not required any additional pain medication. Patient is to reach out to Dr Allan Peraza next week if pain continues for further testing. Patient confirmed that she has Dr Maile Perkins office number. Denies any acute needs at present time. Agreeable to f/u calls. Educated on the use of urgent care or physicians 24 hr access line if assistance is needed after hours. Care Transitions Subsequent and Final Call    Subsequent and Final Calls  Do you have any ongoing symptoms?: No  Have your medications changed?: No  Do you have any questions related to your medications?: No  Do you currently have any active services?: Yes  Are you currently active with any services?: Outpatient/Community Services  Do you have any needs or concerns that I can assist you with?: No  Identified Barriers: None  Care Transitions Interventions  Other Interventions:            Follow Up  Future Appointments   Date Time Provider Genna Rizo   2021  2:20 PM Saint Liner, MD [facility] None   2021  2:15 PM Colleen Hoyt MD [facility]   2022 10:20 AM Bebe Marley MD [facility] P[facility]       Manuel Hoffmann RN

## 2021-11-09 ENCOUNTER — OFFICE VISIT (OUTPATIENT)
Dept: INTERNAL MEDICINE CLINIC | Age: 66
End: 2021-11-09

## 2021-11-09 VITALS — HEIGHT: 62 IN | BODY MASS INDEX: 29.7 KG/M2

## 2021-11-09 DIAGNOSIS — G47.33 OSA (OBSTRUCTIVE SLEEP APNEA): ICD-10-CM

## 2021-11-09 DIAGNOSIS — Z00.00 MEDICARE ANNUAL WELLNESS VISIT, INITIAL: Primary | ICD-10-CM

## 2021-11-09 DIAGNOSIS — Z72.89 OTHER PROBLEMS RELATED TO LIFESTYLE: ICD-10-CM

## 2021-11-09 DIAGNOSIS — J44.9 COPD, SEVERE (HCC): ICD-10-CM

## 2021-11-09 DIAGNOSIS — I48.0 PAROXYSMAL A-FIB (HCC): ICD-10-CM

## 2021-11-09 DIAGNOSIS — Z00.00 ROUTINE GENERAL MEDICAL EXAMINATION AT A HEALTH CARE FACILITY: ICD-10-CM

## 2021-11-09 DIAGNOSIS — Z11.59 NEED FOR HEPATITIS C SCREENING TEST: ICD-10-CM

## 2021-11-09 DIAGNOSIS — I50.32 CHRONIC DIASTOLIC CHF (CONGESTIVE HEART FAILURE) (HCC): ICD-10-CM

## 2021-11-09 DIAGNOSIS — I10 HTN (HYPERTENSION), BENIGN: ICD-10-CM

## 2021-11-09 DIAGNOSIS — Z23 NEED FOR INFLUENZA VACCINATION: ICD-10-CM

## 2021-11-09 DIAGNOSIS — Z86.73 HISTORY OF CVA (CEREBROVASCULAR ACCIDENT): ICD-10-CM

## 2021-11-09 PROCEDURE — G0472 HEP C SCREEN HIGH RISK/OTHER: HCPCS | Performed by: INTERNAL MEDICINE

## 2021-11-09 PROCEDURE — G0008 ADMIN INFLUENZA VIRUS VAC: HCPCS | Performed by: INTERNAL MEDICINE

## 2021-11-09 PROCEDURE — 90662 IIV NO PRSV INCREASED AG IM: CPT | Performed by: INTERNAL MEDICINE

## 2021-11-09 PROCEDURE — G0438 PPPS, INITIAL VISIT: HCPCS | Performed by: INTERNAL MEDICINE

## 2021-11-09 RX ORDER — CYCLOBENZAPRINE HCL 10 MG
TABLET ORAL
Qty: 90 TABLET | Refills: 0 | Status: SHIPPED | OUTPATIENT
Start: 2021-11-09 | End: 2022-01-03

## 2021-11-09 RX ORDER — CYCLOBENZAPRINE HCL 10 MG
TABLET ORAL
Qty: 90 TABLET | Refills: 0 | Status: SHIPPED | OUTPATIENT
Start: 2021-11-09 | End: 2021-11-09 | Stop reason: SDUPTHER

## 2021-11-09 ASSESSMENT — LIFESTYLE VARIABLES
AUDIT-C TOTAL SCORE: 1
HOW OFTEN DO YOU HAVE SIX OR MORE DRINKS ON ONE OCCASION: 0
HOW OFTEN DO YOU HAVE A DRINK CONTAINING ALCOHOL: 1
HOW MANY STANDARD DRINKS CONTAINING ALCOHOL DO YOU HAVE ON A TYPICAL DAY: 0

## 2021-11-09 ASSESSMENT — PATIENT HEALTH QUESTIONNAIRE - PHQ9
SUM OF ALL RESPONSES TO PHQ QUESTIONS 1-9: 0
SUM OF ALL RESPONSES TO PHQ9 QUESTIONS 1 & 2: 0
SUM OF ALL RESPONSES TO PHQ QUESTIONS 1-9: 0
2. FEELING DOWN, DEPRESSED OR HOPELESS: 0
1. LITTLE INTEREST OR PLEASURE IN DOING THINGS: 0
SUM OF ALL RESPONSES TO PHQ QUESTIONS 1-9: 0
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
## 2021-11-09 NOTE — PATIENT INSTRUCTIONS
Personalized Preventive Plan for Meredith Cooney - 11/9/2021  Medicare offers a range of preventive health benefits. Some of the tests and screenings are paid in full while other may be subject to a deductible, co-insurance, and/or copay. Some of these benefits include a comprehensive review of your medical history including lifestyle, illnesses that may run in your family, and various assessments and screenings as appropriate. After reviewing your medical record and screening and assessments performed today your provider may have ordered immunizations, labs, imaging, and/or referrals for you. A list of these orders (if applicable) as well as your Preventive Care list are included within your After Visit Summary for your review. Other Preventive Recommendations:    · A preventive eye exam performed by an eye specialist is recommended every 1-2 years to screen for glaucoma; cataracts, macular degeneration, and other eye disorders. · A preventive dental visit is recommended every 6 months. · Try to get at least 150 minutes of exercise per week or 10,000 steps per day on a pedometer . · Order or download the FREE \"Exercise & Physical Activity: Your Everyday Guide\" from [employer]. Call 5-626.102.7786 or search [employer] online. · You need 7230-0386 mg of calcium and 0276-4579 IU of vitamin D per day. It is possible to meet your calcium requirement with diet alone, but a vitamin D supplement is usually necessary to meet this goal.  · When exposed to the sun, use a sunscreen that protects against both UVA and UVB radiation with an SPF of 30 or greater. Reapply every 2 to 3 hours or after sweating, drying off with a towel, or swimming. · Always wear a seat belt when traveling in a car. Always wear a helmet when riding a bicycle or motorcycle.     Keep Your Memory Pavel Records       Many factors can affect your ability to remembera hectic lifestyle, aging, stress, chronic disease, and certain medicines. But, there are steps you can take to sharpen your mind and help preserve your memory. Challenge Your Brain   Regularly challenging your mind may help keeps it in top shape. Good mental exercises include:   Crossword puzzlesUse a dictionary if you need it; you will learn more that way. Brainteasers Try some! Crafts, such as wood working and sewing   Hobbies, such as gardening and building model airplanes   SocializingVisit old friends or join groups to meet new ones. Reading   Learning a new language   Taking a class, whether it be art history or nile chi   TravelingExperience the food, history, and culture of your destination   Learning to use a computer   Going to museums, the theater, or thought-provoking movies   Changing things in your daily life, such as reversing your pattern in the grocery store or brushing your teeth using your nondominant hand   Use Memory Aids   There is no need to remember every detail on your own. These memory aids can help:   Calendars and day planners   Electronic organizers to store all sorts of helpful informationThese devices can \"beep\" to remind you of appointments. A book of days to record birthdays, anniversaries, and other occasions that occur on the same date every year   Detailed \"to-do\" lists and strategically placed sticky notes   Quick \"study\" sessionsBefore a gathering, review who will be there so their names will be fresh in your mind. Establish routinesFor example, keep your keys, wallet, and umbrella in the same place all the time or take medicine with your 8:00 AM glass of juice   Live a Healthy Life   Many actions that will keep your body strong will do the same for your mind. For example:   Talk to Your Doctor About Herbs and Supplements    Malnutrition and vitamin deficiencies can impair your mental function. For example, vitamin B12 deficiency can cause a range of symptoms, including confusion.  But, what if your nutritional needs are being met? Can herbs and supplements still offer a benefit? Researchers have investigated a range of natural remedies, such as ginkgo , ginseng , and the supplement phosphatidylserine (PS). So far, though, the evidence is inconsistent as to whether these products can improve memory or thinking. If you are interested in taking herbs and supplements, talk to your doctor first because they may interact with other medicines that you are taking. Exercise Regularly    Among the many benefits of regular exercise are increased blood flow to the brain and decreased risk of certain diseases that can interfere with memory function. One study found that even moderate exercise has a beneficial effect. Examples of \"moderate\" exercise include:   Playing 18 holes of golf once a week, without a cart   Playing tennis twice a week   Walking one mile per day   Manage Stress    It can be tough to remember what is important when your mind is cluttered. Make time for relaxation. Choose activities that calm you down, and make it routine. Manage Chronic Conditions    Side effects of high blood pressure , diabetes, and heart disease can interfere with mental function. Many of the lifestyle steps discussed here can help manage these conditions. Strive to eat a healthy diet, exercise regularly, get stress under control, and follow your doctor's advice for your condition. Minimize Medications    Talk to your doctor about the medicines that you take. Some may be unnecessary. Also, healthy lifestyle habits may lower the need for certain drugs. Last Reviewed: April 2010 Tuan Cerda MD   Updated: 4/13/2010   ·     [address]       As we get older, changes in balance, gait, strength, vision, hearing, and cognition make even the most youthful senior more prone to accidents. Falls are one of the leading health risks for older people.  This increased risk of falling is related to:   Aging process (eg, decreased muscle strength, slowed reflexes)   Higher incidence of chronic health problems (eg, arthritis, diabetes) that may limit mobility, agility or sensory awareness   Side effects of medicine (eg, dizziness, blurred vision)especially medicines like prescription pain medicines and drugs used to treat mental health conditions   Depending on the brittleness of your bones, the consequences of a fall can be serious and long lasting. Home Life   Research by the Association of Aging [facility]) shows that some home accidents among older adults can be prevented by making simple lifestyle changes and basic modifications and repairs to the home environment. Here are some lifestyle changes that experts recommend:   Have your hearing and vision checked regularly. Be sure to wear prescription glasses that are right for you. Speak to your doctor or pharmacist about the possible side effects of your medicines. A number of medicines can cause dizziness. If you have problems with sleep, talk to your doctor. Limit your intake of alcohol. If necessary, use a cane or walker to help maintain your balance. Wear supportive, rubber-soled shoes, even at home. If you live in a region that gets wintry weather, you may want to put special cleats on your shoes to prevent you from slipping on the snow and ice. Exercise regularly to help maintain muscle tone, agility, and balance. Always hold the banister when going up or down stairs. Also, use  bars when getting in or out of the bath or shower, or using the toilet. To avoid dizziness, get up slowly from a lying down position. Sit up first, dangling your legs for a minute or two before rising to a standing position. Overall Home Safety Check   According to the Consumer [employer] Safety [employer]'s \"Older Consumer Home Safety Checklist,\" it is important to check for potential hazards in each room. And remember, proper lighting is an essential factor in home safety.  If you cannot see clearly, you are more likely to fall. Important questions to ask yourself include:   Are lamp, electric, extension, and telephone cords placed out of the flow of traffic and maintained in good condition? Have frayed cords been replaced? Are all small rugs and runners slip resistant? If not, you can secure them to the floor with a special double-sided carpet tape. Are smoke detectors properly locatedone on every floor of your home and one outside of every sleeping area? Are they in good working order? Are batteries replaced at least once a year? Do you have a well-maintained carbon monoxide detector outside every sleeping are in your home? Does your furniture layout leave plenty of space to maneuver between and around chairs, tables, beds, and sofas? Are hallways, stairs and passages between rooms well lit? Can you reach a lamp without getting out of bed? Are floor surfaces well maintained? Shag rugs, high-pile carpeting, tile floors, and polished wood floors can be particularly slippery. Stairs should always have handrails and be carpeted or fitted with a non-skid tread. Is your telephone easily reachable. Is the cord safely tucked away? Room by Room   According to the Association of Aging, bathrooms and serene are the two most potentially hazardous rooms in your home. In the Kitchen    Be sure your stove is in proper working order and always make sure burners and the oven are off before you go out or go to sleep. Keep pots on the back burners, turn handles away from the front of the stove, and keep stove clean and free of grease build-up. Kitchen ventilation systems and range exhausts should be working properly. Keep flammable objects such as towels and pot holders away from the cooking area except when in use. Make sure kitchen curtains are tied back. Move cords and appliances away from the sink and hot surfaces.  If extension cords are needed, install wiring guides so they do not hang over the sink, range, or working areas. Look for coffee pots, kettles and toaster ovens with automatic shut-offs. Keep a mop handy in the kitchen so you can wipe up spills instantly. You should also have a small fire extinguisher. Arrange your kitchen with frequently used items on lower shelves to avoid the need to stand on a stepstool to reach them. Make sure countertops are well-lit to avoid injuries while cutting and preparing food. In the Bathroom    Use a non-slip mat or decals in the tub and shower, since wet, soapy tile or porcelain surfaces are extremely slippery. Make sure bathroom rugs are non-skid or tape them firmly to the floor. Bathtubs should have at least one, preferably two, grab bars, firmly attached to structural supports in the wall. (Do not use built-in soap holders or glass shower doors as grab bars.)    Tub seats fitted with non-slip material on the legs allow you to wash sitting down. For people with limited mobility, bathtub transfer benches allow you to slide safely into the tub. Raised toilet seats and toilet safety rails are helpful for those with knee or hip problems. In the [facility]    Make sure you use a nightlight and that the area around your bed is clear of potential obstacles. Be careful with electric blankets and never go to sleep with a heating pad, which can cause serious burns even if on a low setting. Use fire-resistant mattress covers and pillows, and NEVER smoke in bed. Keep a phone next to the bed that is programmed to dial 911 at the push of a button. If you have a chronic condition, you may want to sign on with an automatic call-in service. Typically the system includes a small pendant that connects directly to an emergency medical voice-response system. You should also make arrangements to stay in contact with someonefriend, neighbor, family memberon a regular schedule.    Fire Prevention   According to the [employer] (Smoke Alarms for Every) [facility], senior citizens are one of the two highest risk groups for death and serious injuries due to residential fires. When cooking, wear short-sleeved items, never a bulky long-sleeved robe. The [facility]'s Safety Checklist for Older Consumers emphasizes the importance of checking basements, garages, workshops and storage areas for fire hazards, such as volatile liquids, piles of old rags or clothing and overloaded circuits. Never smoke in bed or when lying down on a couch or recliner chair. Small portable electric or kerosene heaters are responsible for many home fires and should be used cautiously if at all. If you do use one, be sure to keep them away from flammable materials. In case of fire, make sure you have a pre-established emergency exit plan. Have a professional check your fireplace and other fuel-burning appliances yearly. [employer]   Baby boomers entering the drew years will continue to see the development of new products to help older adults live safely and independently in spite of age-related changes. Making Life More Livable  , by Anastacio [employer]lie[employer], lists over 1,000 products for \"living well in the mature years,\" such as bathing and mobility aids, household security devices, ergonomically designed knives and peelers, and faucet valves and knobs for temperature control. Medical supply stores and organizations are good sources of information about products that improve your quality of life and insure your safety.      Last Reviewed: November 2009 Marty Kee MD   Updated: 3/7/2011     ·

## 2021-11-10 LAB
ANION GAP SERPL CALCULATED.3IONS-SCNC: 20 MMOL/L (ref 3–16)
BUN BLDV-MCNC: 16 MG/DL (ref 7–20)
CALCIUM SERPL-MCNC: 9.2 MG/DL (ref 8.3–10.6)
CHLORIDE BLD-SCNC: 92 MMOL/L (ref 99–110)
CO2: 29 MMOL/L (ref 21–32)
CREAT SERPL-MCNC: 0.7 MG/DL (ref 0.6–1.2)
GFR AFRICAN AMERICAN: >60
GFR NON-AFRICAN AMERICAN: >60
GLUCOSE BLD-MCNC: 102 MG/DL (ref 70–99)
HEPATITIS C ANTIBODY INTERPRETATION: REACTIVE
POTASSIUM SERPL-SCNC: 3.6 MMOL/L (ref 3.5–5.1)
SODIUM BLD-SCNC: 141 MMOL/L (ref 136–145)

## 2021-11-11 ENCOUNTER — CARE COORDINATION (OUTPATIENT)
Dept: CASE MANAGEMENT | Age: 66
End: 2021-11-11

## 2021-11-11 NOTE — CARE COORDINATION
[place] [facility] Transitions Follow Up Call    2021    Patient: Cielo Rees  Patient : 1955   MRN: [id number]  Reason for Admission: chronic back pain s/p spinal stimulator, n/v, weakness, severe hypokalemia, constipation, sepsis, COPD, HTN, HLD, Paroxysmal A Fib, chronic dCHF, chronic pain  Discharge Date: 10/23/21 RARS: Readmission Risk Score: 11.3         Spoke with: Cielo Rees (patient)    Completed OV with PCP. Reports her R foot is improving. She continues to ice off and on and noted improvement. Able to use walker to get up to bathroom when before she was requiring use wheelchair. She denies needs/concerns at this time but was encouraged to reach out prn. Care Transitions Subsequent and Final Call    Schedule Follow Up Appointment with PCP: Completed  Subsequent and Final Calls  Do you have any ongoing symptoms?: No  Have your medications changed?: No  Do you have any questions related to your medications?: No  Do you currently have any active services?: No  Are you currently active with any services?: Outpatient/Community Services  Do you have any needs or concerns that I can assist you with?: No  Identified Barriers: Impairment  Care Transitions Interventions  No Identified Needs  Other Interventions:            Follow Up  Future Appointments   Date Time Provider Genna Rizo   2021  2:15 PM MD SANAZ Ramos [facility]   2022  3:10 PM MD [facility] None   2022 10:20 AM Te Egan MD [facility]       Ramu Bartlett RN

## 2021-11-19 ENCOUNTER — CARE COORDINATION (OUTPATIENT)
Dept: CASE MANAGEMENT | Age: 66
End: 2021-11-19

## 2021-11-19 NOTE — CARE COORDINATION
[place] [facility] Transitions Follow Up Call    2021    Patient: Lucy Ortiz  Patient : 1955   MRN: [id number]  Reason for Admission: chronic back pain s/p spinal stimulator, n/v, weakness, severe hypokalemia, constipation, sepsis, COPD, HTN, HLD, Paroxysmal A Fib, chronic dCHF, chronic pain  Discharge Date: 10/23/21 RARS: Readmission Risk Score: 11.3         Spoke with: Lucy Ortiz (patient)    Reports she is doing much better now. Reviewed s/s to call provider. Eating 3 bananas a week per Dr Ced Dennison in addition to taking 4 tablets of potassium daily. Reviewed s/s hypo and hyperkalemia as well as other potassium rich foods. She denies needs at this time. She was encouraged to contact her provider and/or CTN for future needs. Care Transitions Subsequent and Final Call    Schedule Follow Up Appointment with PCP: Completed  Subsequent and Final Calls  Do you have any ongoing symptoms?: No  Have your medications changed?: No  Do you have any questions related to your medications?: No  Do you currently have any active services?: Yes  Are you currently active with any services?: Outpatient/Community Services  Do you have any needs or concerns that I can assist you with?: No  Identified Barriers: Impairment  Care Transitions Interventions  No Identified Needs  Other Interventions:            Follow Up  Future Appointments   Date Time Provider Genna Rizo   2021  2:15 PM MD SANAZ Vega [facility]   2022  3:10 PM Mohsen Ryder MD [place] [facility] None   2022 10:20 AM Adán Craig MD [facility]       Bere Stevens RN

## 2021-11-29 RX ORDER — METOLAZONE 2.5 MG/1
2.5 TABLET ORAL WEEKLY
Qty: 4 TABLET | Refills: 2 | Status: SHIPPED | OUTPATIENT
Start: 2021-11-29 | End: 2021-12-02 | Stop reason: CLARIF

## 2021-12-01 RX ORDER — PANTOPRAZOLE SODIUM 40 MG/1
TABLET, DELAYED RELEASE ORAL
Qty: 30 TABLET | Refills: 0 | Status: SHIPPED | OUTPATIENT
Start: 2021-12-01 | End: 2021-12-30

## 2021-12-02 ENCOUNTER — TELEPHONE (OUTPATIENT)
Dept: CARDIOLOGY CLINIC | Age: 66
End: 2021-12-02

## 2021-12-02 ENCOUNTER — HOSPITAL ENCOUNTER (OUTPATIENT)
Age: 66
Discharge: HOME | End: 2021-12-02
Payer: MEDICARE

## 2021-12-02 ENCOUNTER — OFFICE VISIT (OUTPATIENT)
Dept: CARDIOLOGY CLINIC | Age: 66
End: 2021-12-02
Payer: MEDICARE

## 2021-12-02 VITALS
SYSTOLIC BLOOD PRESSURE: 120 MMHG | HEART RATE: 83 BPM | HEIGHT: 62 IN | BODY MASS INDEX: 29.44 KG/M2 | OXYGEN SATURATION: 93 % | DIASTOLIC BLOOD PRESSURE: 70 MMHG | WEIGHT: 160 LBS

## 2021-12-02 DIAGNOSIS — I10 HTN (HYPERTENSION), BENIGN: ICD-10-CM

## 2021-12-02 DIAGNOSIS — I50.32 CHRONIC DIASTOLIC CHF (CONGESTIVE HEART FAILURE) (HCC): ICD-10-CM

## 2021-12-02 DIAGNOSIS — G45.1 TIA INVOLVING LEFT INTERNAL CAROTID ARTERY: ICD-10-CM

## 2021-12-02 DIAGNOSIS — I48.0 PAROXYSMAL A-FIB (HCC): ICD-10-CM

## 2021-12-02 DIAGNOSIS — I25.118 CORONARY ARTERY DISEASE OF NATIVE ARTERY OF NATIVE HEART WITH STABLE ANGINA PECTORIS (HCC): Primary | ICD-10-CM

## 2021-12-02 DIAGNOSIS — E87.6 HYPOKALEMIA: ICD-10-CM

## 2021-12-02 DIAGNOSIS — E78.2 MIXED HYPERLIPIDEMIA: ICD-10-CM

## 2021-12-02 DIAGNOSIS — Z87.891 HISTORY OF TOBACCO ABUSE: ICD-10-CM

## 2021-12-02 PROCEDURE — 80048 BASIC METABOLIC PNL TOTAL CA: CPT

## 2021-12-02 PROCEDURE — 36415 COLL VENOUS BLD VENIPUNCTURE: CPT

## 2021-12-02 PROCEDURE — 99214 OFFICE O/P EST MOD 30 MIN: CPT | Performed by: INTERNAL MEDICINE

## 2021-12-02 RX ORDER — METOLAZONE 2.5 MG/1
2.5 TABLET ORAL WEEKLY
Qty: 4 TABLET | Refills: 2 | Status: SHIPPED | OUTPATIENT
Start: 2021-12-02 | End: 2022-03-08

## 2021-12-02 RX ORDER — ATORVASTATIN CALCIUM 80 MG/1
80 TABLET, FILM COATED ORAL DAILY
COMMUNITY
End: 2021-12-14 | Stop reason: SDUPTHER

## 2021-12-02 NOTE — PATIENT INSTRUCTIONS
Plan:  1. BMP today to check potassium level   -will call with results and consider increasing potassium  2. Call office to confirm Lipitor dosage  3. Change demadex to 2.5 mg weekly as needed for weight gain of 5 pounds in one week  4.  Limit salt intake to less than 2 grams a day    Follow up with NP in 3 months    Follow up with me in 6 months

## 2021-12-02 NOTE — LETTER
[address]  [address] [address]  Phone: [phone]  Fax: [phone]    Ace Nielsen MD    December 2, 2021     Kevin Shea MD  [address], [facility] [place] [address]    Patient: Clementina Salinas   MR Number: [id number]   YOB: 1955   Date of Visit: 12/2/2021       Dear Kevin Shea:    Thank you for referring Jagdish Hinton to me for evaluation/treatment. Below are the relevant portions of my assessment and plan of care. If you have questions, please do not hesitate to call me. I look forward to following Keri Rivera along with you.     Sincerely,      Ace Nielsen MD
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
## 2021-12-02 NOTE — TELEPHONE ENCOUNTER
Pt was told to call with Lipitor dose. Pt is taking Lipitor 80 mg once nightly. Pt also wanted to let SMM know that she got her blood work done today.  AZAR
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
## 2021-12-03 LAB
ANION GAP SERPL CALCULATED.3IONS-SCNC: 13 MMOL/L (ref 3–16)
BUN BLDV-MCNC: 10 MG/DL (ref 7–20)
CALCIUM SERPL-MCNC: 9.3 MG/DL (ref 8.3–10.6)
CHLORIDE BLD-SCNC: 93 MMOL/L (ref 99–110)
CO2: 34 MMOL/L (ref 21–32)
CREAT SERPL-MCNC: 0.5 MG/DL (ref 0.6–1.2)
GFR AFRICAN AMERICAN: >60
GFR NON-AFRICAN AMERICAN: >60
GLUCOSE BLD-MCNC: 84 MG/DL (ref 70–99)
POTASSIUM SERPL-SCNC: 3.6 MMOL/L (ref 3.5–5.1)
SODIUM BLD-SCNC: 140 MMOL/L (ref 136–145)

## 2021-12-15 RX ORDER — ATORVASTATIN CALCIUM 80 MG/1
80 TABLET, FILM COATED ORAL DAILY
Qty: 90 TABLET | Refills: 1 | Status: SHIPPED | OUTPATIENT
Start: 2021-12-15 | End: 2022-06-14 | Stop reason: SDUPTHER

## 2021-12-16 ENCOUNTER — TELEPHONE (OUTPATIENT)
Dept: PULMONOLOGY | Age: 66
End: 2021-12-16

## 2021-12-30 RX ORDER — PANTOPRAZOLE SODIUM 40 MG/1
TABLET, DELAYED RELEASE ORAL
Qty: 30 TABLET | Refills: 0 | Status: SHIPPED | OUTPATIENT
Start: 2021-12-30 | End: 2022-01-03 | Stop reason: SDUPTHER

## 2022-01-03 RX ORDER — CYCLOBENZAPRINE HCL 10 MG
TABLET ORAL
Qty: 90 TABLET | Refills: 0 | Status: SHIPPED | OUTPATIENT
Start: 2022-01-03 | End: 2022-02-21

## 2022-01-03 RX ORDER — PANTOPRAZOLE SODIUM 40 MG/1
TABLET, DELAYED RELEASE ORAL
Qty: 90 TABLET | Refills: 0 | Status: SHIPPED | OUTPATIENT
Start: 2022-01-03 | End: 2022-02-28 | Stop reason: SDUPTHER

## 2022-01-07 RX ORDER — POTASSIUM CHLORIDE 750 MG/1
50 TABLET, EXTENDED RELEASE ORAL DAILY
Qty: 150 TABLET | Refills: 2 | Status: SHIPPED | OUTPATIENT
Start: 2022-01-07 | End: 2022-02-01

## 2022-02-01 ENCOUNTER — HOSPITAL ENCOUNTER (OUTPATIENT)
Age: 67
Discharge: HOME | End: 2022-02-01
Payer: MEDICARE

## 2022-02-01 ENCOUNTER — HOSPITAL ENCOUNTER (OUTPATIENT)
Dept: GENERAL RADIOLOGY | Age: 67
Discharge: HOME | End: 2022-02-01
Payer: MEDICARE

## 2022-02-01 DIAGNOSIS — M51.17 INTERVERTEBRAL DISC DISORDER WITH RADICULOPATHY OF LUMBOSACRAL REGION: ICD-10-CM

## 2022-02-01 PROCEDURE — 72110 X-RAY EXAM L-2 SPINE 4/>VWS: CPT

## 2022-02-01 RX ORDER — POTASSIUM CHLORIDE 750 MG/1
TABLET, EXTENDED RELEASE ORAL
Qty: 150 TABLET | Refills: 2 | Status: SHIPPED | OUTPATIENT
Start: 2022-02-01 | End: 2022-05-02

## 2022-02-08 DIAGNOSIS — I50.32 CHRONIC DIASTOLIC CHF (CONGESTIVE HEART FAILURE) (HCC): Primary | ICD-10-CM

## 2022-02-08 RX ORDER — TORSEMIDE 100 MG/1
TABLET ORAL
Qty: 45 TABLET | Refills: 2 | Status: SHIPPED | OUTPATIENT
Start: 2022-02-08 | End: 2022-03-08

## 2022-02-21 RX ORDER — CYCLOBENZAPRINE HCL 10 MG
TABLET ORAL
Qty: 90 TABLET | Refills: 0 | Status: SHIPPED | OUTPATIENT
Start: 2022-02-21 | End: 2022-04-05

## 2022-02-28 ENCOUNTER — TELEMEDICINE (OUTPATIENT)
Dept: INTERNAL MEDICINE CLINIC | Age: 67
End: 2022-02-28

## 2022-02-28 DIAGNOSIS — I50.32 CHRONIC DIASTOLIC CHF (CONGESTIVE HEART FAILURE) (HCC): ICD-10-CM

## 2022-02-28 DIAGNOSIS — I25.118 CORONARY ARTERY DISEASE OF NATIVE ARTERY OF NATIVE HEART WITH STABLE ANGINA PECTORIS (HCC): ICD-10-CM

## 2022-02-28 DIAGNOSIS — I72.9 ANEURYSM (HCC): ICD-10-CM

## 2022-02-28 DIAGNOSIS — J44.9 COPD, SEVERE (HCC): Primary | ICD-10-CM

## 2022-02-28 DIAGNOSIS — I10 HTN (HYPERTENSION), BENIGN: ICD-10-CM

## 2022-02-28 DIAGNOSIS — I48.0 PAROXYSMAL A-FIB (HCC): ICD-10-CM

## 2022-02-28 DIAGNOSIS — E78.2 MIXED HYPERLIPIDEMIA: ICD-10-CM

## 2022-02-28 PROCEDURE — 99214 OFFICE O/P EST MOD 30 MIN: CPT | Performed by: INTERNAL MEDICINE

## 2022-02-28 RX ORDER — SENNA AND DOCUSATE SODIUM 50; 8.6 MG/1; MG/1
1 TABLET, FILM COATED ORAL 2 TIMES DAILY
Qty: 180 TABLET | Refills: 1 | Status: SHIPPED | OUTPATIENT
Start: 2022-02-28 | End: 2022-08-27

## 2022-02-28 RX ORDER — PANTOPRAZOLE SODIUM 40 MG/1
40 TABLET, DELAYED RELEASE ORAL 2 TIMES DAILY
Qty: 180 TABLET | Refills: 1 | Status: SHIPPED | OUTPATIENT
Start: 2022-02-28 | End: 2022-08-29

## 2022-02-28 ASSESSMENT — ENCOUNTER SYMPTOMS
ABDOMINAL PAIN: 0
NAUSEA: 0
SHORTNESS OF BREATH: 0
VOMITING: 0
WHEEZING: 0
RHINORRHEA: 0
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
## 2022-03-08 ENCOUNTER — OFFICE VISIT (OUTPATIENT)
Dept: CARDIOLOGY CLINIC | Age: 67
End: 2022-03-08
Payer: MEDICARE

## 2022-03-08 VITALS
BODY MASS INDEX: 29.81 KG/M2 | WEIGHT: 162 LBS | OXYGEN SATURATION: 92 % | DIASTOLIC BLOOD PRESSURE: 72 MMHG | SYSTOLIC BLOOD PRESSURE: 134 MMHG | HEART RATE: 76 BPM | HEIGHT: 62 IN | TEMPERATURE: 97.9 F

## 2022-03-08 DIAGNOSIS — I25.10 CORONARY ARTERY DISEASE INVOLVING NATIVE CORONARY ARTERY OF NATIVE HEART WITHOUT ANGINA PECTORIS: ICD-10-CM

## 2022-03-08 DIAGNOSIS — I50.32 CHRONIC DIASTOLIC CHF (CONGESTIVE HEART FAILURE) (HCC): Primary | ICD-10-CM

## 2022-03-08 DIAGNOSIS — I48.0 PAROXYSMAL A-FIB (HCC): ICD-10-CM

## 2022-03-08 PROCEDURE — 99214 OFFICE O/P EST MOD 30 MIN: CPT | Performed by: NURSE PRACTITIONER

## 2022-03-08 RX ORDER — METOLAZONE 2.5 MG/1
2.5 TABLET ORAL PRN
Qty: 4 TABLET | Refills: 2
Start: 2022-03-08

## 2022-03-08 RX ORDER — TORSEMIDE 100 MG/1
50 TABLET ORAL DAILY
Qty: 45 TABLET | Refills: 2
Start: 2022-03-08 | End: 2022-10-27

## 2022-03-08 ASSESSMENT — ENCOUNTER SYMPTOMS
SHORTNESS OF BREATH: 1
GASTROINTESTINAL NEGATIVE: 1
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
## 2022-03-08 NOTE — LETTER
[address] [address]  Phone: [phone]  Fax: Baljit Adams 67, APRN - CNP    May 5, 2022     Barbara Michelle, [address] [address]    Patient: Al Branham   MR Number: [id number]   YOB: 1955   Date of Visit: 3/8/2022       Dear Barbara Michelle:    Thank you for referring Chang Wilder to me for evaluation/treatment. Below are the relevant portions of my assessment and plan of care. If you have questions, please do not hesitate to call me. I look forward to following Armaan Magallanes along with you.     Sincerely,      Kaur Garcia, JOSHUA - CNP

## 2022-03-08 NOTE — PATIENT INSTRUCTIONS
Check blood work in 1 month  Continue current medications  Stay active along with a healthy diet  Monitor weight and BP at home  Follow up as planned with Dr. Omid Santana

## 2022-03-15 ENCOUNTER — HOSPITAL ENCOUNTER (OUTPATIENT)
Dept: CT IMAGING | Age: 67
Discharge: HOME | End: 2022-03-15
Payer: MEDICARE

## 2022-03-15 DIAGNOSIS — M51.17 INTERVERTEBRAL DISC DISORDER WITH RADICULOPATHY OF LUMBOSACRAL REGION: ICD-10-CM

## 2022-03-15 PROCEDURE — 72131 CT LUMBAR SPINE W/O DYE: CPT

## 2022-04-05 RX ORDER — CYCLOBENZAPRINE HCL 10 MG
TABLET ORAL
Qty: 90 TABLET | Refills: 0 | Status: SHIPPED | OUTPATIENT
Start: 2022-04-05 | End: 2022-07-29

## 2022-04-06 RX ORDER — CARVEDILOL 3.12 MG/1
TABLET ORAL
Qty: 180 TABLET | Refills: 2 | Status: SHIPPED | OUTPATIENT
Start: 2022-04-06 | End: 2022-06-14 | Stop reason: SDUPTHER

## 2022-04-14 ENCOUNTER — TELEMEDICINE (OUTPATIENT)
Dept: PULMONOLOGY | Age: 67
End: 2022-04-14
Payer: MEDICARE

## 2022-04-14 ENCOUNTER — TELEPHONE (OUTPATIENT)
Dept: PULMONOLOGY | Age: 67
End: 2022-04-14

## 2022-04-14 DIAGNOSIS — J96.11 CHRONIC RESPIRATORY FAILURE WITH HYPOXIA (HCC): ICD-10-CM

## 2022-04-14 DIAGNOSIS — J44.9 COPD, SEVERE (HCC): ICD-10-CM

## 2022-04-14 DIAGNOSIS — Z87.891 PERSONAL HISTORY OF TOBACCO USE, PRESENTING HAZARDS TO HEALTH: Primary | ICD-10-CM

## 2022-04-14 PROCEDURE — 99214 OFFICE O/P EST MOD 30 MIN: CPT | Performed by: INTERNAL MEDICINE

## 2022-04-14 NOTE — PROGRESS NOTES
[facility] Pulmonary, Critical Care, and Sleep    Outpatient Follow Up Note    CC: COPD    Interval History: 73 y.o. female    No exacerbations. REIS at baseline. No cough or wheeze. Using inhalers as prescribed. Initial HPI: regarding COPD. The patient has a history of COPD, stroke. Wheezes intermittently. Has a daily intermittent cough that is usually dry or sometimes productive of white to yellow sputum. The patient does not have SOB at rest but has REIS. Exercise tolerance on level ground is < 1 block. Her mother  of lung cancer. Current Medications:    Current Outpatient Medications:     carvedilol (COREG) 3.125 MG tablet, TAKE 1 TABLET BY MOUTH TWICE A DAY WITH MEALS, Disp: 180 tablet, Rfl: 2    cyclobenzaprine (FLEXERIL) 10 MG tablet, TAKE 1 TABLET BY MOUTH THREE TIMES A DAY AS NEEDED FOR MUSCLE SPASMS, Disp: 90 tablet, Rfl: 0    metOLazone (ZAROXOLYN) 2.5 MG tablet, Take 1 tablet by mouth as needed (significant weight gain) Indications: TAKE  ONCE /WEEK ONLY  IF GAIN MOR THAN 2 LBS, Disp: 4 tablet, Rfl: 2    torsemide (DEMADEX) 100 MG tablet, Take 0.5 tablets by mouth daily, Disp: 45 tablet, Rfl: 2    pantoprazole (PROTONIX) 40 MG tablet, Take 1 tablet by mouth in the morning and at bedtime TAKE 1 TABLET BY MOUTH EVERY DAY, Disp: 180 tablet, Rfl: 1    sennosides-docusate sodium (SENOKOT-S) 8.6-50 MG tablet, Take 1 tablet by mouth in the morning and at bedtime, Disp: 180 tablet, Rfl: 1    KLOR-CON M10 10 MEQ extended release tablet, TAKE 5 TABLETS BY MOUTH DAILY. , Disp: 150 tablet, Rfl: 2    atorvastatin (LIPITOR) 80 MG tablet, Take 1 tablet by mouth daily, Disp: 90 tablet, Rfl: 1    amLODIPine (NORVASC) 5 MG tablet, TAKE 1 TABLET BY MOUTH EVERY DAY, Disp: 90 tablet, Rfl: 3    ELIQUIS 5 MG TABS tablet, TAKE 1 TABLET BY MOUTH TWICE A DAY, Disp: 60 tablet, Rfl: 11    vitamin D (ERGOCALCIFEROL) 1.25 MG (68565 UT) CAPS capsule, Take 1 capsule by mouth every 30 days, Disp: 12 capsule, Rfl: 0   gabapentin (NEURONTIN) 400 MG capsule, Take 400 mg by mouth 3 times daily. , Disp: , Rfl:     MORPHINE, PAIN PUMP REFILL CHARGE,, , Disp: , Rfl:     albuterol sulfate HFA (PROVENTIL HFA) 108 (90 Base) MCG/ACT inhaler, Inhale 2 puffs into the lungs every 6 hours as needed for Wheezing or Shortness of Breath, Disp: 1 Inhaler, Rfl: 5    mometasone-formoterol (DULERA) 100-5 MCG/ACT inhaler, Inhale 2 puffs into the lungs 2 times daily, Disp: 1 Inhaler, Rfl: 5    aclidinium (TUDORZA PRESSAIR) 400 MCG/ACT AEPB inhaler, Inhale 1 puff into the lungs daily, Disp: 1 each, Rfl: 5    nitroGLYCERIN (NITROSTAT) 0.4 MG SL tablet, Place 1 tablet under the tongue every 5 minutes as needed for Chest pain, Disp: 25 tablet, Rfl: 3    aspirin 81 MG tablet, Take 81 mg by mouth daily, Disp: , Rfl:     oxyCODONE-acetaminophen (PERCOCET)  MG per tablet, Take 1 tablet by mouth 3 times daily as needed. , Disp: , Rfl:     albuterol (PROVENTIL) (2.5 MG/3ML) 0.083% nebulizer solution, Take 2.5 mg by nebulization every 6 hours as needed for Wheezing, Disp: , Rfl:     OXYGEN, Inhale 2 L into the lungs Wears 2lpm HS and prn, Disp: , Rfl:     Objective:   PHYSICAL EXAM:      VITALS:  There were no vitals taken for this visit. Constitutional:  No acute distress. Appears well developed and nourished. Eyes: EOM intact. Conjunctivae anicteric. No visible discharge. HENT: Head is normocephalic and atraumatic. Mucus membranes are moist and the tongue appears normal. Normal appearing nose. External Ears normal.   Neck: No obvious mass and the trachea is midline. Respiratory: No accessory muscle usage. Respiratory effort normal. No visualized signs of difficulty breathing or respiratory distress  Psychiatric: No anxiety or Agitation. Alert and Oriented to person, place and time. Normal judgement and insight. LABS:  Reviewed any pertinent new labs that are available.     PFTs 5/11/17  FVC  (78%) FEV1 0.74 (31%) FEV1/FVC ratio 31  TLC (122%)  RV  (168%)   DLCO (34%) Bronchodilator response: +++     6MWT: 490ft, 2lpm O2    IMAGING: I personally reviewed and interpreted the following imaging today in the office:    5/11/17 HRCT:   Lungs/pleura: Mucous is present within the proximal trachea and subsegmental   right middle lobe bronchi.  Mild bronchial wall thickening involves the   bilateral lower lobes likely due to bronchitis.       There is no pneumothorax or pleural effusion. Hema Spatz is biapical scarring and   bibasilar atelectasis with extensive emphysema throughout the bilateral lungs. 9/20/21 LDCT Chest: stable 3mm cole nodule  2. LungRADS Category S: Moderate centrilobular emphysema. 3. Biapical fibrotic change and granulomatous changes in the chest.  There is   moderate atherosclerotic calcification of the LAD. ASSESSMENT:  · COPD, severe  · Chronic hypoxic respiratory failure  · Prior tobacco abuse: quit 2012. 120 pack years  · PAF on amiodarone and eliquis  · Chronic Anticoagulation  · H/o TIA, brain aneurysm that is monitored  · DEVI -not treated per pt unable to tolerate PAP     PLAN:   · supplemental 2lpm with exertion and nocturnal  · PRN albuterol INH and continue nebulizer  · Continue Dulera 100 and Tudorza   · On Eliquis for anticoagulation. · Pt interested in excite device and would like a referral to sleep clinic and wants an audiovisual visi  · F/u in 6 mo for COPD and annual LDCT  Screening CT scan was considered in a lung cancer screening counseling and shared decision making visit today that included the following elements:   Eligibility: Age: 73. There are no signs or symptoms of lung cancer.   Tobacco History 120 pack-years, quit 10 years ago  Verbal counseling has been performed by me to include benefits and harms of screening, follow-up diagnostic testing, over-diagnosis, false positive rate, and total radiation exposure;   I have counseled on the importance of adherence to annual lung cancer LDCT screening, the impact of comorbidities and patient is willing to undergo diagnosis and treatment;   I have provided counseling on the importance of maintaining cigarette smoking abstinence if former smoker; or the importance of smoking cessation if current smoker and, if appropriate, furnishing of information about tobacco cessation interventions    This visit was completed using a synchronous (real-time) audio-video encounter. The patient (or guardian or caregiver if applicable) is aware that this is a billable service, which included applicable co-pays. This virtual visit was completed with the patient's or guardian's consent. The visit was conducted pursuant to the emergency declaration under the [address] [place] [address], [address] [place] [address] [facility] authority and the [employer] and [employer] Act. Patient identification was verified, and a caregiver was present when appropriate. The patient was located in a state where the provider is licensed to provide care.

## 2022-05-02 RX ORDER — POTASSIUM CHLORIDE 750 MG/1
TABLET, EXTENDED RELEASE ORAL
Qty: 150 TABLET | Refills: 0 | Status: SHIPPED | OUTPATIENT
Start: 2022-05-02 | End: 2022-05-31

## 2022-05-31 RX ORDER — POTASSIUM CHLORIDE 750 MG/1
TABLET, EXTENDED RELEASE ORAL
Qty: 150 TABLET | Refills: 0 | Status: SHIPPED | OUTPATIENT
Start: 2022-05-31 | End: 2022-06-27

## 2022-06-02 ENCOUNTER — TELEPHONE (OUTPATIENT)
Dept: INTERNAL MEDICINE CLINIC | Age: 67
End: 2022-06-02

## 2022-06-02 NOTE — TELEPHONE ENCOUNTER
----- Message from Duyen Godinez MD sent at 6/2/2022  2:47 PM EDT -----  I meant yes  ----- Message -----  From: Radmaes Narvaez  Sent: 6/1/2022  10:14 AM EDT  To: Duyen Godinez MD    Pt states she gave wrong dose, it is actually Calcium 1200 mg with Vitamin D3. Still okay for 1 cap bid?  ----- Message -----  From: Duyen Godinez MD  Sent: 6/1/2022  10:01 AM EDT  To: Radames Narvaez    yes  ----- Message -----  From: Radames Narvaez  Sent: 5/31/2022   8:40 AM EDT  To: Duyen Godinez MD    Pt was previously taking Calcium 600 mg with vitamin D3 1 cap in AM and 1 cap in PM. States she just got a new bottle of calcium 1000 mg with vitamin D3 and wants to know if she should still take 1 cap bid? Please advise.
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
## 2022-06-14 ENCOUNTER — OFFICE VISIT (OUTPATIENT)
Dept: CARDIOLOGY CLINIC | Age: 67
End: 2022-06-14
Payer: MEDICARE

## 2022-06-14 VITALS
BODY MASS INDEX: 28.34 KG/M2 | HEIGHT: 62 IN | HEART RATE: 63 BPM | TEMPERATURE: 98.4 F | WEIGHT: 154 LBS | SYSTOLIC BLOOD PRESSURE: 116 MMHG | DIASTOLIC BLOOD PRESSURE: 70 MMHG | OXYGEN SATURATION: 92 %

## 2022-06-14 DIAGNOSIS — I10 HTN (HYPERTENSION), BENIGN: ICD-10-CM

## 2022-06-14 DIAGNOSIS — I25.118 CORONARY ARTERY DISEASE OF NATIVE ARTERY OF NATIVE HEART WITH STABLE ANGINA PECTORIS (HCC): Primary | ICD-10-CM

## 2022-06-14 DIAGNOSIS — Z87.891 HISTORY OF TOBACCO ABUSE: ICD-10-CM

## 2022-06-14 DIAGNOSIS — I48.0 PAROXYSMAL A-FIB (HCC): ICD-10-CM

## 2022-06-14 DIAGNOSIS — I50.32 CHRONIC DIASTOLIC CHF (CONGESTIVE HEART FAILURE) (HCC): ICD-10-CM

## 2022-06-14 PROCEDURE — 1123F ACP DISCUSS/DSCN MKR DOCD: CPT | Performed by: INTERNAL MEDICINE

## 2022-06-14 PROCEDURE — 99214 OFFICE O/P EST MOD 30 MIN: CPT | Performed by: INTERNAL MEDICINE

## 2022-06-14 RX ORDER — AMLODIPINE BESYLATE 5 MG/1
TABLET ORAL
Qty: 90 TABLET | Refills: 3 | Status: SHIPPED | OUTPATIENT
Start: 2022-06-14

## 2022-06-14 RX ORDER — CARVEDILOL 3.12 MG/1
TABLET ORAL
Qty: 180 TABLET | Refills: 3 | Status: SHIPPED | OUTPATIENT
Start: 2022-06-14

## 2022-06-14 RX ORDER — ATORVASTATIN CALCIUM 80 MG/1
80 TABLET, FILM COATED ORAL DAILY
Qty: 90 TABLET | Refills: 3 | Status: SHIPPED | OUTPATIENT
Start: 2022-06-14

## 2022-06-14 NOTE — PATIENT INSTRUCTIONS
Plan:  1 have blood work through Lady Ty MD at your appointment in September    2. Continue to ear compression stockings and elevate feet when able   3. Refills sent   4.  Follow up with me in 9 months

## 2022-06-17 ENCOUNTER — TELEPHONE (OUTPATIENT)
Dept: PULMONOLOGY | Age: 67
End: 2022-06-17

## 2022-06-17 NOTE — TELEPHONE ENCOUNTER
Patient cancelled appointment on 6/17/22 with [facility] for NPT sleep eval/excite device/Dr. Chicas patient. Reason: patient stated that she does not remember discussing the excite device with Dr. Clara Bedoya. Said she does not even know what it is. Asked to follow up on this when she sees Dr. Maddie Wallace in October. Patient did not reschedule appointment. Appointment rescheduled for n/a. LOV 4/14/22    ASSESSMENT:  · COPD, severe  · Chronic hypoxic respiratory failure  · Prior tobacco abuse: quit 2012. 120 pack years  · PAF on amiodarone and eliquis  · Chronic Anticoagulation  · H/o TIA, brain aneurysm that is monitored  · DEVI -not treated per pt unable to tolerate PAP     PLAN:   · supplemental 2lpm with exertion and nocturnal  · PRN albuterol INH and continue nebulizer  · Continue Dulera 100 and Tudorza   · On Eliquis for anticoagulation. · Pt interested in excite device and would like a referral to sleep clinic and wants an audiovisual visi  · F/u in 6 mo for COPD and annual LDCT  · Screening CT scan was considered in a lung cancer screening counseling and shared decision making visit today that included the following elements:   · Eligibility: Age: 73. There are no signs or symptoms of lung cancer.   Tobacco History 120 pack-years, quit 10 years ago  · Verbal counseling has been performed by me to include benefits and harms of screening, follow-up diagnostic testing, over-diagnosis, false positive rate, and total radiation exposure;   · I have counseled on the importance of adherence to annual lung cancer LDCT screening, the impact of comorbidities and patient is willing to undergo diagnosis and treatment;   · I have provided counseling on the importance of maintaining cigarette smoking abstinence if former smoker; or the importance of smoking cessation if current smoker and, if appropriate, furnishing of information about tobacco cessation interventions

## 2022-06-27 RX ORDER — POTASSIUM CHLORIDE 750 MG/1
TABLET, EXTENDED RELEASE ORAL
Qty: 150 TABLET | Refills: 0 | Status: SHIPPED | OUTPATIENT
Start: 2022-06-27 | End: 2022-07-25

## 2022-07-05 DIAGNOSIS — J44.9 COPD, SEVERE (HCC): Primary | ICD-10-CM

## 2022-07-05 RX ORDER — ALBUTEROL SULFATE 90 UG/1
2 AEROSOL, METERED RESPIRATORY (INHALATION) EVERY 6 HOURS PRN
Qty: 18 G | Refills: 5 | Status: SHIPPED | OUTPATIENT
Start: 2022-07-05

## 2022-07-05 RX ORDER — ACLIDINIUM BROMIDE 400 UG/1
1 POWDER, METERED RESPIRATORY (INHALATION) DAILY
Qty: 1 EACH | Refills: 5 | Status: SHIPPED | OUTPATIENT
Start: 2022-07-05 | End: 2022-07-07 | Stop reason: CLARIF

## 2022-07-06 NOTE — TELEPHONE ENCOUNTER
Pharmacy called [facility] is not covered insurance prefers [facility]. 4/14/22      ASSESSMENT:  · COPD, severe  · Chronic hypoxic respiratory failure  · Prior tobacco abuse: quit 2012. 120 pack years  · PAF on amiodarone and eliquis  · Chronic Anticoagulation  · H/o TIA, brain aneurysm that is monitored  · DEVI -not treated per pt unable to tolerate PAP     PLAN:   · supplemental 2lpm with exertion and nocturnal  · PRN albuterol INH and continue nebulizer  · Continue Dulera 100 and Tudorza   · On Eliquis for anticoagulation.    · Pt interested in excite device and would like a referral to sleep clinic and wants an audiovisual visi  · F/u in 6 mo for COPD and annual LDCT

## 2022-07-25 RX ORDER — POTASSIUM CHLORIDE 750 MG/1
TABLET, EXTENDED RELEASE ORAL
Qty: 150 TABLET | Refills: 2 | Status: SHIPPED | OUTPATIENT
Start: 2022-07-25 | End: 2022-10-24

## 2022-07-26 RX ORDER — ERGOCALCIFEROL 1.25 MG/1
50000 CAPSULE ORAL
Qty: 12 CAPSULE | Refills: 0 | OUTPATIENT
Start: 2022-07-26

## 2022-07-29 ENCOUNTER — APPOINTMENT (OUTPATIENT)
Dept: CT IMAGING | Age: 67
End: 2022-07-29
Payer: MEDICARE

## 2022-07-29 ENCOUNTER — APPOINTMENT (OUTPATIENT)
Dept: GENERAL RADIOLOGY | Age: 67
End: 2022-07-29
Payer: MEDICARE

## 2022-07-29 ENCOUNTER — HOSPITAL ENCOUNTER (EMERGENCY)
Age: 67
Discharge: HOME | End: 2022-07-29
Attending: EMERGENCY MEDICINE
Payer: MEDICARE

## 2022-07-29 VITALS
WEIGHT: 156 LBS | HEART RATE: 98 BPM | BODY MASS INDEX: 28.71 KG/M2 | HEIGHT: 62 IN | DIASTOLIC BLOOD PRESSURE: 53 MMHG | OXYGEN SATURATION: 96 % | SYSTOLIC BLOOD PRESSURE: 112 MMHG | RESPIRATION RATE: 16 BRPM | TEMPERATURE: 99 F

## 2022-07-29 DIAGNOSIS — R94.31 PROLONGED Q-T INTERVAL ON ECG: ICD-10-CM

## 2022-07-29 DIAGNOSIS — R31.21 ASYMPTOMATIC MICROSCOPIC HEMATURIA: ICD-10-CM

## 2022-07-29 DIAGNOSIS — R11.10: ICD-10-CM

## 2022-07-29 DIAGNOSIS — M54.50 LEFT-SIDED LOW BACK PAIN WITHOUT SCIATICA, UNSPECIFIED CHRONICITY: Primary | ICD-10-CM

## 2022-07-29 LAB
A/G RATIO: 1.2 (ref 1.1–2.2)
ALBUMIN SERPL-MCNC: 4.4 G/DL (ref 3.4–5)
ALP BLD-CCNC: 106 U/L (ref 40–129)
ALT SERPL-CCNC: 14 U/L (ref 10–40)
AMORPHOUS: NORMAL /HPF
ANION GAP SERPL CALCULATED.3IONS-SCNC: 13 MMOL/L (ref 3–16)
AST SERPL-CCNC: 49 U/L (ref 15–37)
BASOPHILS ABSOLUTE: 0 K/UL (ref 0–0.2)
BASOPHILS RELATIVE PERCENT: 0.3 %
BILIRUB SERPL-MCNC: 0.8 MG/DL (ref 0–1)
BILIRUBIN URINE: NEGATIVE
BLOOD, URINE: ABNORMAL
BUN BLDV-MCNC: 12 MG/DL (ref 7–20)
CALCIUM SERPL-MCNC: 9.8 MG/DL (ref 8.3–10.6)
CHLORIDE BLD-SCNC: 94 MMOL/L (ref 99–110)
CLARITY: CLEAR
CO2: 30 MMOL/L (ref 21–32)
COLOR: YELLOW
CREAT SERPL-MCNC: <0.5 MG/DL (ref 0.6–1.2)
EKG ATRIAL RATE: 39 BPM
EKG DIAGNOSIS: NORMAL
EKG Q-T INTERVAL: 548 MS
EKG QRS DURATION: 224 MS
EKG QTC CALCULATION (BAZETT): 611 MS
EKG R AXIS: -81 DEGREES
EKG T AXIS: 103 DEGREES
EKG VENTRICULAR RATE: 75 BPM
EOSINOPHILS ABSOLUTE: 0.1 K/UL (ref 0–0.6)
EOSINOPHILS RELATIVE PERCENT: 1 %
EPITHELIAL CELLS, UA: NORMAL /HPF (ref 0–5)
GFR AFRICAN AMERICAN: >60
GFR NON-AFRICAN AMERICAN: >60
GLUCOSE BLD-MCNC: 132 MG/DL (ref 70–99)
GLUCOSE URINE: NEGATIVE MG/DL
HCT VFR BLD CALC: 46.7 % (ref 36–48)
HEMOGLOBIN: 15.2 G/DL (ref 12–16)
KETONES, URINE: 15 MG/DL
LEUKOCYTE ESTERASE, URINE: NEGATIVE
LIPASE: 28 U/L (ref 13–60)
LYMPHOCYTES ABSOLUTE: 1.5 K/UL (ref 1–5.1)
LYMPHOCYTES RELATIVE PERCENT: 14.5 %
MCH RBC QN AUTO: 28 PG (ref 26–34)
MCHC RBC AUTO-ENTMCNC: 32.4 G/DL (ref 31–36)
MCV RBC AUTO: 86.4 FL (ref 80–100)
MICROSCOPIC EXAMINATION: YES
MONOCYTES ABSOLUTE: 0.8 K/UL (ref 0–1.3)
MONOCYTES RELATIVE PERCENT: 8 %
NEUTROPHILS ABSOLUTE: 7.7 K/UL (ref 1.7–7.7)
NEUTROPHILS RELATIVE PERCENT: 76.2 %
NITRITE, URINE: NEGATIVE
PDW BLD-RTO: 15.1 % (ref 12.4–15.4)
PH UA: 6.5 (ref 5–8)
PLATELET # BLD: 230 K/UL (ref 135–450)
PMV BLD AUTO: 10.3 FL (ref 5–10.5)
POTASSIUM SERPL-SCNC: 5.5 MMOL/L (ref 3.5–5.1)
PROTEIN UA: NEGATIVE MG/DL
RBC # BLD: 5.41 M/UL (ref 4–5.2)
RBC UA: NORMAL /HPF (ref 0–4)
SARS-COV-2, NAAT: NOT DETECTED
SODIUM BLD-SCNC: 137 MMOL/L (ref 136–145)
SPECIFIC GRAVITY UA: 1.02 (ref 1–1.03)
TOTAL PROTEIN: 8.1 G/DL (ref 6.4–8.2)
TROPONIN: <0.01 NG/ML
URINE TYPE: ABNORMAL
UROBILINOGEN, URINE: 0.2 E.U./DL
WBC # BLD: 10.1 K/UL (ref 4–11)
WBC UA: NORMAL /HPF (ref 0–5)

## 2022-07-29 PROCEDURE — 99285 EMERGENCY DEPT VISIT HI MDM: CPT

## 2022-07-29 PROCEDURE — 80053 COMPREHEN METABOLIC PANEL: CPT

## 2022-07-29 PROCEDURE — 85025 COMPLETE CBC W/AUTO DIFF WBC: CPT

## 2022-07-29 PROCEDURE — 83690 ASSAY OF LIPASE: CPT

## 2022-07-29 PROCEDURE — 93010 ELECTROCARDIOGRAM REPORT: CPT | Performed by: INTERNAL MEDICINE

## 2022-07-29 PROCEDURE — 84484 ASSAY OF TROPONIN QUANT: CPT

## 2022-07-29 PROCEDURE — 96375 TX/PRO/DX INJ NEW DRUG ADDON: CPT

## 2022-07-29 PROCEDURE — 93005 ELECTROCARDIOGRAM TRACING: CPT | Performed by: EMERGENCY MEDICINE

## 2022-07-29 PROCEDURE — 87635 SARS-COV-2 COVID-19 AMP PRB: CPT

## 2022-07-29 PROCEDURE — 96374 THER/PROPH/DIAG INJ IV PUSH: CPT

## 2022-07-29 PROCEDURE — 81001 URINALYSIS AUTO W/SCOPE: CPT

## 2022-07-29 PROCEDURE — 71045 X-RAY EXAM CHEST 1 VIEW: CPT

## 2022-07-29 PROCEDURE — 6370000000 HC RX 637 (ALT 250 FOR IP): Performed by: EMERGENCY MEDICINE

## 2022-07-29 PROCEDURE — 2580000003 HC RX 258: Performed by: EMERGENCY MEDICINE

## 2022-07-29 PROCEDURE — 6360000002 HC RX W HCPCS: Performed by: EMERGENCY MEDICINE

## 2022-07-29 PROCEDURE — 74176 CT ABD & PELVIS W/O CONTRAST: CPT

## 2022-07-29 RX ORDER — LIDOCAINE 4 G/G
1 PATCH TOPICAL ONCE
Status: DISCONTINUED | OUTPATIENT
Start: 2022-07-29 | End: 2022-07-29 | Stop reason: HOSPADM

## 2022-07-29 RX ORDER — LIDOCAINE 50 MG/G
1 PATCH TOPICAL DAILY
Qty: 30 PATCH | Refills: 0 | Status: SHIPPED | OUTPATIENT
Start: 2022-07-29 | End: 2022-08-28

## 2022-07-29 RX ORDER — METHOCARBAMOL 750 MG/1
750 TABLET, FILM COATED ORAL ONCE
Status: COMPLETED | OUTPATIENT
Start: 2022-07-29 | End: 2022-07-29

## 2022-07-29 RX ORDER — ONDANSETRON 4 MG/1
4 TABLET, ORALLY DISINTEGRATING ORAL EVERY 8 HOURS PRN
Qty: 12 TABLET | Refills: 0 | Status: SHIPPED | OUTPATIENT
Start: 2022-07-29 | End: 2022-07-29 | Stop reason: SINTOL

## 2022-07-29 RX ORDER — ONDANSETRON 4 MG/1
4 TABLET, ORALLY DISINTEGRATING ORAL ONCE
Status: DISCONTINUED | OUTPATIENT
Start: 2022-07-29 | End: 2022-07-29

## 2022-07-29 RX ORDER — FENTANYL CITRATE 50 UG/ML
50 INJECTION, SOLUTION INTRAMUSCULAR; INTRAVENOUS ONCE
Status: COMPLETED | OUTPATIENT
Start: 2022-07-29 | End: 2022-07-29

## 2022-07-29 RX ORDER — ONDANSETRON 2 MG/ML
4 INJECTION INTRAMUSCULAR; INTRAVENOUS ONCE
Status: COMPLETED | OUTPATIENT
Start: 2022-07-29 | End: 2022-07-29

## 2022-07-29 RX ORDER — 0.9 % SODIUM CHLORIDE 0.9 %
500 INTRAVENOUS SOLUTION INTRAVENOUS ONCE
Status: COMPLETED | OUTPATIENT
Start: 2022-07-29 | End: 2022-07-29

## 2022-07-29 RX ORDER — METHOCARBAMOL 750 MG/1
750 TABLET, FILM COATED ORAL 3 TIMES DAILY
Qty: 12 TABLET | Refills: 0 | Status: SHIPPED | OUTPATIENT
Start: 2022-07-29 | End: 2022-08-02

## 2022-07-29 RX ORDER — ONDANSETRON 4 MG/1
4 TABLET, FILM COATED ORAL ONCE
Status: DISCONTINUED | OUTPATIENT
Start: 2022-07-29 | End: 2022-07-29

## 2022-07-29 RX ORDER — DIPHENHYDRAMINE HYDROCHLORIDE 50 MG/ML
25 INJECTION INTRAMUSCULAR; INTRAVENOUS ONCE
Status: COMPLETED | OUTPATIENT
Start: 2022-07-29 | End: 2022-07-29

## 2022-07-29 RX ADMIN — SODIUM CHLORIDE 500 ML: 9 INJECTION, SOLUTION INTRAVENOUS at 06:13

## 2022-07-29 RX ADMIN — FENTANYL CITRATE 50 MCG: 50 INJECTION, SOLUTION INTRAMUSCULAR; INTRAVENOUS at 06:13

## 2022-07-29 RX ADMIN — METHOCARBAMOL TABLETS 750 MG: 750 TABLET, COATED ORAL at 07:59

## 2022-07-29 RX ADMIN — ONDANSETRON 4 MG: 2 INJECTION INTRAMUSCULAR; INTRAVENOUS at 06:13

## 2022-07-29 RX ADMIN — DIPHENHYDRAMINE HYDROCHLORIDE 25 MG: 50 INJECTION, SOLUTION INTRAMUSCULAR; INTRAVENOUS at 08:56

## 2022-07-29 ASSESSMENT — ENCOUNTER SYMPTOMS
VOMITING: 1
BACK PAIN: 1
ABDOMINAL PAIN: 0
CHEST TIGHTNESS: 0
EYE REDNESS: 0
EYE DISCHARGE: 0
DIARRHEA: 0
RHINORRHEA: 0
SHORTNESS OF BREATH: 1
NAUSEA: 1
COUGH: 0

## 2022-07-29 ASSESSMENT — PAIN DESCRIPTION - LOCATION
LOCATION: BACK

## 2022-07-29 ASSESSMENT — PAIN DESCRIPTION - ORIENTATION: ORIENTATION: LEFT

## 2022-07-29 ASSESSMENT — PAIN DESCRIPTION - DESCRIPTORS: DESCRIPTORS: SHARP;STABBING

## 2022-07-29 ASSESSMENT — PAIN SCALES - GENERAL
PAINLEVEL_OUTOF10: 4
PAINLEVEL_OUTOF10: 4
PAINLEVEL_OUTOF10: 7

## 2022-07-29 ASSESSMENT — PAIN - FUNCTIONAL ASSESSMENT: PAIN_FUNCTIONAL_ASSESSMENT: 0-10

## 2022-07-29 NOTE — ED NOTES
Pt wears oxygen at night when sleeping, Pt in ED wearing 4L of oxygen NC with SPO2 at 99% . When placed on room air pt SPO2 dropped to 87%. Son went home to grab portable oxygen tank.       Anisha Phan RN  07/29/22 8200
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
## 2022-07-29 NOTE — ED NOTES
Assisted pt to bedside commode. Pt balance was stable with help from RN. Pt back In bed with call light in reach.       Herbert Jean RN  07/29/22 0677

## 2022-08-02 ENCOUNTER — TELEPHONE (OUTPATIENT)
Dept: INTERNAL MEDICINE CLINIC | Age: 67
End: 2022-08-02

## 2022-08-02 DIAGNOSIS — R05.9 COUGH: Primary | ICD-10-CM

## 2022-08-02 RX ORDER — DOXYCYCLINE HYCLATE 100 MG
TABLET ORAL
Qty: 10 TABLET | Refills: 0 | Status: SHIPPED | OUTPATIENT
Start: 2022-08-02

## 2022-08-02 RX ORDER — BENZONATATE 200 MG/1
CAPSULE ORAL
Qty: 30 CAPSULE | Refills: 0 | Status: SHIPPED | OUTPATIENT
Start: 2022-08-02

## 2022-08-02 NOTE — TELEPHONE ENCOUNTER
----- Message from Juan Sanchez sent at 8/2/2022  1:00 PM EDT -----  Contact: [phone] (M)  Doxy 100 mg bid x 5 days, tessalon 200 tid #30, cxr per Dr Jeannie Louie  ----- Message -----  From: Wilfrido Link MA  Sent: 8/2/2022  12:33 PM EDT  To: Astrid Ocampo MD    Pt called said she has chest congestion, cough and sob was seen over the weekend in the ED for back pain but was not treated for the cough she did take a covid test and it was negative. She would like to know if she needs an appointment with you or can you call something in please.         [facility] (Ph: [phone])

## 2022-08-03 ENCOUNTER — OFFICE VISIT (OUTPATIENT)
Dept: INTERNAL MEDICINE CLINIC | Age: 67
End: 2022-08-03

## 2022-08-03 ENCOUNTER — HOSPITAL ENCOUNTER (OUTPATIENT)
Dept: GENERAL RADIOLOGY | Age: 67
Discharge: HOME | End: 2022-08-03
Payer: MEDICARE

## 2022-08-03 ENCOUNTER — HOSPITAL ENCOUNTER (OUTPATIENT)
Age: 67
Discharge: HOME | End: 2022-08-03
Payer: MEDICARE

## 2022-08-03 VITALS
OXYGEN SATURATION: 93 % | DIASTOLIC BLOOD PRESSURE: 80 MMHG | HEIGHT: 62 IN | RESPIRATION RATE: 14 BRPM | SYSTOLIC BLOOD PRESSURE: 130 MMHG | HEART RATE: 73 BPM | BODY MASS INDEX: 28.53 KG/M2 | TEMPERATURE: 97.3 F

## 2022-08-03 DIAGNOSIS — J20.8 ACUTE BRONCHITIS DUE TO OTHER SPECIFIED ORGANISMS: ICD-10-CM

## 2022-08-03 DIAGNOSIS — R05.9 COUGH: ICD-10-CM

## 2022-08-03 PROCEDURE — 71046 X-RAY EXAM CHEST 2 VIEWS: CPT

## 2022-08-03 PROCEDURE — 1123F ACP DISCUSS/DSCN MKR DOCD: CPT | Performed by: INTERNAL MEDICINE

## 2022-08-03 PROCEDURE — 99213 OFFICE O/P EST LOW 20 MIN: CPT | Performed by: INTERNAL MEDICINE

## 2022-08-03 ASSESSMENT — ENCOUNTER SYMPTOMS
VOMITING: 0
NAUSEA: 0
RHINORRHEA: 0
ABDOMINAL PAIN: 0
SHORTNESS OF BREATH: 1
WHEEZING: 0

## 2022-08-03 NOTE — PROGRESS NOTES
TIMES DAILY    KLOR-CON M10 10 MEQ extended release tablet TAKE 5 TABLETS BY MOUTH DAILY. lidocaine (LIDODERM) 5 % 1 patch, TransDERmal, DAILY, 12 hours on, 12 hours off.    metOLazone (ZAROXOLYN) 2.5 mg, Oral, PRN    mometasone-formoterol (DULERA) 100-5 MCG/ACT inhaler 2 puffs, Inhalation, 2 TIMES DAILY    MORPHINE, PAIN PUMP REFILL CHARGE, No dose, route, or frequency recorded. nitroGLYCERIN (NITROSTAT) 0.4 mg, SubLINGual, EVERY 5 MIN PRN    oxyCODONE-acetaminophen (PERCOCET)  MG per tablet 1 tablet, Oral, 3 TIMES DAILY PRN    OXYGEN 2 L, Inhalation, Wears 2lpm HS and prn    pantoprazole (PROTONIX) 40 mg, Oral, 2 times daily, TAKE 1 TABLET BY MOUTH EVERY DAY    sennosides-docusate sodium (SENOKOT-S) 8.6-50 MG tablet 1 tablet, Oral, 2 times daily    tiotropium (SPIRIVA RESPIMAT) 2.5 MCG/ACT AERS inhaler 2 puffs, Inhalation, DAILY    torsemide (DEMADEX) 50 mg, Oral, DAILY    vitamin D (ERGOCALCIFEROL) 50,000 Units, Oral, EVERY 30 DAYS       /80 (Site: Right Upper Arm, Position: Sitting, Cuff Size: Medium Adult)   Pulse 73   Temp 97.3 °F (36.3 °C) (Temporal)   Resp 14   Ht 5' 2\" (1.575 m)   SpO2 93%   BMI 28.53 kg/m²       Objective:   Physical Exam  Constitutional:       Appearance: She is well-developed. HENT:      Head: Normocephalic and atraumatic. Eyes:      Pupils: Pupils are equal, round, and reactive to light. Neck:      Thyroid: No thyromegaly. Cardiovascular:      Rate and Rhythm: Normal rate and regular rhythm. Heart sounds: Normal heart sounds. No murmur heard. Pulmonary:      Effort: Pulmonary effort is normal.      Breath sounds: Normal breath sounds. No wheezing. Musculoskeletal:      Cervical back: Normal range of motion and neck supple. Assessment:       Diagnosis Orders   1. Acute bronchitis due to other specified organisms               Plan:      - recheck COVID at home. - continue doxycycline. Does not need prednisone. No wheezing.

## 2022-08-29 RX ORDER — PANTOPRAZOLE SODIUM 40 MG/1
TABLET, DELAYED RELEASE ORAL
Qty: 180 TABLET | Refills: 1 | Status: SHIPPED | OUTPATIENT
Start: 2022-08-29

## 2022-08-30 NOTE — LETTER
11/6/2020    Antonio Kelly [address] [facility]      Dear Vane Hudson,    My name is Rose Mary Wolf and I am a registered nurse who partners with Ximena Friedman MD to improve patients' health. Ximena Friedman MD believes you would benefit from working with me. As a member of your health care team, I would work with other providers involved in your care, offer education for your specific health conditions, and connect you with additional resources as needed. I will collaborate with Ximena Friedman MD to support you in following your treatment plan. The additional support I provide is no additional cost to you. My primary focus is to help you achieve specific goals and improve your health. We are committed to walk with you on this journey and look forward to working with you. Please call me to further discuss your healthcare needs. I am available by phone or for appointments at the office. You can reach me at [phone].     In good health,     Rose Mary Wolf RN Impression:  Acute Urinary retention- rdz inserted on 8/26/22 for PVR >400 cc. WBC WNL   Creat WNL       H/o R Hydronephrosis  S/p Cysto, BRGP 4/17/15 by Dr. Ivis Kendrick  Findings: Mild right dilation of renal pelvis without obstruction, no hydronephrosis    Acute Transverse Myelitis  DM2  HTN  OA      PLAN:    Obtain UA/UCX to rule out UTI as cause of retention. Agree with Flomax 0.4 mg, consider increasing to 0.8 mg. Consider VT prior to DC, after increasing flomax dose. If patient fails, recommend UDS outpatient. Will sign off. Follow up arranged? YES Note sent to Pardeep. Leonarda Enriquez PA-C  [facility]  Available [facility]  Pager: [phone]   I have reviewed pertinent vitals, I/O's, notes, laboratory values and imaging. I have discussed the case and I agree with the provider's assessment and plan as outlined above, with ammendments as follows:        Elli Baum MD  [facility]  Pager [phone]        August 30, 2022 4:53 PM        No chief complaint on file. HISTORY OF PRESENT ILLNESS:  oDnnie Sims is a 77 y.o. female who is seen in consultation as referred by Dr. Candida Buchanan  for retention. Past urological hx significant for right hydronephrosis. Patient last seen in 2015 by Dr. Ivis Kendrick, she had cysto, BRGP performed with finding of mild right dilation of renal pelvis without obstruction. Patient with PMHX significant for uterine cancer,  HTN, HLD, transferred to [facility] rehab facility for bilateral paresthesias and decreased rectal sensation. Patient found to have transverse myelitis with an expansile T2 hyperintense lesion with enhancement that spans between T10-T12. Patient treated with IV steroids during her stay, improved with PT/OT who recommended acute rehab. Patient with PVR of 362 cc, 498 cc on 8/24/22. Flomax started, multiple straight caths performed. Rdz inserted on 8/26/22 for PVR >400 cc. Urology consulted due to retention.  Per patient, complains in change in sensation with urinating. No other urinary complaints. Past Medical History:   Diagnosis Date    Allergic rhinitis     Cancer ([address] 1995    Uterine Cancer (radiation, surgery)    Diverticulosis     GERD (gastroesophageal reflux disease)     Hypercholesteremia     Hypertension     Steatosis of liver 2015       Past Surgical History:   Procedure Laterality Date    COLONOSCOPY N/A 11/18/2019    COLONOSCOPY with polyp bx performed by Pierre Matthew MD at [facility] ENDOSCOPY    HX COLONOSCOPY      HX HYSTERECTOMY  1995    HX OTHER SURGICAL      Liver biopsy       Social History     Tobacco Use    Smoking status: Never    Smokeless tobacco: Never   Substance Use Topics    Alcohol use: Yes     Comment: occasionally    Drug use: No       Allergies   Allergen Reactions    Penicillin G Hives       No family history on file.     Current Facility-Administered Medications   Medication Dose Route Frequency Provider Last Rate Last Admin    diclofenac (VOLTAREN) 1 % topical gel 2 g  2 g Topical QID PRN Nini Watters MD        tamsulosin (FLOMAX) capsule 0.4 mg  0.4 mg Oral DAILY Dedrick Glynn MD   0.4 mg at 08/30/22 0845    pneumococcal 23-valent (PNEUMOVAX 23) injection 0.5 mL  0.5 mL IntraMUSCular PRIOR TO DISCHARGE Dedrick Glynn MD        atorvastatin (LIPITOR) tablet 40 mg  40 mg Oral QHS Shun Cuellar MD   40 mg at 08/29/22 2108    calcium-vitamin D 600 mg-5 mcg (200 unit) per tablet 1 Tablet  1 Tablet Oral BID Shun Cuellar MD   1 Tablet at 08/30/22 0844    cetirizine (ZYRTEC) tablet 5 mg  5 mg Oral DAILY PRN Shun Cuellar MD        losartan (COZAAR) tablet 50 mg  50 mg Oral DAILY Shun Cuellar MD   50 mg at 08/30/22 0844    metFORMIN (GLUCOPHAGE) tablet 500 mg  500 mg Oral DAILY WITH Yoan Chaudhary MD   500 mg at 08/30/22 0845    hydroCHLOROthiazide (HYDRODIURIL) tablet 12.5 mg  12.5 mg Oral DAILY Shun Cuellar MD   12.5 mg at 08/30/22 0844    [Held by provider] carvediloL ([address]) tablet 6.25 mg  6.25 mg Oral Q12H Shayla Olmedo MD   6.25 mg at 08/24/22 0911    timolol (TIMOPTIC) 0.25% ophthalmic solution  1 Drop Both Eyes BID Shayla Olmedo MD   1 Drop at 08/30/22 0845    fluticasone propionate (FLONASE) 50 mcg/actuation nasal spray 2 Spray  2 Spray Both Nostrils DAILY Shayla Olmedo MD   2 Mylo at 08/28/22 1045    insulin lispro (HUMALOG) injection   SubCUTAneous AC&HS Shayla Olmedo MD   2 Units at 08/30/22 0845    glucose chewable tablet 16 g  4 Tablet Oral PRN Shayla Olmedo MD        glucagon (GLUCAGEN) injection 1 mg  1 mg IntraMUSCular PRN Shayla Olmedo MD        dextrose 10% infusion 0-250 mL  0-250 mL IntraVENous PRN Shayla Olmedo MD        [Held by provider] docusate sodium (COLACE) capsule 100 mg  100 mg Oral BID Shayla Olmedo MD   100 mg at 08/25/22 8225    bisacodyL (DULCOLAX) tablet 10 mg  10 mg Oral Q48H PRN Shayla Olmedo MD             Review of Systems:  ROS is:      Negative for: Ophthalmologic issues, ENT issues, Cardiovascular issues, respiratory issues, GI issues, neurologic issues, hematoogic issues, skin lesions, musculoskeletal issues, psychiatric issues  Exceptions: yes    Positive for:    retention      PHYSICAL EXAMINATION:     Visit Vitals  /75 (BP 1 Location: Right upper arm, BP Patient Position: Sitting)   Pulse 100   Temp 97.5 °F (36.4 °C)   Resp 16   Ht 5' 4\" (1.626 m)   Wt 88.5 kg (195 lb)   SpO2 98%   Breastfeeding No   BMI 33.47 kg/m²     Constitutional: Well developed, well-nourished female in no acute distress. HEENT: Normocephalic, Atraumatic   CV:   no edema   Respiratory: No respiratory distress or difficulties breathing   Abdomen:  Soft and nontender.  Female:  CVA tenderness:none                       Valle: clear, yellow urine   Skin:  Normal color. No evidence of jaundice. Neuro/Psych:  Patient with appropriate affect. Alert and oriented.         REVIEW OF LABS AND IMAGING:         Labs: Results:   Chemistry    Recent Labs     08/30/22  0529   *      K 3.5      CO2 26   BUN 14   CREA 0.53*   CA 9.0   AGAP 7   BUCR 26*      CBC w/Diff No results for input(s): WBC, RBC, HGB, HCT, PLT, GRANS, LYMPH, EOS, HGBEXT, HCTEXT, PLTEXT in the last 72 hours. Cultures No results for input(s): CULT in the last 72 hours.   All Micro Results       None              Urinalysis Potassium   Date Value Ref Range Status   08/30/2022 3.5 3.5 - 5.5 mmol/L Final     Creatinine   Date Value Ref Range Status   08/30/2022 0.53 (L) 0.6 - 1.3 MG/DL Final     BUN   Date Value Ref Range Status   08/30/2022 14 7.0 - 18 MG/DL Final      Coagulation Lab Results   Component Value Date/Time    Prothrombin time 13.2 03/06/2015 07:35 AM    INR 1.0 03/06/2015 07:35 AM    aPTT 27.4 03/06/2015 07:35 AM

## 2022-09-23 ENCOUNTER — OFFICE VISIT (OUTPATIENT)
Dept: INTERNAL MEDICINE CLINIC | Age: 67
End: 2022-09-23

## 2022-09-23 VITALS
OXYGEN SATURATION: 96 % | SYSTOLIC BLOOD PRESSURE: 138 MMHG | BODY MASS INDEX: 28.53 KG/M2 | DIASTOLIC BLOOD PRESSURE: 80 MMHG | HEIGHT: 62 IN | RESPIRATION RATE: 14 BRPM | HEART RATE: 70 BPM

## 2022-09-23 DIAGNOSIS — I48.0 PAROXYSMAL A-FIB (HCC): ICD-10-CM

## 2022-09-23 DIAGNOSIS — Z86.73 HISTORY OF CVA (CEREBROVASCULAR ACCIDENT): ICD-10-CM

## 2022-09-23 DIAGNOSIS — Z23 NEED FOR INFLUENZA VACCINATION: ICD-10-CM

## 2022-09-23 DIAGNOSIS — F41.9 ANXIETY: ICD-10-CM

## 2022-09-23 DIAGNOSIS — I10 HTN (HYPERTENSION), BENIGN: Primary | ICD-10-CM

## 2022-09-23 DIAGNOSIS — Z12.11 COLON CANCER SCREENING: ICD-10-CM

## 2022-09-23 DIAGNOSIS — J96.11 CHRONIC RESPIRATORY FAILURE WITH HYPOXIA (HCC): ICD-10-CM

## 2022-09-23 DIAGNOSIS — I25.118 CORONARY ARTERY DISEASE OF NATIVE ARTERY OF NATIVE HEART WITH STABLE ANGINA PECTORIS (HCC): ICD-10-CM

## 2022-09-23 DIAGNOSIS — I50.32 CHRONIC DIASTOLIC CHF (CONGESTIVE HEART FAILURE) (HCC): ICD-10-CM

## 2022-09-23 DIAGNOSIS — J44.9 COPD, SEVERE (HCC): ICD-10-CM

## 2022-09-23 LAB
ANION GAP SERPL CALCULATED.3IONS-SCNC: 13 MMOL/L (ref 3–16)
BASOPHILS ABSOLUTE: 0 K/UL (ref 0–0.2)
BASOPHILS RELATIVE PERCENT: 0.8 %
BUN BLDV-MCNC: 11 MG/DL (ref 7–20)
CALCIUM SERPL-MCNC: 9.8 MG/DL (ref 8.3–10.6)
CHLORIDE BLD-SCNC: 103 MMOL/L (ref 99–110)
CHOLESTEROL, TOTAL: 140 MG/DL (ref 0–199)
CO2: 28 MMOL/L (ref 21–32)
CREAT SERPL-MCNC: <0.5 MG/DL (ref 0.6–1.2)
EOSINOPHILS ABSOLUTE: 0.2 K/UL (ref 0–0.6)
EOSINOPHILS RELATIVE PERCENT: 3.1 %
GFR AFRICAN AMERICAN: >60
GFR NON-AFRICAN AMERICAN: >60
GLUCOSE BLD-MCNC: 138 MG/DL (ref 70–99)
HCT VFR BLD CALC: 39 % (ref 36–48)
HDLC SERPL-MCNC: 62 MG/DL (ref 40–60)
HEMOGLOBIN: 12.4 G/DL (ref 12–16)
LDL CHOLESTEROL CALCULATED: 64 MG/DL
LYMPHOCYTES ABSOLUTE: 1 K/UL (ref 1–5.1)
LYMPHOCYTES RELATIVE PERCENT: 17.1 %
MCH RBC QN AUTO: 28.7 PG (ref 26–34)
MCHC RBC AUTO-ENTMCNC: 31.8 G/DL (ref 31–36)
MCV RBC AUTO: 90.2 FL (ref 80–100)
MONOCYTES ABSOLUTE: 0.5 K/UL (ref 0–1.3)
MONOCYTES RELATIVE PERCENT: 8.4 %
NEUTROPHILS ABSOLUTE: 4.2 K/UL (ref 1.7–7.7)
NEUTROPHILS RELATIVE PERCENT: 70.6 %
PDW BLD-RTO: 16.8 % (ref 12.4–15.4)
PLATELET # BLD: 190 K/UL (ref 135–450)
PMV BLD AUTO: 10.1 FL (ref 5–10.5)
POTASSIUM SERPL-SCNC: 4.7 MMOL/L (ref 3.5–5.1)
RBC # BLD: 4.32 M/UL (ref 4–5.2)
SODIUM BLD-SCNC: 144 MMOL/L (ref 136–145)
TRIGL SERPL-MCNC: 70 MG/DL (ref 0–150)
URIC ACID, SERUM: 4.7 MG/DL (ref 2.6–6)
VLDLC SERPL CALC-MCNC: 14 MG/DL
WBC # BLD: 6 K/UL (ref 4–11)

## 2022-09-23 PROCEDURE — G0008 ADMIN INFLUENZA VIRUS VAC: HCPCS | Performed by: INTERNAL MEDICINE

## 2022-09-23 PROCEDURE — 1123F ACP DISCUSS/DSCN MKR DOCD: CPT | Performed by: INTERNAL MEDICINE

## 2022-09-23 PROCEDURE — 90677 PCV20 VACCINE IM: CPT | Performed by: INTERNAL MEDICINE

## 2022-09-23 PROCEDURE — 90662 IIV NO PRSV INCREASED AG IM: CPT | Performed by: INTERNAL MEDICINE

## 2022-09-23 PROCEDURE — 82274 ASSAY TEST FOR BLOOD FECAL: CPT | Performed by: INTERNAL MEDICINE

## 2022-09-23 PROCEDURE — G0009 ADMIN PNEUMOCOCCAL VACCINE: HCPCS | Performed by: INTERNAL MEDICINE

## 2022-09-23 PROCEDURE — 99214 OFFICE O/P EST MOD 30 MIN: CPT | Performed by: INTERNAL MEDICINE

## 2022-09-23 ASSESSMENT — ENCOUNTER SYMPTOMS
ABDOMINAL PAIN: 0
VOMITING: 0
WHEEZING: 0
NAUSEA: 0
SHORTNESS OF BREATH: 0
RHINORRHEA: 0

## 2022-09-23 NOTE — PROGRESS NOTES
Subjective:      Patient ID: Clara Garcia is a 78 y.o. female. HPI    Patient is here for follow up. She got COVID in August of 2022. She has recovered from it. She has COPD and chronic respiratory failure. She uses 2 L of oxygen at hs. She says her COPD is controlled. She sees Dr Yan Mendez. She has sleep apnea. She is not any treatment. She has atrial fibrillation. She is usually in  NSR. She is on Eliquis. She has a history of CAD. She has three stents. She takes baby ASA. She has hypertension. It is controlled. She takes COREG. She has chronic systolic and diastolic CHF. She takes Lasix and K prn. She has HLD. It is controlled. She has insomnia. She is sleeping ok. She has a skin lesion on her left nostril. Review of Systems   Constitutional:  Negative for appetite change and fatigue. HENT:  Negative for postnasal drip and rhinorrhea. Respiratory:  Negative for shortness of breath and wheezing. Cardiovascular:  Negative for chest pain and palpitations. Gastrointestinal:  Negative for abdominal pain, nausea and vomiting. Musculoskeletal:  Negative for joint swelling. Skin:  Negative for rash. Neurological:  Negative for light-headedness. Psychiatric/Behavioral:  Negative for sleep disturbance. There are no changes to past medical history, family history, social history or review of systems(except as noted in the history section) since prior note (all reviewed with patient).       Current Outpatient Medications   Medication Instructions    albuterol (PROVENTIL) 2.5 mg, Nebulization, EVERY 6 HOURS PRN    albuterol sulfate HFA (PROAIR HFA) 108 (90 Base) MCG/ACT inhaler 2 puffs, Inhalation, EVERY 6 HOURS PRN    albuterol sulfate HFA (PROVENTIL HFA) 108 (90 Base) MCG/ACT inhaler 2 puffs, Inhalation, EVERY 6 HOURS PRN    amLODIPine (NORVASC) 5 MG tablet TAKE 1 TABLET BY MOUTH EVERY DAY    aspirin 81 mg, Oral, DAILY    atorvastatin (LIPITOR) 80 mg, Oral, DAILY    benzonatate (TESSALON) 200 MG capsule Take one capsule by mouth three times daily for cough. carvedilol (COREG) 3.125 MG tablet TAKE 1 TABLET BY MOUTH TWICE A DAY WITH MEALS    doxycycline hyclate (VIBRA-TABS) 100 MG tablet Take one tablet by mouth twice daily for 5 days. ELIQUIS 5 MG TABS tablet TAKE 1 TABLET BY MOUTH TWICE A DAY    gabapentin (NEURONTIN) 400 mg, Oral, 3 TIMES DAILY    KLOR-CON M10 10 MEQ extended release tablet TAKE 5 TABLETS BY MOUTH DAILY. metOLazone (ZAROXOLYN) 2.5 mg, Oral, PRN    mometasone-formoterol (DULERA) 100-5 MCG/ACT inhaler 2 puffs, Inhalation, 2 TIMES DAILY    MORPHINE, PAIN PUMP REFILL CHARGE, No dose, route, or frequency recorded. nitroGLYCERIN (NITROSTAT) 0.4 mg, SubLINGual, EVERY 5 MIN PRN    oxyCODONE-acetaminophen (PERCOCET)  MG per tablet 1 tablet, Oral, 3 TIMES DAILY PRN    OXYGEN 2 L, Inhalation, Wears 2lpm HS and prn    pantoprazole (PROTONIX) 40 MG tablet TAKE 1 TABLET BY MOUTH IN THE MORNING AND AT BEDTIME    tiotropium (SPIRIVA RESPIMAT) 2.5 MCG/ACT AERS inhaler 2 puffs, Inhalation, DAILY    torsemide (DEMADEX) 50 mg, Oral, DAILY    vitamin D (ERGOCALCIFEROL) 50,000 Units, Oral, EVERY 30 DAYS       /80 (Site: Right Upper Arm, Position: Sitting, Cuff Size: Medium Adult)   Pulse 70   Resp 14   Ht 5' 2\" (1.575 m)   SpO2 96%   BMI 28.53 kg/m²         Objective:   Physical Exam  Constitutional:       Appearance: She is well-developed. HENT:      Head: Normocephalic. Eyes:      Conjunctiva/sclera: Conjunctivae normal.      Pupils: Pupils are equal, round, and reactive to light. Neck:      Thyroid: No thyroid mass or thyromegaly. Vascular: No carotid bruit or JVD. Trachea: Trachea normal.   Cardiovascular:      Rate and Rhythm: Normal rate and regular rhythm. Heart sounds: Normal heart sounds. No murmur heard. No gallop. Pulmonary:      Effort: Pulmonary effort is normal. No respiratory distress. Breath sounds: Normal breath sounds. No wheezing or rales. Abdominal:      General: Bowel sounds are normal. There is no distension. Palpations: Abdomen is soft. There is no hepatomegaly, splenomegaly or mass. Tenderness: There is no abdominal tenderness. Musculoskeletal:      Cervical back: Normal range of motion and neck supple. Lymphadenopathy:      Cervical: No cervical adenopathy. Skin:     General: Skin is warm and dry. Findings: No rash. Comments: Skin lesion left nostril ? Seborrheic keratosis. Neurological:      Mental Status: She is alert and oriented to person, place, and time. Cranial Nerves: No cranial nerve deficit. Deep Tendon Reflexes: Reflexes are normal and symmetric. Psychiatric:         Behavior: Behavior normal.         Thought Content: Thought content normal.         Judgment: Judgment normal.         Assessment:       Diagnosis Orders   1. HTN (hypertension), benign        2. Coronary artery disease of native artery of native heart with stable angina pectoris (HCC)        3. Paroxysmal A-fib ([address]        4. Chronic diastolic CHF (congestive heart failure) ([facility])  Lipid Panel    Uric Acid    Basic Metabolic Panel    CBC with Auto Differential      5. History of CVA (cerebrovascular accident)        6. Chronic respiratory failure with hypoxia ([facility])        7. COPD, severe ([address]        8. Anxiety        9. Colon cancer screening  POCT Fecal Immunochemical Test (FIT)      10. Need for influenza vaccination  Influenza, FLUZONE HIGH-DOSE, (age 72 y+), IM, Preservative Free, 0.7 mL             Plan:      #  CAD stable. No chest pain. #  A fib stable. On Eliquis. #  COPD stable. On oxygen at hs and prn. #  HLD at goal.    #  Insomnia. She almost weaned off Xanax. #  Chronic dCHF stable. #  HTN controlled. #  see Dr Shakeel Moran for skin lesion.         David Chua MD

## 2022-09-27 RX ORDER — APIXABAN 5 MG/1
TABLET, FILM COATED ORAL
Qty: 60 TABLET | Refills: 3 | Status: SHIPPED | OUTPATIENT
Start: 2022-09-27

## 2022-10-07 ENCOUNTER — HOSPITAL ENCOUNTER (OUTPATIENT)
Dept: CT IMAGING | Age: 67
Discharge: HOME | End: 2022-10-07
Payer: MEDICARE

## 2022-10-07 DIAGNOSIS — Z87.891 PERSONAL HISTORY OF TOBACCO USE, PRESENTING HAZARDS TO HEALTH: ICD-10-CM

## 2022-10-07 PROCEDURE — 71271 CT THORAX LUNG CANCER SCR C-: CPT

## 2022-10-24 RX ORDER — POTASSIUM CHLORIDE 750 MG/1
TABLET, EXTENDED RELEASE ORAL
Qty: 450 TABLET | Refills: 0 | Status: SHIPPED | OUTPATIENT
Start: 2022-10-24

## 2022-10-25 ENCOUNTER — OFFICE VISIT (OUTPATIENT)
Dept: PULMONOLOGY | Age: 67
End: 2022-10-25
Payer: MEDICARE

## 2022-10-25 VITALS
HEIGHT: 62 IN | BODY MASS INDEX: 28.89 KG/M2 | DIASTOLIC BLOOD PRESSURE: 70 MMHG | WEIGHT: 157 LBS | RESPIRATION RATE: 18 BRPM | SYSTOLIC BLOOD PRESSURE: 115 MMHG | OXYGEN SATURATION: 92 % | HEART RATE: 69 BPM

## 2022-10-25 DIAGNOSIS — J44.9 CHRONIC OBSTRUCTIVE PULMONARY DISEASE, UNSPECIFIED COPD TYPE (HCC): Primary | ICD-10-CM

## 2022-10-25 PROCEDURE — 3074F SYST BP LT 130 MM HG: CPT | Performed by: INTERNAL MEDICINE

## 2022-10-25 PROCEDURE — 99214 OFFICE O/P EST MOD 30 MIN: CPT | Performed by: INTERNAL MEDICINE

## 2022-10-25 PROCEDURE — 1123F ACP DISCUSS/DSCN MKR DOCD: CPT | Performed by: INTERNAL MEDICINE

## 2022-10-25 PROCEDURE — 3078F DIAST BP <80 MM HG: CPT | Performed by: INTERNAL MEDICINE
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
## 2022-10-27 DIAGNOSIS — I50.32 CHRONIC DIASTOLIC CHF (CONGESTIVE HEART FAILURE) (HCC): ICD-10-CM

## 2022-10-27 RX ORDER — TORSEMIDE 100 MG/1
TABLET ORAL
Qty: 45 TABLET | Refills: 2 | Status: SHIPPED | OUTPATIENT
Start: 2022-10-27

## 2022-12-01 RX ORDER — INCONTINENCE PAD,LINER,DISP
EACH MISCELLANEOUS
Qty: 180 TABLET | Refills: 0 | Status: SHIPPED | OUTPATIENT
Start: 2022-12-01
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
## 2023-01-20 RX ORDER — POTASSIUM CHLORIDE 750 MG/1
TABLET, EXTENDED RELEASE ORAL
Qty: 450 TABLET | Refills: 0 | Status: SHIPPED | OUTPATIENT
Start: 2023-01-20

## 2023-01-20 RX ORDER — APIXABAN 5 MG/1
TABLET, FILM COATED ORAL
Qty: 60 TABLET | Refills: 4 | Status: SHIPPED | OUTPATIENT
Start: 2023-01-20

## 2023-02-21 RX ORDER — PANTOPRAZOLE SODIUM 40 MG/1
TABLET, DELAYED RELEASE ORAL
Qty: 180 TABLET | Refills: 0 | Status: SHIPPED | OUTPATIENT
Start: 2023-02-21
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
## 2023-03-13 ENCOUNTER — HOSPITAL ENCOUNTER (OUTPATIENT)
Age: 68
Discharge: HOME | End: 2023-03-13
Payer: MEDICARE

## 2023-03-13 ENCOUNTER — OFFICE VISIT (OUTPATIENT)
Dept: CARDIOLOGY CLINIC | Age: 68
End: 2023-03-13
Payer: MEDICARE

## 2023-03-13 VITALS
DIASTOLIC BLOOD PRESSURE: 82 MMHG | BODY MASS INDEX: 29.08 KG/M2 | SYSTOLIC BLOOD PRESSURE: 131 MMHG | HEART RATE: 81 BPM | HEIGHT: 62 IN | TEMPERATURE: 98.6 F | WEIGHT: 158 LBS | OXYGEN SATURATION: 93 %

## 2023-03-13 DIAGNOSIS — G45.1 TIA INVOLVING LEFT INTERNAL CAROTID ARTERY: ICD-10-CM

## 2023-03-13 DIAGNOSIS — Z87.891 HISTORY OF TOBACCO ABUSE: ICD-10-CM

## 2023-03-13 DIAGNOSIS — I10 HTN (HYPERTENSION), BENIGN: ICD-10-CM

## 2023-03-13 DIAGNOSIS — I25.10 CORONARY ARTERY DISEASE INVOLVING NATIVE CORONARY ARTERY OF NATIVE HEART WITHOUT ANGINA PECTORIS: Primary | ICD-10-CM

## 2023-03-13 DIAGNOSIS — E78.2 MIXED HYPERLIPIDEMIA: ICD-10-CM

## 2023-03-13 DIAGNOSIS — J44.9 COPD, SEVERE (HCC): ICD-10-CM

## 2023-03-13 DIAGNOSIS — I50.32 CHRONIC DIASTOLIC CHF (CONGESTIVE HEART FAILURE) (HCC): ICD-10-CM

## 2023-03-13 DIAGNOSIS — G47.33 OSA (OBSTRUCTIVE SLEEP APNEA): ICD-10-CM

## 2023-03-13 DIAGNOSIS — J44.9 CHRONIC OBSTRUCTIVE PULMONARY DISEASE, UNSPECIFIED COPD TYPE (HCC): ICD-10-CM

## 2023-03-13 DIAGNOSIS — I48.0 PAROXYSMAL A-FIB (HCC): ICD-10-CM

## 2023-03-13 LAB — EOSINOPHILS ABSOLUTE COUNT: 0.1 K/UL (ref 0–0.6)

## 2023-03-13 PROCEDURE — 99214 OFFICE O/P EST MOD 30 MIN: CPT | Performed by: INTERNAL MEDICINE

## 2023-03-13 PROCEDURE — 3079F DIAST BP 80-89 MM HG: CPT | Performed by: INTERNAL MEDICINE

## 2023-03-13 PROCEDURE — 36415 COLL VENOUS BLD VENIPUNCTURE: CPT

## 2023-03-13 PROCEDURE — 3075F SYST BP GE 130 - 139MM HG: CPT | Performed by: INTERNAL MEDICINE

## 2023-03-13 PROCEDURE — 1123F ACP DISCUSS/DSCN MKR DOCD: CPT | Performed by: INTERNAL MEDICINE

## 2023-03-13 PROCEDURE — 85048 AUTOMATED LEUKOCYTE COUNT: CPT

## 2023-03-13 PROCEDURE — 82785 ASSAY OF IGE: CPT

## 2023-03-13 RX ORDER — AMLODIPINE BESYLATE 5 MG/1
TABLET ORAL
Qty: 90 TABLET | Refills: 3 | Status: SHIPPED | OUTPATIENT
Start: 2023-03-13

## 2023-03-13 RX ORDER — ATORVASTATIN CALCIUM 80 MG/1
80 TABLET, FILM COATED ORAL DAILY
Qty: 90 TABLET | Refills: 3 | Status: SHIPPED | OUTPATIENT
Start: 2023-03-13

## 2023-03-13 RX ORDER — TORSEMIDE 100 MG/1
TABLET ORAL
Qty: 45 TABLET | Refills: 2 | Status: SHIPPED | OUTPATIENT
Start: 2023-03-13

## 2023-03-13 RX ORDER — METOLAZONE 2.5 MG/1
2.5 TABLET ORAL WEEKLY
Qty: 45 TABLET | Refills: 2 | Status: SHIPPED | OUTPATIENT
Start: 2023-03-13

## 2023-03-13 RX ORDER — CARVEDILOL 3.12 MG/1
TABLET ORAL
Qty: 180 TABLET | Refills: 3 | Status: SHIPPED | OUTPATIENT
Start: 2023-03-13

## 2023-03-13 NOTE — PATIENT INSTRUCTIONS
Plan:  Current medications reviewed. Refills given as warranted. Continue to follow with Dr. Cornelio Lewis for your routine blood work  Start taking Metolazone once a week scheduled. Then you can take it up to 3 times a week  If this is not working then you can take full 100 mg torsemide   You can help decrease your swelling by:  -Weighing yourself every morning   -Following a No Added Salt/Low Sodium diet of less than 3000 mg sodium daily  -Following a Fluid Limitation of less than 2 liters (64 ounces daily)                           -this also includes foods like soup, ice cream, popsicles  -Elevating your legs when sitting  -Wearing compression socks during the day   5. No cardiac testing at this time.      Follow up with me in 9 months

## 2023-03-15 LAB — IGE SERPL-ACNC: 94 KU/L

## 2023-04-13 PROBLEM — D68.69 SECONDARY HYPERCOAGULABLE STATE (HCC): Status: ACTIVE | Noted: 2023-04-13

## 2023-04-17 RX ORDER — POTASSIUM CHLORIDE 750 MG/1
TABLET, EXTENDED RELEASE ORAL
Qty: 450 TABLET | Refills: 0 | Status: SHIPPED | OUTPATIENT
Start: 2023-04-17

## 2023-05-22 RX ORDER — PANTOPRAZOLE SODIUM 40 MG/1
TABLET, DELAYED RELEASE ORAL
Qty: 180 TABLET | Refills: 0 | Status: SHIPPED | OUTPATIENT
Start: 2023-05-22

## 2023-08-15 ENCOUNTER — OFFICE VISIT (OUTPATIENT)
Dept: INTERNAL MEDICINE CLINIC | Age: 68
End: 2023-08-15

## 2023-08-15 VITALS
HEART RATE: 70 BPM | SYSTOLIC BLOOD PRESSURE: 130 MMHG | WEIGHT: 160 LBS | DIASTOLIC BLOOD PRESSURE: 80 MMHG | HEIGHT: 62 IN | BODY MASS INDEX: 29.44 KG/M2 | RESPIRATION RATE: 12 BRPM

## 2023-08-15 DIAGNOSIS — J96.11 CHRONIC RESPIRATORY FAILURE WITH HYPOXIA (HCC): ICD-10-CM

## 2023-08-15 DIAGNOSIS — I25.10 CORONARY ARTERY DISEASE INVOLVING NATIVE CORONARY ARTERY OF NATIVE HEART WITHOUT ANGINA PECTORIS: ICD-10-CM

## 2023-08-15 DIAGNOSIS — Z12.31 ENCOUNTER FOR SCREENING MAMMOGRAM FOR MALIGNANT NEOPLASM OF BREAST: ICD-10-CM

## 2023-08-15 DIAGNOSIS — I25.10 CORONARY ARTERY DISEASE INVOLVING NATIVE CORONARY ARTERY OF NATIVE HEART WITHOUT ANGINA PECTORIS: Primary | ICD-10-CM

## 2023-08-15 DIAGNOSIS — Z86.73 HISTORY OF CVA (CEREBROVASCULAR ACCIDENT): ICD-10-CM

## 2023-08-15 DIAGNOSIS — D68.69 SECONDARY HYPERCOAGULABLE STATE (HCC): ICD-10-CM

## 2023-08-15 DIAGNOSIS — J44.9 COPD, SEVERE (HCC): ICD-10-CM

## 2023-08-15 DIAGNOSIS — I10 HTN (HYPERTENSION), BENIGN: ICD-10-CM

## 2023-08-15 DIAGNOSIS — I50.32 CHRONIC DIASTOLIC CHF (CONGESTIVE HEART FAILURE) (HCC): ICD-10-CM

## 2023-08-15 DIAGNOSIS — I48.0 PAROXYSMAL A-FIB (HCC): ICD-10-CM

## 2023-08-15 LAB
CHOLEST SERPL-MCNC: 127 MG/DL (ref 0–199)
HDLC SERPL-MCNC: 52 MG/DL (ref 40–60)
LDLC SERPL CALC-MCNC: 63 MG/DL
TRIGL SERPL-MCNC: 59 MG/DL (ref 0–150)
VLDLC SERPL CALC-MCNC: 12 MG/DL

## 2023-08-15 PROCEDURE — 3079F DIAST BP 80-89 MM HG: CPT | Performed by: INTERNAL MEDICINE

## 2023-08-15 PROCEDURE — 99214 OFFICE O/P EST MOD 30 MIN: CPT | Performed by: INTERNAL MEDICINE

## 2023-08-15 PROCEDURE — 3075F SYST BP GE 130 - 139MM HG: CPT | Performed by: INTERNAL MEDICINE

## 2023-08-15 PROCEDURE — 1123F ACP DISCUSS/DSCN MKR DOCD: CPT | Performed by: INTERNAL MEDICINE

## 2023-08-15 ASSESSMENT — ENCOUNTER SYMPTOMS
VOMITING: 0
NAUSEA: 0
RHINORRHEA: 0
SHORTNESS OF BREATH: 0
ABDOMINAL PAIN: 0
WHEEZING: 0
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
## 2023-08-22 RX ORDER — PANTOPRAZOLE SODIUM 40 MG/1
TABLET, DELAYED RELEASE ORAL
Qty: 180 TABLET | Refills: 0 | Status: SHIPPED | OUTPATIENT
Start: 2023-08-22

## 2023-10-09 RX ORDER — APIXABAN 5 MG/1
5 TABLET, FILM COATED ORAL 2 TIMES DAILY
Qty: 180 TABLET | Refills: 3 | OUTPATIENT
Start: 2023-10-09

## 2023-10-09 NOTE — TELEPHONE ENCOUNTER
Medication refused, this is a duplicate request. Eliquis 5 MG BID was just signed for refill on 3/13/23 by [facility]. With quantity of 180 and 3 refills.

## 2023-10-10 ENCOUNTER — OFFICE VISIT (OUTPATIENT)
Dept: PULMONOLOGY | Age: 68
End: 2023-10-10
Payer: MEDICARE

## 2023-10-10 ENCOUNTER — TELEPHONE (OUTPATIENT)
Dept: PULMONOLOGY | Age: 68
End: 2023-10-10

## 2023-10-10 ENCOUNTER — HOSPITAL ENCOUNTER (OUTPATIENT)
Dept: CT IMAGING | Age: 68
Discharge: HOME | End: 2023-10-10
Payer: MEDICARE

## 2023-10-10 VITALS
BODY MASS INDEX: 29.63 KG/M2 | HEIGHT: 62 IN | HEART RATE: 60 BPM | OXYGEN SATURATION: 93 % | WEIGHT: 161 LBS | DIASTOLIC BLOOD PRESSURE: 76 MMHG | SYSTOLIC BLOOD PRESSURE: 120 MMHG

## 2023-10-10 DIAGNOSIS — Z87.891 PERSONAL HISTORY OF TOBACCO USE, PRESENTING HAZARDS TO HEALTH: ICD-10-CM

## 2023-10-10 DIAGNOSIS — F17.200 SMOKING: Primary | ICD-10-CM

## 2023-10-10 DIAGNOSIS — Z87.891 PERSONAL HISTORY OF TOBACCO USE: ICD-10-CM

## 2023-10-10 DIAGNOSIS — Z23 FLU VACCINE NEED: ICD-10-CM

## 2023-10-10 DIAGNOSIS — Z87.891 PERSONAL HISTORY OF TOBACCO USE, PRESENTING HAZARDS TO HEALTH: Primary | ICD-10-CM

## 2023-10-10 DIAGNOSIS — J44.9 CHRONIC OBSTRUCTIVE PULMONARY DISEASE, UNSPECIFIED COPD TYPE (HCC): ICD-10-CM

## 2023-10-10 DIAGNOSIS — J44.9 COPD, SEVERE (HCC): ICD-10-CM

## 2023-10-10 PROCEDURE — 1123F ACP DISCUSS/DSCN MKR DOCD: CPT | Performed by: INTERNAL MEDICINE

## 2023-10-10 PROCEDURE — G0296 VISIT TO DETERM LDCT ELIG: HCPCS | Performed by: INTERNAL MEDICINE

## 2023-10-10 PROCEDURE — 3078F DIAST BP <80 MM HG: CPT | Performed by: INTERNAL MEDICINE

## 2023-10-10 PROCEDURE — 3074F SYST BP LT 130 MM HG: CPT | Performed by: INTERNAL MEDICINE

## 2023-10-10 PROCEDURE — 90694 VACC AIIV4 NO PRSRV 0.5ML IM: CPT | Performed by: INTERNAL MEDICINE

## 2023-10-10 PROCEDURE — 99214 OFFICE O/P EST MOD 30 MIN: CPT | Performed by: INTERNAL MEDICINE

## 2023-10-10 PROCEDURE — 71271 CT THORAX LUNG CANCER SCR C-: CPT

## 2023-10-10 PROCEDURE — G0008 ADMIN INFLUENZA VIRUS VAC: HCPCS | Performed by: INTERNAL MEDICINE

## 2023-10-10 RX ORDER — ALBUTEROL SULFATE 90 UG/1
2 AEROSOL, METERED RESPIRATORY (INHALATION) EVERY 6 HOURS PRN
Qty: 18 G | Refills: 5 | Status: SHIPPED | OUTPATIENT
Start: 2023-10-10

## 2023-10-10 NOTE — TELEPHONE ENCOUNTER
Pt scheduled for ct today order that was placed during LOV was incorrect (ct chest w/o). Pt to complete ct lung screening.  New order placed

## 2023-10-10 NOTE — PROGRESS NOTES
Medicare only covers LDCT screening in patients aged 53-69 with at least a 20 pack-year smoking history who currently smoke or have quit in the last 15 years

## 2023-10-10 NOTE — PATIENT INSTRUCTIONS
[phone]. [facility] is only for reporting reactions, and [facility] staff members do not give medical advice. 6. The National Vaccine Injury Compensation Program    The [facility], located within [facility] Vaccine Injury Compensation Program (VICP) is a federal program that was created to compensate people who may have been injured by certain vaccines. Claims regarding alleged injury or death due to vaccination have a time limit for filing, which may be as short as two years. Visit the VICP website at www.[facility].gov/vaccinecompensation or call 0-907.819.7813 to learn about the program and about filing a claim. 7. How can I learn more? Ask your health care provider. Call your local or state health department. Visit the website of the Food and Drug Administration (FDA) for vaccine package inserts and additional information at www.fda.gov/vaccines-blood-biologics/vaccines. Contact the Centers for Disease Control and Prevention (CDC): Call 4-753.526.2286 (1-800-CDC-INFO) or  Visit CDC's influenza website at www.cdc.gov/flu. Vaccine Information Statement   Inactivated Influenza Vaccine   8/6/2021  42 MARZENA Holden [id number]     Department of Health and Human Services  Centers for Disease Control and Prevention    Office Use Only

## 2023-10-31 RX ORDER — APIXABAN 5 MG/1
5 TABLET, FILM COATED ORAL 2 TIMES DAILY
Qty: 180 TABLET | Refills: 3 | Status: SHIPPED | OUTPATIENT
Start: 2023-10-31

## 2023-11-29 RX ORDER — PANTOPRAZOLE SODIUM 40 MG/1
TABLET, DELAYED RELEASE ORAL
Qty: 180 TABLET | Refills: 0 | Status: SHIPPED | OUTPATIENT
Start: 2023-11-29

## 2023-12-06 ENCOUNTER — OFFICE VISIT (OUTPATIENT)
Dept: INTERNAL MEDICINE CLINIC | Age: 68
End: 2023-12-06

## 2023-12-06 VITALS
WEIGHT: 158 LBS | BODY MASS INDEX: 29.08 KG/M2 | SYSTOLIC BLOOD PRESSURE: 120 MMHG | HEART RATE: 70 BPM | RESPIRATION RATE: 12 BRPM | DIASTOLIC BLOOD PRESSURE: 80 MMHG | HEIGHT: 62 IN

## 2023-12-06 DIAGNOSIS — J96.11 CHRONIC RESPIRATORY FAILURE WITH HYPOXIA (HCC): ICD-10-CM

## 2023-12-06 DIAGNOSIS — I10 HTN (HYPERTENSION), BENIGN: ICD-10-CM

## 2023-12-06 DIAGNOSIS — Z12.11 COLON CANCER SCREENING: ICD-10-CM

## 2023-12-06 DIAGNOSIS — D68.69 SECONDARY HYPERCOAGULABLE STATE (HCC): ICD-10-CM

## 2023-12-06 DIAGNOSIS — Z86.73 HISTORY OF CVA (CEREBROVASCULAR ACCIDENT): ICD-10-CM

## 2023-12-06 DIAGNOSIS — E78.2 MIXED HYPERLIPIDEMIA: ICD-10-CM

## 2023-12-06 DIAGNOSIS — J44.9 COPD, SEVERE (HCC): ICD-10-CM

## 2023-12-06 DIAGNOSIS — I48.0 PAROXYSMAL A-FIB (HCC): ICD-10-CM

## 2023-12-06 DIAGNOSIS — I50.32 CHRONIC DIASTOLIC CHF (CONGESTIVE HEART FAILURE) (HCC): ICD-10-CM

## 2023-12-06 DIAGNOSIS — I25.10 CORONARY ARTERY DISEASE INVOLVING NATIVE CORONARY ARTERY OF NATIVE HEART WITHOUT ANGINA PECTORIS: Primary | ICD-10-CM

## 2023-12-06 DIAGNOSIS — G47.33 OSA (OBSTRUCTIVE SLEEP APNEA): ICD-10-CM

## 2023-12-06 LAB
ALBUMIN SERPL-MCNC: 4.2 G/DL (ref 3.4–5)
ALBUMIN/GLOB SERPL: 1.8 {RATIO} (ref 1.1–2.2)
ALP SERPL-CCNC: 97 U/L (ref 40–129)
ALT SERPL-CCNC: 14 U/L (ref 10–40)
ANION GAP SERPL CALCULATED.3IONS-SCNC: 10 MMOL/L (ref 3–16)
AST SERPL-CCNC: 27 U/L (ref 15–37)
BASOPHILS # BLD: 0 K/UL (ref 0–0.2)
BASOPHILS NFR BLD: 0.4 %
BILIRUB SERPL-MCNC: 0.6 MG/DL (ref 0–1)
BUN SERPL-MCNC: 17 MG/DL (ref 7–20)
CALCIUM SERPL-MCNC: 9.6 MG/DL (ref 8.3–10.6)
CHLORIDE SERPL-SCNC: 97 MMOL/L (ref 99–110)
CHOLEST SERPL-MCNC: 118 MG/DL (ref 0–199)
CO2 SERPL-SCNC: 33 MMOL/L (ref 21–32)
CREAT SERPL-MCNC: 0.5 MG/DL (ref 0.6–1.2)
DEPRECATED RDW RBC AUTO: 14.5 % (ref 12.4–15.4)
EOSINOPHIL # BLD: 0.2 K/UL (ref 0–0.6)
EOSINOPHIL NFR BLD: 2.6 %
GFR SERPLBLD CREATININE-BSD FMLA CKD-EPI: >60 ML/MIN/{1.73_M2}
GLUCOSE SERPL-MCNC: 103 MG/DL (ref 70–99)
HCT VFR BLD AUTO: 41.8 % (ref 36–48)
HDLC SERPL-MCNC: 46 MG/DL (ref 40–60)
HGB BLD-MCNC: 14.2 G/DL (ref 12–16)
LDLC SERPL CALC-MCNC: 57 MG/DL
LYMPHOCYTES # BLD: 1.3 K/UL (ref 1–5.1)
LYMPHOCYTES NFR BLD: 20.2 %
MCH RBC QN AUTO: 29.6 PG (ref 26–34)
MCHC RBC AUTO-ENTMCNC: 33.9 G/DL (ref 31–36)
MCV RBC AUTO: 87.5 FL (ref 80–100)
MONOCYTES # BLD: 0.7 K/UL (ref 0–1.3)
MONOCYTES NFR BLD: 11 %
NEUTROPHILS # BLD: 4.3 K/UL (ref 1.7–7.7)
NEUTROPHILS NFR BLD: 65.8 %
PLATELET # BLD AUTO: 188 K/UL (ref 135–450)
PMV BLD AUTO: 11.3 FL (ref 5–10.5)
POTASSIUM SERPL-SCNC: 4.1 MMOL/L (ref 3.5–5.1)
PROT SERPL-MCNC: 6.5 G/DL (ref 6.4–8.2)
RBC # BLD AUTO: 4.78 M/UL (ref 4–5.2)
SODIUM SERPL-SCNC: 140 MMOL/L (ref 136–145)
TRIGL SERPL-MCNC: 76 MG/DL (ref 0–150)
URATE SERPL-MCNC: 6.8 MG/DL (ref 2.6–6)
VLDLC SERPL CALC-MCNC: 15 MG/DL
WBC # BLD AUTO: 6.6 K/UL (ref 4–11)

## 2023-12-06 PROCEDURE — 1123F ACP DISCUSS/DSCN MKR DOCD: CPT | Performed by: INTERNAL MEDICINE

## 2023-12-06 PROCEDURE — 3074F SYST BP LT 130 MM HG: CPT | Performed by: INTERNAL MEDICINE

## 2023-12-06 PROCEDURE — 99214 OFFICE O/P EST MOD 30 MIN: CPT | Performed by: INTERNAL MEDICINE

## 2023-12-06 PROCEDURE — 3079F DIAST BP 80-89 MM HG: CPT | Performed by: INTERNAL MEDICINE

## 2023-12-06 ASSESSMENT — ENCOUNTER SYMPTOMS
SHORTNESS OF BREATH: 0
RHINORRHEA: 0
VOMITING: 0
NAUSEA: 0
WHEEZING: 0
ABDOMINAL PAIN: 0

## 2023-12-06 NOTE — PROGRESS NOTES
knowledge. I agree with the details independently gathered by the clinical support staff, while the remaining scribed note accurately describes my personal service to the patient. Cost of prescription medications and patient compliance have been reviewed with patient. All questions answered. Thank you for allowing me to participate in the care of this individual.     Jeffery George.  Ronen Curtis M.D., [facility]

## 2023-12-14 ENCOUNTER — OFFICE VISIT (OUTPATIENT)
Dept: CARDIOLOGY CLINIC | Age: 68
End: 2023-12-14

## 2023-12-14 VITALS
DIASTOLIC BLOOD PRESSURE: 72 MMHG | HEART RATE: 70 BPM | WEIGHT: 154 LBS | OXYGEN SATURATION: 91 % | BODY MASS INDEX: 28.34 KG/M2 | HEIGHT: 62 IN | SYSTOLIC BLOOD PRESSURE: 130 MMHG

## 2023-12-14 DIAGNOSIS — E78.2 MIXED HYPERLIPIDEMIA: ICD-10-CM

## 2023-12-14 DIAGNOSIS — Z87.891 HISTORY OF TOBACCO ABUSE: ICD-10-CM

## 2023-12-14 DIAGNOSIS — I10 HTN (HYPERTENSION), BENIGN: ICD-10-CM

## 2023-12-14 DIAGNOSIS — I25.10 CORONARY ARTERY DISEASE INVOLVING NATIVE CORONARY ARTERY OF NATIVE HEART WITHOUT ANGINA PECTORIS: Primary | ICD-10-CM

## 2023-12-14 DIAGNOSIS — I48.0 PAROXYSMAL A-FIB (HCC): ICD-10-CM

## 2023-12-14 DIAGNOSIS — I72.9 ANEURYSM (HCC): ICD-10-CM

## 2023-12-14 DIAGNOSIS — I50.32 CHRONIC DIASTOLIC CHF (CONGESTIVE HEART FAILURE) (HCC): ICD-10-CM

## 2023-12-14 RX ORDER — NITROGLYCERIN 0.4 MG/1
0.4 TABLET SUBLINGUAL EVERY 5 MIN PRN
Qty: 25 TABLET | Refills: 3 | Status: SHIPPED | OUTPATIENT
Start: 2023-12-14

## 2023-12-14 RX ORDER — NITROGLYCERIN 0.4 MG/1
0.4 TABLET SUBLINGUAL EVERY 5 MIN PRN
Qty: 25 TABLET | Refills: 3 | Status: SHIPPED | OUTPATIENT
Start: 2023-12-14 | End: 2023-12-14 | Stop reason: SDUPTHER

## 2023-12-14 NOTE — PATIENT INSTRUCTIONS
Plan:  Current medications reviewed. No refills warranted except Nitro SL. Continue current course  No need for cardiac testing at this time  4. DAILY SELF CARE WITH HEART FAILURE:  ~ Weigh yourself every morning and call your doctor if you gain 3 lb gain in a day -or- 5 lb gain in a week. ~Follow a No Added Salt/Low Sodium diet of 3000 mg sodium daily  ~Follow a Fluid Limitation of 2 liters (64 ounces daily) on consistent basis  ~Call your doctor if you notice : worsening shortness of breath especially with usual activities or when lying down flat, increased swelling, decreased urination, weight gain of 3 lb overnight or 5 lb gain in a week, increased coughing, or chest pressure or dizziness upon discharge from hospital.   ~Take all prescribed medications.  Do not stop any-without calling your doctor first.  ~Wear compression stocking during day and remove at night if you have edema  ~Keep feet elevated while sitting if you have edema     Plan to follow up in 9 months

## 2023-12-26 ENCOUNTER — TELEPHONE (OUTPATIENT)
Dept: INTERNAL MEDICINE CLINIC | Age: 68
End: 2023-12-26

## 2023-12-26 NOTE — TELEPHONE ENCOUNTER
----- Message from Renea Caban sent at 12/22/2023 12:13 PM EST -----  Contact: [phone]  Please mail to pt if we receive while I am out of office. ----- Message -----  From: Maria Ines Adame  Sent: 12/14/2023  12:36 PM EST  To: Renea Caban    Pt requesting Fit test to be mailed to her when they come in.

## 2024-01-11 ENCOUNTER — APPOINTMENT (OUTPATIENT)
Dept: CT IMAGING | Age: 69
DRG: 190 | End: 2024-01-11
Payer: MEDICARE

## 2024-01-11 ENCOUNTER — HOSPITAL ENCOUNTER (INPATIENT)
Age: 69
LOS: 3 days | Discharge: HOME | DRG: 190 | End: 2024-01-14
Attending: EMERGENCY MEDICINE | Admitting: INTERNAL MEDICINE
Payer: MEDICARE

## 2024-01-11 ENCOUNTER — APPOINTMENT (OUTPATIENT)
Dept: GENERAL RADIOLOGY | Age: 69
DRG: 190 | End: 2024-01-11
Payer: MEDICARE

## 2024-01-11 DIAGNOSIS — J18.9 PNEUMONIA DUE TO INFECTIOUS ORGANISM, UNSPECIFIED LATERALITY, UNSPECIFIED PART OF LUNG: ICD-10-CM

## 2024-01-11 DIAGNOSIS — J96.01 ACUTE RESPIRATORY FAILURE WITH HYPOXEMIA (HCC): Primary | ICD-10-CM

## 2024-01-11 DIAGNOSIS — J44.1 ACUTE EXACERBATION OF CHRONIC OBSTRUCTIVE PULMONARY DISEASE (COPD) (HCC): ICD-10-CM

## 2024-01-11 DIAGNOSIS — R11.2 NAUSEA AND VOMITING, UNSPECIFIED VOMITING TYPE: ICD-10-CM

## 2024-01-11 LAB
ALBUMIN SERPL-MCNC: 4.3 G/DL (ref 3.4–5)
ALBUMIN/GLOB SERPL: 1.3 {RATIO} (ref 1.1–2.2)
ALP SERPL-CCNC: 112 U/L (ref 40–129)
ALT SERPL-CCNC: 16 U/L (ref 10–40)
ANION GAP SERPL CALCULATED.3IONS-SCNC: 10 MMOL/L (ref 3–16)
AST SERPL-CCNC: 33 U/L (ref 15–37)
BASE EXCESS BLDV CALC-SCNC: -0.7 MMOL/L (ref -3–3)
BASOPHILS # BLD: 0 K/UL (ref 0–0.2)
BASOPHILS NFR BLD: 0.2 %
BILIRUB SERPL-MCNC: 0.6 MG/DL (ref 0–1)
BILIRUB UR QL STRIP.AUTO: NEGATIVE
BUN SERPL-MCNC: 13 MG/DL (ref 7–20)
CALCIUM SERPL-MCNC: 9.3 MG/DL (ref 8.3–10.6)
CHLORIDE SERPL-SCNC: 100 MMOL/L (ref 99–110)
CLARITY UR: ABNORMAL
CO2 BLDV-SCNC: 26 MMOL/L
CO2 SERPL-SCNC: 29 MMOL/L (ref 21–32)
COHGB MFR BLDV: 3 % (ref 0–1.5)
COLOR UR: YELLOW
CREAT SERPL-MCNC: <0.5 MG/DL (ref 0.6–1.2)
DEPRECATED RDW RBC AUTO: 14.7 % (ref 12.4–15.4)
EKG ATRIAL RATE: 84 BPM
EKG DIAGNOSIS: NORMAL
EKG P AXIS: 54 DEGREES
EKG P-R INTERVAL: 190 MS
EKG Q-T INTERVAL: 362 MS
EKG QRS DURATION: 84 MS
EKG QTC CALCULATION (BAZETT): 427 MS
EKG R AXIS: -66 DEGREES
EKG T AXIS: 37 DEGREES
EKG VENTRICULAR RATE: 84 BPM
EOSINOPHIL # BLD: 0.1 K/UL (ref 0–0.6)
EOSINOPHIL NFR BLD: 1 %
FLUAV RNA RESP QL NAA+PROBE: NOT DETECTED
FLUBV RNA RESP QL NAA+PROBE: NOT DETECTED
GFR SERPLBLD CREATININE-BSD FMLA CKD-EPI: >60 ML/MIN/{1.73_M2}
GLUCOSE SERPL-MCNC: 156 MG/DL (ref 70–99)
GLUCOSE UR STRIP.AUTO-MCNC: NEGATIVE MG/DL
HCO3 BLDV-SCNC: 24.3 MMOL/L (ref 23–29)
HCT VFR BLD AUTO: 48.1 % (ref 36–48)
HGB BLD-MCNC: 15.6 G/DL (ref 12–16)
HGB UR QL STRIP.AUTO: NEGATIVE
INR PPP: 1.05 (ref 0.84–1.16)
KETONES UR STRIP.AUTO-MCNC: 15 MG/DL
LACTATE BLDV-SCNC: 1 MMOL/L (ref 0.4–1.9)
LEUKOCYTE ESTERASE UR QL STRIP.AUTO: NEGATIVE
LYMPHOCYTES # BLD: 0.3 K/UL (ref 1–5.1)
LYMPHOCYTES NFR BLD: 2.5 %
MCH RBC QN AUTO: 29.1 PG (ref 26–34)
MCHC RBC AUTO-ENTMCNC: 32.5 G/DL (ref 31–36)
MCV RBC AUTO: 89.7 FL (ref 80–100)
METHGB MFR BLDV: 0.3 %
MONOCYTES # BLD: 0.6 K/UL (ref 0–1.3)
MONOCYTES NFR BLD: 5 %
NEUTROPHILS # BLD: 11.5 K/UL (ref 1.7–7.7)
NEUTROPHILS NFR BLD: 91.3 %
NITRITE UR QL STRIP.AUTO: NEGATIVE
NT-PROBNP SERPL-MCNC: 130 PG/ML (ref 0–124)
O2 CT VFR BLDV CALC: 19 VOL %
O2 THERAPY: ABNORMAL
PCO2 BLDV: 41.7 MMHG (ref 40–50)
PH BLDV: 7.38 [PH] (ref 7.35–7.45)
PH UR STRIP.AUTO: 8.5 [PH] (ref 5–8)
PLATELET # BLD AUTO: 182 K/UL (ref 135–450)
PMV BLD AUTO: 10.8 FL (ref 5–10.5)
PO2 BLDV: 66.4 MMHG (ref 25–40)
POTASSIUM SERPL-SCNC: 4 MMOL/L (ref 3.5–5.1)
PROT SERPL-MCNC: 7.5 G/DL (ref 6.4–8.2)
PROT UR STRIP.AUTO-MCNC: NEGATIVE MG/DL
PROTHROMBIN TIME: 13.8 SEC (ref 11.5–14.8)
RBC # BLD AUTO: 5.36 M/UL (ref 4–5.2)
RSV AG NOSE QL: NEGATIVE
SAO2 % BLDV: 93 %
SARS-COV-2 RNA RESP QL NAA+PROBE: NOT DETECTED
SODIUM SERPL-SCNC: 139 MMOL/L (ref 136–145)
SP GR UR STRIP.AUTO: 1.01 (ref 1–1.03)
TROPONIN, HIGH SENSITIVITY: 7 NG/L (ref 0–14)
UA COMPLETE W REFLEX CULTURE PNL UR: ABNORMAL
UA DIPSTICK W REFLEX MICRO PNL UR: ABNORMAL
URN SPEC COLLECT METH UR: ABNORMAL
UROBILINOGEN UR STRIP-ACNC: 0.2 E.U./DL
WBC # BLD AUTO: 12.6 K/UL (ref 4–11)

## 2024-01-11 PROCEDURE — 96368 THER/DIAG CONCURRENT INF: CPT

## 2024-01-11 PROCEDURE — 6370000000 HC RX 637 (ALT 250 FOR IP): Performed by: PHYSICIAN ASSISTANT

## 2024-01-11 PROCEDURE — 96365 THER/PROPH/DIAG IV INF INIT: CPT

## 2024-01-11 PROCEDURE — 2060000000 HC ICU INTERMEDIATE R&B

## 2024-01-11 PROCEDURE — 83880 ASSAY OF NATRIURETIC PEPTIDE: CPT

## 2024-01-11 PROCEDURE — 83605 ASSAY OF LACTIC ACID: CPT

## 2024-01-11 PROCEDURE — 36415 COLL VENOUS BLD VENIPUNCTURE: CPT

## 2024-01-11 PROCEDURE — 96375 TX/PRO/DX INJ NEW DRUG ADDON: CPT

## 2024-01-11 PROCEDURE — 82306 VITAMIN D 25 HYDROXY: CPT

## 2024-01-11 PROCEDURE — 2500000003 HC RX 250 WO HCPCS: Performed by: PHYSICIAN ASSISTANT

## 2024-01-11 PROCEDURE — 87040 BLOOD CULTURE FOR BACTERIA: CPT

## 2024-01-11 PROCEDURE — 82803 BLOOD GASES ANY COMBINATION: CPT

## 2024-01-11 PROCEDURE — 84484 ASSAY OF TROPONIN QUANT: CPT

## 2024-01-11 PROCEDURE — 85025 COMPLETE CBC W/AUTO DIFF WBC: CPT

## 2024-01-11 PROCEDURE — 6360000004 HC RX CONTRAST MEDICATION: Performed by: PHYSICIAN ASSISTANT

## 2024-01-11 PROCEDURE — 99285 EMERGENCY DEPT VISIT HI MDM: CPT

## 2024-01-11 PROCEDURE — 93005 ELECTROCARDIOGRAM TRACING: CPT | Performed by: EMERGENCY MEDICINE

## 2024-01-11 PROCEDURE — 96366 THER/PROPH/DIAG IV INF ADDON: CPT

## 2024-01-11 PROCEDURE — 87807 RSV ASSAY W/OPTIC: CPT

## 2024-01-11 PROCEDURE — A4216 STERILE WATER/SALINE, 10 ML: HCPCS | Performed by: PHYSICIAN ASSISTANT

## 2024-01-11 PROCEDURE — 2580000003 HC RX 258: Performed by: PHYSICIAN ASSISTANT

## 2024-01-11 PROCEDURE — 6370000000 HC RX 637 (ALT 250 FOR IP): Performed by: EMERGENCY MEDICINE

## 2024-01-11 PROCEDURE — 87636 SARSCOV2 & INF A&B AMP PRB: CPT

## 2024-01-11 PROCEDURE — 71045 X-RAY EXAM CHEST 1 VIEW: CPT

## 2024-01-11 PROCEDURE — 93010 ELECTROCARDIOGRAM REPORT: CPT | Performed by: INTERNAL MEDICINE

## 2024-01-11 PROCEDURE — 6360000002 HC RX W HCPCS: Performed by: PHYSICIAN ASSISTANT

## 2024-01-11 PROCEDURE — 81003 URINALYSIS AUTO W/O SCOPE: CPT

## 2024-01-11 PROCEDURE — 85610 PROTHROMBIN TIME: CPT

## 2024-01-11 PROCEDURE — 71260 CT THORAX DX C+: CPT

## 2024-01-11 PROCEDURE — 80053 COMPREHEN METABOLIC PANEL: CPT

## 2024-01-11 RX ORDER — PREDNISONE 20 MG/1
40 TABLET ORAL DAILY
Status: DISCONTINUED | OUTPATIENT
Start: 2024-01-12 | End: 2024-01-14 | Stop reason: HOSPADM

## 2024-01-11 RX ORDER — ATORVASTATIN CALCIUM 40 MG/1
80 TABLET, FILM COATED ORAL DAILY
Status: DISCONTINUED | OUTPATIENT
Start: 2024-01-12 | End: 2024-01-14 | Stop reason: HOSPADM

## 2024-01-11 RX ORDER — ALBUTEROL SULFATE 2.5 MG/3ML
2.5 SOLUTION RESPIRATORY (INHALATION)
Status: DISCONTINUED | OUTPATIENT
Start: 2024-01-11 | End: 2024-01-14 | Stop reason: HOSPADM

## 2024-01-11 RX ORDER — ONDANSETRON 2 MG/ML
4 INJECTION INTRAMUSCULAR; INTRAVENOUS EVERY 6 HOURS PRN
Status: DISCONTINUED | OUTPATIENT
Start: 2024-01-11 | End: 2024-01-14 | Stop reason: HOSPADM

## 2024-01-11 RX ORDER — ONDANSETRON 2 MG/ML
4 INJECTION INTRAMUSCULAR; INTRAVENOUS ONCE
Status: COMPLETED | OUTPATIENT
Start: 2024-01-11 | End: 2024-01-11

## 2024-01-11 RX ORDER — TORSEMIDE 100 MG/1
50 TABLET ORAL DAILY
Status: DISCONTINUED | OUTPATIENT
Start: 2024-01-12 | End: 2024-01-14 | Stop reason: HOSPADM

## 2024-01-11 RX ORDER — OXYCODONE HYDROCHLORIDE AND ACETAMINOPHEN 5; 325 MG/1; MG/1
1 TABLET ORAL ONCE
Status: COMPLETED | OUTPATIENT
Start: 2024-01-11 | End: 2024-01-11

## 2024-01-11 RX ORDER — DIPHENHYDRAMINE HYDROCHLORIDE 50 MG/ML
12.5 INJECTION INTRAMUSCULAR; INTRAVENOUS ONCE
Status: COMPLETED | OUTPATIENT
Start: 2024-01-11 | End: 2024-01-11

## 2024-01-11 RX ORDER — GABAPENTIN 400 MG/1
400 CAPSULE ORAL 3 TIMES DAILY
Status: DISCONTINUED | OUTPATIENT
Start: 2024-01-11 | End: 2024-01-14 | Stop reason: HOSPADM

## 2024-01-11 RX ORDER — OXYCODONE AND ACETAMINOPHEN 10; 325 MG/1; MG/1
1 TABLET ORAL 3 TIMES DAILY PRN
Status: DISCONTINUED | OUTPATIENT
Start: 2024-01-11 | End: 2024-01-14 | Stop reason: HOSPADM

## 2024-01-11 RX ORDER — ALBUTEROL SULFATE 2.5 MG/3ML
2.5 SOLUTION RESPIRATORY (INHALATION) ONCE
Status: COMPLETED | OUTPATIENT
Start: 2024-01-11 | End: 2024-01-11

## 2024-01-11 RX ORDER — BENZONATATE 100 MG/1
100 CAPSULE ORAL 3 TIMES DAILY PRN
Status: DISCONTINUED | OUTPATIENT
Start: 2024-01-11 | End: 2024-01-14 | Stop reason: HOSPADM

## 2024-01-11 RX ORDER — 0.9 % SODIUM CHLORIDE 0.9 %
1000 INTRAVENOUS SOLUTION INTRAVENOUS ONCE
Status: COMPLETED | OUTPATIENT
Start: 2024-01-11 | End: 2024-01-11

## 2024-01-11 RX ORDER — GUAIFENESIN 600 MG/1
600 TABLET, EXTENDED RELEASE ORAL 2 TIMES DAILY
Status: DISCONTINUED | OUTPATIENT
Start: 2024-01-11 | End: 2024-01-14 | Stop reason: HOSPADM

## 2024-01-11 RX ORDER — POTASSIUM CHLORIDE 7.45 MG/ML
10 INJECTION INTRAVENOUS PRN
Status: DISCONTINUED | OUTPATIENT
Start: 2024-01-11 | End: 2024-01-14 | Stop reason: HOSPADM

## 2024-01-11 RX ORDER — MAGNESIUM SULFATE IN WATER 40 MG/ML
2000 INJECTION, SOLUTION INTRAVENOUS ONCE
Status: COMPLETED | OUTPATIENT
Start: 2024-01-11 | End: 2024-01-11

## 2024-01-11 RX ORDER — POLYETHYLENE GLYCOL 3350 17 G/17G
17 POWDER, FOR SOLUTION ORAL DAILY PRN
Status: DISCONTINUED | OUTPATIENT
Start: 2024-01-11 | End: 2024-01-14 | Stop reason: HOSPADM

## 2024-01-11 RX ORDER — IPRATROPIUM BROMIDE AND ALBUTEROL SULFATE 2.5; .5 MG/3ML; MG/3ML
2 SOLUTION RESPIRATORY (INHALATION) ONCE
Status: COMPLETED | OUTPATIENT
Start: 2024-01-11 | End: 2024-01-11

## 2024-01-11 RX ORDER — LANOLIN ALCOHOL/MO/W.PET/CERES
3 CREAM (GRAM) TOPICAL NIGHTLY
Status: DISCONTINUED | OUTPATIENT
Start: 2024-01-11 | End: 2024-01-14 | Stop reason: HOSPADM

## 2024-01-11 RX ORDER — POTASSIUM CHLORIDE 20 MEQ/1
40 TABLET, EXTENDED RELEASE ORAL PRN
Status: DISCONTINUED | OUTPATIENT
Start: 2024-01-11 | End: 2024-01-14 | Stop reason: HOSPADM

## 2024-01-11 RX ORDER — NICOTINE 21 MG/24HR
1 PATCH, TRANSDERMAL 24 HOURS TRANSDERMAL DAILY
Status: DISCONTINUED | OUTPATIENT
Start: 2024-01-12 | End: 2024-01-12

## 2024-01-11 RX ORDER — AMLODIPINE BESYLATE 5 MG/1
5 TABLET ORAL DAILY
Status: DISCONTINUED | OUTPATIENT
Start: 2024-01-12 | End: 2024-01-14 | Stop reason: HOSPADM

## 2024-01-11 RX ORDER — MAGNESIUM SULFATE IN WATER 40 MG/ML
2000 INJECTION, SOLUTION INTRAVENOUS PRN
Status: DISCONTINUED | OUTPATIENT
Start: 2024-01-11 | End: 2024-01-14 | Stop reason: HOSPADM

## 2024-01-11 RX ORDER — ONDANSETRON 4 MG/1
4 TABLET, ORALLY DISINTEGRATING ORAL EVERY 8 HOURS PRN
Status: DISCONTINUED | OUTPATIENT
Start: 2024-01-11 | End: 2024-01-14 | Stop reason: HOSPADM

## 2024-01-11 RX ORDER — ASPIRIN 81 MG/1
81 TABLET, CHEWABLE ORAL DAILY
Status: DISCONTINUED | OUTPATIENT
Start: 2024-01-12 | End: 2024-01-14 | Stop reason: HOSPADM

## 2024-01-11 RX ORDER — IPRATROPIUM BROMIDE AND ALBUTEROL SULFATE 2.5; .5 MG/3ML; MG/3ML
1 SOLUTION RESPIRATORY (INHALATION)
Status: DISCONTINUED | OUTPATIENT
Start: 2024-01-11 | End: 2024-01-13

## 2024-01-11 RX ORDER — BUDESONIDE AND FORMOTEROL FUMARATE DIHYDRATE 160; 4.5 UG/1; UG/1
2 AEROSOL RESPIRATORY (INHALATION)
Status: DISCONTINUED | OUTPATIENT
Start: 2024-01-11 | End: 2024-01-14 | Stop reason: HOSPADM

## 2024-01-11 RX ORDER — ENOXAPARIN SODIUM 100 MG/ML
40 INJECTION SUBCUTANEOUS DAILY
Status: DISCONTINUED | OUTPATIENT
Start: 2024-01-12 | End: 2024-01-11

## 2024-01-11 RX ORDER — POTASSIUM CHLORIDE 7.45 MG/ML
10 INJECTION INTRAVENOUS PRN
Status: DISCONTINUED | OUTPATIENT
Start: 2024-01-11 | End: 2024-01-12 | Stop reason: SDUPTHER

## 2024-01-11 RX ORDER — CARVEDILOL 3.12 MG/1
3.12 TABLET ORAL 2 TIMES DAILY WITH MEALS
Status: DISCONTINUED | OUTPATIENT
Start: 2024-01-12 | End: 2024-01-14 | Stop reason: HOSPADM

## 2024-01-11 RX ADMIN — CEFTRIAXONE SODIUM 1000 MG: 1 INJECTION, POWDER, FOR SOLUTION INTRAMUSCULAR; INTRAVENOUS at 14:31

## 2024-01-11 RX ADMIN — FAMOTIDINE 40 MG: 10 INJECTION, SOLUTION INTRAVENOUS at 13:47

## 2024-01-11 RX ADMIN — IPRATROPIUM BROMIDE AND ALBUTEROL SULFATE 2 DOSE: 2.5; .5 SOLUTION RESPIRATORY (INHALATION) at 13:49

## 2024-01-11 RX ADMIN — DIPHENHYDRAMINE HYDROCHLORIDE 12.5 MG: 50 INJECTION INTRAMUSCULAR; INTRAVENOUS at 13:47

## 2024-01-11 RX ADMIN — AZITHROMYCIN MONOHYDRATE 500 MG: 500 INJECTION, POWDER, LYOPHILIZED, FOR SOLUTION INTRAVENOUS at 15:29

## 2024-01-11 RX ADMIN — ALBUTEROL SULFATE 2.5 MG: 2.5 SOLUTION RESPIRATORY (INHALATION) at 13:49

## 2024-01-11 RX ADMIN — OXYCODONE AND ACETAMINOPHEN 1 TABLET: 5; 325 TABLET ORAL at 21:16

## 2024-01-11 RX ADMIN — IOPAMIDOL 75 ML: 755 INJECTION, SOLUTION INTRAVENOUS at 14:03

## 2024-01-11 RX ADMIN — SODIUM CHLORIDE 1000 ML: 9 INJECTION, SOLUTION INTRAVENOUS at 14:26

## 2024-01-11 RX ADMIN — METHYLPREDNISOLONE SODIUM SUCCINATE 125 MG: 125 INJECTION INTRAMUSCULAR; INTRAVENOUS at 14:27

## 2024-01-11 RX ADMIN — ONDANSETRON 4 MG: 2 INJECTION INTRAMUSCULAR; INTRAVENOUS at 13:45

## 2024-01-11 RX ADMIN — MAGNESIUM SULFATE HEPTAHYDRATE 2000 MG: 40 INJECTION, SOLUTION INTRAVENOUS at 15:48

## 2024-01-11 ASSESSMENT — PAIN DESCRIPTION - LOCATION: LOCATION: BACK

## 2024-01-11 ASSESSMENT — LIFESTYLE VARIABLES
HOW MANY STANDARD DRINKS CONTAINING ALCOHOL DO YOU HAVE ON A TYPICAL DAY: 1 OR 2
HOW OFTEN DO YOU HAVE A DRINK CONTAINING ALCOHOL: MONTHLY OR LESS

## 2024-01-11 ASSESSMENT — PAIN SCALES - GENERAL
PAINLEVEL_OUTOF10: 5
PAINLEVEL_OUTOF10: 8

## 2024-01-11 ASSESSMENT — PAIN - FUNCTIONAL ASSESSMENT: PAIN_FUNCTIONAL_ASSESSMENT: 0-10

## 2024-01-11 NOTE — DISCHARGE INSTR - COC
Continuity of Care Form    Patient Name: Serina Rubin   :  1955  MRN:  [id number]    Admit date:  2024  Discharge date:  24      Code Status Order: Prior   Advance Directives:     Admitting Physician:  No admitting provider for patient encounter.  PCP: Elvira Hunter MD    Discharging Nurse: Jorge Willingham RN    Discharging Hospital Unit/Room#:   Discharging Unit Phone Number: [phone]    Emergency Contact:   Extended Emergency Contact Information  Primary Emergency Contact: Shant Rubin  Address: [address] [place] [address] [employer]  Home Phone: [phone]  Mobile Phone: [phone]  Relation: Child  Secondary Emergency Contact: Donna Rubin  Address: [address]  Home Phone: [phone]  Mobile Phone: [phone]  Relation: Other Relative    Past Surgical History:  Past Surgical History:   Procedure Laterality Date    BREAST BIOPSY      BREAST SURGERY      CARDIAC CATHETERIZATION      stents x3     COLONOSCOPY      DENTAL SURGERY  17    multiple teeth extracted/ Removal of BRANDIN    FRACTURE SURGERY Right     femur    HYSTERECTOMY (CERVIX STATUS UNKNOWN)      JOINT REPLACEMENT Bilateral     hips    NOSE SURGERY      for a broken nose    OTHER SURGICAL HISTORY      INTRATHECAL PAIN PUMP IMPLANT    OVARY REMOVAL         Immunization History:   Immunization History   Administered Date(s) Administered    COVID-19, MODERNA BLUE border, Primary or Immunocompromised, (age 12y+), IM, 100 mcg/0.5mL 2021, 2021    COVID-19, MODERNA Booster BLUE border, (age 18y+), IM, 50mcg/0.25mL 2021    COVID-19, PFIZER, ( formula), (age 12y+), IM, 30mcg/0.3mL 2023    Influenza Vaccine, unspecified formulation 10/01/2016, 2018    Influenza Virus Vaccine 10/01/2012, 2014, 10/01/2016, 2018    Influenza, AFLURIA (age 3 yrs+), FLUZONE, (age 6

## 2024-01-11 NOTE — CARE COORDINATION
Case Management Assessment  Initial Evaluation    Date/Time of Evaluation: 1/11/2024 3:40 PM  Assessment Completed by: MICHELLE Ochoa    If patient is discharged prior to next notation, then this note serves as note for discharge by case management.    Patient Name: Serina Rubin                   YOB: 1955  Diagnosis: No admission diagnoses are documented for this encounter.                   Date / Time: 1/11/2024 11:29 AM    Patient Admission Status: Emergency   Readmission Risk (Low < 19, Mod (19-27), High > 27): No data recorded  Current PCP: Elvira Hunter MD  PCP verified by CM? (P) No    Chart Reviewed: Yes      History Provided by: Patient, Child/Family  Patient Orientation: Alert and Oriented, Person, Place, Situation, Self    Patient Cognition: Alert    Hospitalization in the last 30 days (Readmission):  No    If yes, Readmission Assessment in  Navigator will be completed.    Advance Directives:      Code Status: Prior   Patient's Primary Decision Maker is: (P) Named in Scanned ACP Document    Primary Decision Maker: Shant Rubin - Child - [phone]    Discharge Planning:    Patient lives with: (P) Alone Type of Home: (P) Apartment, Independent Living  Primary Care Giver: Self  Patient Support Systems include: Children, Family Members   Current Financial resources: (P) None  Current community resources: (P) None  Current services prior to admission: (P) Durable Medical Equipment, Oxygen Therapy            Current DME: (P) Walker, Oxygen Therapy (Comment), Other (Comment) (pain pump, nebulizer, power chair)            Type of Home Care services:  (P) OT, PT    ADLS  Prior functional level: (P) Independent in ADLs/IADLs  Current functional level: (P) Independent in ADLs/IADLs    PT AM-PAC:   /24  OT AM-PAC:   /24    Family can provide assistance at DC: (P) Yes  Would you like Case Management to discuss the discharge plan with any other family members/significant others,

## 2024-01-12 PROBLEM — J96.21 ACUTE ON CHRONIC HYPOXIC RESPIRATORY FAILURE (HCC): Status: ACTIVE | Noted: 2024-01-12

## 2024-01-12 LAB
25(OH)D3 SERPL-MCNC: 15.9 NG/ML
ALBUMIN SERPL-MCNC: 3.4 G/DL (ref 3.4–5)
ALBUMIN/GLOB SERPL: 1.4 {RATIO} (ref 1.1–2.2)
ALP SERPL-CCNC: 74 U/L (ref 40–129)
ALT SERPL-CCNC: 17 U/L (ref 10–40)
ANION GAP SERPL CALCULATED.3IONS-SCNC: 8 MMOL/L (ref 3–16)
AST SERPL-CCNC: 28 U/L (ref 15–37)
BASE EXCESS BLDV CALC-SCNC: -0.3 MMOL/L (ref -3–3)
BASOPHILS # BLD: 0 K/UL (ref 0–0.2)
BASOPHILS NFR BLD: 0.3 %
BILIRUB SERPL-MCNC: 0.3 MG/DL (ref 0–1)
BUN SERPL-MCNC: 10 MG/DL (ref 7–20)
CALCIUM SERPL-MCNC: 8.3 MG/DL (ref 8.3–10.6)
CHLORIDE SERPL-SCNC: 106 MMOL/L (ref 99–110)
CO2 BLDV-SCNC: 27 MMOL/L
CO2 SERPL-SCNC: 27 MMOL/L (ref 21–32)
COHGB MFR BLDV: 2.1 % (ref 0–1.5)
CREAT SERPL-MCNC: <0.5 MG/DL (ref 0.6–1.2)
DEPRECATED RDW RBC AUTO: 14.5 % (ref 12.4–15.4)
EOSINOPHIL # BLD: 0 K/UL (ref 0–0.6)
EOSINOPHIL NFR BLD: 0 %
GFR SERPLBLD CREATININE-BSD FMLA CKD-EPI: >60 ML/MIN/{1.73_M2}
GLUCOSE SERPL-MCNC: 116 MG/DL (ref 70–99)
HCO3 BLDV-SCNC: 25.4 MMOL/L (ref 23–29)
HCT VFR BLD AUTO: 37.8 % (ref 36–48)
HGB BLD-MCNC: 12.4 G/DL (ref 12–16)
LYMPHOCYTES # BLD: 0.4 K/UL (ref 1–5.1)
LYMPHOCYTES NFR BLD: 3.8 %
MCH RBC QN AUTO: 29.4 PG (ref 26–34)
MCHC RBC AUTO-ENTMCNC: 32.7 G/DL (ref 31–36)
MCV RBC AUTO: 89.9 FL (ref 80–100)
METHGB MFR BLDV: 0.3 %
MONOCYTES # BLD: 0.3 K/UL (ref 0–1.3)
MONOCYTES NFR BLD: 3.5 %
NEUTROPHILS # BLD: 9.1 K/UL (ref 1.7–7.7)
NEUTROPHILS NFR BLD: 92.4 %
O2 CT VFR BLDV CALC: 17 VOL %
O2 THERAPY: ABNORMAL
PCO2 BLDV: 45.3 MMHG (ref 40–50)
PH BLDV: 7.37 [PH] (ref 7.35–7.45)
PLATELET # BLD AUTO: 159 K/UL (ref 135–450)
PMV BLD AUTO: 10.5 FL (ref 5–10.5)
PO2 BLDV: 53.5 MMHG (ref 25–40)
POTASSIUM SERPL-SCNC: 3.6 MMOL/L (ref 3.5–5.1)
PROCALCITONIN SERPL IA-MCNC: 0.1 NG/ML (ref 0–0.15)
PROT SERPL-MCNC: 5.8 G/DL (ref 6.4–8.2)
RBC # BLD AUTO: 4.2 M/UL (ref 4–5.2)
SAO2 % BLDV: 87 %
SODIUM SERPL-SCNC: 141 MMOL/L (ref 136–145)
TROPONIN, HIGH SENSITIVITY: 8 NG/L (ref 0–14)
WBC # BLD AUTO: 9.9 K/UL (ref 4–11)

## 2024-01-12 PROCEDURE — 2580000003 HC RX 258: Performed by: INTERNAL MEDICINE

## 2024-01-12 PROCEDURE — 6370000000 HC RX 637 (ALT 250 FOR IP): Performed by: INTERNAL MEDICINE

## 2024-01-12 PROCEDURE — 6360000002 HC RX W HCPCS: Performed by: INTERNAL MEDICINE

## 2024-01-12 PROCEDURE — 2580000003 HC RX 258: Performed by: NURSE PRACTITIONER

## 2024-01-12 PROCEDURE — 85025 COMPLETE CBC W/AUTO DIFF WBC: CPT

## 2024-01-12 PROCEDURE — 97530 THERAPEUTIC ACTIVITIES: CPT

## 2024-01-12 PROCEDURE — 97535 SELF CARE MNGMENT TRAINING: CPT

## 2024-01-12 PROCEDURE — 94761 N-INVAS EAR/PLS OXIMETRY MLT: CPT

## 2024-01-12 PROCEDURE — 99223 1ST HOSP IP/OBS HIGH 75: CPT | Performed by: INTERNAL MEDICINE

## 2024-01-12 PROCEDURE — 97116 GAIT TRAINING THERAPY: CPT

## 2024-01-12 PROCEDURE — 2700000000 HC OXYGEN THERAPY PER DAY

## 2024-01-12 PROCEDURE — 36415 COLL VENOUS BLD VENIPUNCTURE: CPT

## 2024-01-12 PROCEDURE — 84484 ASSAY OF TROPONIN QUANT: CPT

## 2024-01-12 PROCEDURE — 80053 COMPREHEN METABOLIC PANEL: CPT

## 2024-01-12 PROCEDURE — 94640 AIRWAY INHALATION TREATMENT: CPT

## 2024-01-12 PROCEDURE — 94010 BREATHING CAPACITY TEST: CPT

## 2024-01-12 PROCEDURE — 84145 PROCALCITONIN (PCT): CPT

## 2024-01-12 PROCEDURE — 82803 BLOOD GASES ANY COMBINATION: CPT

## 2024-01-12 PROCEDURE — 97161 PT EVAL LOW COMPLEX 20 MIN: CPT

## 2024-01-12 PROCEDURE — 2060000000 HC ICU INTERMEDIATE R&B

## 2024-01-12 PROCEDURE — 97166 OT EVAL MOD COMPLEX 45 MIN: CPT

## 2024-01-12 RX ORDER — 0.9 % SODIUM CHLORIDE 0.9 %
500 INTRAVENOUS SOLUTION INTRAVENOUS ONCE
Status: COMPLETED | OUTPATIENT
Start: 2024-01-12 | End: 2024-01-12

## 2024-01-12 RX ORDER — ALBUTEROL SULFATE 2.5 MG/3ML
2.5 SOLUTION RESPIRATORY (INHALATION) EVERY 6 HOURS PRN
COMMUNITY

## 2024-01-12 RX ADMIN — OXYCODONE HYDROCHLORIDE AND ACETAMINOPHEN 1 TABLET: 10; 325 TABLET ORAL at 22:11

## 2024-01-12 RX ADMIN — Medication 3 MG: at 01:36

## 2024-01-12 RX ADMIN — GUAIFENESIN 600 MG: 600 TABLET, EXTENDED RELEASE ORAL at 21:52

## 2024-01-12 RX ADMIN — Medication 3 MG: at 21:52

## 2024-01-12 RX ADMIN — OXYCODONE HYDROCHLORIDE AND ACETAMINOPHEN 1 TABLET: 10; 325 TABLET ORAL at 06:34

## 2024-01-12 RX ADMIN — IPRATROPIUM BROMIDE AND ALBUTEROL SULFATE 1 DOSE: 2.5; .5 SOLUTION RESPIRATORY (INHALATION) at 01:36

## 2024-01-12 RX ADMIN — CARVEDILOL 3.12 MG: 3.12 TABLET, FILM COATED ORAL at 09:41

## 2024-01-12 RX ADMIN — GABAPENTIN 400 MG: 400 CAPSULE ORAL at 01:36

## 2024-01-12 RX ADMIN — GUAIFENESIN 600 MG: 600 TABLET, EXTENDED RELEASE ORAL at 09:41

## 2024-01-12 RX ADMIN — IPRATROPIUM BROMIDE AND ALBUTEROL SULFATE 1 DOSE: 2.5; .5 SOLUTION RESPIRATORY (INHALATION) at 09:02

## 2024-01-12 RX ADMIN — APIXABAN 5 MG: 5 TABLET, FILM COATED ORAL at 09:42

## 2024-01-12 RX ADMIN — Medication 2 PUFF: at 21:18

## 2024-01-12 RX ADMIN — Medication 2 PUFF: at 09:03

## 2024-01-12 RX ADMIN — IPRATROPIUM BROMIDE AND ALBUTEROL SULFATE 1 DOSE: 2.5; .5 SOLUTION RESPIRATORY (INHALATION) at 21:18

## 2024-01-12 RX ADMIN — GABAPENTIN 400 MG: 400 CAPSULE ORAL at 09:42

## 2024-01-12 RX ADMIN — APIXABAN 5 MG: 5 TABLET, FILM COATED ORAL at 21:52

## 2024-01-12 RX ADMIN — GUAIFENESIN 600 MG: 600 TABLET, EXTENDED RELEASE ORAL at 01:36

## 2024-01-12 RX ADMIN — SODIUM CHLORIDE 500 ML: 9 INJECTION, SOLUTION INTRAVENOUS at 16:54

## 2024-01-12 RX ADMIN — ATORVASTATIN CALCIUM 80 MG: 40 TABLET, FILM COATED ORAL at 09:41

## 2024-01-12 RX ADMIN — BENZONATATE 100 MG: 100 CAPSULE ORAL at 09:41

## 2024-01-12 RX ADMIN — CEFTRIAXONE SODIUM 1000 MG: 1 INJECTION, POWDER, FOR SOLUTION INTRAMUSCULAR; INTRAVENOUS at 06:35

## 2024-01-12 RX ADMIN — GABAPENTIN 400 MG: 400 CAPSULE ORAL at 14:51

## 2024-01-12 RX ADMIN — ONDANSETRON 4 MG: 2 INJECTION INTRAMUSCULAR; INTRAVENOUS at 17:16

## 2024-01-12 RX ADMIN — ASPIRIN 81 MG: 81 TABLET, CHEWABLE ORAL at 09:41

## 2024-01-12 RX ADMIN — AZITHROMYCIN MONOHYDRATE 500 MG: 500 INJECTION, POWDER, LYOPHILIZED, FOR SOLUTION INTRAVENOUS at 21:59

## 2024-01-12 RX ADMIN — PREDNISONE 40 MG: 20 TABLET ORAL at 09:48

## 2024-01-12 RX ADMIN — GABAPENTIN 400 MG: 400 CAPSULE ORAL at 21:52

## 2024-01-12 RX ADMIN — IPRATROPIUM BROMIDE AND ALBUTEROL SULFATE 1 DOSE: 2.5; .5 SOLUTION RESPIRATORY (INHALATION) at 12:28

## 2024-01-12 ASSESSMENT — COPD QUESTIONNAIRES
QUESTION5_HOMEACTIVITIES: 3
QUESTION2_CHESTPHLEGM: 2
QUESTION4_WALKINCLINE: 5
QUESTION6_LEAVINGHOUSE: 2
QUESTION1_COUGHFREQUENCY: 4
QUESTION3_CHESTTIGHTNESS: 0
QUESTION7_SLEEPQUALITY: 0
QUESTION8_ENERGYLEVEL: 3
CAT_TOTALSCORE: 19

## 2024-01-12 ASSESSMENT — LIFESTYLE VARIABLES
HOW OFTEN DO YOU HAVE A DRINK CONTAINING ALCOHOL: NEVER
HOW MANY STANDARD DRINKS CONTAINING ALCOHOL DO YOU HAVE ON A TYPICAL DAY: PATIENT DOES NOT DRINK

## 2024-01-12 ASSESSMENT — PAIN DESCRIPTION - LOCATION
LOCATION: BACK
LOCATION: BACK

## 2024-01-12 ASSESSMENT — PAIN SCALES - GENERAL
PAINLEVEL_OUTOF10: 5
PAINLEVEL_OUTOF10: 6

## 2024-01-12 ASSESSMENT — PAIN - FUNCTIONAL ASSESSMENT: PAIN_FUNCTIONAL_ASSESSMENT: ACTIVITIES ARE NOT PREVENTED

## 2024-01-12 ASSESSMENT — PAIN DESCRIPTION - ORIENTATION: ORIENTATION: MID;LOWER

## 2024-01-12 ASSESSMENT — PAIN DESCRIPTION - DESCRIPTORS: DESCRIPTORS: ACHING

## 2024-01-12 NOTE — PROGRESS NOTES
Inpatient Physical Therapy Evaluation & Treatment    Unit: ED  Date:  1/12/2024  Patient Name:    Serina Rubin  Admitting diagnosis:  Acute exacerbation of chronic obstructive pulmonary disease (COPD) ([facility]) [J44.1]  Admit Date:  1/11/2024  Precautions/Restrictions/WB Status/ Lines/ Wounds/ Oxygen: Lines (IV, Supplemental O2 (4L), and external catheter), Telemetry, and Continuous pulse oximetry      Pt seen for cotreatment this date due to patient safety, patient endurance, and acute illness/injury    Treatment Time:  10:30 - 11:05  Treatment Number:  1   Timed Code Treatment Minutes: 25 minutes  Total Treatment Minutes:  35  minutes    Patient Stated Goals for Therapy: \" feel better \"          Discharge Recommendations: Home with PRN assistance and Home PT (Return to [facility])  DME needs for discharge: Needs Met       Therapy recommendation for EMS Transport: can transport by wheelchair    Therapy recommendations for staff:   Stand by assist for ambulation with use of rolling walker (RW) and gait belt to/from BSC  to/from chair  to/from bathroom  within room    History of Present Illness:   Serina Rubin is a 68 y.o. female who presents to the emergency department by EMS from [facility]  Patient with nausea persistent with emesis x 1.  Oximetry room air at 82% per [facility].  Patient with known COPD.  Patient reports wears oxygen at night and may wear it during the day if needed.  She does not wear O2 on a 24/7 basis.  She does not report chest pain.  She is currently on supplemental oxygen 2 L and saturating at 97%.  She reports no lower extremity edema.  No fevers or chills.  Her BP is 117/65 and heart rate 90 initially and after treatments did increase slightly to 103.  She reports no diarrhea or constipation.  No urinary complaints are being reported.       Home Health S4 Level Recommendation:  Level 1 Standard        AM-PAC Mobility Score    AM-PAC Inpatient Mobility without Stair Climbing Raw Score :

## 2024-01-12 NOTE — ED NOTES
Norvasc and Cassidy held for now until Admitting NP evaluates patient - NP here in departemt now updated bp 90's/50's this am.

## 2024-01-12 NOTE — FLOWSHEET NOTE
01/12/24 1642   Vital Signs   Temp 98.2 °F (36.8 °C)   Temp Source Oral   Pulse 84   Heart Rate Source Monitor   Respirations 18   BP (!) 100/55   MAP (Calculated) 70   BP Location Left upper arm   BP Method Automatic   Patient Position Semi fowlers   Oxygen Therapy   SpO2 94 %   O2 Device Nasal cannula   O2 Flow Rate (L/min) 3 L/min     Pt. Resting in bed. Call light in reach. Shift assessment completed see flow sheet. Denies any needs at this time. Will continue to monitor.

## 2024-01-12 NOTE — ED PROVIDER NOTES
- 5.0 g/dL    Albumin/Globulin Ratio 1.5 1.1 - 2.2    Total Bilirubin 0.3 0.0 - 1.0 mg/dL    Alkaline Phosphatase 64 40 - 129 U/L    ALT 14 10 - 40 U/L    AST 24 15 - 37 U/L   CBC auto differential   Result Value Ref Range    WBC 9.3 4.0 - 11.0 K/uL    RBC 4.18 4.00 - 5.20 M/uL    Hemoglobin 12.3 12.0 - 16.0 g/dL    Hematocrit 37.8 36.0 - 48.0 %    MCV 90.3 80.0 - 100.0 fL    MCH 29.4 26.0 - 34.0 pg    MCHC 32.5 31.0 - 36.0 g/dL    RDW 14.8 12.4 - 15.4 %    Platelets 169 135 - 450 K/uL    MPV 10.6 (H) 5.0 - 10.5 fL    Neutrophils % 76.3 %    Lymphocytes % 11.7 %    Monocytes % 11.9 %    Eosinophils % 0.0 %    Basophils % 0.1 %    Neutrophils Absolute 7.1 1.7 - 7.7 K/uL    Lymphocytes Absolute 1.1 1.0 - 5.1 K/uL    Monocytes Absolute 1.1 0.0 - 1.3 K/uL    Eosinophils Absolute 0.0 0.0 - 0.6 K/uL    Basophils Absolute 0.0 0.0 - 0.2 K/uL   Comprehensive Metabolic Panel w/ Reflex to MG   Result Value Ref Range    Sodium 139 136 - 145 mmol/L    Potassium reflex Magnesium 3.6 3.5 - 5.1 mmol/L    Chloride 104 99 - 110 mmol/L    CO2 28 21 - 32 mmol/L    Anion Gap 7 3 - 16    Glucose 85 70 - 99 mg/dL    BUN 11 7 - 20 mg/dL    Creatinine <0.5 (L) 0.6 - 1.2 mg/dL    Est, Glom Filt Rate >60 >60    Calcium 8.5 8.3 - 10.6 mg/dL    Total Protein 5.4 (L) 6.4 - 8.2 g/dL    Albumin 3.3 (L) 3.4 - 5.0 g/dL    Albumin/Globulin Ratio 1.6 1.1 - 2.2    Total Bilirubin 0.3 0.0 - 1.0 mg/dL    Alkaline Phosphatase 61 40 - 129 U/L    ALT 16 10 - 40 U/L    AST 27 15 - 37 U/L   CBC auto differential   Result Value Ref Range    WBC 9.4 4.0 - 11.0 K/uL    RBC 4.41 4.00 - 5.20 M/uL    Hemoglobin 12.7 12.0 - 16.0 g/dL    Hematocrit 39.1 36.0 - 48.0 %    MCV 88.7 80.0 - 100.0 fL    MCH 28.9 26.0 - 34.0 pg    MCHC 32.5 31.0 - 36.0 g/dL    RDW 15.0 12.4 - 15.4 %    Platelets 176 135 - 450 K/uL    MPV 10.4 5.0 - 10.5 fL    Neutrophils % 73.2 %    Lymphocytes % 13.4 %    Monocytes % 13.1 %    Eosinophils % 0.2 %    Basophils % 0.1 %    Neutrophils 
made to edit the dictations but occasionally words are mis-transcribed.)    Rupesh Hager PA-C (electronically signed)        Rupesh Hager PA-C  01/11/24 2053

## 2024-01-12 NOTE — DISCHARGE INSTRUCTIONS
Hold blood pressure medications and Demadex for two days.     JORDI LIND MD 1/14/2024 12:23 PM
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
## 2024-01-12 NOTE — PROGRESS NOTES
Inpatient Occupational Therapy Evaluation and Treatment    Unit: ED  Date:  1/12/2024  Patient Name:    Serina Rubin  Admitting diagnosis:  Acute exacerbation of chronic obstructive pulmonary disease (COPD) ([facility]) [J44.1]  Admit Date:  1/11/2024  Precautions/Restrictions/WB Status/ Lines/ Wounds/ Oxygen: Fall risk, Bed/chair alarm, and Lines (IV, Supplemental O2 (4L), and external catheter)    Pt seen for cotreatment this date due to patient safety, patient endurance, and limited functional status information    Treatment Time:  9903-9572  Treatment Number:  1  Timed Code Treatment Minutes: 25 minutes  Total Treatment Minutes:  35  minutes    Patient Goals for Therapy: \"get out of this bed \"          Discharge Recommendations: Home with PRN assist and Home OT  DME needs for discharge: Needs Met       Therapy recommendations for staff:   Assist of 1 for transfers with use of rolling walker (RW) and gait belt to/from chair  to/from bathroom  within halls    History of Present Illness:  68 y.o. female who presents to the emergency department by EMS from [facility].  Patient with nausea persistent with emesis x 1.  Oximetry room air at 82% per [facility].  Patient with known COPD.  Patient reports wears oxygen at night and may wear it during the day if needed.  She does not wear O2 on a 24/7 basis.  She does not report chest pain.  She is currently on supplemental oxygen 2 L and saturating at 97%.  She reports no lower extremity edema.  No fevers or chills.  Her BP is 117/65 and heart rate 90 initially and after treatments did increase slightly to 103.  She reports no diarrhea or constipation.  No urinary complaints are being reported.  Diagnosed with acute on chronic hypoxic respiratory failure.        Home Health S4 Level Recommendation:  NA    AM-PAC Score: AM-PAC Inpatient Daily Activity Raw Score: 18     Subjective:  Patient lying reclined in bed with no family present.   Pt agreeable to this OT session.

## 2024-01-12 NOTE — H&P
below, pulmonary consulted    - CT of chest showed no acute findings, moderate emphysema     COPD  AE  - prednisone 40 mg daily   - inhaled bronchodilators   - started on Rocephin and Zithromax D# 1--consider deescalating pending pulmonary consult   - check sputum cultures    - blood cultures ordered     Acute gastroenteritis   - +N/V/D  - ongoing for 2 days  - PRN nausea control  - diarrhea has resolved, consider stool samples if diarrhea persistent.  - no recent anabiotic use  - IVF give a bolus  - CT showed no acute abnormalities       HTN  - blood pressures on the lower side  - hold Norvasc, coreg and Torsemide at this time   - monitor, may need IVF     CAD  S/p PCI  - continue aspirin and statin  - holding BB due to hypotension  - troponin negative x2  - monitor on telemetry     PAF  - NSR  - on eliquis and coreg   - Secondary hypercoagulable state in a patient with atrial fibrillation  - monitor on telemetry      Chronic combined CHF, with recovered EF  - overall appears dry, pt with 1+BLE edema  - holding torsemide and BB at this time due to hypotension, resume when able  - daily weight, low sodium diet, fluid restriction       HLD  - Continue statin      Hx of CVA  - continue eliquis and statin     DEVI  -nightly oxygen     Hx of brain aneurysm        Note above makes patient higher risk for morbidity and mortality requiring testing and treatment.      DVT Prophylaxis: eliquis   Diet: ADULT DIET; Regular; 3 carb choices (45 gm/meal); Low Fat/Low Chol/High Fiber/2 gm Na; Low Sodium (2 gm)  Code Status: Full Code     Corinna Price NP 1/12/2024       JORDI LIND MD 1/12/2024 3:08 PM

## 2024-01-12 NOTE — PROGRESS NOTES
Patient admitted to room 314-2 from ER. Patient oriented to room, call light, bed rails, phone, lights and bathroom. Patient instructed about the schedule of the day including: vital sign frequency, lab draws, possible tests, frequency of MD and staff rounds, daily weights, I &O's and prescribed diet.  Telemetry box in place, patient aware of placement and reason. Bed locked, in lowest position, side rails up 2/4, call light within reach.        Recliner Assessment  Patient is able to demonstrate the ability to move from a reclining position to an upright position within the recliner.       4 Eyes Skin Assessment     NAME:  Serina Rubin  YOB: 1955  MEDICAL RECORD NUMBER:  [id number]    The patient is being assessed for  Admission    I agree that at least one RN has performed a thorough Head to Toe Skin Assessment on the patient. ALL assessment sites listed below have been assessed.      Areas assessed by both nurses:    Head, Face, Ears, Shoulders, Back, Chest, Arms, Elbows, Hands, Sacrum. Buttock, Coccyx, Ischium, Legs. Feet and Heels, Under Medical Devices , and Other blanchable redness coccyx, p  Petechiae/ rash bilateral legs, old healed wound left shin        Does the Patient have a Wound? No noted wound(s)       Caleb Prevention initiated by RN: No  Wound Care Orders initiated by RN: No    Pressure Injury (Stage 3,4, Unstageable, DTI, NWPT, and Complex wounds) if present, place Wound referral order by RN under : No    New Ostomies, if present place, Ostomy referral order under : No     Nurse 1 eSignature: Electronically signed by Jorge Willingham RN on 1/12/24 at 5:20 PM EST    **SHARE this note so that the co-signing nurse can place an eSignature**    Nurse 2 eSignature: Electronically signed by Matty Willingham RN on 1/12/24 at 7:04 PM EST

## 2024-01-12 NOTE — PROGRESS NOTES
Patient provided a COPD Educational Folder that includes the following materials:     [x]  [employer] Booklet: Managing your COPD  [x]  ALA: Getting the Most Out of Medication Delivery Devices  [x]  ALA: My COPD Action Plan  [x]  Better Breathers [facility]: [facility]   [x]  Smoking Cessation Classes  [x]  Outpatient Spiritual Care Services  [x]  [employer]: Signs of COPD    PATIENT/CAREGIVER TEACHING:   Level of patient/caregiver understanding able to:   [x] Verbalize understanding   [] Demonstrate understanding       [] Teach back        [] Needs reinforcement     []  Other:     COPD ASSESSMENT TOOL (CAT):   Cough Assessment: 4   Phlegm Assessment: 2   Chest tightness:  0   Walking on an incline: 5   Home Activities: 3   Confident Leaving the Home: 2   Sleeping Soundly: 0   Have Energy: 3   Assessment Score: 19    CAT score of 19. Patient provided educational folder. Patient unsure about outpatient pulmonary rehab. F/U appointment scheduled. Patient will set up transport with [facility] just incase she can't drive.     Electronically signed by Megan Allen RN on 1/12/2024 at 4:19 PM

## 2024-01-12 NOTE — PROGRESS NOTES

## 2024-01-13 LAB
ALBUMIN SERPL-MCNC: 3.2 G/DL (ref 3.4–5)
ALBUMIN/GLOB SERPL: 1.5 {RATIO} (ref 1.1–2.2)
ALP SERPL-CCNC: 64 U/L (ref 40–129)
ALT SERPL-CCNC: 14 U/L (ref 10–40)
ANION GAP SERPL CALCULATED.3IONS-SCNC: 6 MMOL/L (ref 3–16)
AST SERPL-CCNC: 24 U/L (ref 15–37)
BASOPHILS # BLD: 0 K/UL (ref 0–0.2)
BASOPHILS NFR BLD: 0.1 %
BILIRUB SERPL-MCNC: 0.3 MG/DL (ref 0–1)
BUN SERPL-MCNC: 10 MG/DL (ref 7–20)
CALCIUM SERPL-MCNC: 8.4 MG/DL (ref 8.3–10.6)
CHLORIDE SERPL-SCNC: 106 MMOL/L (ref 99–110)
CO2 SERPL-SCNC: 29 MMOL/L (ref 21–32)
CREAT SERPL-MCNC: <0.5 MG/DL (ref 0.6–1.2)
DEPRECATED RDW RBC AUTO: 14.8 % (ref 12.4–15.4)
EOSINOPHIL # BLD: 0 K/UL (ref 0–0.6)
EOSINOPHIL NFR BLD: 0 %
GFR SERPLBLD CREATININE-BSD FMLA CKD-EPI: >60 ML/MIN/{1.73_M2}
GLUCOSE SERPL-MCNC: 81 MG/DL (ref 70–99)
HCT VFR BLD AUTO: 37.8 % (ref 36–48)
HGB BLD-MCNC: 12.3 G/DL (ref 12–16)
LYMPHOCYTES # BLD: 1.1 K/UL (ref 1–5.1)
LYMPHOCYTES NFR BLD: 11.7 %
MCH RBC QN AUTO: 29.4 PG (ref 26–34)
MCHC RBC AUTO-ENTMCNC: 32.5 G/DL (ref 31–36)
MCV RBC AUTO: 90.3 FL (ref 80–100)
MONOCYTES # BLD: 1.1 K/UL (ref 0–1.3)
MONOCYTES NFR BLD: 11.9 %
NEUTROPHILS # BLD: 7.1 K/UL (ref 1.7–7.7)
NEUTROPHILS NFR BLD: 76.3 %
PLATELET # BLD AUTO: 169 K/UL (ref 135–450)
PMV BLD AUTO: 10.6 FL (ref 5–10.5)
POTASSIUM SERPL-SCNC: 4 MMOL/L (ref 3.5–5.1)
PROT SERPL-MCNC: 5.3 G/DL (ref 6.4–8.2)
RBC # BLD AUTO: 4.18 M/UL (ref 4–5.2)
SODIUM SERPL-SCNC: 141 MMOL/L (ref 136–145)
WBC # BLD AUTO: 9.3 K/UL (ref 4–11)

## 2024-01-13 PROCEDURE — 94761 N-INVAS EAR/PLS OXIMETRY MLT: CPT

## 2024-01-13 PROCEDURE — 6370000000 HC RX 637 (ALT 250 FOR IP): Performed by: INTERNAL MEDICINE

## 2024-01-13 PROCEDURE — 2700000000 HC OXYGEN THERAPY PER DAY

## 2024-01-13 PROCEDURE — 85025 COMPLETE CBC W/AUTO DIFF WBC: CPT

## 2024-01-13 PROCEDURE — 2060000000 HC ICU INTERMEDIATE R&B

## 2024-01-13 PROCEDURE — 36415 COLL VENOUS BLD VENIPUNCTURE: CPT

## 2024-01-13 PROCEDURE — 99232 SBSQ HOSP IP/OBS MODERATE 35: CPT | Performed by: INTERNAL MEDICINE

## 2024-01-13 PROCEDURE — 94640 AIRWAY INHALATION TREATMENT: CPT

## 2024-01-13 PROCEDURE — 6360000002 HC RX W HCPCS: Performed by: INTERNAL MEDICINE

## 2024-01-13 PROCEDURE — 2580000003 HC RX 258: Performed by: INTERNAL MEDICINE

## 2024-01-13 PROCEDURE — 99233 SBSQ HOSP IP/OBS HIGH 50: CPT | Performed by: INTERNAL MEDICINE

## 2024-01-13 PROCEDURE — 80053 COMPREHEN METABOLIC PANEL: CPT

## 2024-01-13 RX ORDER — IPRATROPIUM BROMIDE AND ALBUTEROL SULFATE 2.5; .5 MG/3ML; MG/3ML
1 SOLUTION RESPIRATORY (INHALATION)
Status: DISCONTINUED | OUTPATIENT
Start: 2024-01-13 | End: 2024-01-14 | Stop reason: HOSPADM

## 2024-01-13 RX ADMIN — GABAPENTIN 400 MG: 400 CAPSULE ORAL at 20:21

## 2024-01-13 RX ADMIN — IPRATROPIUM BROMIDE AND ALBUTEROL SULFATE 1 DOSE: 2.5; .5 SOLUTION RESPIRATORY (INHALATION) at 21:41

## 2024-01-13 RX ADMIN — CEFTRIAXONE SODIUM 1000 MG: 1 INJECTION, POWDER, FOR SOLUTION INTRAMUSCULAR; INTRAVENOUS at 04:45

## 2024-01-13 RX ADMIN — ASPIRIN 81 MG: 81 TABLET, CHEWABLE ORAL at 08:07

## 2024-01-13 RX ADMIN — OXYCODONE HYDROCHLORIDE AND ACETAMINOPHEN 1 TABLET: 10; 325 TABLET ORAL at 21:53

## 2024-01-13 RX ADMIN — APIXABAN 5 MG: 5 TABLET, FILM COATED ORAL at 20:21

## 2024-01-13 RX ADMIN — Medication 2 PUFF: at 21:41

## 2024-01-13 RX ADMIN — Medication 2 PUFF: at 08:05

## 2024-01-13 RX ADMIN — GABAPENTIN 400 MG: 400 CAPSULE ORAL at 13:34

## 2024-01-13 RX ADMIN — AZITHROMYCIN MONOHYDRATE 500 MG: 500 INJECTION, POWDER, LYOPHILIZED, FOR SOLUTION INTRAVENOUS at 20:23

## 2024-01-13 RX ADMIN — ATORVASTATIN CALCIUM 80 MG: 40 TABLET, FILM COATED ORAL at 08:09

## 2024-01-13 RX ADMIN — OXYCODONE HYDROCHLORIDE AND ACETAMINOPHEN 1 TABLET: 10; 325 TABLET ORAL at 16:07

## 2024-01-13 RX ADMIN — APIXABAN 5 MG: 5 TABLET, FILM COATED ORAL at 08:07

## 2024-01-13 RX ADMIN — OXYCODONE HYDROCHLORIDE AND ACETAMINOPHEN 1 TABLET: 10; 325 TABLET ORAL at 08:07

## 2024-01-13 RX ADMIN — GUAIFENESIN 600 MG: 600 TABLET, EXTENDED RELEASE ORAL at 20:21

## 2024-01-13 RX ADMIN — Medication 3 MG: at 20:20

## 2024-01-13 RX ADMIN — PREDNISONE 40 MG: 20 TABLET ORAL at 08:10

## 2024-01-13 RX ADMIN — GABAPENTIN 400 MG: 400 CAPSULE ORAL at 08:07

## 2024-01-13 RX ADMIN — ONDANSETRON 4 MG: 2 INJECTION INTRAMUSCULAR; INTRAVENOUS at 17:51

## 2024-01-13 RX ADMIN — GUAIFENESIN 600 MG: 600 TABLET, EXTENDED RELEASE ORAL at 08:07

## 2024-01-13 RX ADMIN — IPRATROPIUM BROMIDE AND ALBUTEROL SULFATE 1 DOSE: 2.5; .5 SOLUTION RESPIRATORY (INHALATION) at 08:05

## 2024-01-13 ASSESSMENT — PAIN DESCRIPTION - DESCRIPTORS
DESCRIPTORS: ACHING
DESCRIPTORS: ACHING
DESCRIPTORS: ACHING;SHARP

## 2024-01-13 ASSESSMENT — PAIN SCALES - GENERAL
PAINLEVEL_OUTOF10: 7

## 2024-01-13 ASSESSMENT — PAIN DESCRIPTION - ORIENTATION: ORIENTATION: MID

## 2024-01-13 ASSESSMENT — PAIN DESCRIPTION - LOCATION
LOCATION: BACK

## 2024-01-13 ASSESSMENT — PAIN - FUNCTIONAL ASSESSMENT: PAIN_FUNCTIONAL_ASSESSMENT: ACTIVITIES ARE NOT PREVENTED

## 2024-01-13 NOTE — PROGRESS NOTES
RT Inhaler-Nebulizer Bronchodilator Protocol Note    There is a bronchodilator order in the chart from a provider indicating to follow the RT Bronchodilator Protocol and there is an “Initiate RT Inhaler-Nebulizer Bronchodilator Protocol” order as well (see protocol at bottom of note).    CXR Findings:  XR CHEST PORTABLE    Result Date: 1/11/2024  No radiographic evidence of acute cardiopulmonary process.       The findings from the last RT Protocol Assessment were as follows:   History Pulmonary Disease: (P) Chronic pulmonary disease  Respiratory Pattern: (P) Dyspnea on exertion or RR 21-25 bpm  Breath Sounds: (P) Slightly diminished and/or crackles  Cough: (P) Strong, spontaneous, non-productive  Indication for Bronchodilator Therapy: (P) Decreased or absent breath sounds  Bronchodilator Assessment Score: (P) 6    Aerosolized bronchodilator medication orders have been revised according to the RT Inhaler-Nebulizer Bronchodilator Protocol below.    Respiratory Therapist to perform RT Therapy Protocol Assessment initially then follow the protocol.  Repeat RT Therapy Protocol Assessment PRN for score 0-3 or on second treatment, BID, and PRN for scores above 3.    No Indications - adjust the frequency to every 6 hours PRN wheezing or bronchospasm, if no treatments needed after 48 hours then discontinue using Per Protocol order mode.     If indication present, adjust the RT bronchodilator orders based on the Bronchodilator Assessment Score as indicated below.  Use Inhaler orders unless patient has one or more of the following: on home nebulizer, not able to hold breath for 10 seconds, is not alert and oriented, cannot activate and use MDI correctly, or respiratory rate 25 breaths per minute or more, then use the equivalent nebulizer order(s) with same Frequency and PRN reasons based on the score.  If a patient is on this medication at home then do not decrease Frequency below that used at home.    0-3 - enter or revise

## 2024-01-13 NOTE — FLOWSHEET NOTE
01/12/24 2038   Vital Signs   Temp 98.4 °F (36.9 °C)   Temp Source Oral   Pulse 73   Heart Rate Source Monitor   Respirations 18   /64   MAP (Calculated) 77   BP Location Right upper arm   BP Method Automatic   Patient Position Semi fowlers   Oxygen Therapy   SpO2 91 %   O2 Device Nasal cannula   O2 Flow Rate (L/min) 3 L/min     Pt assessment complete.  Pt lying in bed quietly.  Lung sounds diminished.  Pt NSR per montior with HR of 77.  Purewick catheter in place draining clear yellow urine.  Nightly medications given. PRN percocet given for back pain.  No other needs at this time.  Call light within reach. Bed exit alarm on.

## 2024-01-13 NOTE — FLOWSHEET NOTE
01/13/24 0034   Vital Signs   Temp 97.4 °F (36.3 °C)   Temp Source Oral   Pulse 67   Heart Rate Source Monitor   Respirations 16   BP (!) 90/51   MAP (Calculated) 64   BP Location Left upper arm   BP Method Automatic   Patient Position Semi fowlers   Oxygen Therapy   SpO2 92 %   O2 Device Nasal cannula   O2 Flow Rate (L/min) 3 L/min     Reassessment complete.  No changes noted.  Pt lying in bed quietly.  Denies needs.  Call light within reach.  Bed exit alarm on.

## 2024-01-13 NOTE — PROGRESS NOTES
01/12/24 2100   RT Protocol   History Pulmonary Disease 2   Respiratory pattern 2   Breath sounds 2   Cough 0   Indications for Bronchodilator Therapy Decreased or absent breath sounds   Bronchodilator Assessment Score 6     RT Inhaler-Nebulizer Bronchodilator Protocol Note    There is a bronchodilator order in the chart from a provider indicating to follow the RT Bronchodilator Protocol and there is an “Initiate RT Inhaler-Nebulizer Bronchodilator Protocol” order as well (see protocol at bottom of note).    CXR Findings:  XR CHEST PORTABLE    Result Date: 1/11/2024  No radiographic evidence of acute cardiopulmonary process.       The findings from the last RT Protocol Assessment were as follows:   History Pulmonary Disease: Chronic pulmonary disease  Respiratory Pattern: Dyspnea on exertion or RR 21-25 bpm  Breath Sounds: Slightly diminished and/or crackles  Cough: Strong, spontaneous, non-productive  Indication for Bronchodilator Therapy: Decreased or absent breath sounds  Bronchodilator Assessment Score: 6    Aerosolized bronchodilator medication orders have been revised according to the RT Inhaler-Nebulizer Bronchodilator Protocol below.    Respiratory Therapist to perform RT Therapy Protocol Assessment initially then follow the protocol.  Repeat RT Therapy Protocol Assessment PRN for score 0-3 or on second treatment, BID, and PRN for scores above 3.    No Indications - adjust the frequency to every 6 hours PRN wheezing or bronchospasm, if no treatments needed after 48 hours then discontinue using Per Protocol order mode.     If indication present, adjust the RT bronchodilator orders based on the Bronchodilator Assessment Score as indicated below.  Use Inhaler orders unless patient has one or more of the following: on home nebulizer, not able to hold breath for 10 seconds, is not alert and oriented, cannot activate and use MDI correctly, or respiratory rate 25 breaths per minute or more, then use the

## 2024-01-13 NOTE — PROGRESS NOTES
Progress Note    Admit Date:  1/11/2024    Subjective:  Ms. Rubin is feeling some better today.  She is trying to eat a little more.  Breathing is slowly improving but not at baseline.  No chest pain.  No fever.    Objective:   /66   Pulse 76   Temp 98.1 °F (36.7 °C) (Oral)   Resp 18   Ht 1.575 m (5' 2\")   Wt 73 kg (161 lb)   SpO2 94%   BMI 29.45 kg/m²        Intake/Output Summary (Last 24 hours) at 1/13/2024 1309  Last data filed at 1/13/2024 1244  Gross per 24 hour   Intake 1181 ml   Output --   Net 1181 ml       Physical Exam:    General appearance: alert, appears stated age and cooperative  Head: Normocephalic, without obvious abnormality, atraumatic  Eyes: conjunctivae/corneas clear. PERRL, EOM's intact.  Neck: no adenopathy, no carotid bruit, no JVD, supple, symmetrical, trachea midline and thyroid not enlarged, symmetric, no tenderness/mass/nodules  Lungs: Diminished breath sounds at bases.  Few wheezes.  Heart: regular rate and rhythm, S1, S2 normal, no murmur, click, rub or gallop  Abdomen: soft, non-tender; bowel sounds normal; no masses,  no organomegaly  Extremities: extremities normal, atraumatic, no cyanosis or edema  Pulses: 2+ and symmetric  Skin: Skin color, texture, turgor normal. No rashes or lesions  Neurologic: Grossly normal    Scheduled Meds:   ipratropium 0.5 mg-albuterol 2.5 mg  1 Dose Inhalation BID RT    melatonin  3 mg Oral Nightly    predniSONE  40 mg Oral Daily    guaiFENesin  600 mg Oral BID    cefTRIAXone (ROCEPHIN) IV  1,000 mg IntraVENous Q24H    And    azithromycin  500 mg IntraVENous Q24H    [Held by provider] amLODIPine  5 mg Oral Daily    aspirin  81 mg Oral Daily    atorvastatin  80 mg Oral Daily    [Held by provider] carvedilol  3.125 mg Oral BID WC    apixaban  5 mg Oral BID    gabapentin  400 mg Oral TID    budesonide-formoterol  2 puff Inhalation BID RT    [Held by provider] torsemide  50 mg Oral Daily       Continuous Infusions:      PRN Meds:  potassium

## 2024-01-13 NOTE — FLOWSHEET NOTE
01/13/24 0802   Vital Signs   Temp 97.6 °F (36.4 °C)   Temp Source Oral   Pulse 52   Heart Rate Source Monitor   Respirations 16   BP (!) 113/55   MAP (Calculated) 74   BP Location Left upper arm   BP Method Automatic   Patient Position Semi fowlers   Oxygen Therapy   SpO2 91 %   O2 Device Nasal cannula   O2 Flow Rate (L/min) 3 L/min     Pt. Resting in bed. Call light in reach. Shift assessment completed see flow sheet. Denies any needs at this time. Will continue to monitor.

## 2024-01-13 NOTE — PROGRESS NOTES
01/13/24 1506   Encounter Summary   Encounter Overview/Reason  Spiritual/Emotional Needs;Initial Encounter   Service Provided For: Patient   Referral/Consult From: [facility]   Support System Children   Last Encounter  01/13/24  (Talked with pt abt Advanced Directives, pt's son will bring, prayed with pt)   Spiritual/Emotional needs   Type Spiritual Support   Advance Care Planning   Type ACP conversation  (Son will bring already completed document)   Assessment/Intervention/Outcome   Assessment Concerns with suffering;Hopeful;Peaceful   Intervention Active listening;Discussed illness injury and it’s impact;Discussed relationship with God;Explored/Affirmed feelings, thoughts, concerns;Nurtured Hope;Prayer (assurance of)/[place]   Outcome Encouraged;Engaged in conversation;Expressed Gratitude

## 2024-01-13 NOTE — PROGRESS NOTES
Pulmonary Progress Note  CC: SOB at nursing home    Subjective:  less SOB today, not much cough  N/V resolved and goi to try to eat today    EXAM: /66   Pulse 76   Temp 98.1 °F (36.7 °C) (Oral)   Resp 18   Ht 1.575 m (5' 2\")   Wt 73 kg (161 lb)   SpO2 94%   BMI 29.45 kg/m²  on 3L  Constitutional:  No acute distress.   Eyes: PERRL. Conjunctivae anicteric.   ENT: Normal nose. Normal tongue.    Neck:  Trachea is midline.   Respiratory: No accessory muscle usage.  decreased breath sounds. No wheezes. No rales. No Rhonchi.  Cardiovascular: Normal S1S2. No digit clubbing. No digit cyanosis. No LE edema.   Psychiatric: No anxiety or Agitation. Alert and Oriented to person, place and time.    Scheduled Meds:   ipratropium 0.5 mg-albuterol 2.5 mg  1 Dose Inhalation BID RT    melatonin  3 mg Oral Nightly    predniSONE  40 mg Oral Daily    guaiFENesin  600 mg Oral BID    cefTRIAXone (ROCEPHIN) IV  1,000 mg IntraVENous Q24H    And    azithromycin  500 mg IntraVENous Q24H    [Held by provider] amLODIPine  5 mg Oral Daily    aspirin  81 mg Oral Daily    atorvastatin  80 mg Oral Daily    [Held by provider] carvedilol  3.125 mg Oral BID WC    apixaban  5 mg Oral BID    gabapentin  400 mg Oral TID    budesonide-formoterol  2 puff Inhalation BID RT    [Held by provider] torsemide  50 mg Oral Daily     Continuous Infusions:    PRN Meds:  potassium chloride **OR** potassium alternative oral replacement **OR** potassium chloride, magnesium sulfate, polyethylene glycol, albuterol, ondansetron **OR** ondansetron, benzonatate, oxyCODONE-acetaminophen    Labs:  CBC:   Recent Labs     01/11/24  1230 01/12/24  0644 01/13/24  0429   WBC 12.6* 9.9 9.3   HGB 15.6 12.4 12.3   HCT 48.1* 37.8 37.8   MCV 89.7 89.9 90.3    159 169     BMP:   Recent Labs     01/11/24  1230 01/12/24  0644 01/13/24  0429    141 141   K 4.0 3.6 4.0    106 106   CO2 29 27 29   BUN 13 10 10   CREATININE <0.5* <0.5* <0.5*

## 2024-01-13 NOTE — PROGRESS NOTES
Pt is lying in bed with their eyes closed. Respirations are easy and even. Call light within reach bed in lowest position with the wheels locked. Will continue to monitor. Charlotte Batres RN

## 2024-01-14 VITALS
WEIGHT: 162.4 LBS | OXYGEN SATURATION: 94 % | BODY MASS INDEX: 29.88 KG/M2 | SYSTOLIC BLOOD PRESSURE: 123 MMHG | DIASTOLIC BLOOD PRESSURE: 74 MMHG | TEMPERATURE: 98.3 F | HEART RATE: 70 BPM | RESPIRATION RATE: 16 BRPM | HEIGHT: 62 IN

## 2024-01-14 LAB
ALBUMIN SERPL-MCNC: 3.3 G/DL (ref 3.4–5)
ALBUMIN/GLOB SERPL: 1.6 {RATIO} (ref 1.1–2.2)
ALP SERPL-CCNC: 61 U/L (ref 40–129)
ALT SERPL-CCNC: 16 U/L (ref 10–40)
ANION GAP SERPL CALCULATED.3IONS-SCNC: 7 MMOL/L (ref 3–16)
AST SERPL-CCNC: 27 U/L (ref 15–37)
BASOPHILS # BLD: 0 K/UL (ref 0–0.2)
BASOPHILS NFR BLD: 0.1 %
BILIRUB SERPL-MCNC: 0.3 MG/DL (ref 0–1)
BUN SERPL-MCNC: 11 MG/DL (ref 7–20)
CALCIUM SERPL-MCNC: 8.5 MG/DL (ref 8.3–10.6)
CHLORIDE SERPL-SCNC: 104 MMOL/L (ref 99–110)
CO2 SERPL-SCNC: 28 MMOL/L (ref 21–32)
CREAT SERPL-MCNC: <0.5 MG/DL (ref 0.6–1.2)
DEPRECATED RDW RBC AUTO: 15 % (ref 12.4–15.4)
EOSINOPHIL # BLD: 0 K/UL (ref 0–0.6)
EOSINOPHIL NFR BLD: 0.2 %
GFR SERPLBLD CREATININE-BSD FMLA CKD-EPI: >60 ML/MIN/{1.73_M2}
GLUCOSE SERPL-MCNC: 85 MG/DL (ref 70–99)
HCT VFR BLD AUTO: 39.1 % (ref 36–48)
HGB BLD-MCNC: 12.7 G/DL (ref 12–16)
LYMPHOCYTES # BLD: 1.3 K/UL (ref 1–5.1)
LYMPHOCYTES NFR BLD: 13.4 %
MCH RBC QN AUTO: 28.9 PG (ref 26–34)
MCHC RBC AUTO-ENTMCNC: 32.5 G/DL (ref 31–36)
MCV RBC AUTO: 88.7 FL (ref 80–100)
MONOCYTES # BLD: 1.2 K/UL (ref 0–1.3)
MONOCYTES NFR BLD: 13.1 %
NEUTROPHILS # BLD: 6.9 K/UL (ref 1.7–7.7)
NEUTROPHILS NFR BLD: 73.2 %
PLATELET # BLD AUTO: 176 K/UL (ref 135–450)
PMV BLD AUTO: 10.4 FL (ref 5–10.5)
POTASSIUM SERPL-SCNC: 3.6 MMOL/L (ref 3.5–5.1)
PROT SERPL-MCNC: 5.4 G/DL (ref 6.4–8.2)
RBC # BLD AUTO: 4.41 M/UL (ref 4–5.2)
SODIUM SERPL-SCNC: 139 MMOL/L (ref 136–145)
WBC # BLD AUTO: 9.4 K/UL (ref 4–11)

## 2024-01-14 PROCEDURE — 80053 COMPREHEN METABOLIC PANEL: CPT

## 2024-01-14 PROCEDURE — 6370000000 HC RX 637 (ALT 250 FOR IP): Performed by: INTERNAL MEDICINE

## 2024-01-14 PROCEDURE — 99233 SBSQ HOSP IP/OBS HIGH 50: CPT | Performed by: INTERNAL MEDICINE

## 2024-01-14 PROCEDURE — 36415 COLL VENOUS BLD VENIPUNCTURE: CPT

## 2024-01-14 PROCEDURE — 6360000002 HC RX W HCPCS: Performed by: INTERNAL MEDICINE

## 2024-01-14 PROCEDURE — 99238 HOSP IP/OBS DSCHRG MGMT 30/<: CPT | Performed by: INTERNAL MEDICINE

## 2024-01-14 PROCEDURE — 94761 N-INVAS EAR/PLS OXIMETRY MLT: CPT

## 2024-01-14 PROCEDURE — 2580000003 HC RX 258: Performed by: INTERNAL MEDICINE

## 2024-01-14 PROCEDURE — 94640 AIRWAY INHALATION TREATMENT: CPT

## 2024-01-14 PROCEDURE — 2700000000 HC OXYGEN THERAPY PER DAY

## 2024-01-14 PROCEDURE — 85025 COMPLETE CBC W/AUTO DIFF WBC: CPT

## 2024-01-14 RX ORDER — ONDANSETRON 4 MG/1
4 TABLET, ORALLY DISINTEGRATING ORAL 3 TIMES DAILY PRN
Qty: 21 TABLET | Refills: 0 | Status: SHIPPED | OUTPATIENT
Start: 2024-01-14

## 2024-01-14 RX ORDER — PREDNISONE 10 MG/1
TABLET ORAL
Qty: 30 TABLET | Refills: 0 | Status: SHIPPED | OUTPATIENT
Start: 2024-01-14

## 2024-01-14 RX ORDER — CEFDINIR 300 MG/1
300 CAPSULE ORAL 2 TIMES DAILY
Qty: 10 CAPSULE | Refills: 0 | Status: SHIPPED | OUTPATIENT
Start: 2024-01-14 | End: 2024-01-19

## 2024-01-14 RX ADMIN — ASPIRIN 81 MG: 81 TABLET, CHEWABLE ORAL at 08:50

## 2024-01-14 RX ADMIN — GABAPENTIN 400 MG: 400 CAPSULE ORAL at 08:50

## 2024-01-14 RX ADMIN — GABAPENTIN 400 MG: 400 CAPSULE ORAL at 13:46

## 2024-01-14 RX ADMIN — CEFTRIAXONE SODIUM 1000 MG: 1 INJECTION, POWDER, FOR SOLUTION INTRAMUSCULAR; INTRAVENOUS at 05:57

## 2024-01-14 RX ADMIN — APIXABAN 5 MG: 5 TABLET, FILM COATED ORAL at 08:50

## 2024-01-14 RX ADMIN — OXYCODONE HYDROCHLORIDE AND ACETAMINOPHEN 1 TABLET: 10; 325 TABLET ORAL at 05:57

## 2024-01-14 RX ADMIN — OXYCODONE HYDROCHLORIDE AND ACETAMINOPHEN 1 TABLET: 10; 325 TABLET ORAL at 14:29

## 2024-01-14 RX ADMIN — IPRATROPIUM BROMIDE AND ALBUTEROL SULFATE 1 DOSE: 2.5; .5 SOLUTION RESPIRATORY (INHALATION) at 07:58

## 2024-01-14 RX ADMIN — PREDNISONE 40 MG: 20 TABLET ORAL at 08:50

## 2024-01-14 RX ADMIN — GUAIFENESIN 600 MG: 600 TABLET, EXTENDED RELEASE ORAL at 08:50

## 2024-01-14 RX ADMIN — Medication 2 PUFF: at 07:58

## 2024-01-14 RX ADMIN — ATORVASTATIN CALCIUM 80 MG: 40 TABLET, FILM COATED ORAL at 08:50

## 2024-01-14 ASSESSMENT — PAIN DESCRIPTION - LOCATION
LOCATION: BACK
LOCATION: BACK

## 2024-01-14 ASSESSMENT — PAIN SCALES - GENERAL
PAINLEVEL_OUTOF10: 7
PAINLEVEL_OUTOF10: 7

## 2024-01-14 ASSESSMENT — PAIN DESCRIPTION - ORIENTATION: ORIENTATION: MID;LOWER

## 2024-01-14 ASSESSMENT — PAIN - FUNCTIONAL ASSESSMENT: PAIN_FUNCTIONAL_ASSESSMENT: ACTIVITIES ARE NOT PREVENTED

## 2024-01-14 ASSESSMENT — PAIN DESCRIPTION - DESCRIPTORS: DESCRIPTORS: ACHING

## 2024-01-14 NOTE — FLOWSHEET NOTE
01/13/24 2340   Vital Signs   Temp 97.6 °F (36.4 °C)   Temp Source Oral   Pulse 83   Heart Rate Source Monitor   Respirations 18   /65   MAP (Calculated) 77   BP Method Automatic   Patient Position Semi fowlers   Oxygen Therapy   SpO2 94 %   O2 Device Nasal cannula   O2 Flow Rate (L/min) 3 L/min     Reassessment complete.  No changes noted.  Call light within reach.

## 2024-01-14 NOTE — PLAN OF CARE
Problem: Chronic Conditions and Co-morbidities  Goal: Patient's chronic conditions and co-morbidity symptoms are monitored and maintained or improved  1/14/2024 1416 by Jorge Willingham RN  Outcome: Completed  1/14/2024 0849 by Jorge Willingham RN  Outcome: Progressing     Problem: Discharge Planning  Goal: Discharge to home or other facility with appropriate resources  1/14/2024 1416 by Jorge Willingham RN  Outcome: Completed  1/14/2024 0849 by Jorge Willingham RN  Outcome: Progressing     Problem: Pain  Goal: Verbalizes/displays adequate comfort level or baseline comfort level  1/14/2024 1416 by Jorge Willingham RN  Outcome: Completed  1/14/2024 0849 by Jorge Willingham RN  Outcome: Progressing     Problem: Safety - Adult  Goal: Free from fall injury  1/14/2024 1416 by Jorge Willingham RN  Outcome: Completed  1/14/2024 0849 by Jorge Willingham RN  Outcome: Progressing

## 2024-01-14 NOTE — DISCHARGE SUMMARY
Name:  Serina Rubin  Room:  /0314-02  MRN:    [id number]    Discharge Summary      This discharge summary is in conjunction with a complete physical exam done on the day of discharge.      Discharging Physician: JORDI LIND MD      Admit: 1/11/2024  Discharge:  1/14/2024     Diagnoses this Admission    Principal Problem:    Acute exacerbation of chronic obstructive pulmonary disease (COPD) (HCC)  Active Problems:    Acute respiratory failure with hypoxemia (HCC)    Paroxysmal A-fib (HCC)    Chronic diastolic CHF (congestive heart failure) (HCC)    History of CVA (cerebrovascular accident)    DEVI (obstructive sleep apnea)    HTN (hypertension), benign    Nausea vomiting and diarrhea    Acute on chronic hypoxic respiratory failure (HCC)  Resolved Problems:    * No resolved hospital problems. *      Procedures (Please Review Full Report for Details)  None    Consults    IP CONSULT TO HOSPITALIST  IP CONSULT TO PULMONOLOGY      HPI:    Patient is a 68-year-old white female with a past medical history of brain aneurysm, COPD, hypertension, hyperlipidemia, CHF, A-fib, anxiety, sleep apnea. Came to emergency room with complaints of GI illness. She complains of nausea vomiting and diarrhea that started 2 days ago. She lives in assisted living. Patient had diarrhea throughout the night. There is no blood. Subsequent to the diarrhea she had nausea and vomiting. She had some shortness of breath associated with the nausea and vomiting. Usually uses oxygen at night. When I saw her she was feeling slightly improved. She was on 3 L of oxygen. Not have much of an appetite.     Physical Exam at Discharge:  /74   Pulse 70   Temp 98.3 °F (36.8 °C) (Oral)   Resp 16   Ht 1.575 m (5' 2\")   Wt 73.7 kg (162 lb 6.4 oz)   SpO2 94%   BMI 29.70 kg/m²     General appearance: alert, appears stated age and cooperative  Head: Normocephalic, without obvious abnormality, atraumatic  Eyes: conjunctivae/corneas clear.

## 2024-01-14 NOTE — PROGRESS NOTES
RT Inhaler-Nebulizer Bronchodilator Protocol Note    There is a bronchodilator order in the chart from a provider indicating to follow the RT Bronchodilator Protocol and there is an “Initiate RT Inhaler-Nebulizer Bronchodilator Protocol” order as well (see protocol at bottom of note).    CXR Findings:  No results found.    The findings from the last RT Protocol Assessment were as follows:   History Pulmonary Disease: (P) Chronic pulmonary disease  Respiratory Pattern: (P) Dyspnea on exertion or RR 21-25 bpm  Breath Sounds: (P) Slightly diminished and/or crackles  Cough: (P) Strong, spontaneous, non-productive  Indication for Bronchodilator Therapy: (P) Decreased or absent breath sounds  Bronchodilator Assessment Score: (P) 6    Aerosolized bronchodilator medication orders have been revised according to the RT Inhaler-Nebulizer Bronchodilator Protocol below.    Respiratory Therapist to perform RT Therapy Protocol Assessment initially then follow the protocol.  Repeat RT Therapy Protocol Assessment PRN for score 0-3 or on second treatment, BID, and PRN for scores above 3.    No Indications - adjust the frequency to every 6 hours PRN wheezing or bronchospasm, if no treatments needed after 48 hours then discontinue using Per Protocol order mode.     If indication present, adjust the RT bronchodilator orders based on the Bronchodilator Assessment Score as indicated below.  Use Inhaler orders unless patient has one or more of the following: on home nebulizer, not able to hold breath for 10 seconds, is not alert and oriented, cannot activate and use MDI correctly, or respiratory rate 25 breaths per minute or more, then use the equivalent nebulizer order(s) with same Frequency and PRN reasons based on the score.  If a patient is on this medication at home then do not decrease Frequency below that used at home.    0-3 - enter or revise RT bronchodilator order(s) to equivalent RT Bronchodilator order with Frequency of every 4

## 2024-01-14 NOTE — PROGRESS NOTES
Pulmonary Progress Note  CC: SOB at nursing home    Subjective:  c/o diarrhea    EXAM: /74   Pulse 70   Temp 98.3 °F (36.8 °C) (Oral)   Resp 16   Ht 1.575 m (5' 2\")   Wt 73.7 kg (162 lb 6.4 oz)   SpO2 94%   BMI 29.70 kg/m²  on 3L  Constitutional:  No acute distress.   Eyes: PERRL. Conjunctivae anicteric.   ENT: Normal nose. Normal tongue.    Neck:  Trachea is midline.   Respiratory: No accessory muscle usage.  decreased breath sounds. No wheezes. No rales. No Rhonchi.  Cardiovascular: Normal S1S2. No digit clubbing. No digit cyanosis. No LE edema.   Psychiatric: No anxiety or Agitation. Alert and Oriented to person, place and time.    Scheduled Meds:   ipratropium 0.5 mg-albuterol 2.5 mg  1 Dose Inhalation BID RT    melatonin  3 mg Oral Nightly    predniSONE  40 mg Oral Daily    guaiFENesin  600 mg Oral BID    cefTRIAXone (ROCEPHIN) IV  1,000 mg IntraVENous Q24H    And    azithromycin  500 mg IntraVENous Q24H    [Held by provider] amLODIPine  5 mg Oral Daily    aspirin  81 mg Oral Daily    atorvastatin  80 mg Oral Daily    [Held by provider] carvedilol  3.125 mg Oral BID WC    apixaban  5 mg Oral BID    gabapentin  400 mg Oral TID    budesonide-formoterol  2 puff Inhalation BID RT    [Held by provider] torsemide  50 mg Oral Daily     Continuous Infusions:    PRN Meds:  potassium chloride **OR** potassium alternative oral replacement **OR** potassium chloride, magnesium sulfate, polyethylene glycol, albuterol, ondansetron **OR** ondansetron, benzonatate, oxyCODONE-acetaminophen    Labs:  CBC:   Recent Labs     01/12/24  0644 01/13/24  0429 01/14/24 0449   WBC 9.9 9.3 9.4   HGB 12.4 12.3 12.7   HCT 37.8 37.8 39.1   MCV 89.9 90.3 88.7    169 176       BMP:   Recent Labs     01/12/24  0644 01/13/24  0429 01/14/24  0449    141 139   K 3.6 4.0 3.6    106 104   CO2 27 29 28   BUN 10 10 11   CREATININE <0.5* <0.5* <0.5*         Cultures:  FLU/COVID/RSV all negative      Films:  1/11/24 Chest

## 2024-01-14 NOTE — FLOWSHEET NOTE
01/13/24 1957   Vital Signs   Temp 97.8 °F (36.6 °C)   Temp Source Oral   Pulse 70   Heart Rate Source Monitor   Respirations 16   /66   MAP (Calculated) 83   BP Location Left upper arm   BP Method Automatic   Patient Position Semi fowlers     Pt assessment complete.  Pt lying in bed quietly in bed.  Lung sounds diminished. Pt on 02 3liters via nasal canula.  Purewick catheter in place draining clear yellow urine.  Nightly medications given.  No other needs at this time.  Call light within reach.

## 2024-01-14 NOTE — FLOWSHEET NOTE
01/14/24 0805   Vital Signs   Temp 97.6 °F (36.4 °C)   Temp Source Oral   Pulse 70   Heart Rate Source Monitor   Respirations 16   BP (!) 97/56   MAP (Calculated) 70   BP Location Left upper arm   BP Method Automatic   Patient Position Semi fowlers   Oxygen Therapy   SpO2 96 %   O2 Device Nasal cannula   O2 Flow Rate (L/min) 3 L/min     Pt. Resting in bed. Call light in reach. Shift assessment completed see flow sheet. Denies any needs at this time. Will continue to monitor.

## 2024-01-14 NOTE — PROGRESS NOTES
01/13/24 2100   RT Protocol   History Pulmonary Disease 2   Respiratory pattern 2   Breath sounds 2   Cough 0   Indications for Bronchodilator Therapy Decreased or absent breath sounds   Bronchodilator Assessment Score 6     RT Inhaler-Nebulizer Bronchodilator Protocol Note    There is a bronchodilator order in the chart from a provider indicating to follow the RT Bronchodilator Protocol and there is an “Initiate RT Inhaler-Nebulizer Bronchodilator Protocol” order as well (see protocol at bottom of note).    CXR Findings:  No results found.    The findings from the last RT Protocol Assessment were as follows:   History Pulmonary Disease: Chronic pulmonary disease  Respiratory Pattern: Dyspnea on exertion or RR 21-25 bpm  Breath Sounds: Slightly diminished and/or crackles  Cough: Strong, spontaneous, non-productive  Indication for Bronchodilator Therapy: Decreased or absent breath sounds  Bronchodilator Assessment Score: 6    Aerosolized bronchodilator medication orders have been revised according to the RT Inhaler-Nebulizer Bronchodilator Protocol below.    Respiratory Therapist to perform RT Therapy Protocol Assessment initially then follow the protocol.  Repeat RT Therapy Protocol Assessment PRN for score 0-3 or on second treatment, BID, and PRN for scores above 3.    No Indications - adjust the frequency to every 6 hours PRN wheezing or bronchospasm, if no treatments needed after 48 hours then discontinue using Per Protocol order mode.     If indication present, adjust the RT bronchodilator orders based on the Bronchodilator Assessment Score as indicated below.  Use Inhaler orders unless patient has one or more of the following: on home nebulizer, not able to hold breath for 10 seconds, is not alert and oriented, cannot activate and use MDI correctly, or respiratory rate 25 breaths per minute or more, then use the equivalent nebulizer order(s) with same Frequency and PRN reasons based on the score.  If a

## 2024-01-14 NOTE — CARE COORDINATION
DISCHARGE ORDER  Date/Time 2024 1:54 PM  Completed by: Teresa Boeck, RN, Case Management    Patient Name: Serina Rubin      : 1955  Admitting Diagnosis: Acute exacerbation of chronic obstructive pulmonary disease (COPD) ([facility]) [J44.1]  Acute respiratory failure with hypoxemia ([facility]) [J96.01]  Pneumonia due to infectious organism, unspecified laterality, unspecified part of lung [J18.9]  Nausea and vomiting, unspecified vomiting type [R11.2]      Admit order Date and Status:24  (verify MD's last order for status of admission)      Noted discharge order.   If applicable PT/OT recommendation at Discharge: NA  DME recommendation by PT/OT:  Confirmed discharge plan: Yes  with whom Jorge CABRERA who advised patient. Also spoke to Marina at [facility] who advised the patient lives in the assisted living at [facility] and can return when she would like.    The patient's sister is transporting the patient to [facility].     If pt confirmed DC plan does family need to be contacted by CM No if yes who______  Discharge Plan: Th e patient is discharging with her sister and returning to [facility].     Date of Last IMM Given: 24    Reviewed chart.  Role of discharge planner explained and patient verbalized understanding. Discharge order is noted.    Has Home O2 in place on admit:  yes  Informed of need to bring portable home O2 tank on day of discharge for nursing to connect prior to leaving:   Yes  Verbalized agreement/Understanding:   Yes  Pt is being d/c'd to [facility] today. Pt's O2 sats are 94% on 3L.    Discharge timeout done with Jorge CABRERA. All discharge needs and concerns addressed.

## 2024-01-14 NOTE — PROGRESS NOTES
Pt. Walked on 2 liters oxygen . Her SpO2 was 97% at rest and 93 % when walking. She ambulated 200 feet during walk

## 2024-01-15 LAB
BACTERIA BLD CULT ORG #2: NORMAL
BACTERIA BLD CULT: NORMAL

## 2024-02-05 ASSESSMENT — PATIENT HEALTH QUESTIONNAIRE - PHQ9
SUM OF ALL RESPONSES TO PHQ QUESTIONS 1-9: 0
2. FEELING DOWN, DEPRESSED OR HOPELESS: 0
SUM OF ALL RESPONSES TO PHQ9 QUESTIONS 1 & 2: 0
SUM OF ALL RESPONSES TO PHQ QUESTIONS 1-9: 0
SUM OF ALL RESPONSES TO PHQ QUESTIONS 1-9: 0
1. LITTLE INTEREST OR PLEASURE IN DOING THINGS: NOT AT ALL
1. LITTLE INTEREST OR PLEASURE IN DOING THINGS: 0
2. FEELING DOWN, DEPRESSED OR HOPELESS: NOT AT ALL
SUM OF ALL RESPONSES TO PHQ QUESTIONS 1-9: 0
SUM OF ALL RESPONSES TO PHQ9 QUESTIONS 1 & 2: 0

## 2024-02-06 ENCOUNTER — OFFICE VISIT (OUTPATIENT)
Dept: INTERNAL MEDICINE CLINIC | Age: 69
End: 2024-02-06

## 2024-02-06 VITALS
HEIGHT: 62 IN | HEART RATE: 89 BPM | RESPIRATION RATE: 14 BRPM | DIASTOLIC BLOOD PRESSURE: 60 MMHG | WEIGHT: 160 LBS | BODY MASS INDEX: 29.44 KG/M2 | SYSTOLIC BLOOD PRESSURE: 112 MMHG

## 2024-02-06 DIAGNOSIS — R21 RASH: Primary | ICD-10-CM

## 2024-02-06 PROCEDURE — 3074F SYST BP LT 130 MM HG: CPT | Performed by: NURSE PRACTITIONER

## 2024-02-06 PROCEDURE — 3078F DIAST BP <80 MM HG: CPT | Performed by: NURSE PRACTITIONER

## 2024-02-06 PROCEDURE — 1123F ACP DISCUSS/DSCN MKR DOCD: CPT | Performed by: NURSE PRACTITIONER

## 2024-02-06 PROCEDURE — 99212 OFFICE O/P EST SF 10 MIN: CPT | Performed by: NURSE PRACTITIONER

## 2024-02-06 RX ORDER — TRIAMCINOLONE ACETONIDE 1 MG/G
OINTMENT TOPICAL
Qty: 30 G | Refills: 1 | Status: SHIPPED | OUTPATIENT
Start: 2024-02-06 | End: 2024-02-13

## 2024-02-06 RX ORDER — TRIAMCINOLONE ACETONIDE 1 MG/G
OINTMENT TOPICAL
Qty: 30 G | Refills: 1 | Status: SHIPPED | OUTPATIENT
Start: 2024-02-06 | End: 2024-02-06

## 2024-02-06 NOTE — PROGRESS NOTES
Chief Complaint:   Serina Rubin is a 68 y.o. female who presents for   Chief Complaint   Patient presents with    Rash       HPI    Patient presents to the office with c/o rash to RLE.   Onset was this weekend.  It started in a couple places.  By Sunday and Monday it spread.  Now there is a spot on her left leg.     Review of Systems  Review of Systems      Allergies  Codeine, Darvocet [propoxyphene n-acetaminophen], Navane [thiothixene (tiotixene)], Penicillins, Propoxyphene, Trilafon [perphenazine], Aspirin, and Dye [iodides]      Vitals  /60 (Site: Left Upper Arm, Position: Sitting)   Pulse 89   Resp 14   Ht 1.575 m (5' 2\")   Wt 72.6 kg (160 lb)   BMI 29.26 kg/m²     Current Medications  Current Outpatient Medications   Medication Sig Dispense Refill    predniSONE (DELTASONE) 10 MG tablet 4 tabs for 3 days 3 tabs for 3 days 2 tabs for 3 days 1 tabs for 3 days 30 tablet 0    ondansetron (ZOFRAN-ODT) 4 MG disintegrating tablet Take 1 tablet by mouth 3 times daily as needed for Nausea or Vomiting 21 tablet 0    albuterol (PROVENTIL) (2.5 MG/3ML) 0.083% nebulizer solution Take 3 mLs by nebulization every 6 hours as needed for Wheezing      apixaban (ELIQUIS) 5 MG TABS tablet Take 1 tablet by mouth 2 times daily      nitroGLYCERIN (NITROSTAT) 0.4 MG SL tablet Place 1 tablet under the tongue every 5 minutes as needed for Chest pain 25 tablet 3    pantoprazole (PROTONIX) 40 MG tablet TAKE 1 TABLET BY MOUTH TWICE A DAY IN THE MORNING AND IN THE EVENING (Patient taking differently: Take 1 tablet by mouth daily) 180 tablet 0    tiotropium (SPIRIVA RESPIMAT) 2.5 MCG/ACT AERS inhaler Inhale 2 puffs into the lungs daily 4 g 5    albuterol sulfate HFA (PROAIR HFA) 108 (90 Base) MCG/ACT inhaler Inhale 2 puffs into the lungs every 6 hours as needed for Wheezing or Shortness of Breath 18 g 5    mometasone-formoterol (DULERA) 100-5 MCG/ACT inhaler Inhale 2 puffs into the lungs 2 times daily 13 g 5    KLOR-CON M10

## 2024-03-16 DIAGNOSIS — I50.32 CHRONIC DIASTOLIC CHF (CONGESTIVE HEART FAILURE) (HCC): ICD-10-CM

## 2024-03-18 RX ORDER — ATORVASTATIN CALCIUM 80 MG/1
80 TABLET, FILM COATED ORAL DAILY
Qty: 90 TABLET | Refills: 3 | Status: SHIPPED | OUTPATIENT
Start: 2024-03-18

## 2024-03-18 RX ORDER — TORSEMIDE 100 MG/1
TABLET ORAL
Qty: 45 TABLET | Refills: 3 | Status: SHIPPED | OUTPATIENT
Start: 2024-03-18

## 2024-03-18 RX ORDER — ATORVASTATIN CALCIUM 80 MG/1
80 TABLET, FILM COATED ORAL DAILY
Qty: 90 TABLET | Refills: 0 | OUTPATIENT
Start: 2024-03-18

## 2024-03-18 RX ORDER — PANTOPRAZOLE SODIUM 40 MG/1
TABLET, DELAYED RELEASE ORAL
Qty: 180 TABLET | Refills: 0 | Status: SHIPPED | OUTPATIENT
Start: 2024-03-18

## 2024-03-18 RX ORDER — AMLODIPINE BESYLATE 5 MG/1
TABLET ORAL
Qty: 90 TABLET | Refills: 3 | Status: SHIPPED | OUTPATIENT
Start: 2024-03-18

## 2024-04-15 ENCOUNTER — OFFICE VISIT (OUTPATIENT)
Dept: INTERNAL MEDICINE CLINIC | Age: 69
End: 2024-04-15

## 2024-04-15 VITALS
WEIGHT: 160 LBS | RESPIRATION RATE: 12 BRPM | HEIGHT: 62 IN | DIASTOLIC BLOOD PRESSURE: 80 MMHG | BODY MASS INDEX: 29.44 KG/M2 | SYSTOLIC BLOOD PRESSURE: 132 MMHG | HEART RATE: 70 BPM

## 2024-04-15 DIAGNOSIS — I50.32 CHRONIC DIASTOLIC CHF (CONGESTIVE HEART FAILURE) (HCC): ICD-10-CM

## 2024-04-15 DIAGNOSIS — Z00.00 MEDICARE ANNUAL WELLNESS VISIT, SUBSEQUENT: Primary | ICD-10-CM

## 2024-04-15 DIAGNOSIS — Z12.31 ENCOUNTER FOR SCREENING MAMMOGRAM FOR MALIGNANT NEOPLASM OF BREAST: ICD-10-CM

## 2024-04-15 DIAGNOSIS — Z12.11 COLON CANCER SCREENING: ICD-10-CM

## 2024-04-15 DIAGNOSIS — I25.10 CORONARY ARTERY DISEASE INVOLVING NATIVE CORONARY ARTERY OF NATIVE HEART WITHOUT ANGINA PECTORIS: ICD-10-CM

## 2024-04-15 DIAGNOSIS — J44.9 COPD, SEVERE (HCC): ICD-10-CM

## 2024-04-15 DIAGNOSIS — J96.11 CHRONIC RESPIRATORY FAILURE WITH HYPOXIA (HCC): ICD-10-CM

## 2024-04-15 DIAGNOSIS — I72.9 ANEURYSM (HCC): ICD-10-CM

## 2024-04-15 DIAGNOSIS — I48.0 PAROXYSMAL A-FIB (HCC): ICD-10-CM

## 2024-04-15 DIAGNOSIS — D68.69 SECONDARY HYPERCOAGULABLE STATE (HCC): ICD-10-CM

## 2024-04-15 DIAGNOSIS — E78.2 MIXED HYPERLIPIDEMIA: ICD-10-CM

## 2024-04-15 DIAGNOSIS — Z86.73 HISTORY OF CVA (CEREBROVASCULAR ACCIDENT): ICD-10-CM

## 2024-04-15 DIAGNOSIS — G47.33 OSA (OBSTRUCTIVE SLEEP APNEA): ICD-10-CM

## 2024-04-15 DIAGNOSIS — I10 HTN (HYPERTENSION), BENIGN: ICD-10-CM

## 2024-04-15 PROCEDURE — 3079F DIAST BP 80-89 MM HG: CPT | Performed by: INTERNAL MEDICINE

## 2024-04-15 PROCEDURE — 3075F SYST BP GE 130 - 139MM HG: CPT | Performed by: INTERNAL MEDICINE

## 2024-04-15 PROCEDURE — 82274 ASSAY TEST FOR BLOOD FECAL: CPT | Performed by: INTERNAL MEDICINE

## 2024-04-15 PROCEDURE — 1123F ACP DISCUSS/DSCN MKR DOCD: CPT | Performed by: INTERNAL MEDICINE

## 2024-04-15 PROCEDURE — G0439 PPPS, SUBSEQ VISIT: HCPCS | Performed by: INTERNAL MEDICINE

## 2024-04-15 ASSESSMENT — PATIENT HEALTH QUESTIONNAIRE - PHQ9
1. LITTLE INTEREST OR PLEASURE IN DOING THINGS: NOT AT ALL
SUM OF ALL RESPONSES TO PHQ9 QUESTIONS 1 & 2: 0
SUM OF ALL RESPONSES TO PHQ QUESTIONS 1-9: 0
2. FEELING DOWN, DEPRESSED OR HOPELESS: NOT AT ALL

## 2024-04-15 NOTE — PATIENT INSTRUCTIONS
chair.  Button or unbutton a shirt or sweater.  Remove and put on your shoes.  It's common to feel a little worried or anxious if you find you can't do all the things you used to be able to do. Talking with your doctor about ADLs is a way to make sure you're as safe as possible and able to care for yourself as well as you can. You may want to bring a caregiver, friend, or family member to your checkup. They can help you talk to your doctor.  Follow-up care is a key part of your treatment and safety. Be sure to make and go to all appointments, and call your doctor if you are having problems. It's also a good idea to know your test results and keep a list of the medicines you take.  Current as of: October 24, 2023               Content Version: 14.0  © 2006-2024 [employer].   Care instructions adapted under license by [employer]. If you have questions about a medical condition or this instruction, always ask your healthcare professional. [employer] disclaims any warranty or liability for your use of this information.           A Healthy Heart: Care Instructions  Overview     Coronary artery disease, also called heart disease, occurs when a substance called plaque builds up in the vessels that supply oxygen-rich blood to your heart muscle. This can narrow the blood vessels and reduce blood flow. A heart attack happens when blood flow is completely blocked. A high-fat diet, smoking, and other factors increase the risk of heart disease.  Your doctor has found that you have a chance of having heart disease. A heart-healthy lifestyle can help keep your heart healthy and prevent heart disease. This lifestyle includes eating healthy, being active, staying at a weight that's healthy for you, and not smoking or using tobacco. It also includes taking medicines as directed, managing other health conditions, and trying to get a healthy amount of sleep.  Follow-up care is a key part of your treatment

## 2024-04-15 NOTE — PROGRESS NOTES
Medicare Annual Wellness Visit    Serina Rubin is here for Medicare AWV    Assessment & Plan   Medicare annual wellness visit, subsequent  Paroxysmal A-fib (HCC)  Chronic diastolic CHF (congestive heart failure) (HCC)  History of CVA (cerebrovascular accident)  DEVI (obstructive sleep apnea)  HTN (hypertension), benign  COPD, severe (HCC)  Coronary artery disease involving native coronary artery of native heart without angina pectoris  Chronic respiratory failure with hypoxia (HCC)  Mixed hyperlipidemia  Secondary hypercoagulable state (HCC)  Colon cancer screening  -     POCT Fecal Immunochemical Test (FIT)  Aneurysm (HCC)  Encounter for screening mammogram for malignant neoplasm of breast  -     Mammography screening bilateral; Future    -Medicare exam done  - A fib. In SR. She has a secondary coagulable state.  - Chronic diastolic CHF. Stable  - Severe COPD and chronic respiratory failure. On oxygen  - H/O CVA stable  - HLD at goal  - HTN controlled.       Recommendations for Preventive Services Due: see orders and patient instructions/AVS.  Recommended screening schedule for the next 5-10 years is provided to the patient in written form: see Patient Instructions/AVS.     No follow-ups on file.     Subjective     She is here for a Medicare exam.    [place] [facility] [place] COPD exacerbation.     She has COPD and chronic respiratory failure. She uses 2 L of oxygen at hs.   She says her COPD is controlled. She sees Dr Delacruz. She has steroids and antibiotics on hand if she need them. No ER visit. No recent flare.     She has sleep apnea. She is not any treatment.      She has atrial fibrillation. She is usually in  NSR. She is on Eliquis. No excessive bruising or bleeding.      She has a history of CAD. She has three stents. She takes baby ASA.     She has hypertension. It is controlled.  She takes Corge.      She has chronic systolic and diastolic CHF. She takes Lasix and K prn. She has some

## 2024-05-03 ENCOUNTER — TELEPHONE (OUTPATIENT)
Dept: INTERNAL MEDICINE CLINIC | Age: 69
End: 2024-05-03

## 2024-05-03 DIAGNOSIS — R19.5 POSITIVE FIT (FECAL IMMUNOCHEMICAL TEST): Primary | ICD-10-CM

## 2024-05-03 LAB
CONTROL: POSITIVE
FECAL BLOOD IMMUNOCHEMICAL TEST: POSITIVE

## 2024-05-03 NOTE — TELEPHONE ENCOUNTER
----- Message from Elvira Hunter MD sent at 5/3/2024  2:37 PM EDT -----  Refer to Dr Owens for diagnostic colonoscopy

## 2024-05-28 RX ORDER — CARVEDILOL 3.12 MG/1
TABLET ORAL
Qty: 180 TABLET | Refills: 2 | Status: SHIPPED | OUTPATIENT
Start: 2024-05-28

## 2024-06-10 RX ORDER — PANTOPRAZOLE SODIUM 40 MG/1
40 TABLET, DELAYED RELEASE ORAL 2 TIMES DAILY
Qty: 180 TABLET | Refills: 0 | Status: SHIPPED | OUTPATIENT
Start: 2024-06-10

## 2024-07-22 ENCOUNTER — OFFICE VISIT (OUTPATIENT)
Dept: INTERNAL MEDICINE CLINIC | Age: 69
End: 2024-07-22

## 2024-07-22 VITALS — HEIGHT: 62 IN | WEIGHT: 155 LBS | BODY MASS INDEX: 28.52 KG/M2

## 2024-07-22 DIAGNOSIS — Z86.73 HISTORY OF STROKE: ICD-10-CM

## 2024-07-22 DIAGNOSIS — I50.32 CHRONIC DIASTOLIC CHF (CONGESTIVE HEART FAILURE) (HCC): ICD-10-CM

## 2024-07-22 DIAGNOSIS — D68.69 SECONDARY HYPERCOAGULABLE STATE (HCC): ICD-10-CM

## 2024-07-22 DIAGNOSIS — Z86.73 HISTORY OF CVA (CEREBROVASCULAR ACCIDENT): ICD-10-CM

## 2024-07-22 DIAGNOSIS — G47.33 OSA (OBSTRUCTIVE SLEEP APNEA): ICD-10-CM

## 2024-07-22 DIAGNOSIS — J44.9 COPD, SEVERE (HCC): ICD-10-CM

## 2024-07-22 DIAGNOSIS — I48.0 PAROXYSMAL A-FIB (HCC): Primary | ICD-10-CM

## 2024-07-22 DIAGNOSIS — I10 HTN (HYPERTENSION), BENIGN: ICD-10-CM

## 2024-07-22 DIAGNOSIS — J96.11 CHRONIC RESPIRATORY FAILURE WITH HYPOXIA (HCC): ICD-10-CM

## 2024-07-22 LAB
ALBUMIN SERPL-MCNC: 4.2 G/DL (ref 3.4–5)
ALBUMIN/GLOB SERPL: 1.8 {RATIO} (ref 1.1–2.2)
ALP SERPL-CCNC: 99 U/L (ref 40–129)
ALT SERPL-CCNC: 12 U/L (ref 10–40)
ANION GAP SERPL CALCULATED.3IONS-SCNC: 8 MMOL/L (ref 3–16)
AST SERPL-CCNC: 25 U/L (ref 15–37)
BASOPHILS # BLD: 0 K/UL (ref 0–0.2)
BASOPHILS NFR BLD: 0.6 %
BILIRUB SERPL-MCNC: 0.6 MG/DL (ref 0–1)
BUN SERPL-MCNC: 14 MG/DL (ref 7–20)
CALCIUM SERPL-MCNC: 9.6 MG/DL (ref 8.3–10.6)
CHLORIDE SERPL-SCNC: 96 MMOL/L (ref 99–110)
CO2 SERPL-SCNC: 37 MMOL/L (ref 21–32)
CREAT SERPL-MCNC: <0.5 MG/DL (ref 0.6–1.2)
DEPRECATED RDW RBC AUTO: 15.1 % (ref 12.4–15.4)
EOSINOPHIL # BLD: 0.2 K/UL (ref 0–0.6)
EOSINOPHIL NFR BLD: 2.4 %
GFR SERPLBLD CREATININE-BSD FMLA CKD-EPI: >90 ML/MIN/{1.73_M2}
GLUCOSE SERPL-MCNC: 110 MG/DL (ref 70–99)
HCT VFR BLD AUTO: 41.4 % (ref 36–48)
HGB BLD-MCNC: 13.6 G/DL (ref 12–16)
LYMPHOCYTES # BLD: 1.3 K/UL (ref 1–5.1)
LYMPHOCYTES NFR BLD: 16.5 %
MCH RBC QN AUTO: 28.8 PG (ref 26–34)
MCHC RBC AUTO-ENTMCNC: 32.8 G/DL (ref 31–36)
MCV RBC AUTO: 87.8 FL (ref 80–100)
MONOCYTES # BLD: 0.7 K/UL (ref 0–1.3)
MONOCYTES NFR BLD: 8.6 %
NEUTROPHILS # BLD: 5.5 K/UL (ref 1.7–7.7)
NEUTROPHILS NFR BLD: 71.9 %
PLATELET # BLD AUTO: 176 K/UL (ref 135–450)
PMV BLD AUTO: 11.3 FL (ref 5–10.5)
POTASSIUM SERPL-SCNC: 3.7 MMOL/L (ref 3.5–5.1)
PROT SERPL-MCNC: 6.6 G/DL (ref 6.4–8.2)
RBC # BLD AUTO: 4.72 M/UL (ref 4–5.2)
SODIUM SERPL-SCNC: 141 MMOL/L (ref 136–145)
URATE SERPL-MCNC: 6 MG/DL (ref 2.6–6)
WBC # BLD AUTO: 7.6 K/UL (ref 4–11)

## 2024-07-22 PROCEDURE — 99214 OFFICE O/P EST MOD 30 MIN: CPT | Performed by: INTERNAL MEDICINE

## 2024-07-22 PROCEDURE — 1123F ACP DISCUSS/DSCN MKR DOCD: CPT | Performed by: INTERNAL MEDICINE

## 2024-07-22 ASSESSMENT — ENCOUNTER SYMPTOMS
WHEEZING: 0
VOMITING: 0
NAUSEA: 0
SHORTNESS OF BREATH: 0
ABDOMINAL PAIN: 0
RHINORRHEA: 0

## 2024-07-22 NOTE — PROGRESS NOTES
General: Skin is warm and dry.      Findings: No rash.   Neurological:      Mental Status: She is alert and oriented to person, place, and time.      Cranial Nerves: No cranial nerve deficit.      Deep Tendon Reflexes: Reflexes are normal and symmetric.   Psychiatric:         Behavior: Behavior normal.         Thought Content: Thought content normal.         Judgment: Judgment normal.           Assessment:       Diagnosis Orders   1. Paroxysmal A-fib ([facility])        2. Chronic diastolic CHF (congestive heart failure) ([facility])        3. History of CVA (cerebrovascular accident)        4. DEVI (obstructive sleep apnea)        5. HTN (hypertension), benign  Comprehensive Metabolic Panel    CBC with Auto Differential    Uric Acid      6. COPD, severe ([facility])        7. Chronic respiratory failure with hypoxia ([facility])        8. Secondary hypercoagulable state ([facility])        9. History of stroke               Plan:   Assessment & Plan   #  CAD stable. No chest pain.     #  A fib stable. On Eliquis. In SR.    #  COPD stable. On oxygen at hs and prn.     #  HLD at goal.    #  Insomnia. She almost weaned off Xanax.    #  Chronic dCHF stable.    #  HTN controlled.    #  She had a positive FIT test. I referred her for a diagnostic colonoscopy. She refused. She understands the risk of colon cancer and polyps. She will think about it.           JORDI LIND MD
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
## 2024-08-19 ENCOUNTER — TELEPHONE (OUTPATIENT)
Dept: PULMONOLOGY | Age: 69
End: 2024-08-19

## 2024-08-19 NOTE — TELEPHONE ENCOUNTER
Lives at [facility] [place]. They have something called \"Live a Dream\" where they let them fulfill a dream. Hers is to skydive. They are going to let her do it, only if she gets clearance from her Dr.  Will you give her clearance to do it with her lung issues? If so, she will need it in writing. Also, will she also need one from Dr. Garcia for her heart?

## 2024-09-03 ENCOUNTER — OFFICE VISIT (OUTPATIENT)
Dept: CARDIOLOGY CLINIC | Age: 69
End: 2024-09-03
Payer: MEDICARE

## 2024-09-03 VITALS
BODY MASS INDEX: 28.85 KG/M2 | WEIGHT: 156.8 LBS | HEIGHT: 62 IN | SYSTOLIC BLOOD PRESSURE: 100 MMHG | HEART RATE: 71 BPM | DIASTOLIC BLOOD PRESSURE: 58 MMHG | OXYGEN SATURATION: 92 %

## 2024-09-03 DIAGNOSIS — I50.32 CHRONIC DIASTOLIC CHF (CONGESTIVE HEART FAILURE) (HCC): ICD-10-CM

## 2024-09-03 DIAGNOSIS — I25.10 CORONARY ARTERY DISEASE INVOLVING NATIVE CORONARY ARTERY OF NATIVE HEART WITHOUT ANGINA PECTORIS: ICD-10-CM

## 2024-09-03 DIAGNOSIS — Z72.0 TOBACCO ABUSE: ICD-10-CM

## 2024-09-03 DIAGNOSIS — E78.2 MIXED HYPERLIPIDEMIA: Primary | ICD-10-CM

## 2024-09-03 DIAGNOSIS — I10 HTN (HYPERTENSION), BENIGN: ICD-10-CM

## 2024-09-03 DIAGNOSIS — I48.0 PAROXYSMAL A-FIB (HCC): ICD-10-CM

## 2024-09-03 PROCEDURE — 3078F DIAST BP <80 MM HG: CPT | Performed by: INTERNAL MEDICINE

## 2024-09-03 PROCEDURE — 1123F ACP DISCUSS/DSCN MKR DOCD: CPT | Performed by: INTERNAL MEDICINE

## 2024-09-03 PROCEDURE — 99214 OFFICE O/P EST MOD 30 MIN: CPT | Performed by: INTERNAL MEDICINE

## 2024-09-03 PROCEDURE — 3074F SYST BP LT 130 MM HG: CPT | Performed by: INTERNAL MEDICINE

## 2024-09-03 NOTE — PATIENT INSTRUCTIONS
Plan:  Current medications reviewed.  No refills warranted except Nitro SL.  Continue current course  No need for cardiac testing at this time  You can metolazone up to three times a week for eight gain/edema.   DAILY SELF CARE WITH HEART FAILURE:  ~ Weigh yourself every morning and call your doctor if you gain 3 lb gain in a day -or- 5 lb gain in a week. May use metolazone three times a week or take whole torsemide for a couple of days.   ~Follow a No Added Salt/Low Sodium diet of 3000 mg sodium daily  ~Follow a Fluid Limitation of 2 liters (64 ounces daily) on consistent basis  ~Call your doctor if you notice : worsening shortness of breath especially with usual activities or when lying down flat, increased swelling, decreased urination, weight gain of 3 lb overnight or 5 lb gain in a week, increased coughing, or chest pressure or dizziness upon discharge from hospital.   ~Take all prescribed medications. Do not stop any-without calling your doctor first.  ~Wear compression stocking during day and remove at night if you have edema  ~Keep feet elevated while sitting if you have edema   Plan to follow up 6 months

## 2024-09-06 RX ORDER — PANTOPRAZOLE SODIUM 40 MG/1
TABLET, DELAYED RELEASE ORAL
Qty: 180 TABLET | Refills: 0 | Status: SHIPPED | OUTPATIENT
Start: 2024-09-06

## 2024-09-24 NOTE — PROGRESS NOTES
Fasting blood work on days 19-21 of cycle     To increase libido - do not reorder testosterone premium  Start below for 3 month trial  Watch for increased breast tenderness, increased clotting and abnormal cycles. It might take 3 months for hormones to level out.        Take one a day preferably with a meal   Libido- if no improvement with supplement I might increased testosterone to and extra pump every other week.     Menstrual clotting and fatigue around cycles     Based on labs and symptoms of clotting I might add DIM detox to help with possible estrogen overload.     Fatigue-   Due to prolonged tirzepatide use micronutrient testing has been ordered due to possible absorption concerns   Previous ferritin has been low - this can be micronutrient or may need to add support to the gut health            Serina RAMONA Rubin received a viral test for COVID-19. They were educated on isolation and quarantine as appropriate. For any symptoms, they were directed to seek care from their PCP, given contact information to establish with a doctor, directed to an urgent care or the emergency room.

## 2024-10-02 ENCOUNTER — HOSPITAL ENCOUNTER (OUTPATIENT)
Dept: CT IMAGING | Age: 69
Discharge: HOME | End: 2024-10-02
Payer: MEDICARE

## 2024-10-02 ENCOUNTER — OFFICE VISIT (OUTPATIENT)
Dept: PULMONOLOGY | Age: 69
End: 2024-10-02

## 2024-10-02 VITALS
RESPIRATION RATE: 17 BRPM | BODY MASS INDEX: 28.97 KG/M2 | WEIGHT: 157.4 LBS | OXYGEN SATURATION: 62 % | SYSTOLIC BLOOD PRESSURE: 118 MMHG | HEART RATE: 91 BPM | DIASTOLIC BLOOD PRESSURE: 70 MMHG | HEIGHT: 62 IN

## 2024-10-02 DIAGNOSIS — Z87.891 PERSONAL HISTORY OF TOBACCO USE: ICD-10-CM

## 2024-10-02 DIAGNOSIS — F17.200 SMOKING: ICD-10-CM

## 2024-10-02 DIAGNOSIS — J44.9 CHRONIC OBSTRUCTIVE PULMONARY DISEASE, UNSPECIFIED COPD TYPE (HCC): Primary | ICD-10-CM

## 2024-10-02 PROCEDURE — 71271 CT THORAX LUNG CANCER SCR C-: CPT

## 2024-10-02 NOTE — PROGRESS NOTES
twitching, convulsions, loss of consciousness or memory.     Endocrine:  . No history of goiter, exophthalmos or dryness of skin.  The patient has no history of diabetes.    Hematopoietic:  No history of bleeding disorders or easy bruising.  Rheumatic:  No connective tissue disease history or polyarthritis/inflammatory joint disease.      PHYSICAL EXAMINATION:  Vitals:    10/02/24 1421   BP: 118/70   Pulse: 91   Resp: 17   SpO2: 91     Constitutional: This is a well developed, well nourished 69 y.o. year old female who is alert, oriented, cooperative and in no apparent distress.  Head was normocephalic and atraumatic.    EENT: Mallampati class :  Extraocular muscles intact.   External canals are patent and no discharge was appreciated.  Septum was midline,   mucosa was without erythema, exudates or cobblestoning.  No thrush was noted.      Neck: Supple without thyromegaly. No elevated JVP. Trachea was midline. No carotid bruits were auscultated.    Respiratory: Rhonchi bilateral     Cardiovascular: Regular without murmur, clicks, gallops or rubs.  There is no left or right ventricular heave.    Pulses:  Carotid, radial and femoral pulses were equally bilaterally.    Abdomen: Slightly rounded and soft without organomegaly. No rebound, rigidity or guarding was appreciated.    Lymphatic: No lymphadenopathy.  Musculoskeletal: no joint def .    Extremities: no edema   Skin:  Warm and dry.  Good color, turgor and pigmentation. No lesions or scars.  Neurological/Psychiatric: The patient's general behavior, level of consciousness, thought content and emotional status is normal.  Cranial nerves II-XII are intact.      DATA:      PFTs 5/11/17  FVC  (78%) FEV1 0.74 (31%) FEV1/FVC ratio 31  TLC  (122%)  RV  (168%)   DLCO (34%) Bronchodilator response: +++     6MWT: 490ft, 2lpm O2   has CHF   Repeat CT shows stable         IMPRESSION:    1-Lung nodule   2-COPD ,severe   3-Hypoxia   4-DEVI can not wear mask                PLAN:

## 2024-10-07 ENCOUNTER — OFFICE VISIT (OUTPATIENT)
Dept: INTERNAL MEDICINE CLINIC | Age: 69
End: 2024-10-07

## 2024-10-07 VITALS
DIASTOLIC BLOOD PRESSURE: 80 MMHG | BODY MASS INDEX: 28.34 KG/M2 | HEART RATE: 70 BPM | RESPIRATION RATE: 12 BRPM | HEIGHT: 62 IN | SYSTOLIC BLOOD PRESSURE: 130 MMHG | WEIGHT: 154 LBS

## 2024-10-07 DIAGNOSIS — Z12.11 SPECIAL SCREENING FOR MALIGNANT NEOPLASMS, COLON: ICD-10-CM

## 2024-10-07 DIAGNOSIS — J96.11 CHRONIC RESPIRATORY FAILURE WITH HYPOXIA: ICD-10-CM

## 2024-10-07 DIAGNOSIS — I50.32 CHRONIC DIASTOLIC CHF (CONGESTIVE HEART FAILURE) (HCC): ICD-10-CM

## 2024-10-07 DIAGNOSIS — E78.2 MIXED HYPERLIPIDEMIA: ICD-10-CM

## 2024-10-07 DIAGNOSIS — I25.10 CORONARY ARTERY DISEASE INVOLVING NATIVE CORONARY ARTERY OF NATIVE HEART WITHOUT ANGINA PECTORIS: ICD-10-CM

## 2024-10-07 DIAGNOSIS — Z12.12 SCREENING FOR MALIGNANT NEOPLASM OF THE RECTUM: ICD-10-CM

## 2024-10-07 DIAGNOSIS — Z86.73 HISTORY OF CVA (CEREBROVASCULAR ACCIDENT): ICD-10-CM

## 2024-10-07 DIAGNOSIS — J44.9 COPD, SEVERE (HCC): Primary | ICD-10-CM

## 2024-10-07 DIAGNOSIS — D68.69 SECONDARY HYPERCOAGULABLE STATE (HCC): ICD-10-CM

## 2024-10-07 DIAGNOSIS — I48.0 PAROXYSMAL A-FIB (HCC): ICD-10-CM

## 2024-10-07 DIAGNOSIS — G47.33 OSA (OBSTRUCTIVE SLEEP APNEA): ICD-10-CM

## 2024-10-07 LAB
CHOLEST SERPL-MCNC: 140 MG/DL (ref 0–199)
HDLC SERPL-MCNC: 50 MG/DL (ref 40–60)
LDLC SERPL CALC-MCNC: 74 MG/DL
TRIGL SERPL-MCNC: 82 MG/DL (ref 0–150)
VLDLC SERPL CALC-MCNC: 16 MG/DL

## 2024-10-07 PROCEDURE — 3075F SYST BP GE 130 - 139MM HG: CPT | Performed by: INTERNAL MEDICINE

## 2024-10-07 PROCEDURE — 3079F DIAST BP 80-89 MM HG: CPT | Performed by: INTERNAL MEDICINE

## 2024-10-07 PROCEDURE — 1123F ACP DISCUSS/DSCN MKR DOCD: CPT | Performed by: INTERNAL MEDICINE

## 2024-10-07 PROCEDURE — 99214 OFFICE O/P EST MOD 30 MIN: CPT | Performed by: INTERNAL MEDICINE

## 2024-10-07 ASSESSMENT — ENCOUNTER SYMPTOMS
SHORTNESS OF BREATH: 0
NAUSEA: 0
VOMITING: 0
WHEEZING: 0
RHINORRHEA: 0
ABDOMINAL PAIN: 0
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
## 2024-10-16 ENCOUNTER — TELEPHONE (OUTPATIENT)
Dept: PULMONOLOGY | Age: 69
End: 2024-10-16

## 2024-10-16 NOTE — TELEPHONE ENCOUNTER
Patient calling stating at her 10/2 appointment Dr. Jefferson was going to do some research on something and update her in two weeks.    She is calling to follow up on this.

## 2024-10-17 NOTE — TELEPHONE ENCOUNTER
Called patient to tell her that diving is contraindicated for COPD patients, even mild. Pt would like to know what can she do as far as swimming?

## 2024-10-18 NOTE — TELEPHONE ENCOUNTER
Called pt to inform her of the contraindications of diving. Pt would like to know what can she safely do involving swimming

## 2024-11-11 RX ORDER — APIXABAN 5 MG/1
5 TABLET, FILM COATED ORAL 2 TIMES DAILY
Qty: 180 TABLET | Refills: 1 | Status: SHIPPED | OUTPATIENT
Start: 2024-11-11

## 2024-11-11 NOTE — TELEPHONE ENCOUNTER
Last Office Visit: 9/3/2024 Provider: GLENN  Is provider OOT? Yes    Next Office Visit: 3/18/2025 Provider: GLENN  **Has patient already had 30 day supply? No    LAST LABS:   CMP:   Lab Results   Component Value Date     07/22/2024    K 3.7 07/22/2024    CL 96 (L) 07/22/2024    CO2 37 (H) 07/22/2024    BUN 14 07/22/2024    CREATININE <0.5 (L) 07/22/2024    GLUCOSE 110 (H) 07/22/2024    CALCIUM 9.6 07/22/2024    BILITOT 0.6 07/22/2024    ALKPHOS 99 07/22/2024    AST 25 07/22/2024    ALT 12 07/22/2024    LABGLOM >90 07/22/2024    GFRAA >60 09/23/2022    AGRATIO 1.8 07/22/2024    GLOB 2.3 10/22/2021         **Check Care Everywhere? N/A  **Lab orders needed? no -      Is encounter provider correct?   Yes  Does refill dosage match last filled?   Yes  Changes to script from Tele Encounters or LOV Plan?   no  Did you adjust dispensed amount or refills to reflect last and upcoming OV?  Yes    Requested Prescriptions     Pending Prescriptions Disp Refills    ELIQUIS 5 MG TABS tablet [Pharmacy Med Name: Eliquis Oral Tablet 5 MG] 660 tablet 0     Sig: TAKE 1 TABLET BY MOUTH 2 TIMES A DAY

## 2024-11-11 NOTE — TELEPHONE ENCOUNTER
Lab Results   Component Value Date    WBC 7.6 07/22/2024    HGB 13.6 07/22/2024    HCT 41.4 07/22/2024    MCV 87.8 07/22/2024     07/22/2024

## 2024-12-02 RX ORDER — PANTOPRAZOLE SODIUM 40 MG/1
TABLET, DELAYED RELEASE ORAL
Qty: 180 TABLET | Refills: 0 | Status: SHIPPED | OUTPATIENT
Start: 2024-12-02

## 2025-01-20 ASSESSMENT — PATIENT HEALTH QUESTIONNAIRE - PHQ9
SUM OF ALL RESPONSES TO PHQ QUESTIONS 1-9: 0
SUM OF ALL RESPONSES TO PHQ QUESTIONS 1-9: 0
SUM OF ALL RESPONSES TO PHQ9 QUESTIONS 1 & 2: 0
SUM OF ALL RESPONSES TO PHQ QUESTIONS 1-9: 0
2. FEELING DOWN, DEPRESSED OR HOPELESS: NOT AT ALL
SUM OF ALL RESPONSES TO PHQ QUESTIONS 1-9: 0
2. FEELING DOWN, DEPRESSED OR HOPELESS: NOT AT ALL
SUM OF ALL RESPONSES TO PHQ9 QUESTIONS 1 & 2: 0
1. LITTLE INTEREST OR PLEASURE IN DOING THINGS: NOT AT ALL
1. LITTLE INTEREST OR PLEASURE IN DOING THINGS: NOT AT ALL

## 2025-01-23 ENCOUNTER — HOSPITAL ENCOUNTER (OUTPATIENT)
Age: 70
Discharge: HOME | End: 2025-01-23
Payer: MEDICARE

## 2025-01-23 ENCOUNTER — OFFICE VISIT (OUTPATIENT)
Dept: INTERNAL MEDICINE CLINIC | Age: 70
End: 2025-01-23

## 2025-01-23 ENCOUNTER — TELEPHONE (OUTPATIENT)
Dept: CARDIOLOGY CLINIC | Age: 70
End: 2025-01-23

## 2025-01-23 ENCOUNTER — TELEPHONE (OUTPATIENT)
Dept: PULMONOLOGY | Age: 70
End: 2025-01-23

## 2025-01-23 VITALS
HEART RATE: 62 BPM | HEIGHT: 62 IN | SYSTOLIC BLOOD PRESSURE: 120 MMHG | RESPIRATION RATE: 14 BRPM | WEIGHT: 150 LBS | DIASTOLIC BLOOD PRESSURE: 70 MMHG | BODY MASS INDEX: 27.6 KG/M2

## 2025-01-23 DIAGNOSIS — I25.10 CORONARY ARTERY DISEASE INVOLVING NATIVE CORONARY ARTERY OF NATIVE HEART WITHOUT ANGINA PECTORIS: ICD-10-CM

## 2025-01-23 DIAGNOSIS — G47.33 OSA (OBSTRUCTIVE SLEEP APNEA): ICD-10-CM

## 2025-01-23 DIAGNOSIS — I48.0 PAROXYSMAL A-FIB (HCC): ICD-10-CM

## 2025-01-23 DIAGNOSIS — Z01.818 PRE-OP EXAM: ICD-10-CM

## 2025-01-23 DIAGNOSIS — J96.11 CHRONIC RESPIRATORY FAILURE WITH HYPOXIA: ICD-10-CM

## 2025-01-23 DIAGNOSIS — J44.9 COPD, SEVERE (HCC): ICD-10-CM

## 2025-01-23 DIAGNOSIS — Z86.73 HISTORY OF CVA (CEREBROVASCULAR ACCIDENT): ICD-10-CM

## 2025-01-23 DIAGNOSIS — M51.17 INTERVERTEBRAL DISC DISORDER WITH RADICULOPATHY OF LUMBOSACRAL REGION: ICD-10-CM

## 2025-01-23 DIAGNOSIS — D68.69 SECONDARY HYPERCOAGULABLE STATE (HCC): ICD-10-CM

## 2025-01-23 DIAGNOSIS — I50.32 CHRONIC DIASTOLIC CHF (CONGESTIVE HEART FAILURE) (HCC): ICD-10-CM

## 2025-01-23 DIAGNOSIS — E78.2 MIXED HYPERLIPIDEMIA: ICD-10-CM

## 2025-01-23 DIAGNOSIS — Z01.818 PRE-OP EXAM: Primary | ICD-10-CM

## 2025-01-23 LAB
ALBUMIN SERPL-MCNC: 4.1 G/DL (ref 3.4–5)
ALBUMIN/GLOB SERPL: 2 {RATIO} (ref 1.1–2.2)
ALP SERPL-CCNC: 89 U/L (ref 40–129)
ALT SERPL-CCNC: 14 U/L (ref 10–40)
ANION GAP SERPL CALCULATED.3IONS-SCNC: 9 MMOL/L (ref 3–16)
AST SERPL-CCNC: 26 U/L (ref 15–37)
BASOPHILS # BLD: 0 K/UL (ref 0–0.2)
BASOPHILS NFR BLD: 0.6 %
BILIRUB SERPL-MCNC: 0.5 MG/DL (ref 0–1)
BUN SERPL-MCNC: 14 MG/DL (ref 7–20)
CALCIUM SERPL-MCNC: 9.1 MG/DL (ref 8.3–10.6)
CHLORIDE SERPL-SCNC: 101 MMOL/L (ref 99–110)
CO2 SERPL-SCNC: 34 MMOL/L (ref 21–32)
CREAT SERPL-MCNC: 0.8 MG/DL (ref 0.6–1.2)
DEPRECATED RDW RBC AUTO: 15.4 % (ref 12.4–15.4)
EKG ATRIAL RATE: 61 BPM
EKG DIAGNOSIS: NORMAL
EKG P AXIS: 82 DEGREES
EKG P-R INTERVAL: 162 MS
EKG Q-T INTERVAL: 424 MS
EKG QRS DURATION: 70 MS
EKG QTC CALCULATION (BAZETT): 426 MS
EKG R AXIS: -66 DEGREES
EKG T AXIS: 39 DEGREES
EKG VENTRICULAR RATE: 61 BPM
EOSINOPHIL # BLD: 0.2 K/UL (ref 0–0.6)
EOSINOPHIL NFR BLD: 3.2 %
GFR SERPLBLD CREATININE-BSD FMLA CKD-EPI: 80 ML/MIN/{1.73_M2}
GLUCOSE SERPL-MCNC: 115 MG/DL (ref 70–99)
HCT VFR BLD AUTO: 42.5 % (ref 36–48)
HGB BLD-MCNC: 13.7 G/DL (ref 12–16)
LYMPHOCYTES # BLD: 1.2 K/UL (ref 1–5.1)
LYMPHOCYTES NFR BLD: 16.7 %
MCH RBC QN AUTO: 28.7 PG (ref 26–34)
MCHC RBC AUTO-ENTMCNC: 32.1 G/DL (ref 31–36)
MCV RBC AUTO: 89.4 FL (ref 80–100)
MONOCYTES # BLD: 0.8 K/UL (ref 0–1.3)
MONOCYTES NFR BLD: 10.1 %
NEUTROPHILS # BLD: 5.2 K/UL (ref 1.7–7.7)
NEUTROPHILS NFR BLD: 69.4 %
PLATELET # BLD AUTO: 173 K/UL (ref 135–450)
PMV BLD AUTO: 10.9 FL (ref 5–10.5)
POTASSIUM SERPL-SCNC: 4.8 MMOL/L (ref 3.5–5.1)
PROT SERPL-MCNC: 6.2 G/DL (ref 6.4–8.2)
RBC # BLD AUTO: 4.76 M/UL (ref 4–5.2)
SODIUM SERPL-SCNC: 144 MMOL/L (ref 136–145)
WBC # BLD AUTO: 7.5 K/UL (ref 4–11)

## 2025-01-23 PROCEDURE — 1159F MED LIST DOCD IN RCRD: CPT | Performed by: NURSE PRACTITIONER

## 2025-01-23 PROCEDURE — 1160F RVW MEDS BY RX/DR IN RCRD: CPT | Performed by: NURSE PRACTITIONER

## 2025-01-23 PROCEDURE — 3074F SYST BP LT 130 MM HG: CPT | Performed by: NURSE PRACTITIONER

## 2025-01-23 PROCEDURE — 3078F DIAST BP <80 MM HG: CPT | Performed by: NURSE PRACTITIONER

## 2025-01-23 PROCEDURE — 93005 ELECTROCARDIOGRAM TRACING: CPT | Performed by: INTERNAL MEDICINE

## 2025-01-23 PROCEDURE — 1123F ACP DISCUSS/DSCN MKR DOCD: CPT | Performed by: NURSE PRACTITIONER

## 2025-01-23 PROCEDURE — 99214 OFFICE O/P EST MOD 30 MIN: CPT | Performed by: NURSE PRACTITIONER

## 2025-01-23 NOTE — TELEPHONE ENCOUNTER
Serina Rubin, 1955    Cardiac Risk Assessment    What type of procedure are you having?  INSERTION INTERTHECAL CATHETER,PROGRAMMABLE PAIN PUMP IMPLANT     When is your procedure scheduled for?  01.27.25    Medications to be stopped.  Per pt she has already been instructed when to stop ASA and Eliquis.    What physician is performing your procedure?  Dr. Nick    Phone Number:   [phone]    Fax number to send the letter:   [phone]    Cardiologist:   GLENN    Last Appointment:   9.3.24    Next Appointment:   3.18.25    Last EKG- 1.23.25 ordered by PCP    Pt would like to be called when letter is sent.

## 2025-01-23 NOTE — PROGRESS NOTES
ASA  Personal or FH of DVT/PE:  No  Patient objection to receiving blood products: No     Objective:     Vitals:    01/23/25 0857   BP: 120/70   Pulse: 62   Resp: 14       Physical Exam  Constitutional:       Appearance: She is well-developed.   HENT:      Head: Normocephalic.   Eyes:      Conjunctiva/sclera: Conjunctivae normal.      Pupils: Pupils are equal, round, and reactive to light.   Neck:      Thyroid: No thyroid mass or thyromegaly.      Vascular: No carotid bruit or JVD.      Trachea: Trachea normal.   Cardiovascular:      Rate and Rhythm: Normal rate and regular rhythm.      Heart sounds: Normal heart sounds. No murmur heard.     No gallop.   Pulmonary:      Effort: Pulmonary effort is normal. No respiratory distress.      Breath sounds: Normal breath sounds. No wheezing or rales.   Abdominal:      General: Bowel sounds are normal. There is no distension.      Palpations: Abdomen is soft.      Tenderness: There is no abdominal tenderness.   Musculoskeletal:      Cervical back: Normal range of motion and neck supple.      Right lower leg: Edema present.      Left lower leg: Edema present.   Lymphadenopathy:      Cervical: No cervical adenopathy.   Skin:     General: Skin is warm and dry.      Findings: No rash.   Neurological:      Mental Status: She is alert and oriented to person, place, and time.      Cranial Nerves: No cranial nerve deficit.   Psychiatric:         Behavior: Behavior normal.         Thought Content: Thought content normal.         Judgment: Judgment normal.          Assessment:       Diagnosis Orders   1. Pre-op exam        2. Intervertebral disc disorder with radiculopathy of lumbosacral region        3. Chronic diastolic CHF (congestive heart failure) (HCC)        4. COPD, severe (HCC)        5. Coronary artery disease involving native coronary artery of native heart without angina pectoris        6. History of CVA (cerebrovascular accident)        7. DEVI (obstructive sleep apnea)

## 2025-01-23 NOTE — TELEPHONE ENCOUNTER
Pt stopped in office, stated she has an appt same day to have her Morphine pump replaced wanted to know if it was safe to do. Inform PT that Provider was not in office was in ICU. Informed PT I could not guarantee an answer prior to her appt.

## 2025-01-23 NOTE — TELEPHONE ENCOUNTER
Intermediate risk clinically for lower risk procedure. Proceed as planned with recs for holding blood thinner from doc. OK to hold for least amount of time possible per Dr. Nick.

## 2025-01-24 ENCOUNTER — TELEPHONE (OUTPATIENT)
Dept: INTERNAL MEDICINE CLINIC | Age: 70
End: 2025-01-24

## 2025-01-24 NOTE — TELEPHONE ENCOUNTER
----- Message from Juana MUNGUIA sent at 1/23/2025  3:40 PM EST -----  Contact: Marjan [phone]  [facility] requesting patients blood work and EKG to be faxed to [phone]

## 2025-01-24 NOTE — TELEPHONE ENCOUNTER
----- Message from Chetna MURPHY sent at 1/24/2025 11:24 AM EST -----  Contact: Maura [phone]  Caller Candie requesting EKG tracing for patient     Fax [phone]

## 2025-03-10 SDOH — ECONOMIC STABILITY: INCOME INSECURITY: IN THE LAST 12 MONTHS, WAS THERE A TIME WHEN YOU WERE NOT ABLE TO PAY THE MORTGAGE OR RENT ON TIME?: NO

## 2025-03-10 SDOH — ECONOMIC STABILITY: FOOD INSECURITY: WITHIN THE PAST 12 MONTHS, THE FOOD YOU BOUGHT JUST DIDN'T LAST AND YOU DIDN'T HAVE MONEY TO GET MORE.: NEVER TRUE

## 2025-03-10 SDOH — ECONOMIC STABILITY: FOOD INSECURITY: WITHIN THE PAST 12 MONTHS, YOU WORRIED THAT YOUR FOOD WOULD RUN OUT BEFORE YOU GOT MONEY TO BUY MORE.: NEVER TRUE

## 2025-03-10 SDOH — HEALTH STABILITY: PHYSICAL HEALTH: ON AVERAGE, HOW MANY MINUTES DO YOU ENGAGE IN EXERCISE AT THIS LEVEL?: 10 MIN

## 2025-03-10 SDOH — HEALTH STABILITY: PHYSICAL HEALTH: ON AVERAGE, HOW MANY DAYS PER WEEK DO YOU ENGAGE IN MODERATE TO STRENUOUS EXERCISE (LIKE A BRISK WALK)?: 0 DAYS

## 2025-03-10 ASSESSMENT — LIFESTYLE VARIABLES
HOW MANY STANDARD DRINKS CONTAINING ALCOHOL DO YOU HAVE ON A TYPICAL DAY: 0
HOW OFTEN DO YOU HAVE A DRINK CONTAINING ALCOHOL: 1
HOW OFTEN DO YOU HAVE A DRINK CONTAINING ALCOHOL: NEVER
HOW MANY STANDARD DRINKS CONTAINING ALCOHOL DO YOU HAVE ON A TYPICAL DAY: PATIENT DOES NOT DRINK
HOW OFTEN DO YOU HAVE SIX OR MORE DRINKS ON ONE OCCASION: 1

## 2025-03-10 ASSESSMENT — PATIENT HEALTH QUESTIONNAIRE - PHQ9
SUM OF ALL RESPONSES TO PHQ QUESTIONS 1-9: 0
2. FEELING DOWN, DEPRESSED OR HOPELESS: NOT AT ALL
SUM OF ALL RESPONSES TO PHQ QUESTIONS 1-9: 0
1. LITTLE INTEREST OR PLEASURE IN DOING THINGS: NOT AT ALL
SUM OF ALL RESPONSES TO PHQ QUESTIONS 1-9: 0
SUM OF ALL RESPONSES TO PHQ QUESTIONS 1-9: 0

## 2025-03-11 ENCOUNTER — OFFICE VISIT (OUTPATIENT)
Dept: INTERNAL MEDICINE CLINIC | Age: 70
End: 2025-03-11

## 2025-03-11 VITALS
DIASTOLIC BLOOD PRESSURE: 80 MMHG | RESPIRATION RATE: 12 BRPM | HEART RATE: 70 BPM | SYSTOLIC BLOOD PRESSURE: 130 MMHG | WEIGHT: 151 LBS | BODY MASS INDEX: 27.79 KG/M2 | HEIGHT: 62 IN

## 2025-03-11 DIAGNOSIS — Z86.73 HISTORY OF CVA (CEREBROVASCULAR ACCIDENT): ICD-10-CM

## 2025-03-11 DIAGNOSIS — J44.9 COPD, SEVERE (HCC): ICD-10-CM

## 2025-03-11 DIAGNOSIS — Z00.00 MEDICARE ANNUAL WELLNESS VISIT, SUBSEQUENT: Primary | ICD-10-CM

## 2025-03-11 DIAGNOSIS — I50.32 CHRONIC DIASTOLIC CHF (CONGESTIVE HEART FAILURE) (HCC): ICD-10-CM

## 2025-03-11 DIAGNOSIS — J96.11 CHRONIC RESPIRATORY FAILURE WITH HYPOXIA (HCC): ICD-10-CM

## 2025-03-11 DIAGNOSIS — G47.33 OSA (OBSTRUCTIVE SLEEP APNEA): ICD-10-CM

## 2025-03-11 DIAGNOSIS — I25.10 CORONARY ARTERY DISEASE INVOLVING NATIVE CORONARY ARTERY OF NATIVE HEART WITHOUT ANGINA PECTORIS: ICD-10-CM

## 2025-03-11 DIAGNOSIS — I48.0 PAROXYSMAL A-FIB (HCC): ICD-10-CM

## 2025-03-11 DIAGNOSIS — E78.2 MIXED HYPERLIPIDEMIA: ICD-10-CM

## 2025-03-11 DIAGNOSIS — I10 HTN (HYPERTENSION), BENIGN: ICD-10-CM

## 2025-03-11 PROBLEM — J18.9 PNEUMONIA OF RIGHT LOWER LOBE DUE TO INFECTIOUS ORGANISM: Status: RESOLVED | Noted: 2018-06-10 | Resolved: 2025-03-11

## 2025-03-11 PROCEDURE — G0439 PPPS, SUBSEQ VISIT: HCPCS | Performed by: INTERNAL MEDICINE

## 2025-03-11 PROCEDURE — 1159F MED LIST DOCD IN RCRD: CPT | Performed by: INTERNAL MEDICINE

## 2025-03-11 PROCEDURE — 3075F SYST BP GE 130 - 139MM HG: CPT | Performed by: INTERNAL MEDICINE

## 2025-03-11 PROCEDURE — 1123F ACP DISCUSS/DSCN MKR DOCD: CPT | Performed by: INTERNAL MEDICINE

## 2025-03-11 PROCEDURE — 1160F RVW MEDS BY RX/DR IN RCRD: CPT | Performed by: INTERNAL MEDICINE

## 2025-03-11 PROCEDURE — 3079F DIAST BP 80-89 MM HG: CPT | Performed by: INTERNAL MEDICINE

## 2025-03-11 NOTE — PROGRESS NOTES
Medicare Annual Wellness Visit    Serina Rubin is here for Medicare AWV    Assessment & Plan   Medicare annual wellness visit, subsequent  Coronary artery disease involving native coronary artery of native heart without angina pectoris  Paroxysmal A-fib (HCC)  Chronic diastolic CHF (congestive heart failure) (HCC)  History of CVA (cerebrovascular accident)  HTN (hypertension), benign  DEVI (obstructive sleep apnea)  COPD, severe (HCC)  Chronic respiratory failure with hypoxia (HCC)  Mixed hyperlipidemia      Medicare exam   CAD stable  Paroxysmal a fib stable  Chronic diastolic CHF. Stable  HTN controlled  HLD at goal  She has quit smoking  She has a new pain pump     No follow-ups on file.     Subjective     She is here for a Medicare exam.    She has COPD and chronic respiratory failure. She uses 2 L of oxygen at hs. She says her COPD is controlled. She sees Dr Delacruz. She has steroids and antibiotics on hand if she need them. No ER visit. No recent flare. She had a recent CT chest and it was ok.      She has sleep apnea. She is not any treatment.      She has atrial fibrillation. She is usually in NSR. She is on Eliquis. No excessive bruising or bleeding.      She has a history of CAD. She has three stents. She takes baby ASA.     She has hypertension. It is controlled.  She takes COREG.     She has chronic systolic and diastolic CHF. She takes Lasix and K prn.     She has HLD. It is controlled.     She has insomnia. She is sleeping ok.     She had a morphine pump replacement done    Patient's complete Health Risk Assessment and screening values have been reviewed and are found in Flowsheets. The following problems were reviewed today and where indicated follow up appointments were made and/or referrals ordered.    Positive Risk Factor Screenings with Interventions:          Controlled Medication Review:    Today's Pain Level: No data recorded   Opioid Risk: (Low risk score <55) Opioid risk score:

## 2025-03-13 RX ORDER — AMLODIPINE BESYLATE 5 MG/1
5 TABLET ORAL DAILY
Qty: 90 TABLET | Refills: 0 | Status: SHIPPED | OUTPATIENT
Start: 2025-03-13

## 2025-03-13 RX ORDER — ATORVASTATIN CALCIUM 80 MG/1
80 TABLET, FILM COATED ORAL DAILY
Qty: 90 TABLET | Refills: 0 | Status: SHIPPED | OUTPATIENT
Start: 2025-03-13

## 2025-03-13 NOTE — TELEPHONE ENCOUNTER
Lab Results   Component Value Date    CHOL 140 10/07/2024    TRIG 82 10/07/2024    HDL 50 10/07/2024    LDL 74 10/07/2024    VLDL 16 10/07/2024     Lab Results   Component Value Date/Time    ALKPHOS 89 01/23/2025 09:30 AM    ALT 14 01/23/2025 09:30 AM    AST 26 01/23/2025 09:30 AM    BILITOT 0.5 01/23/2025 09:30 AM    BILIDIR <0.2 07/08/2020 09:08 AM     LOV 9/3/24  NOV 5/30/25

## 2025-03-14 RX ORDER — CARVEDILOL 3.12 MG/1
3.12 TABLET ORAL 2 TIMES DAILY WITH MEALS
Qty: 180 TABLET | Refills: 0 | Status: SHIPPED | OUTPATIENT
Start: 2025-03-14

## 2025-03-14 RX ORDER — PANTOPRAZOLE SODIUM 40 MG/1
TABLET, DELAYED RELEASE ORAL
Qty: 180 TABLET | Refills: 0 | Status: SHIPPED | OUTPATIENT
Start: 2025-03-14

## 2025-04-06 DIAGNOSIS — I50.32 CHRONIC DIASTOLIC CHF (CONGESTIVE HEART FAILURE) (HCC): ICD-10-CM

## 2025-04-07 RX ORDER — TORSEMIDE 100 MG/1
50 TABLET ORAL DAILY
Qty: 45 TABLET | Refills: 1 | Status: SHIPPED | OUTPATIENT
Start: 2025-04-07

## 2025-04-07 NOTE — TELEPHONE ENCOUNTER
Last Office Visit: 9/3/2024 Provider: GLENN  **Is provider OOT? Yes    Next Office Visit: 5/30/2025 Provider: GLENN  **If no OV, when does pt need to be seen? N/a  **Has patient already had 30 day supply? No    Lab orders needed? no   Encounter provider correct? Yes If not, change provider  Script changes since last refill? no    LAST LABS:   CMP:   Lab Results   Component Value Date     01/23/2025    K 4.8 01/23/2025     01/23/2025    CO2 34 (H) 01/23/2025    BUN 14 01/23/2025    CREATININE 0.8 01/23/2025    GLUCOSE 115 (H) 01/23/2025    CALCIUM 9.1 01/23/2025    BILITOT 0.5 01/23/2025    ALKPHOS 89 01/23/2025    AST 26 01/23/2025    ALT 14 01/23/2025    LABGLOM 80 01/23/2025    GFRAA >60 09/23/2022    AGRATIO 2.0 01/23/2025    GLOB 2.3 10/22/2021

## 2025-04-20 ENCOUNTER — APPOINTMENT (OUTPATIENT)
Dept: GENERAL RADIOLOGY | Age: 70
DRG: 189 | End: 2025-04-20
Payer: MEDICARE

## 2025-04-20 ENCOUNTER — HOSPITAL ENCOUNTER (INPATIENT)
Age: 70
LOS: 2 days | Discharge: HOME | DRG: 189 | End: 2025-04-22
Attending: STUDENT IN AN ORGANIZED HEALTH CARE EDUCATION/TRAINING PROGRAM | Admitting: INTERNAL MEDICINE
Payer: MEDICARE

## 2025-04-20 ENCOUNTER — APPOINTMENT (OUTPATIENT)
Dept: CT IMAGING | Age: 70
DRG: 189 | End: 2025-04-20
Payer: MEDICARE

## 2025-04-20 DIAGNOSIS — Z86.73 HISTORY OF STROKE: ICD-10-CM

## 2025-04-20 DIAGNOSIS — E87.6 HYPOKALEMIA: ICD-10-CM

## 2025-04-20 DIAGNOSIS — R11.2 NAUSEA AND VOMITING, UNSPECIFIED VOMITING TYPE: ICD-10-CM

## 2025-04-20 DIAGNOSIS — J44.1 ACUTE EXACERBATION OF CHRONIC OBSTRUCTIVE PULMONARY DISEASE (COPD) (HCC): ICD-10-CM

## 2025-04-20 DIAGNOSIS — R06.02 SHORTNESS OF BREATH: Primary | ICD-10-CM

## 2025-04-20 DIAGNOSIS — J44.1 COPD EXACERBATION (HCC): ICD-10-CM

## 2025-04-20 DIAGNOSIS — R94.31 PROLONGED Q-T INTERVAL ON ECG: ICD-10-CM

## 2025-04-20 LAB
ALBUMIN SERPL-MCNC: 4 G/DL (ref 3.4–5)
ALBUMIN/GLOB SERPL: 1.4 {RATIO} (ref 1.1–2.2)
ALP SERPL-CCNC: 95 U/L (ref 40–129)
ALT SERPL-CCNC: 12 U/L (ref 10–40)
ANION GAP SERPL CALCULATED.3IONS-SCNC: 12 MMOL/L (ref 3–16)
AST SERPL-CCNC: 29 U/L (ref 15–37)
BASE EXCESS BLDV CALC-SCNC: 14.7 MMOL/L (ref -3–3)
BASOPHILS # BLD: 0 K/UL (ref 0–0.2)
BASOPHILS NFR BLD: 0.4 %
BILIRUB SERPL-MCNC: 0.8 MG/DL (ref 0–1)
BUN SERPL-MCNC: 10 MG/DL (ref 7–20)
BUN SERPL-MCNC: 13 MG/DL (ref 7–20)
BUN SERPL-MCNC: 14 MG/DL (ref 7–20)
CALCIUM SERPL-MCNC: 9 MG/DL (ref 8.3–10.6)
CALCIUM SERPL-MCNC: 9.2 MG/DL (ref 8.3–10.6)
CALCIUM SERPL-MCNC: 9.7 MG/DL (ref 8.3–10.6)
CHLORIDE SERPL-SCNC: 87 MMOL/L (ref 99–110)
CHLORIDE SERPL-SCNC: 88 MMOL/L (ref 99–110)
CHLORIDE SERPL-SCNC: 91 MMOL/L (ref 99–110)
CO2 BLDV-SCNC: 45 MMOL/L
CO2 SERPL-SCNC: 35 MMOL/L (ref 21–32)
CO2 SERPL-SCNC: 38 MMOL/L (ref 21–32)
CO2 SERPL-SCNC: 42 MMOL/L (ref 21–32)
COHGB MFR BLDV: 1.7 % (ref 0–1.5)
CREAT SERPL-MCNC: 0.6 MG/DL (ref 0.6–1.2)
CREAT SERPL-MCNC: 0.7 MG/DL (ref 0.6–1.2)
CREAT SERPL-MCNC: 0.8 MG/DL (ref 0.6–1.2)
D-DIMER QUANTITATIVE: 1.85 UG/ML FEU (ref 0–0.6)
DEPRECATED RDW RBC AUTO: 14.7 % (ref 12.4–15.4)
EOSINOPHIL # BLD: 0 K/UL (ref 0–0.6)
EOSINOPHIL NFR BLD: 0.4 %
FLUAV RNA RESP QL NAA+PROBE: NOT DETECTED
FLUBV RNA RESP QL NAA+PROBE: NOT DETECTED
GFR SERPLBLD CREATININE-BSD FMLA CKD-EPI: 80 ML/MIN/{1.73_M2}
GFR SERPLBLD CREATININE-BSD FMLA CKD-EPI: >90 ML/MIN/{1.73_M2}
GFR SERPLBLD CREATININE-BSD FMLA CKD-EPI: >90 ML/MIN/{1.73_M2}
GLUCOSE SERPL-MCNC: 118 MG/DL (ref 70–99)
GLUCOSE SERPL-MCNC: 120 MG/DL (ref 70–99)
GLUCOSE SERPL-MCNC: 179 MG/DL (ref 70–99)
HCO3 BLDV-SCNC: 42.8 MMOL/L (ref 23–29)
HCT VFR BLD AUTO: 48.1 % (ref 36–48)
HGB BLD-MCNC: 15.9 G/DL (ref 12–16)
LYMPHOCYTES # BLD: 0.8 K/UL (ref 1–5.1)
LYMPHOCYTES NFR BLD: 9.1 %
MAGNESIUM SERPL-MCNC: 1.75 MG/DL (ref 1.8–2.4)
MCH RBC QN AUTO: 29.4 PG (ref 26–34)
MCHC RBC AUTO-ENTMCNC: 33.1 G/DL (ref 31–36)
MCV RBC AUTO: 88.8 FL (ref 80–100)
METHGB MFR BLDV: 0 %
MONOCYTES # BLD: 0.7 K/UL (ref 0–1.3)
MONOCYTES NFR BLD: 8 %
NEUTROPHILS # BLD: 6.8 K/UL (ref 1.7–7.7)
NEUTROPHILS NFR BLD: 82.1 %
NT-PROBNP SERPL-MCNC: 46 PG/ML (ref 0–124)
O2 CT VFR BLDV CALC: 18 VOL %
O2 THERAPY: ABNORMAL
PCO2 BLDV: 64.8 MMHG (ref 40–50)
PH BLDV: 7.44 [PH] (ref 7.35–7.45)
PLATELET # BLD AUTO: 161 K/UL (ref 135–450)
PMV BLD AUTO: 10.7 FL (ref 5–10.5)
PO2 BLDV: 43.6 MMHG (ref 25–40)
POTASSIUM SERPL-SCNC: 2.5 MMOL/L (ref 3.5–5.1)
POTASSIUM SERPL-SCNC: 2.6 MMOL/L (ref 3.5–5.1)
POTASSIUM SERPL-SCNC: 2.7 MMOL/L (ref 3.5–5.1)
PROT SERPL-MCNC: 6.8 G/DL (ref 6.4–8.2)
RBC # BLD AUTO: 5.42 M/UL (ref 4–5.2)
RSV BY PCR: NOT DETECTED
SAO2 % BLDV: 79 %
SARS-COV-2 RNA RESP QL NAA+PROBE: NOT DETECTED
SODIUM SERPL-SCNC: 138 MMOL/L (ref 136–145)
SODIUM SERPL-SCNC: 138 MMOL/L (ref 136–145)
SODIUM SERPL-SCNC: 141 MMOL/L (ref 136–145)
TROPONIN, HIGH SENSITIVITY: 9 NG/L (ref 0–14)
WBC # BLD AUTO: 8.3 K/UL (ref 4–11)

## 2025-04-20 PROCEDURE — 85025 COMPLETE CBC W/AUTO DIFF WBC: CPT

## 2025-04-20 PROCEDURE — 87637 SARSCOV2&INF A&B&RSV AMP PRB: CPT

## 2025-04-20 PROCEDURE — 6360000002 HC RX W HCPCS: Performed by: INTERNAL MEDICINE

## 2025-04-20 PROCEDURE — 71046 X-RAY EXAM CHEST 2 VIEWS: CPT

## 2025-04-20 PROCEDURE — 99285 EMERGENCY DEPT VISIT HI MDM: CPT

## 2025-04-20 PROCEDURE — 6370000000 HC RX 637 (ALT 250 FOR IP): Performed by: INTERNAL MEDICINE

## 2025-04-20 PROCEDURE — 83880 ASSAY OF NATRIURETIC PEPTIDE: CPT

## 2025-04-20 PROCEDURE — 2500000003 HC RX 250 WO HCPCS

## 2025-04-20 PROCEDURE — 94640 AIRWAY INHALATION TREATMENT: CPT

## 2025-04-20 PROCEDURE — 1200000000 HC SEMI PRIVATE

## 2025-04-20 PROCEDURE — 6370000000 HC RX 637 (ALT 250 FOR IP)

## 2025-04-20 PROCEDURE — 71260 CT THORAX DX C+: CPT

## 2025-04-20 PROCEDURE — 6360000004 HC RX CONTRAST MEDICATION

## 2025-04-20 PROCEDURE — 6360000002 HC RX W HCPCS

## 2025-04-20 PROCEDURE — 83735 ASSAY OF MAGNESIUM: CPT

## 2025-04-20 PROCEDURE — 82803 BLOOD GASES ANY COMBINATION: CPT

## 2025-04-20 PROCEDURE — 2500000003 HC RX 250 WO HCPCS: Performed by: INTERNAL MEDICINE

## 2025-04-20 PROCEDURE — 93005 ELECTROCARDIOGRAM TRACING: CPT

## 2025-04-20 PROCEDURE — 96375 TX/PRO/DX INJ NEW DRUG ADDON: CPT

## 2025-04-20 PROCEDURE — 36415 COLL VENOUS BLD VENIPUNCTURE: CPT

## 2025-04-20 PROCEDURE — 6370000000 HC RX 637 (ALT 250 FOR IP): Performed by: STUDENT IN AN ORGANIZED HEALTH CARE EDUCATION/TRAINING PROGRAM

## 2025-04-20 PROCEDURE — 6360000002 HC RX W HCPCS: Performed by: STUDENT IN AN ORGANIZED HEALTH CARE EDUCATION/TRAINING PROGRAM

## 2025-04-20 PROCEDURE — 96365 THER/PROPH/DIAG IV INF INIT: CPT

## 2025-04-20 PROCEDURE — 2060000000 HC ICU INTERMEDIATE R&B

## 2025-04-20 PROCEDURE — 96366 THER/PROPH/DIAG IV INF ADDON: CPT

## 2025-04-20 PROCEDURE — 84484 ASSAY OF TROPONIN QUANT: CPT

## 2025-04-20 PROCEDURE — 74177 CT ABD & PELVIS W/CONTRAST: CPT

## 2025-04-20 PROCEDURE — 85379 FIBRIN DEGRADATION QUANT: CPT

## 2025-04-20 PROCEDURE — 2580000003 HC RX 258: Performed by: INTERNAL MEDICINE

## 2025-04-20 PROCEDURE — 80053 COMPREHEN METABOLIC PANEL: CPT

## 2025-04-20 PROCEDURE — 2580000003 HC RX 258

## 2025-04-20 PROCEDURE — 94761 N-INVAS EAR/PLS OXIMETRY MLT: CPT

## 2025-04-20 PROCEDURE — 2700000000 HC OXYGEN THERAPY PER DAY

## 2025-04-20 RX ORDER — BUDESONIDE AND FORMOTEROL FUMARATE DIHYDRATE 160; 4.5 UG/1; UG/1
2 AEROSOL RESPIRATORY (INHALATION)
Status: DISCONTINUED | OUTPATIENT
Start: 2025-04-20 | End: 2025-04-22 | Stop reason: HOSPADM

## 2025-04-20 RX ORDER — IPRATROPIUM BROMIDE AND ALBUTEROL SULFATE 2.5; .5 MG/3ML; MG/3ML
1 SOLUTION RESPIRATORY (INHALATION)
Status: DISCONTINUED | OUTPATIENT
Start: 2025-04-20 | End: 2025-04-20

## 2025-04-20 RX ORDER — ASPIRIN 81 MG/1
81 TABLET, CHEWABLE ORAL DAILY
Status: DISCONTINUED | OUTPATIENT
Start: 2025-04-21 | End: 2025-04-22 | Stop reason: HOSPADM

## 2025-04-20 RX ORDER — PANTOPRAZOLE SODIUM 40 MG/1
40 TABLET, DELAYED RELEASE ORAL
Status: DISCONTINUED | OUTPATIENT
Start: 2025-04-21 | End: 2025-04-22 | Stop reason: HOSPADM

## 2025-04-20 RX ORDER — ACETAMINOPHEN 325 MG/1
650 TABLET ORAL ONCE
Status: COMPLETED | OUTPATIENT
Start: 2025-04-20 | End: 2025-04-20

## 2025-04-20 RX ORDER — ALBUTEROL SULFATE 0.83 MG/ML
2.5 SOLUTION RESPIRATORY (INHALATION) EVERY 6 HOURS PRN
Status: DISCONTINUED | OUTPATIENT
Start: 2025-04-20 | End: 2025-04-22 | Stop reason: HOSPADM

## 2025-04-20 RX ORDER — GUAIFENESIN/DEXTROMETHORPHAN 100-10MG/5
5 SYRUP ORAL EVERY 4 HOURS PRN
Status: DISCONTINUED | OUTPATIENT
Start: 2025-04-20 | End: 2025-04-22 | Stop reason: HOSPADM

## 2025-04-20 RX ORDER — OXYCODONE HYDROCHLORIDE 5 MG/1
10 TABLET ORAL ONCE
Refills: 0 | Status: COMPLETED | OUTPATIENT
Start: 2025-04-20 | End: 2025-04-20

## 2025-04-20 RX ORDER — AMLODIPINE BESYLATE 5 MG/1
5 TABLET ORAL DAILY
Status: DISCONTINUED | OUTPATIENT
Start: 2025-04-21 | End: 2025-04-22 | Stop reason: HOSPADM

## 2025-04-20 RX ORDER — IPRATROPIUM BROMIDE AND ALBUTEROL SULFATE 2.5; .5 MG/3ML; MG/3ML
1 SOLUTION RESPIRATORY (INHALATION) ONCE
Status: COMPLETED | OUTPATIENT
Start: 2025-04-20 | End: 2025-04-20

## 2025-04-20 RX ORDER — CARVEDILOL 3.12 MG/1
3.12 TABLET ORAL 2 TIMES DAILY WITH MEALS
Status: DISCONTINUED | OUTPATIENT
Start: 2025-04-21 | End: 2025-04-22 | Stop reason: HOSPADM

## 2025-04-20 RX ORDER — MAGNESIUM SULFATE 1 G/100ML
1000 INJECTION INTRAVENOUS ONCE
Status: COMPLETED | OUTPATIENT
Start: 2025-04-20 | End: 2025-04-20

## 2025-04-20 RX ORDER — ALBUTEROL SULFATE 0.83 MG/ML
2.5 SOLUTION RESPIRATORY (INHALATION) ONCE
Status: COMPLETED | OUTPATIENT
Start: 2025-04-20 | End: 2025-04-20

## 2025-04-20 RX ORDER — ONDANSETRON 2 MG/ML
4 INJECTION INTRAMUSCULAR; INTRAVENOUS EVERY 6 HOURS PRN
Status: DISCONTINUED | OUTPATIENT
Start: 2025-04-20 | End: 2025-04-20

## 2025-04-20 RX ORDER — SODIUM CHLORIDE 9 MG/ML
INJECTION, SOLUTION INTRAVENOUS PRN
Status: DISCONTINUED | OUTPATIENT
Start: 2025-04-20 | End: 2025-04-22 | Stop reason: HOSPADM

## 2025-04-20 RX ORDER — IOPAMIDOL 755 MG/ML
75 INJECTION, SOLUTION INTRAVASCULAR
Status: COMPLETED | OUTPATIENT
Start: 2025-04-20 | End: 2025-04-20

## 2025-04-20 RX ORDER — ONDANSETRON 2 MG/ML
4 INJECTION INTRAMUSCULAR; INTRAVENOUS EVERY 6 HOURS PRN
Status: DISCONTINUED | OUTPATIENT
Start: 2025-04-20 | End: 2025-04-22 | Stop reason: HOSPADM

## 2025-04-20 RX ORDER — SODIUM CHLORIDE, SODIUM LACTATE, POTASSIUM CHLORIDE, AND CALCIUM CHLORIDE .6; .31; .03; .02 G/100ML; G/100ML; G/100ML; G/100ML
1000 INJECTION, SOLUTION INTRAVENOUS ONCE
Status: DISCONTINUED | OUTPATIENT
Start: 2025-04-20 | End: 2025-04-20

## 2025-04-20 RX ORDER — IPRATROPIUM BROMIDE AND ALBUTEROL SULFATE 2.5; .5 MG/3ML; MG/3ML
1 SOLUTION RESPIRATORY (INHALATION)
Status: DISCONTINUED | OUTPATIENT
Start: 2025-04-21 | End: 2025-04-22

## 2025-04-20 RX ORDER — ATORVASTATIN CALCIUM 40 MG/1
80 TABLET, FILM COATED ORAL DAILY
Status: DISCONTINUED | OUTPATIENT
Start: 2025-04-20 | End: 2025-04-22 | Stop reason: HOSPADM

## 2025-04-20 RX ORDER — SODIUM CHLORIDE 0.9 % (FLUSH) 0.9 %
5-40 SYRINGE (ML) INJECTION EVERY 12 HOURS SCHEDULED
Status: DISCONTINUED | OUTPATIENT
Start: 2025-04-20 | End: 2025-04-22 | Stop reason: HOSPADM

## 2025-04-20 RX ORDER — IPRATROPIUM BROMIDE AND ALBUTEROL SULFATE 2.5; .5 MG/3ML; MG/3ML
1 SOLUTION RESPIRATORY (INHALATION) EVERY 4 HOURS PRN
Status: DISCONTINUED | OUTPATIENT
Start: 2025-04-20 | End: 2025-04-22 | Stop reason: HOSPADM

## 2025-04-20 RX ORDER — POLYETHYLENE GLYCOL 3350 17 G/17G
17 POWDER, FOR SOLUTION ORAL DAILY PRN
Status: DISCONTINUED | OUTPATIENT
Start: 2025-04-20 | End: 2025-04-22 | Stop reason: HOSPADM

## 2025-04-20 RX ORDER — GUAIFENESIN 600 MG/1
600 TABLET, EXTENDED RELEASE ORAL 2 TIMES DAILY
Status: DISCONTINUED | OUTPATIENT
Start: 2025-04-20 | End: 2025-04-22 | Stop reason: HOSPADM

## 2025-04-20 RX ORDER — SODIUM CHLORIDE 9 MG/ML
1000 INJECTION, SOLUTION INTRAVENOUS CONTINUOUS
Status: DISCONTINUED | OUTPATIENT
Start: 2025-04-20 | End: 2025-04-21

## 2025-04-20 RX ORDER — ONDANSETRON 4 MG/1
4 TABLET, ORALLY DISINTEGRATING ORAL EVERY 8 HOURS PRN
Status: DISCONTINUED | OUTPATIENT
Start: 2025-04-20 | End: 2025-04-22 | Stop reason: HOSPADM

## 2025-04-20 RX ORDER — ONDANSETRON 4 MG/1
4 TABLET, ORALLY DISINTEGRATING ORAL EVERY 8 HOURS PRN
Status: DISCONTINUED | OUTPATIENT
Start: 2025-04-20 | End: 2025-04-20

## 2025-04-20 RX ORDER — POTASSIUM CHLORIDE 7.45 MG/ML
10 INJECTION INTRAVENOUS
Status: COMPLETED | OUTPATIENT
Start: 2025-04-20 | End: 2025-04-20

## 2025-04-20 RX ORDER — DIPHENHYDRAMINE HCL 25 MG
50 TABLET ORAL ONCE
Status: COMPLETED | OUTPATIENT
Start: 2025-04-20 | End: 2025-04-20

## 2025-04-20 RX ORDER — ONDANSETRON 2 MG/ML
4 INJECTION INTRAMUSCULAR; INTRAVENOUS ONCE
Status: COMPLETED | OUTPATIENT
Start: 2025-04-20 | End: 2025-04-20

## 2025-04-20 RX ORDER — OXYCODONE AND ACETAMINOPHEN 10; 325 MG/1; MG/1
1 TABLET ORAL 3 TIMES DAILY PRN
Refills: 0 | Status: DISCONTINUED | OUTPATIENT
Start: 2025-04-20 | End: 2025-04-22 | Stop reason: HOSPADM

## 2025-04-20 RX ORDER — SODIUM CHLORIDE, SODIUM LACTATE, POTASSIUM CHLORIDE, CALCIUM CHLORIDE 600; 310; 30; 20 MG/100ML; MG/100ML; MG/100ML; MG/100ML
INJECTION, SOLUTION INTRAVENOUS CONTINUOUS
Status: DISCONTINUED | OUTPATIENT
Start: 2025-04-20 | End: 2025-04-21

## 2025-04-20 RX ORDER — SODIUM CHLORIDE 0.9 % (FLUSH) 0.9 %
5-40 SYRINGE (ML) INJECTION PRN
Status: DISCONTINUED | OUTPATIENT
Start: 2025-04-20 | End: 2025-04-22 | Stop reason: HOSPADM

## 2025-04-20 RX ORDER — GABAPENTIN 400 MG/1
400 CAPSULE ORAL 3 TIMES DAILY
Status: DISCONTINUED | OUTPATIENT
Start: 2025-04-20 | End: 2025-04-22 | Stop reason: HOSPADM

## 2025-04-20 RX ADMIN — POTASSIUM CHLORIDE 10 MEQ: 7.45 INJECTION INTRAVENOUS at 14:51

## 2025-04-20 RX ADMIN — METHYLPREDNISOLONE SODIUM SUCCINATE 125 MG: 125 INJECTION INTRAMUSCULAR; INTRAVENOUS at 14:47

## 2025-04-20 RX ADMIN — POTASSIUM CHLORIDE 10 MEQ: 7.45 INJECTION INTRAVENOUS at 18:09

## 2025-04-20 RX ADMIN — GABAPENTIN 400 MG: 400 CAPSULE ORAL at 22:55

## 2025-04-20 RX ADMIN — MAGNESIUM SULFATE HEPTAHYDRATE 1000 MG: 1 INJECTION, SOLUTION INTRAVENOUS at 21:56

## 2025-04-20 RX ADMIN — ALBUTEROL SULFATE 2.5 MG: 2.5 SOLUTION RESPIRATORY (INHALATION) at 14:08

## 2025-04-20 RX ADMIN — APIXABAN 5 MG: 5 TABLET, FILM COATED ORAL at 21:49

## 2025-04-20 RX ADMIN — IPRATROPIUM BROMIDE AND ALBUTEROL SULFATE 1 DOSE: 2.5; .5 SOLUTION RESPIRATORY (INHALATION) at 20:34

## 2025-04-20 RX ADMIN — ACETAMINOPHEN 650 MG: 325 TABLET ORAL at 13:27

## 2025-04-20 RX ADMIN — IOPAMIDOL 75 ML: 755 INJECTION, SOLUTION INTRAVENOUS at 15:38

## 2025-04-20 RX ADMIN — IPRATROPIUM BROMIDE AND ALBUTEROL SULFATE 1 DOSE: 2.5; .5 SOLUTION RESPIRATORY (INHALATION) at 13:28

## 2025-04-20 RX ADMIN — ATORVASTATIN CALCIUM 80 MG: 40 TABLET, FILM COATED ORAL at 21:49

## 2025-04-20 RX ADMIN — OXYCODONE HYDROCHLORIDE 10 MG: 5 TABLET ORAL at 15:09

## 2025-04-20 RX ADMIN — POTASSIUM CHLORIDE 10 MEQ: 7.45 INJECTION INTRAVENOUS at 16:51

## 2025-04-20 RX ADMIN — SODIUM CHLORIDE, PRESERVATIVE FREE 10 ML: 5 INJECTION INTRAVENOUS at 22:01

## 2025-04-20 RX ADMIN — DIPHENHYDRAMINE HCL 50 MG: 25 TABLET ORAL at 14:48

## 2025-04-20 RX ADMIN — OXYCODONE HYDROCHLORIDE AND ACETAMINOPHEN 1 TABLET: 10; 325 TABLET ORAL at 22:55

## 2025-04-20 RX ADMIN — SODIUM CHLORIDE, SODIUM LACTATE, POTASSIUM CHLORIDE, AND CALCIUM CHLORIDE: .6; .31; .03; .02 INJECTION, SOLUTION INTRAVENOUS at 22:59

## 2025-04-20 RX ADMIN — POTASSIUM CHLORIDE 10 MEQ: 7.45 INJECTION INTRAVENOUS at 19:19

## 2025-04-20 RX ADMIN — GUAIFENESIN 600 MG: 600 TABLET, EXTENDED RELEASE ORAL at 21:49

## 2025-04-20 RX ADMIN — ONDANSETRON 4 MG: 2 INJECTION, SOLUTION INTRAMUSCULAR; INTRAVENOUS at 13:26

## 2025-04-20 RX ADMIN — SODIUM CHLORIDE 1000 ML: 0.9 INJECTION, SOLUTION INTRAVENOUS at 14:08

## 2025-04-20 RX ADMIN — DOXYCYCLINE 100 MG: 100 INJECTION, POWDER, LYOPHILIZED, FOR SOLUTION INTRAVENOUS at 23:01

## 2025-04-20 RX ADMIN — POTASSIUM BICARBONATE 40 MEQ: 782 TABLET, EFFERVESCENT ORAL at 14:49

## 2025-04-20 ASSESSMENT — PAIN SCALES - GENERAL
PAINLEVEL_OUTOF10: 5
PAINLEVEL_OUTOF10: 4
PAINLEVEL_OUTOF10: 7

## 2025-04-20 ASSESSMENT — PAIN DESCRIPTION - LOCATION: LOCATION: BACK;LEG

## 2025-04-20 ASSESSMENT — PAIN DESCRIPTION - DESCRIPTORS: DESCRIPTORS: ACHING

## 2025-04-20 ASSESSMENT — PAIN DESCRIPTION - ORIENTATION: ORIENTATION: LOWER;LEFT;RIGHT

## 2025-04-20 NOTE — PROGRESS NOTES
Bedside report and transfer of care given to DEBORAH Rhoades. Pt currently resting in bed with the call light within reach. Pt denies any other care needs at this time. Pt stable at this time.
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
## 2025-04-20 NOTE — ED PROVIDER NOTES
[facility] EMERGENCY DEPARTMENT  EMERGENCY DEPARTMENT ENCOUNTER        Pt Name: Serina uRbin  MRN: [id number]  Birthdate 1955  Date of evaluation: 4/20/2025  Provider: FAVIAN Dolan  PCP: Elvira Hunter MD  Note Started: 1:03 PM EDT 4/20/25       I have seen and evaluated this patient with my supervising physician Corinne Cantrell MD.      CHIEF COMPLAINT       Chief Complaint   Patient presents with    Fatigue    Nausea     Patient states this morning she woke up feeling nauseated and fatigue. Unable to get up to get a shower because she felt weak. Patient also reports having to increase use of her PRN oxygen.       HISTORY OF PRESENT ILLNESS: 1 or more Elements     History From: Patient     Limitations to history : None    Social Determinants Significantly Affecting Health : None    Chief Complaint: Generalized illness     Serina Rubin is a 69 y.o. female who presents to the emergency department for generalized illness.  States that she woke up today feeling nauseous with bodyaches, generalized weakness, cough, increased shortness of breath.  She does have a history of COPD.  She wears oxygen as needed, she typically only needs it at night but states that she has had to wear most of the day today.  Also admits associated chills.  She denies known fevers, vomiting, diarrhea, abdominal pain, dysuria, urinary urgency/frequency, chest pain.  Denies known sick contacts.    Nursing Notes were all reviewed and agreed with or any disagreements were addressed in the HPI.    REVIEW OF SYSTEMS :      Review of Systems    Positives and Pertinent negatives as per HPI.     SURGICAL HISTORY     Past Surgical History:   Procedure Laterality Date    BREAST BIOPSY      BREAST SURGERY      CARDIAC CATHETERIZATION      stents x3     COLONOSCOPY      DENTAL SURGERY  4/18/16    multiple teeth extracted/ Removal of BRANDIN    FRACTURE SURGERY Right     femur    HYSTERECTOMY (CERVIX STATUS UNKNOWN)

## 2025-04-20 NOTE — PROGRESS NOTES
Patient admitted to room 319 from ER 5. Patient oriented to room, call light, bed rails, phone, lights and bathroom. Patient instructed about the schedule of the day including: vital sign frequency, lab draws, possible tests, frequency of MD and staff rounds, daily weights, I &O's and prescribed diet.  Telemetry box in place, patient aware of placement and reason. Bed locked, in lowest position, side rails up 2/4, call light within reach.     Patient is alert and oriented x4.  She verbalized that she notified her family that she was admitted to room 319.    Tele:   VSS  02 at 4Lpm with continuous pulse ox     Recliner Assessment  Patient is not able to demonstrate the ability to move from a reclining position to an upright position within the recliner due to weakness.

## 2025-04-21 ENCOUNTER — APPOINTMENT (OUTPATIENT)
Dept: ULTRASOUND IMAGING | Age: 70
DRG: 189 | End: 2025-04-21
Payer: MEDICARE

## 2025-04-21 PROBLEM — K83.8 DILATED BILE DUCT: Status: ACTIVE | Noted: 2025-04-21

## 2025-04-21 PROBLEM — R94.31 PROLONGED Q-T INTERVAL ON ECG: Status: ACTIVE | Noted: 2025-04-21

## 2025-04-21 LAB
ANION GAP SERPL CALCULATED.3IONS-SCNC: 11 MMOL/L (ref 3–16)
ANION GAP SERPL CALCULATED.3IONS-SCNC: 15 MMOL/L (ref 3–16)
BASOPHILS # BLD: 0 K/UL (ref 0–0.2)
BASOPHILS NFR BLD: 0.1 %
BUN SERPL-MCNC: 10 MG/DL (ref 7–20)
BUN SERPL-MCNC: 13 MG/DL (ref 7–20)
CALCIUM SERPL-MCNC: 8.8 MG/DL (ref 8.3–10.6)
CALCIUM SERPL-MCNC: 9.5 MG/DL (ref 8.3–10.6)
CHLORIDE SERPL-SCNC: 96 MMOL/L (ref 99–110)
CHLORIDE SERPL-SCNC: 96 MMOL/L (ref 99–110)
CO2 SERPL-SCNC: 25 MMOL/L (ref 21–32)
CO2 SERPL-SCNC: 30 MMOL/L (ref 21–32)
CREAT SERPL-MCNC: 0.7 MG/DL (ref 0.6–1.2)
CREAT SERPL-MCNC: 0.7 MG/DL (ref 0.6–1.2)
DEPRECATED RDW RBC AUTO: 14.7 % (ref 12.4–15.4)
EKG ATRIAL RATE: 66 BPM
EKG ATRIAL RATE: 71 BPM
EKG DIAGNOSIS: NORMAL
EKG DIAGNOSIS: NORMAL
EKG P AXIS: 58 DEGREES
EKG P AXIS: 63 DEGREES
EKG P-R INTERVAL: 158 MS
EKG P-R INTERVAL: 180 MS
EKG Q-T INTERVAL: 410 MS
EKG Q-T INTERVAL: 542 MS
EKG QRS DURATION: 74 MS
EKG QRS DURATION: 90 MS
EKG QTC CALCULATION (BAZETT): 429 MS
EKG QTC CALCULATION (BAZETT): 588 MS
EKG R AXIS: -65 DEGREES
EKG R AXIS: -76 DEGREES
EKG T AXIS: 49 DEGREES
EKG T AXIS: 63 DEGREES
EKG VENTRICULAR RATE: 66 BPM
EKG VENTRICULAR RATE: 71 BPM
EOSINOPHIL # BLD: 0 K/UL (ref 0–0.6)
EOSINOPHIL NFR BLD: 0 %
GFR SERPLBLD CREATININE-BSD FMLA CKD-EPI: >90 ML/MIN/{1.73_M2}
GFR SERPLBLD CREATININE-BSD FMLA CKD-EPI: >90 ML/MIN/{1.73_M2}
GLUCOSE SERPL-MCNC: 131 MG/DL (ref 70–99)
GLUCOSE SERPL-MCNC: 208 MG/DL (ref 70–99)
HCT VFR BLD AUTO: 47 % (ref 36–48)
HGB BLD-MCNC: 15.5 G/DL (ref 12–16)
LYMPHOCYTES # BLD: 0.5 K/UL (ref 1–5.1)
LYMPHOCYTES NFR BLD: 5.6 %
MAGNESIUM SERPL-MCNC: 2.21 MG/DL (ref 1.8–2.4)
MCH RBC QN AUTO: 29.5 PG (ref 26–34)
MCHC RBC AUTO-ENTMCNC: 33 G/DL (ref 31–36)
MCV RBC AUTO: 89.4 FL (ref 80–100)
MONOCYTES # BLD: 0.1 K/UL (ref 0–1.3)
MONOCYTES NFR BLD: 1.6 %
NEUTROPHILS # BLD: 8.5 K/UL (ref 1.7–7.7)
NEUTROPHILS NFR BLD: 92.7 %
PLATELET # BLD AUTO: 162 K/UL (ref 135–450)
PMV BLD AUTO: 10.4 FL (ref 5–10.5)
POTASSIUM SERPL-SCNC: 3.8 MMOL/L (ref 3.5–5.1)
POTASSIUM SERPL-SCNC: 4.2 MMOL/L (ref 3.5–5.1)
RBC # BLD AUTO: 5.25 M/UL (ref 4–5.2)
REASON FOR REJECTION: NORMAL
REJECTED TEST: NORMAL
SODIUM SERPL-SCNC: 136 MMOL/L (ref 136–145)
SODIUM SERPL-SCNC: 137 MMOL/L (ref 136–145)
WBC # BLD AUTO: 9.2 K/UL (ref 4–11)

## 2025-04-21 PROCEDURE — 1200000000 HC SEMI PRIVATE

## 2025-04-21 PROCEDURE — 6370000000 HC RX 637 (ALT 250 FOR IP): Performed by: INTERNAL MEDICINE

## 2025-04-21 PROCEDURE — 97530 THERAPEUTIC ACTIVITIES: CPT

## 2025-04-21 PROCEDURE — 6360000002 HC RX W HCPCS: Performed by: INTERNAL MEDICINE

## 2025-04-21 PROCEDURE — 76705 ECHO EXAM OF ABDOMEN: CPT

## 2025-04-21 PROCEDURE — 2500000003 HC RX 250 WO HCPCS: Performed by: INTERNAL MEDICINE

## 2025-04-21 PROCEDURE — 80048 BASIC METABOLIC PNL TOTAL CA: CPT

## 2025-04-21 PROCEDURE — 93005 ELECTROCARDIOGRAM TRACING: CPT | Performed by: INTERNAL MEDICINE

## 2025-04-21 PROCEDURE — 2580000003 HC RX 258: Performed by: INTERNAL MEDICINE

## 2025-04-21 PROCEDURE — 2060000000 HC ICU INTERMEDIATE R&B

## 2025-04-21 PROCEDURE — 94761 N-INVAS EAR/PLS OXIMETRY MLT: CPT

## 2025-04-21 PROCEDURE — 85025 COMPLETE CBC W/AUTO DIFF WBC: CPT

## 2025-04-21 PROCEDURE — 94640 AIRWAY INHALATION TREATMENT: CPT

## 2025-04-21 PROCEDURE — 99233 SBSQ HOSP IP/OBS HIGH 50: CPT | Performed by: INTERNAL MEDICINE

## 2025-04-21 PROCEDURE — 99223 1ST HOSP IP/OBS HIGH 75: CPT | Performed by: INTERNAL MEDICINE

## 2025-04-21 PROCEDURE — 2700000000 HC OXYGEN THERAPY PER DAY

## 2025-04-21 PROCEDURE — 36415 COLL VENOUS BLD VENIPUNCTURE: CPT

## 2025-04-21 PROCEDURE — 97166 OT EVAL MOD COMPLEX 45 MIN: CPT

## 2025-04-21 PROCEDURE — 93010 ELECTROCARDIOGRAM REPORT: CPT | Performed by: STUDENT IN AN ORGANIZED HEALTH CARE EDUCATION/TRAINING PROGRAM

## 2025-04-21 PROCEDURE — 97535 SELF CARE MNGMENT TRAINING: CPT

## 2025-04-21 RX ORDER — POTASSIUM CHLORIDE 7.45 MG/ML
10 INJECTION INTRAVENOUS
Status: DISPENSED | OUTPATIENT
Start: 2025-04-21 | End: 2025-04-21

## 2025-04-21 RX ORDER — POTASSIUM CHLORIDE 1500 MG/1
40 TABLET, EXTENDED RELEASE ORAL ONCE
Status: COMPLETED | OUTPATIENT
Start: 2025-04-21 | End: 2025-04-21

## 2025-04-21 RX ADMIN — DOXYCYCLINE 100 MG: 100 INJECTION, POWDER, LYOPHILIZED, FOR SOLUTION INTRAVENOUS at 20:43

## 2025-04-21 RX ADMIN — POTASSIUM CHLORIDE 10 MEQ: 7.46 INJECTION, SOLUTION INTRAVENOUS at 01:50

## 2025-04-21 RX ADMIN — GABAPENTIN 400 MG: 400 CAPSULE ORAL at 15:52

## 2025-04-21 RX ADMIN — BUDESONIDE AND FORMOTEROL FUMARATE DIHYDRATE 2 PUFF: 160; 4.5 AEROSOL RESPIRATORY (INHALATION) at 20:10

## 2025-04-21 RX ADMIN — BUDESONIDE AND FORMOTEROL FUMARATE DIHYDRATE 2 PUFF: 160; 4.5 AEROSOL RESPIRATORY (INHALATION) at 07:15

## 2025-04-21 RX ADMIN — METHYLPREDNISOLONE SODIUM SUCCINATE 40 MG: 125 INJECTION, POWDER, LYOPHILIZED, FOR SOLUTION INTRAMUSCULAR; INTRAVENOUS at 17:22

## 2025-04-21 RX ADMIN — METHYLPREDNISOLONE SODIUM SUCCINATE 40 MG: 125 INJECTION, POWDER, LYOPHILIZED, FOR SOLUTION INTRAMUSCULAR; INTRAVENOUS at 05:26

## 2025-04-21 RX ADMIN — IPRATROPIUM BROMIDE AND ALBUTEROL SULFATE 1 DOSE: 2.5; .5 SOLUTION RESPIRATORY (INHALATION) at 11:32

## 2025-04-21 RX ADMIN — POTASSIUM CHLORIDE 10 MEQ: 7.46 INJECTION, SOLUTION INTRAVENOUS at 06:17

## 2025-04-21 RX ADMIN — IPRATROPIUM BROMIDE AND ALBUTEROL SULFATE 1 DOSE: 2.5; .5 SOLUTION RESPIRATORY (INHALATION) at 07:15

## 2025-04-21 RX ADMIN — OXYCODONE HYDROCHLORIDE AND ACETAMINOPHEN 1 TABLET: 10; 325 TABLET ORAL at 15:17

## 2025-04-21 RX ADMIN — DOXYCYCLINE 100 MG: 100 INJECTION, POWDER, LYOPHILIZED, FOR SOLUTION INTRAVENOUS at 10:24

## 2025-04-21 RX ADMIN — GABAPENTIN 400 MG: 400 CAPSULE ORAL at 20:37

## 2025-04-21 RX ADMIN — ASPIRIN 81 MG: 81 TABLET, CHEWABLE ORAL at 10:25

## 2025-04-21 RX ADMIN — GUAIFENESIN 600 MG: 600 TABLET, EXTENDED RELEASE ORAL at 20:37

## 2025-04-21 RX ADMIN — IPRATROPIUM BROMIDE AND ALBUTEROL SULFATE 1 DOSE: 2.5; .5 SOLUTION RESPIRATORY (INHALATION) at 20:10

## 2025-04-21 RX ADMIN — GUAIFENESIN 600 MG: 600 TABLET, EXTENDED RELEASE ORAL at 10:25

## 2025-04-21 RX ADMIN — PANTOPRAZOLE SODIUM 40 MG: 40 TABLET, DELAYED RELEASE ORAL at 05:27

## 2025-04-21 RX ADMIN — POTASSIUM CHLORIDE 10 MEQ: 7.46 INJECTION, SOLUTION INTRAVENOUS at 02:50

## 2025-04-21 RX ADMIN — CARVEDILOL 3.12 MG: 3.12 TABLET, FILM COATED ORAL at 10:24

## 2025-04-21 RX ADMIN — SODIUM CHLORIDE, PRESERVATIVE FREE 10 ML: 5 INJECTION INTRAVENOUS at 20:36

## 2025-04-21 RX ADMIN — APIXABAN 5 MG: 5 TABLET, FILM COATED ORAL at 20:37

## 2025-04-21 RX ADMIN — AMLODIPINE BESYLATE 5 MG: 5 TABLET ORAL at 10:26

## 2025-04-21 RX ADMIN — POTASSIUM CHLORIDE 10 MEQ: 7.46 INJECTION, SOLUTION INTRAVENOUS at 04:44

## 2025-04-21 RX ADMIN — POTASSIUM CHLORIDE 40 MEQ: 1500 TABLET, EXTENDED RELEASE ORAL at 01:06

## 2025-04-21 RX ADMIN — APIXABAN 5 MG: 5 TABLET, FILM COATED ORAL at 10:26

## 2025-04-21 RX ADMIN — ATORVASTATIN CALCIUM 80 MG: 40 TABLET, FILM COATED ORAL at 10:25

## 2025-04-21 RX ADMIN — SODIUM CHLORIDE, PRESERVATIVE FREE 10 ML: 5 INJECTION INTRAVENOUS at 07:35

## 2025-04-21 RX ADMIN — OXYCODONE HYDROCHLORIDE AND ACETAMINOPHEN 1 TABLET: 10; 325 TABLET ORAL at 06:55

## 2025-04-21 RX ADMIN — SODIUM CHLORIDE 25 ML: 0.9 INJECTION, SOLUTION INTRAVENOUS at 10:22

## 2025-04-21 RX ADMIN — TIOTROPIUM BROMIDE INHALATION SPRAY 2 PUFF: 3.12 SPRAY, METERED RESPIRATORY (INHALATION) at 07:15

## 2025-04-21 RX ADMIN — IPRATROPIUM BROMIDE AND ALBUTEROL SULFATE 1 DOSE: 2.5; .5 SOLUTION RESPIRATORY (INHALATION) at 15:28

## 2025-04-21 RX ADMIN — GABAPENTIN 400 MG: 400 CAPSULE ORAL at 10:25

## 2025-04-21 ASSESSMENT — PAIN DESCRIPTION - ONSET: ONSET: ON-GOING

## 2025-04-21 ASSESSMENT — PAIN SCALES - GENERAL
PAINLEVEL_OUTOF10: 5
PAINLEVEL_OUTOF10: 3
PAINLEVEL_OUTOF10: 3
PAINLEVEL_OUTOF10: 5
PAINLEVEL_OUTOF10: 2

## 2025-04-21 ASSESSMENT — PAIN DESCRIPTION - ORIENTATION
ORIENTATION: LEFT;RIGHT
ORIENTATION: RIGHT;LEFT;LOWER
ORIENTATION: RIGHT;LEFT;LOWER

## 2025-04-21 ASSESSMENT — PAIN DESCRIPTION - LOCATION
LOCATION: BACK;LEG

## 2025-04-21 ASSESSMENT — PAIN DESCRIPTION - DESCRIPTORS
DESCRIPTORS: ACHING;DISCOMFORT
DESCRIPTORS: ACHING
DESCRIPTORS: ACHING

## 2025-04-21 ASSESSMENT — PAIN DESCRIPTION - FREQUENCY: FREQUENCY: CONTINUOUS

## 2025-04-21 ASSESSMENT — PAIN - FUNCTIONAL ASSESSMENT
PAIN_FUNCTIONAL_ASSESSMENT: ACTIVITIES ARE NOT PREVENTED
PAIN_FUNCTIONAL_ASSESSMENT: ACTIVITIES ARE NOT PREVENTED

## 2025-04-21 ASSESSMENT — PAIN DESCRIPTION - PAIN TYPE: TYPE: CHRONIC PAIN
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
## 2025-04-21 NOTE — PROGRESS NOTES
Dilated bile duct  Resolved Problems:    * No resolved hospital problems. *      Plan:    # Acute exacerbation of COPD.  Patient came to the ER with shortness of breath.  She is on 2 L of oxygen at night.  She was found to be in COPD exacerbation.  She is admitted to hospital.  She is feeling some better today.  She was on 4 L of oxygen this morning.  I weaned her down to 2 L.    # Chronic respiratory failure.  Uses 2 L of oxygen at night.  Presently on 4.  I will wean it down.    # Paroxysmal A-fib.  On Eliquis.  Rate is low.  She has a secondary hypercoagulable state.    # Hypokalemia.  Potassium is low.  She has not been taking her potassium for 2 days.  Potassium has been replaced.    # Prolonged QTc due to hypokalemia.  Repeat EKG.    # Primary hypertension.  On Coreg and Norvasc.    # Chronic diastolic heart failure.  Stable.  Continue diuretics.  Continue Demadex.    # Dilated bile duct.  Check ultrasound the right upper quadrant.  She still has a gallbladder.  Her LFTs are normal.    # On Eliquis for DVT prophylaxis    MDM       [x] High (any 2)     A. Problems (any 1)  [] Acute/Chronic Illness/injury posing threat to life or bodily function:  Hypokalemia  [] Severe exacerbation of chronic illness:  COPD  ---------------------------------------------------------------------  B. Risk of Treatment (any 1)   [x] Drugs/treatments that require intensive monitoring for toxicity include:  K replacement  [] Change in code status:   [] Decision to escalate care:    [] Major surgery/procedure with associated risk factors:    ----------------------------------------------------------------------  C. Data (any 2)  [x] Discussed current management and discharge planning options with Case Management.  [] Discussed management of the case with:  [] Telemetry personally reviewed and interpreted as documented above    [] Imaging personally reviewed and interpreted, includes:    [x] Data Review (any 3)  [] Collateral history  obtained from:  patient  [] All available Consultant notes from yesterday/today were reviewed  [x] All current labs were reviewed and interpreted for clinical significance   [x] Appropriate follow-up labs were ordered       Note above makes patient higher risk for morbidity and mortality requiring testing and treatment.     JORDI LIND MD 4/21/2025 7:16 AM

## 2025-04-21 NOTE — PROGRESS NOTES
Patient admitted to room 319 from ED. Patient oriented to room, call light, bed rails, phone, lights and bathroom. Patient instructed about the schedule of the day including: vital sign frequency, lab draws, possible tests, frequency of MD and staff rounds, daily weights, I &O's and prescribed diet. Yes Telemetry box in place, patient aware of placement and reason. Bed locked, in lowest position, side rails up 2/4, call light within reach.        Recliner Assessment  Patient is not able to demonstrated the ability to move from a reclining position to an upright position within the recliner. however patient is alert, oriented and able to provide informed consent       4 Eyes Skin Assessment     NAME:  Serina Rubin  YOB: 1955  MEDICAL RECORD NUMBER:  [id number]    The patient is being assessed for  Admission    I agree that at least one RN has performed a thorough Head to Toe Skin Assessment on the patient. ALL assessment sites listed below have been assessed.      Areas assessed by both nurses:    Head, Face, Ears, Shoulders, Back, Chest, Arms, Elbows, Hands, Sacrum. Buttock, Coccyx, Ischium, Legs. Feet and Heels, and Under Medical Devices     Scattered bruises        Does the Patient have a Wound? No noted wound(s)       Caleb Prevention initiated by RN: No  Wound Care Orders initiated by RN: No    Pressure Injury (Stage 3,4, Unstageable, DTI, NWPT, and Complex wounds) if present, place Wound referral order by RN under : No    New Ostomies, if present place, Ostomy referral order under : No     Nurse 1 eSignature: Electronically signed by Jf Castillo RN on 4/20/25 at 10:31 PM EDT    **SHARE this note so that the co-signing nurse can place an eSignature**    Nurse 2 eSignature: Electronically signed by Dell Alberto RN on 4/21/25 at 6:28 AM EDT

## 2025-04-21 NOTE — DISCHARGE INSTRUCTIONS
Heart Failure Resources:  Heart Failure Interactive Workbook:  Go to [email or web address]/publication/?d=497511 for a Free Heart Failure Interactive Workbook provided by The American [employer] Association. This interactive workbook will provide information on Healthier Living with Heart Failure. Please copy and paste link into search bar. Use your mouse to scroll through the pages.    HF [place] hu:   Heart Failure Free smart phone hu available for iPhone and Android download. Use your phone to track sodium intake, fluid intake, symptoms, and weight.     Low Sodium Diet / Recipes:  Go to www.[employer] website for “renal” diet which is Low Sodium! [employer] is a dialysis company, but this website offers free seasonal cookbooks. Each quarter, they will release 25-30 new recipes with a breakdown of calories, sodium, and glucose. You can also go to wwwMFG.com/recipes website for free recipes.     Discharge Instruction Video:  Scan the QR code below with your camera and click the canva.com link to open the video and watch educational information on Heart Failure and Medications from one of our nurses.   [email or web address]/edit    Home Exercise Program:   Identification of Green/Yellow/Red zones:  You should be able to identify when you feel good (green zone), if you have 1-2 symptoms of HF (yellow zone), or if you are in need of medical attention (red zone).  In your CHF education folder you were provided a “stop light tool” to outline this information.     We want to you to rate your exertion levels:    Our therapy team has discussed means of identification with you such as the \"Prerna scale.\"  The Prerna rating scale ranges from 6 to 20, where 6 means \"no exertion at all\" and 20 means \"maximal exertion.\" The goal is to use this to gauge how much effort it is taking for you to do your normal daily tasks.   You should be able to recognize when too much

## 2025-04-21 NOTE — PROGRESS NOTES
04/20/25 2000   RT Protocol   History Pulmonary Disease 2   Respiratory pattern 0   Breath sounds 2   Cough 0   Indications for Bronchodilator Therapy On home bronchodilators   Bronchodilator Assessment Score 4     RT Inhaler-Nebulizer Bronchodilator Protocol Note    There is a bronchodilator order in the chart from a provider indicating to follow the RT Bronchodilator Protocol and there is an “Initiate RT Inhaler-Nebulizer Bronchodilator Protocol” order as well (see protocol at bottom of note).    CXR Findings:  No results found.    The findings from the last RT Protocol Assessment were as follows:   History Pulmonary Disease: Chronic pulmonary disease  Respiratory Pattern: Regular pattern and RR 12-20 bpm  Breath Sounds: Slightly diminished and/or crackles  Cough: Strong, spontaneous, non-productive  Indication for Bronchodilator Therapy: On home bronchodilators  Bronchodilator Assessment Score: 4    Aerosolized bronchodilator medication orders have been revised according to the RT Inhaler-Nebulizer Bronchodilator Protocol below.    Respiratory Therapist to perform RT Therapy Protocol Assessment initially then follow the protocol.  Repeat RT Therapy Protocol Assessment PRN for score 0-3 or on second treatment, BID, and PRN for scores above 3.    No Indications - adjust the frequency to every 6 hours PRN wheezing or bronchospasm, if no treatments needed after 48 hours then discontinue using Per Protocol order mode.     If indication present, adjust the RT bronchodilator orders based on the Bronchodilator Assessment Score as indicated below.  Use Inhaler orders unless patient has one or more of the following: on home nebulizer, not able to hold breath for 10 seconds, is not alert and oriented, cannot activate and use MDI correctly, or respiratory rate 25 breaths per minute or more, then use the equivalent nebulizer order(s) with same Frequency and PRN reasons based on the score.  If a patient is on this

## 2025-04-21 NOTE — PROGRESS NOTES
Called [facility] for phone report. Spoke to DEBORAH Dahl. Receiving RN requested call back number and will return call.

## 2025-04-21 NOTE — H&P
Hospital Medicine History & Physical      PCP: Elvira Hunter MD    Date of Admission: 4/20/2025    Date of Service: Pt seen/examined on 4/20/2025    Pt seen/examined face to face on and admitted as inpatient with expected LOS to be two days but can change depending on diagnostic work up and treatment response.     Chief Complaint:    Chief Complaint   Patient presents with    Fatigue    Nausea     Patient states this morning she woke up feeling nauseated and fatigue. Unable to get up to get a shower because she felt weak. Patient also reports having to increase use of her PRN oxygen.            ASSESSMENT AND PLAN:    Active Hospital Problems    Diagnosis Date Noted    COPD exacerbation (HCC) [J44.1] 04/20/2025     Acute hypoxic on chronic hypercapnic respiratory failure,  Currently at 2 x4 L nasal cannula  Wean as tolerated    Acute COPD exacerbation:   DuoNebs, prednisone, doxycycline  Pulmonary consulted, appreciated    Severe hypokalemia:Replaced  Hypomagnesemia: Replaced    Stable 3 mm pulm nodule  Outpatient follow-up    Hernia containing fat and small portion of the transverse colon:  Outpatient follow-up    Dilation of the common bile duct measuring 1.1 cm,  Negative transaminitis and symptoms  Ultrasound tomorrow    GERD: Continue home medication  Hyperlipidemia: Controlled on home Statin. Outpatient PCP follow up post-discharge  Essential Hypertension: Reviewed patient's medications and plan is to continue home medication  Paroxysmal Atrial fibrilliation: unspecified and clinically unable to determine etiology.  Controlled on home medication.  currently Anticoagulated and Monitored on tele      .Due to the above diagnosis makes the patient higher risk for morbidity and mortality requiring testing and treatment      Discussion with the primary ER physician in regards to symptoms, history, physical exam, diagnosis and treatment, collaborative decision was to admit the patient.    Diet:

## 2025-04-21 NOTE — PROGRESS NOTES
Inpatient Occupational Therapy Evaluation & Treatment    Unit: PCU  Date:  4/21/2025  Patient Name:    Serina Rubin  Admitting diagnosis:  Shortness of breath [R06.02]  Hypokalemia [E87.6]  Prolonged Q-T interval on ECG [R94.31]  COPD exacerbation (HCC) [J44.1]  Nausea and vomiting, unspecified vomiting type [R11.2]  Admit Date:  4/20/2025  Precautions/Restrictions/WB Status/ Lines/ Wounds/ Oxygen: Fall risk, Bed/chair alarm, Lines (IV, Supplemental O2 (3L), and external catheter), Telemetry, and Continuous pulse oximetry    Treatment Time:  1386-9276  Treatment Number:  1  Timed Code Treatment Minutes: 45 minutes  Total Treatment Minutes:  55  minutes    Patient Goals for Therapy: \"go home\"          Discharge Recommendations: Home with initial 24/7 supervision and Home OT  DME needs for discharge: Needs Met       Therapy recommendations for staff:   Assist of 1 for ambulation with use of rolling walker (RW) and gait belt to/from chair  to/from bathroom  within room    History of Present Illness: per Dr Euceda H&P 10/20/25:  \"69 y.o. female who presented to [facility] with past medical history of atrial fibrillation, COPD, CHF, hypertension, hyperlipidemia presented ED with chief complaint of worsening shortness of breath and generalized illness     Patient reported that she been having some bodyaches leg general weakness with cough and phlegm progressively worsening exacerbated with exertion no alleviating factor no associated fever chills nausea vomiting abdominal pain or dysuria.\"    Preadmission Environment:   Pt. Lives                                              Alone-brother in law and/or friend can stay and assist at DC if needed  Home environment:                            ILF ([facility])  Steps to enter first floor:                     No steps  Steps to second floor/basement:        elevator  Laundry:                                              Within building  Bathroom:                            Independent  Grooming       Modified Independent  Upper Body Dressing:      Independent  Lower Body Dressing:      Modified Independent  Pt to demonstrate UE therapeutic exs x 15 reps with minimal cues    Rehabilitation Potential: Good  Strengths for achieving goals include: Pt motivated, PLOF, Family Support, and Pt cooperative   Barriers to achieving goals include:  Complexity of condition and Pain    Plan:  To be seen 2-3 x/ week while in acute care setting for therapeutic exercises/activities, bed mobility training, functional transfer training, family/patient education, ADL/IADL retraining, and energy conservation training.    Electronically signed by Tonia Tom OT on 4/21/2025 at 3:37 PM      If patient discharges from this facility prior to next visit, this note will serve as the Discharge Summary

## 2025-04-21 NOTE — PLAN OF CARE
Problem: Chronic Conditions and Co-morbidities  Goal: Patient's chronic conditions and co-morbidity symptoms are monitored and maintained or improved  Outcome: Progressing     Problem: Discharge Planning  Goal: Discharge to home or other facility with appropriate resources  Outcome: Progressing     Problem: Pain  Goal: Verbalizes/displays adequate comfort level or baseline comfort level  Outcome: Progressing  Flowsheets (Taken 4/21/2025 1016)  Verbalizes/displays adequate comfort level or baseline comfort level:   Encourage patient to monitor pain and request assistance   Assess pain using appropriate pain scale   Administer analgesics based on type and severity of pain and evaluate response   Implement non-pharmacological measures as appropriate and evaluate response     Problem: Safety - Adult  Goal: Free from fall injury  Outcome: Progressing     Problem: Respiratory - Adult  Goal: Achieves optimal ventilation and oxygenation  Outcome: Progressing     Problem: Skin/Tissue Integrity - Adult  Goal: Skin integrity remains intact  Outcome: Progressing     Problem: Musculoskeletal - Adult  Goal: Return mobility to safest level of function  Outcome: Progressing  Goal: Return ADL status to a safe level of function  Outcome: Progressing     Problem: Metabolic/Fluid and Electrolytes - Adult  Goal: Electrolytes maintained within normal limits  Outcome: Progressing     Problem: Genitourinary - Adult  Goal: Absence of urinary retention  Outcome: Progressing

## 2025-04-21 NOTE — CARE COORDINATION
Case Management Assessment  Initial Evaluation    Date/Time of Evaluation: 4/21/2025 10:22 AM  Assessment Completed by: Meme Lerma    If patient is discharged prior to next notation, then this note serves as note for discharge by case management.    Patient Name: Serina Rubin                   YOB: 1955  Diagnosis: Shortness of breath [R06.02]  Hypokalemia [E87.6]  Prolonged Q-T interval on ECG [R94.31]  COPD exacerbation (HCC) [J44.1]  Nausea and vomiting, unspecified vomiting type [R11.2]                   Date / Time: 4/20/2025 12:29 PM    Patient Admission Status: Inpatient   Readmission Risk (Low < 19, Mod (19-27), High > 27): Readmission Risk Score: 11.9    Current PCP: Elvira Hunter MD  PCP verified by CM? Yes (katie)    Chart Reviewed: Yes      History Provided by: Patient  Patient Orientation: Alert and Oriented    Patient Cognition: Alert    Hospitalization in the last 30 days (Readmission):  No    If yes, Readmission Assessment in CM Navigator will be completed.    Advance Directives:      Code Status: Full Code   Patient's Primary Decision Maker is: Named in Scanned ACP Document    Primary Decision Maker: Shant Rubin - Cruz - [phone]    Discharge Planning:    Patient lives with: Alone Type of Home: Independent Living  Primary Care Giver: Self  Patient Support Systems include: Family Members, Children   Current Financial resources: Medicare  Current community resources: None  Current services prior to admission: Oxygen Therapy ([facility])            Current DME:              Type of Home Care services:  PT    ADLS  Prior functional level: Independent in ADLs/IADLs  Current functional level: Independent in ADLs/IADLs    PT AM-PAC:   /24  OT AM-PAC:   /24    Family can provide assistance at DC: Yes  Would you like Case Management to discuss the discharge plan with any other family members/significant others, and if so, who? No  Plans to Return to Present Housing:

## 2025-04-21 NOTE — FLOWSHEET NOTE
Pt A/Ox4. VSS. Pain 2/10, see assessment below. Pt unlabored; respirations even, regular, effortless. Baseline oxygen in place at 2 L via NC. No distress noted. Shift assessment complete. See flowsheet. AM med's given. See MAR. External catheter remains in place. Denies needs at this time. Telemetry remains in place. Bed alarm on. Side rails 2/4. Bed in low position. Call light within reach.     Bedside Mobility Assessment Tool (BMAT):     Assessment Level 1- Sit and Shake    1. From a semi-reclined position, ask patient to sit up and rotate to a seated position at the side of the bed. Can use the bedrail.    2. Ask patient to reach out and grab your hand and shake making sure patient reaches across his/her midline.   Pass- Patient is able to come to a seated position, maintain core strength. Maintains seated balance while reaching across midline. Move on to Assessment Level 2.     Assessment Level 2- Stretch and Point   1. With patient in seated position at the side of the bed, have patient place both feet on the floor (or stool) with knees no higher than hips.    2. Ask patient to stretch one leg and straighten the knee, then bend the ankle/flex and point the toes. If appropriate, repeat with the other leg.   Pass- Patient is able to demonstrate appropriate quad strength on intended weight bearing limb(s). Move onto Assessment Level 3.     Assessment Level 3- Stand   1. Ask patient to elevate off the bed or chair (seated to standing) using an assistive device (cane, bedrail).    2. Patient should be able to raise buttocks off be and hold for a count of five. May repeat once.   Pass- Patient maintains standing stability for at least 5 seconds, proceed to assessment level 4.    Assessment Level 4- Walk   1. Ask patient to march in place at bedside.    2. Then ask patient to advance step and return each foot. Some medical conditions may render a patient from stepping backwards, use your best clinical judgement.

## 2025-04-22 VITALS
DIASTOLIC BLOOD PRESSURE: 69 MMHG | SYSTOLIC BLOOD PRESSURE: 130 MMHG | BODY MASS INDEX: 26.35 KG/M2 | RESPIRATION RATE: 18 BRPM | TEMPERATURE: 98 F | OXYGEN SATURATION: 94 % | HEIGHT: 62 IN | WEIGHT: 143.2 LBS | HEART RATE: 62 BPM

## 2025-04-22 LAB
ANION GAP SERPL CALCULATED.3IONS-SCNC: 11 MMOL/L (ref 3–16)
BASOPHILS # BLD: 0 K/UL (ref 0–0.2)
BASOPHILS NFR BLD: 0.1 %
BUN SERPL-MCNC: 15 MG/DL (ref 7–20)
CALCIUM SERPL-MCNC: 9.7 MG/DL (ref 8.3–10.6)
CHLORIDE SERPL-SCNC: 97 MMOL/L (ref 99–110)
CO2 SERPL-SCNC: 28 MMOL/L (ref 21–32)
CREAT SERPL-MCNC: 0.6 MG/DL (ref 0.6–1.2)
DEPRECATED RDW RBC AUTO: 14.5 % (ref 12.4–15.4)
EOSINOPHIL # BLD: 0 K/UL (ref 0–0.6)
EOSINOPHIL NFR BLD: 0.1 %
GFR SERPLBLD CREATININE-BSD FMLA CKD-EPI: >90 ML/MIN/{1.73_M2}
GLUCOSE SERPL-MCNC: 139 MG/DL (ref 70–99)
HCT VFR BLD AUTO: 46.7 % (ref 36–48)
HGB BLD-MCNC: 14.9 G/DL (ref 12–16)
LYMPHOCYTES # BLD: 0.7 K/UL (ref 1–5.1)
LYMPHOCYTES NFR BLD: 4.8 %
MAGNESIUM SERPL-MCNC: 2.17 MG/DL (ref 1.8–2.4)
MCH RBC QN AUTO: 28.5 PG (ref 26–34)
MCHC RBC AUTO-ENTMCNC: 31.8 G/DL (ref 31–36)
MCV RBC AUTO: 89.4 FL (ref 80–100)
MONOCYTES # BLD: 0.7 K/UL (ref 0–1.3)
MONOCYTES NFR BLD: 4.4 %
NEUTROPHILS # BLD: 13.2 K/UL (ref 1.7–7.7)
NEUTROPHILS NFR BLD: 90.6 %
PLATELET # BLD AUTO: 174 K/UL (ref 135–450)
PMV BLD AUTO: 10.6 FL (ref 5–10.5)
POTASSIUM SERPL-SCNC: 3.4 MMOL/L (ref 3.5–5.1)
RBC # BLD AUTO: 5.23 M/UL (ref 4–5.2)
SODIUM SERPL-SCNC: 136 MMOL/L (ref 136–145)
WBC # BLD AUTO: 14.6 K/UL (ref 4–11)

## 2025-04-22 PROCEDURE — 97162 PT EVAL MOD COMPLEX 30 MIN: CPT

## 2025-04-22 PROCEDURE — 80048 BASIC METABOLIC PNL TOTAL CA: CPT

## 2025-04-22 PROCEDURE — 94761 N-INVAS EAR/PLS OXIMETRY MLT: CPT

## 2025-04-22 PROCEDURE — 83735 ASSAY OF MAGNESIUM: CPT

## 2025-04-22 PROCEDURE — 6370000000 HC RX 637 (ALT 250 FOR IP): Performed by: INTERNAL MEDICINE

## 2025-04-22 PROCEDURE — 2500000003 HC RX 250 WO HCPCS: Performed by: INTERNAL MEDICINE

## 2025-04-22 PROCEDURE — 36415 COLL VENOUS BLD VENIPUNCTURE: CPT

## 2025-04-22 PROCEDURE — 99238 HOSP IP/OBS DSCHRG MGMT 30/<: CPT | Performed by: INTERNAL MEDICINE

## 2025-04-22 PROCEDURE — 2700000000 HC OXYGEN THERAPY PER DAY

## 2025-04-22 PROCEDURE — 85025 COMPLETE CBC W/AUTO DIFF WBC: CPT

## 2025-04-22 PROCEDURE — 6360000002 HC RX W HCPCS: Performed by: INTERNAL MEDICINE

## 2025-04-22 PROCEDURE — 94640 AIRWAY INHALATION TREATMENT: CPT

## 2025-04-22 PROCEDURE — 97116 GAIT TRAINING THERAPY: CPT

## 2025-04-22 RX ORDER — ALBUTEROL SULFATE 0.83 MG/ML
2.5 SOLUTION RESPIRATORY (INHALATION) EVERY 6 HOURS PRN
Qty: 120 EACH | Refills: 3 | Status: SHIPPED | OUTPATIENT
Start: 2025-04-22

## 2025-04-22 RX ORDER — PREDNISONE 10 MG/1
TABLET ORAL
Qty: 30 TABLET | Refills: 0 | Status: SHIPPED | OUTPATIENT
Start: 2025-04-22

## 2025-04-22 RX ORDER — POTASSIUM CHLORIDE 1500 MG/1
40 TABLET, EXTENDED RELEASE ORAL ONCE
Status: COMPLETED | OUTPATIENT
Start: 2025-04-22 | End: 2025-04-22

## 2025-04-22 RX ORDER — IPRATROPIUM BROMIDE AND ALBUTEROL SULFATE 2.5; .5 MG/3ML; MG/3ML
1 SOLUTION RESPIRATORY (INHALATION)
Status: DISCONTINUED | OUTPATIENT
Start: 2025-04-22 | End: 2025-04-22 | Stop reason: HOSPADM

## 2025-04-22 RX ORDER — DOXYCYCLINE HYCLATE 100 MG
100 TABLET ORAL 2 TIMES DAILY
Qty: 10 TABLET | Refills: 0 | Status: SHIPPED | OUTPATIENT
Start: 2025-04-22 | End: 2025-04-27

## 2025-04-22 RX ADMIN — METHYLPREDNISOLONE SODIUM SUCCINATE 40 MG: 125 INJECTION, POWDER, LYOPHILIZED, FOR SOLUTION INTRAMUSCULAR; INTRAVENOUS at 04:41

## 2025-04-22 RX ADMIN — APIXABAN 5 MG: 5 TABLET, FILM COATED ORAL at 09:39

## 2025-04-22 RX ADMIN — POTASSIUM CHLORIDE 40 MEQ: 1500 TABLET, EXTENDED RELEASE ORAL at 12:22

## 2025-04-22 RX ADMIN — BUDESONIDE AND FORMOTEROL FUMARATE DIHYDRATE 2 PUFF: 160; 4.5 AEROSOL RESPIRATORY (INHALATION) at 06:51

## 2025-04-22 RX ADMIN — GABAPENTIN 400 MG: 400 CAPSULE ORAL at 09:39

## 2025-04-22 RX ADMIN — TIOTROPIUM BROMIDE INHALATION SPRAY 2 PUFF: 3.12 SPRAY, METERED RESPIRATORY (INHALATION) at 06:51

## 2025-04-22 RX ADMIN — ASPIRIN 81 MG: 81 TABLET, CHEWABLE ORAL at 09:38

## 2025-04-22 RX ADMIN — PANTOPRAZOLE SODIUM 40 MG: 40 TABLET, DELAYED RELEASE ORAL at 04:41

## 2025-04-22 RX ADMIN — OXYCODONE HYDROCHLORIDE AND ACETAMINOPHEN 1 TABLET: 10; 325 TABLET ORAL at 04:40

## 2025-04-22 RX ADMIN — ATORVASTATIN CALCIUM 80 MG: 40 TABLET, FILM COATED ORAL at 09:38

## 2025-04-22 RX ADMIN — CARVEDILOL 3.12 MG: 3.12 TABLET, FILM COATED ORAL at 09:38

## 2025-04-22 RX ADMIN — IPRATROPIUM BROMIDE AND ALBUTEROL SULFATE 1 DOSE: 2.5; .5 SOLUTION RESPIRATORY (INHALATION) at 06:51

## 2025-04-22 RX ADMIN — GUAIFENESIN 600 MG: 600 TABLET, EXTENDED RELEASE ORAL at 09:38

## 2025-04-22 RX ADMIN — AMLODIPINE BESYLATE 5 MG: 5 TABLET ORAL at 09:38

## 2025-04-22 ASSESSMENT — PAIN DESCRIPTION - DESCRIPTORS: DESCRIPTORS: ACHING;DISCOMFORT

## 2025-04-22 ASSESSMENT — PAIN SCALES - GENERAL: PAINLEVEL_OUTOF10: 5

## 2025-04-22 ASSESSMENT — PAIN - FUNCTIONAL ASSESSMENT: PAIN_FUNCTIONAL_ASSESSMENT: ACTIVITIES ARE NOT PREVENTED

## 2025-04-22 ASSESSMENT — PAIN DESCRIPTION - ORIENTATION: ORIENTATION: RIGHT;LEFT;LOWER;MID

## 2025-04-22 ASSESSMENT — PAIN DESCRIPTION - LOCATION: LOCATION: BACK;NECK

## 2025-04-22 NOTE — PLAN OF CARE
Problem: Chronic Conditions and Co-morbidities  Goal: Patient's chronic conditions and co-morbidity symptoms are monitored and maintained or improved  Outcome: Adequate for Discharge     Problem: Discharge Planning  Goal: Discharge to home or other facility with appropriate resources  Outcome: Adequate for Discharge     Problem: Pain  Goal: Verbalizes/displays adequate comfort level or baseline comfort level  Outcome: Adequate for Discharge     Problem: Safety - Adult  Goal: Free from fall injury  Outcome: Adequate for Discharge     Problem: Respiratory - Adult  Goal: Achieves optimal ventilation and oxygenation  Outcome: Adequate for Discharge     Problem: Skin/Tissue Integrity - Adult  Goal: Skin integrity remains intact  Outcome: Adequate for Discharge     Problem: Musculoskeletal - Adult  Goal: Return mobility to safest level of function  Outcome: Adequate for Discharge  Goal: Return ADL status to a safe level of function  Outcome: Adequate for Discharge     Problem: Metabolic/Fluid and Electrolytes - Adult  Goal: Electrolytes maintained within normal limits  Outcome: Adequate for Discharge     Problem: Genitourinary - Adult  Goal: Absence of urinary retention  Outcome: Adequate for Discharge

## 2025-04-22 NOTE — PROGRESS NOTES
04/22/25 0651   Treatment   Treatment Type N   $Treatment Type $Inhaled Therapy/Meds   Medications Albuterol/Ipratropium   Pre-Tx Pulse 59   Pre-Tx Resps 18   Breath Sounds Pre-Tx TOÑA Clear;Diminished   Breath Sounds Pre-Tx LLL Diminished   Breath Sounds Pre-Tx RUL Clear;Diminished   Breath Sounds Pre-Tx RML Diminished   Breath Sounds Pre-Tx RLL Diminished   Breath Sounds Post-Tx TOÑA Diminished   Breath Sounds Post-Tx LLL Diminished   Breath Sounds Post-Tx RUL Diminished   Breath Sounds Post-Tx RML Diminished   Breath Sounds Post-Tx RLL Diminished   Delivery Source Air   Position Semi-Tejeda's   Treatment Tolerance Well   Is patient on O2? Y   Oxygen Therapy/Pulse Ox   O2 Therapy Oxygen   O2 Device Nasal cannula   O2 Flow Rate (L/min) 2 L/min   Pulse 59   Respirations 18   SpO2 95 %

## 2025-04-22 NOTE — PROGRESS NOTES
04/21/25 2010   Oxygen Therapy/Pulse Ox   O2 Therapy Oxygen   O2 Device Nasal cannula   O2 Flow Rate (L/min) (S)  2 L/min  (decreased to 2L)   SpO2 93 %

## 2025-04-22 NOTE — PROGRESS NOTES
Pt in bed, awake, A/O X4. Shift assessment complete, night meds given. No C/o pain at this time. Night time snack given.  . No other needs expressed. Call light in reach. Will monitor.  Pari Martinez RN

## 2025-04-22 NOTE — PROGRESS NOTES
Discharge instructions reviewed with the patient. Medications delivered to the patient before leaving. Patient left the unit on 2L of oxygen.

## 2025-04-22 NOTE — DISCHARGE SUMMARY
Name:  Serina Rubin  Room:  /0319-01  MRN:    [id number]    Discharge Summary      This discharge summary is in conjunction with a complete physical exam done on the day of discharge.      Discharging Physician: JORDI LIND MD      Admit: 4/20/2025  Discharge:  4/22/2025     Diagnoses this Admission    Principal Problem:    COPD exacerbation (HCC)  Active Problems:    Paroxysmal A-fib (HCC)    Chronic diastolic CHF (congestive heart failure) (HCC)    History of CVA (cerebrovascular accident)    HTN (hypertension), benign    Chronic respiratory failure with hypoxia    Anxiety    Hypokalemia    Dilated bile duct    Prolonged Q-T interval on ECG  Resolved Problems:    * No resolved hospital problems. *      Procedures (Please Review Full Report for Details)  none    Consults    IP CONSULT TO HOSPITALIST  IP CONSULT TO PULMONOLOGY      HPI:    69 y.o. female who presented to [facility] with past medical history of atrial fibrillation, COPD, CHF, hypertension, hyperlipidemia presented ED with chief complaint of worsening shortness of breath and generalized illness     Patient reported that she been having some bodyaches leg general weakness with cough and phlegm progressively worsening exacerbated with exertion no alleviating factor no associated fever chills nausea vomiting abdominal pain or dysuria.    Physical Exam at Discharge:  /72   Pulse 59   Temp 97.3 °F (36.3 °C) (Oral)   Resp 18   Ht 1.575 m (5' 2\")   Wt 65 kg (143 lb 3.2 oz)   SpO2 95%   BMI 26.19 kg/m²     General appearance: alert, appears stated age and cooperative  Head: Normocephalic, without obvious abnormality, atraumatic. Ill appearing.  Eyes: conjunctivae/corneas clear. PERRL, EOM's intact.  Neck: no adenopathy, no carotid bruit, no JVD, supple, symmetrical, trachea midline and thyroid not enlarged, symmetric, no tenderness/mass/nodules  Lungs:diminished BS. No wheezes or crackles  Heart: regular rate and rhythm,

## 2025-04-22 NOTE — DISCHARGE INSTR - DIET

## 2025-04-22 NOTE — PROGRESS NOTES
Inpatient Physical Therapy Evaluation & Treatment    Unit: PCU  Date:  4/22/2025  Patient Name:    Serina Rubin  Admitting diagnosis:  Shortness of breath [R06.02]  Hypokalemia [E87.6]  Prolonged Q-T interval on ECG [R94.31]  COPD exacerbation (HCC) [J44.1]  Nausea and vomiting, unspecified vomiting type [R11.2]  Admit Date:  4/20/2025  Precautions/Restrictions/WB Status/ Lines/ Wounds/ Oxygen: Fall risk, Bed/chair alarm, Lines (IV and Supplemental O2 (2L)), Telemetry, and Continuous pulse oximetry      Treatment Time:  806-825  Treatment Number:  1   Timed Code Treatment Minutes: 9 minutes  Total Treatment Minutes:  19  minutes    Patient Stated Goals for Therapy: \" To go home. \"          Discharge Recommendations: Home with initial 24 hr supervision and Home PT  DME needs for discharge: Needs Met       Therapy recommendation for EMS Transport: can transport by wheelchair    Therapy recommendations for staff:   Stand by assist for ambulation with use of rolling walker (RW) and gait belt to/from chair  to/from bathroom  within room  within halls    History of Present Illness:   per Dr Euceda H&P 10/20/25:  \"69 y.o. female who presented to [facility] with past medical history of atrial fibrillation, COPD, CHF, hypertension, hyperlipidemia presented ED with chief complaint of worsening shortness of breath and generalized illness     Patient reported that she been having some bodyaches leg general weakness with cough and phlegm progressively worsening exacerbated with exertion no alleviating factor no associated fever chills nausea vomiting abdominal pain or dysuria.\"     Preadmission Environment:   Pt. Lives                                              Alone-brother in law and/or friend can stay and assist at DC if needed  Home environment:                            [facility] ([facility])  Steps to enter first floor:                     No steps  Steps to second floor/basement:        elevator  Laundry:

## 2025-04-22 NOTE — PROGRESS NOTES
Pt in bed, eyes closed. No distress noted. Call light in reach. Will continue to monitor.  Pari Martniez RN

## 2025-04-23 ENCOUNTER — CARE COORDINATION (OUTPATIENT)
Dept: CASE MANAGEMENT | Age: 70
End: 2025-04-23

## 2025-04-23 DIAGNOSIS — J44.9 COPD, SEVERE (HCC): Primary | ICD-10-CM

## 2025-04-23 PROCEDURE — 1111F DSCHRG MED/CURRENT MED MERGE: CPT | Performed by: INTERNAL MEDICINE

## 2025-04-23 NOTE — CARE COORDINATION
Care Transitions Note    Initial Call - Call within 2 business days of discharge: Yes    Patient Current Location:  Home: [facility] [address]  [facility] [facility] [address]    Care Transition Nurse contacted the patient by telephone to perform post hospital discharge assessment, verified name and  as identifiers.  Provided introduction to self, and explanation of the Care Transition Nurse role.    Patient: Serina Rubin    Patient : 1955   MRN: [id number]    Reason for Admission: COPD  Discharge Date: 25  RURS: Readmission Risk Score: 11.2      Last Discharge Facility       Date Complaint Diagnosis Description Type Department Provider    25 Fatigue; Nausea Shortness of breath ... ED to Hosp-Admission (Discharged) (ADMITTED) [facility] PCU Yogesh Maria MD; Corinne Cantrell ...            Was this an external facility discharge? No    Additional needs identified to be addressed with provider   High priority: spoke to patient for transition support and reviewed upcoming appts. Noted patient is scheduled with Manasa Chinchilla 25 at 2pm in system, but when looking in the Appts tab the appt was not seen. Please verify that appt is scheduled d/t patient aware that HFU visit noted in system is scheduled 25 and plans to be at appt. Thank you             Method of communication with provider: none.    Patients top risk factors for readmission: functional physical ability    Interventions to address risk factors:   Review of patient management of conditions/medications: COPD    Care Summary Note: Patient answered call and verified . Patient pleasant and agreeable to transition call. Stated that she is \"feeling pretty good\" and was driving back home from a MD appt. Patient confirmed call was hands free and able to talk without difficulty. Discussed recent hospitalization and wearing oxygen as needed during the day to help support her O2 saturation level. Patient aware of 90% O2 sat level goal

## 2025-04-29 ENCOUNTER — OFFICE VISIT (OUTPATIENT)
Dept: INTERNAL MEDICINE CLINIC | Age: 70
End: 2025-04-29

## 2025-04-29 VITALS
HEART RATE: 64 BPM | OXYGEN SATURATION: 93 % | HEIGHT: 62 IN | BODY MASS INDEX: 26.5 KG/M2 | RESPIRATION RATE: 16 BRPM | SYSTOLIC BLOOD PRESSURE: 130 MMHG | WEIGHT: 144 LBS | DIASTOLIC BLOOD PRESSURE: 70 MMHG

## 2025-04-29 DIAGNOSIS — R94.31 PROLONGED Q-T INTERVAL ON ECG: ICD-10-CM

## 2025-04-29 DIAGNOSIS — E87.6 HYPOKALEMIA: ICD-10-CM

## 2025-04-29 DIAGNOSIS — Z09 HOSPITAL DISCHARGE FOLLOW-UP: Primary | ICD-10-CM

## 2025-04-29 DIAGNOSIS — J44.1 COPD EXACERBATION (HCC): ICD-10-CM

## 2025-04-29 DIAGNOSIS — Z09 HOSPITAL DISCHARGE FOLLOW-UP: ICD-10-CM

## 2025-04-29 PROCEDURE — 99213 OFFICE O/P EST LOW 20 MIN: CPT | Performed by: NURSE PRACTITIONER

## 2025-04-29 PROCEDURE — 1111F DSCHRG MED/CURRENT MED MERGE: CPT | Performed by: NURSE PRACTITIONER

## 2025-04-29 PROCEDURE — 1160F RVW MEDS BY RX/DR IN RCRD: CPT | Performed by: NURSE PRACTITIONER

## 2025-04-29 PROCEDURE — 3075F SYST BP GE 130 - 139MM HG: CPT | Performed by: NURSE PRACTITIONER

## 2025-04-29 PROCEDURE — 1159F MED LIST DOCD IN RCRD: CPT | Performed by: NURSE PRACTITIONER

## 2025-04-29 PROCEDURE — 3078F DIAST BP <80 MM HG: CPT | Performed by: NURSE PRACTITIONER

## 2025-04-29 PROCEDURE — 1123F ACP DISCUSS/DSCN MKR DOCD: CPT | Performed by: NURSE PRACTITIONER

## 2025-04-29 RX ORDER — POTASSIUM CHLORIDE 750 MG/1
50 TABLET, EXTENDED RELEASE ORAL DAILY
Qty: 450 TABLET | Refills: 0 | Status: SHIPPED | OUTPATIENT
Start: 2025-04-29

## 2025-04-29 NOTE — PROGRESS NOTES
Post-Discharge Transitional Care Follow Up      Serina Rubin   YOB: 1955    Date of Office Visit:  4/29/2025  Date of Hospital Admission: 4/20/25  Date of Hospital Discharge: 4/22/25  Readmission Risk Score (high >=14%. Medium >=10%):Readmission Risk Score: 11.2      Care management risk score Rising risk (score 2-5) and Complex Care (Scores >=6): No Risk Score On File     Non face to face  following discharge, date last encounter closed (first attempt may have been earlier): 04/23/2025     Call initiated 2 business days of discharge: Yes     Hospital discharge follow-up  -     ME DISCHARGE MEDS RECONCILED W/ CURRENT OUTPATIENT MED LIST  COPD exacerbation (HCC)  Prolonged Q-T interval on ECG  Hypokalemia      Medical Decision Making: low complexity  Return if symptoms worsen or fail to improve.    On this date 4/29/2025 I have spent 40 minutes reviewing previous notes, test results and face to face with the patient discussing the diagnosis and importance of compliance with the treatment plan as well as documenting on the day of the visit.       Subjective:   HPI    Inpatient course: Discharge summary reviewed- see chart.    Interval history/Current status: Patient presents for hospital follow up.  She was not feeling well.  Admitted to [facility] 4/20-4/22/25 for COPD exacerbation, hypokalemia. No vomiting, diarrhea.  Eats well.  She is on diuretics, but also on potassium.   Wears oxygen at night and as needed during the day.   Still on prednisone. Done with antibiotic.     Patient Active Problem List   Diagnosis    Acute respiratory failure with hypoxemia (HCC)    Coronary artery disease involving native coronary artery of native heart without angina pectoris    General weakness    H/O oral surgery    Chronic pain    Bradycardia    Paroxysmal A-fib (HCC)    Chronic diastolic CHF (congestive heart failure) (HCC)    History of CVA (cerebrovascular accident)    Dysarthria    Ataxia    Right sided weakness

## 2025-04-30 ENCOUNTER — CARE COORDINATION (OUTPATIENT)
Dept: CASE MANAGEMENT | Age: 70
End: 2025-04-30

## 2025-04-30 LAB
ALBUMIN SERPL-MCNC: 4.5 G/DL (ref 3.4–5)
ALBUMIN/GLOB SERPL: 2 {RATIO} (ref 1.1–2.2)
ALP SERPL-CCNC: 83 U/L (ref 40–129)
ALT SERPL-CCNC: 26 U/L (ref 10–40)
ANION GAP SERPL CALCULATED.3IONS-SCNC: 15 MMOL/L (ref 3–16)
AST SERPL-CCNC: 29 U/L (ref 15–37)
BASOPHILS # BLD: 0 K/UL (ref 0–0.2)
BASOPHILS NFR BLD: 0.4 %
BILIRUB SERPL-MCNC: 0.8 MG/DL (ref 0–1)
BUN SERPL-MCNC: 20 MG/DL (ref 7–20)
CALCIUM SERPL-MCNC: 9.6 MG/DL (ref 8.3–10.6)
CHLORIDE SERPL-SCNC: 94 MMOL/L (ref 99–110)
CO2 SERPL-SCNC: 32 MMOL/L (ref 21–32)
CREAT SERPL-MCNC: 0.8 MG/DL (ref 0.6–1.2)
DEPRECATED RDW RBC AUTO: 15.1 % (ref 12.4–15.4)
EOSINOPHIL # BLD: 0 K/UL (ref 0–0.6)
EOSINOPHIL NFR BLD: 0.1 %
GFR SERPLBLD CREATININE-BSD FMLA CKD-EPI: 80 ML/MIN/{1.73_M2}
GLUCOSE SERPL-MCNC: 138 MG/DL (ref 70–99)
HCT VFR BLD AUTO: 44.5 % (ref 36–48)
HGB BLD-MCNC: 14.8 G/DL (ref 12–16)
LYMPHOCYTES # BLD: 0.9 K/UL (ref 1–5.1)
LYMPHOCYTES NFR BLD: 7.8 %
MCH RBC QN AUTO: 29.3 PG (ref 26–34)
MCHC RBC AUTO-ENTMCNC: 33.3 G/DL (ref 31–36)
MCV RBC AUTO: 88.1 FL (ref 80–100)
MONOCYTES # BLD: 0.3 K/UL (ref 0–1.3)
MONOCYTES NFR BLD: 3 %
NEUTROPHILS # BLD: 9.9 K/UL (ref 1.7–7.7)
NEUTROPHILS NFR BLD: 88.7 %
PLATELET # BLD AUTO: 219 K/UL (ref 135–450)
PMV BLD AUTO: 11.7 FL (ref 5–10.5)
POTASSIUM SERPL-SCNC: 4.3 MMOL/L (ref 3.5–5.1)
PROT SERPL-MCNC: 6.8 G/DL (ref 6.4–8.2)
RBC # BLD AUTO: 5.05 M/UL (ref 4–5.2)
SODIUM SERPL-SCNC: 141 MMOL/L (ref 136–145)
WBC # BLD AUTO: 11.1 K/UL (ref 4–11)

## 2025-04-30 NOTE — CARE COORDINATION
Care Transitions Note    Follow Up Call     Patient Current Location:  Home: [address]  [address]  [place] [address]    Care Transition Nurse contacted the patient by telephone. Verified name and  as identifiers.    Additional needs identified to be addressed with provider   No needs identified         Method of communication with provider: none.    Care Summary Note: Patient answered call and verified . Patient pleasant and agreeable to transition call. Patient goes by \"Neela\"  Patient stated she is feeling better and \"getting stronger every day\". Patient stated that her insurance nurse visited today. Patient's  O2 sat 93% and lungs were clear according to nurse. Patient was seen by PCP for HFU yesterday and discussed visit and blood work was collected. Taking steroid and \"some blood test results were abnormal\", but breathing is better. Appetite is good and denied any acute needs at present time.  Agreeable to f/u calls.  Educated on the use of urgent care or physician’s 24 hr access line if assistance is needed after hours.      Plan of care updates since last contact:  Review of patient management of conditions/medications: COPD       Advance Care Planning:   Does patient have an Advance Directive: reviewed during previous call, see note. .    Medication Review:  No changes since last call.     Remote Patient Monitoring:  Offered patient enrollment in the Remote Patient Monitoring (RPM) program for in-home monitoring: Yes, but did not enroll at this time: already monitoring with home equipment.    Assessments:  Care Transitions Subsequent and Final Call    Subsequent and Final Calls  Care Transitions Interventions  Other Interventions:              Follow Up Appointment:   JOI appointment attended as scheduled   Future Appointments         Provider Specialty Dept Phone    2025 8:45 AM Rajinder Garcia MD Cardiology [phone]    2025 9:30 AM Elvira Hunter MD

## 2025-05-01 ENCOUNTER — RESULTS FOLLOW-UP (OUTPATIENT)
Dept: INTERNAL MEDICINE CLINIC | Age: 70
End: 2025-05-01

## 2025-05-01 NOTE — PROGRESS NOTES
Physician Progress Note      PATIENT:               STEPHANIE KING  CSN #:                  [id number]  :                       1955  ADMIT DATE:       2025 12:29 PM  DISCH DATE:        2025 12:34 PM  RESPONDING  PROVIDER #:        Elvira Hunter MD          QUERY TEXT:    Acute respiratory failure is documented in the medical record in the H&P.   Please provide additional clinical indicators supportive of your   documentation. Or please document if the diagnosis of acute respiratory   failure has been ruled out after study.    The clinical indicators include:  68 yo female w/ PMHX of Afib, Chronic RF, HTN, COPD  ED; Reports having to increase use of her PRN oxygen.  Effort: Pulmonary   effort is normal. No respiratory distress.  Breath sounds: Normal breath sounds. No wheezing, rhonchi or rales.    Diminished breath sounds bilaterally-base line 2 L now 4 L, Brice PCO2 64.8, pH   7.438  H&P: Acute hypoxic on chronic hypercapnic respiratory failure, Currently at 2   x4 L nasal cannula, Wean as tolerated  DCS: COPD exacerbation  Options provided:  -- Acute Respiratory Failure ruled out after study and Chronic Respiratory   Failure confirmed  -- Acute Respiratory Failure as evidenced by, Please document evidence.  -- Acute Respiratory Failure ruled out after study  -- Other - I will add my own diagnosis  -- Disagree - Not applicable / Not valid  -- Disagree - Clinically unable to determine / Unknown  -- Refer to Clinical Documentation Reviewer    PROVIDER RESPONSE TEXT:    Acute Respiratory Failure ruled out after study and Chronic Respiratory   Failure confirmed.    Query created by: Corinne Arreaga on 2025 1:16 PM      Electronically signed by:  Elvira Hunter MD 2025 4:11 PM

## 2025-05-07 ENCOUNTER — CARE COORDINATION (OUTPATIENT)
Dept: CASE MANAGEMENT | Age: 70
End: 2025-05-07

## 2025-05-07 NOTE — CARE COORDINATION
Care Transitions Note    Follow Up Call     Attempted to reach patient for transitions of care follow up.  Unable to reach patient.      Outreach Attempts:   HIPAA compliant voicemail left for patient.     Care Summary Note: Attempted to reach patient via phone for transition call.  VM left stating purpose of call along with my contact information requesting a return call.        Follow Up Appointment:   Future Appointments         Provider Specialty Dept Phone    5/30/2025 8:45 AM Rajinder Garcia MD Cardiology [phone]    8/4/2025 9:30 AM Elvira Hunter MD Internal Medicine [phone]    10/2/2025 9:30 AM (Arrive by 9:00 AM) [facility] CT MAIN Radiology [phone]    10/9/2025 10:45 AM Alhaji Jefferson MD Pulmonology [phone]            Plan for follow-up call in 2-5 days based on severity of symptoms and risk factors. Plan for next call: self management-     Omayra Dorantes RN

## 2025-05-09 ENCOUNTER — CARE COORDINATION (OUTPATIENT)
Dept: CASE MANAGEMENT | Age: 70
End: 2025-05-09

## 2025-05-09 NOTE — CARE COORDINATION
Care Transitions Note    Follow Up Call     Patient Current Location:  Home: [facility] [address]  [place] [facility] [place] [address]    Care Transition Nurse contacted the patient by telephone. Verified name and  as identifiers.    Additional needs identified to be addressed with provider   No needs identified         Method of communication with provider: none.    Care Summary Note: Patient answered call and verified . Patient pleasant and agreeable to transition call. Patient stated she is feeling heart-broken this morning. Stated a friend/resident where she lives ([facility]) passed away this week. Patient stated the friend's loss has been difficult on everyone at her facility.   CTN offered emotional support and condolences. Patient stated she is feeling okay other than heart break. Appetite is improving, breathing is easy, and energy is increasing. Taking all medication as directed and denied any acute needs at present time.  Agreeable to f/u calls.  Educated on the use of urgent care or physician’s 24 hr access line if assistance is needed after hours.      Plan of care updates since last contact:  Review of patient management of conditions/medications: fatigue       Advance Care Planning:   Does patient have an Advance Directive: reviewed during previous call, see note. .    Medication Review:  Full medication reconciliation completed during previous call.    Remote Patient Monitoring:  Offered patient enrollment in the Remote Patient Monitoring (RPM) program for in-home monitoring: Patient is not eligible for RPM program because: patient lives in facility .    Assessments:  Care Transitions Subsequent and Final Call    Subsequent and Final Calls  Care Transitions Interventions  Other Interventions:              Follow Up Appointment:   JOI appointment attended as scheduled   Future Appointments         Provider Specialty Dept Phone    2025 8:45 AM Rajinder Garcia MD Cardiology

## 2025-05-13 RX ORDER — APIXABAN 5 MG/1
5 TABLET, FILM COATED ORAL 2 TIMES DAILY
Qty: 180 TABLET | Refills: 0 | Status: SHIPPED | OUTPATIENT
Start: 2025-05-13

## 2025-05-13 NOTE — TELEPHONE ENCOUNTER
Lab Results   Component Value Date    WBC 11.1 (H) 04/29/2025    HGB 14.8 04/29/2025    HCT 44.5 04/29/2025    MCV 88.1 04/29/2025     04/29/2025     Patient last seen on 9/3/24. Advised to follow up 6 months. Next appointment 5/30/25.

## 2025-05-14 ENCOUNTER — CARE COORDINATION (OUTPATIENT)
Dept: CASE MANAGEMENT | Age: 70
End: 2025-05-14

## 2025-05-14 NOTE — CARE COORDINATION
Care Transitions Note    Follow Up Call     Patient Current Location:  Home: [address]  [address]  [place] [facility] [place] [facility] [place] [facility] [address]    Care Transition Nurse contacted the patient by telephone. Verified name and  as identifiers.    Additional needs identified to be addressed with provider   No needs identified           Method of communication with provider: none.    Care Summary Note: Patient answered call and verified . Patient pleasant and agreeable to transition call. Patient is doing good, but breathing is \"a little more difficult on these humid and rainy days\". Stated she has air conditioning at her facility and stay indoors on days like today.  Patient stated that her friend's  was Monday and several people from [facility] were able to attend. Patient was happy to attend  and say good-by to her friend. Taking all medications as directed and increasing her activity. Denied any acute needs at present time.  Agreeable to f/u calls.  Educated on the use of urgent care or physician’s 24 hr access line if assistance is needed after hours.       Plan of care updates since last contact:  Review of patient management of conditions/medications: see note       Advance Care Planning:   Does patient have an Advance Directive: reviewed during previous call, see note. .    Medication Review:  Full medication reconciliation completed during previous call.    Remote Patient Monitoring:  Offered patient enrollment in the Remote Patient Monitoring (RPM) program for in-home monitoring: Yes, but did not enroll at this time: already monitoring with home equipment.    Assessments:  Care Transitions Subsequent and Final Call    Subsequent and Final Calls  Care Transitions Interventions  Other Interventions:              Follow Up Appointment:   Reviewed upcoming appointment(s). and JOI appointment attended as scheduled   Future Appointments         Provider Specialty Dept Phone    2025 8:45 AM [facility]

## 2025-05-21 ENCOUNTER — CARE COORDINATION (OUTPATIENT)
Dept: CASE MANAGEMENT | Age: 70
End: 2025-05-21

## 2025-05-21 NOTE — CARE COORDINATION
Care Transitions Note    Final Call     Patient Current Location:  Home: [address]  [address]  [facility] [address]    Care Transition Nurse contacted the patient by telephone. Verified name and  as identifiers.    Patient graduated from the Care Transitions program on 25.  Patient/family has the ability to self-manage at this time..      Advance Care Planning:   Does patient have an Advance Directive: reviewed during previous call, see note. .    Handoff:   Patient was not referred to the ACM team due to patient declined services.      Care Summary Note: Patient answered call and verified . Patient pleasant and agreeable to final transition call. Patient has been having back pain d/t the rainy weather this area has been having. Breathing is doing well and increasing her activities. Denied any acute needs at present time.  offered further support with [employer], but patient declined. Transition episode ended.   Educated on the use of urgent care or physician’s 24 hr access line if assistance is needed after hours.      Assessments:  Care Transitions Subsequent and Final Call    Subsequent and Final Calls  Care Transitions Interventions  Other Interventions:              Upcoming Appointments:    Future Appointments         Provider Specialty Dept Phone    2025 8:45 AM Rajinder Garcia MD Cardiology [phone]    2025 9:30 AM Elvira Hunter MD Internal Medicine [phone]    10/2/2025 9:30 AM (Arrive by 9:00 AM) [facility] [place] [place] MAIN Radiology [phone]    10/9/2025 10:45 AM Alhaji Jefferson MD Pulmonology [phone]            Patient has agreed to contact primary care provider and/or specialist for any further questions, concerns, or needs.    Omayra Dorantes RN
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
## 2025-05-30 ENCOUNTER — OFFICE VISIT (OUTPATIENT)
Dept: CARDIOLOGY CLINIC | Age: 70
End: 2025-05-30
Payer: MEDICARE

## 2025-05-30 ENCOUNTER — HOSPITAL ENCOUNTER (OUTPATIENT)
Dept: LAB | Age: 70
Discharge: HOME | End: 2025-05-30
Payer: MEDICARE

## 2025-05-30 ENCOUNTER — RESULTS FOLLOW-UP (OUTPATIENT)
Dept: CARDIOLOGY | Age: 70
End: 2025-05-30

## 2025-05-30 VITALS
HEIGHT: 62 IN | SYSTOLIC BLOOD PRESSURE: 110 MMHG | BODY MASS INDEX: 28.16 KG/M2 | DIASTOLIC BLOOD PRESSURE: 70 MMHG | WEIGHT: 153 LBS | HEART RATE: 65 BPM | OXYGEN SATURATION: 90 %

## 2025-05-30 DIAGNOSIS — I25.10 CORONARY ARTERY DISEASE INVOLVING NATIVE CORONARY ARTERY OF NATIVE HEART WITHOUT ANGINA PECTORIS: ICD-10-CM

## 2025-05-30 DIAGNOSIS — Z87.891 HISTORY OF TOBACCO ABUSE: ICD-10-CM

## 2025-05-30 DIAGNOSIS — I50.32 CHRONIC DIASTOLIC CHF (CONGESTIVE HEART FAILURE) (HCC): ICD-10-CM

## 2025-05-30 DIAGNOSIS — Z79.899 MEDICATION MANAGEMENT: Primary | ICD-10-CM

## 2025-05-30 DIAGNOSIS — I48.0 PAF (PAROXYSMAL ATRIAL FIBRILLATION) (HCC): ICD-10-CM

## 2025-05-30 DIAGNOSIS — Z79.899 MEDICATION MANAGEMENT: ICD-10-CM

## 2025-05-30 LAB
ALBUMIN SERPL-MCNC: 3.9 G/DL (ref 3.4–5)
ALBUMIN/GLOB SERPL: 1.6 {RATIO} (ref 1.1–2.2)
ALP SERPL-CCNC: 79 U/L (ref 40–129)
ALT SERPL-CCNC: 14 U/L (ref 10–40)
ANION GAP SERPL CALCULATED.3IONS-SCNC: 7 MMOL/L (ref 3–16)
AST SERPL-CCNC: 29 U/L (ref 15–37)
BILIRUB SERPL-MCNC: 0.5 MG/DL (ref 0–1)
BUN SERPL-MCNC: 12 MG/DL (ref 7–20)
CALCIUM SERPL-MCNC: 9.7 MG/DL (ref 8.3–10.6)
CHLORIDE SERPL-SCNC: 102 MMOL/L (ref 99–110)
CO2 SERPL-SCNC: 33 MMOL/L (ref 21–32)
CREAT SERPL-MCNC: 0.7 MG/DL (ref 0.6–1.2)
GFR SERPLBLD CREATININE-BSD FMLA CKD-EPI: >90 ML/MIN/{1.73_M2}
GLUCOSE SERPL-MCNC: 115 MG/DL (ref 70–99)
MAGNESIUM SERPL-MCNC: 1.91 MG/DL (ref 1.8–2.4)
POTASSIUM SERPL-SCNC: 5.2 MMOL/L (ref 3.5–5.1)
PROT SERPL-MCNC: 6.4 G/DL (ref 6.4–8.2)
SODIUM SERPL-SCNC: 142 MMOL/L (ref 136–145)

## 2025-05-30 PROCEDURE — 83735 ASSAY OF MAGNESIUM: CPT

## 2025-05-30 PROCEDURE — 3078F DIAST BP <80 MM HG: CPT | Performed by: INTERNAL MEDICINE

## 2025-05-30 PROCEDURE — 99214 OFFICE O/P EST MOD 30 MIN: CPT | Performed by: INTERNAL MEDICINE

## 2025-05-30 PROCEDURE — 3074F SYST BP LT 130 MM HG: CPT | Performed by: INTERNAL MEDICINE

## 2025-05-30 PROCEDURE — 1123F ACP DISCUSS/DSCN MKR DOCD: CPT | Performed by: INTERNAL MEDICINE

## 2025-05-30 PROCEDURE — 36415 COLL VENOUS BLD VENIPUNCTURE: CPT

## 2025-05-30 PROCEDURE — 80053 COMPREHEN METABOLIC PANEL: CPT

## 2025-05-30 PROCEDURE — 1159F MED LIST DOCD IN RCRD: CPT | Performed by: INTERNAL MEDICINE

## 2025-05-30 RX ORDER — AMLODIPINE BESYLATE 5 MG/1
5 TABLET ORAL DAILY
Qty: 90 TABLET | Refills: 2 | Status: SHIPPED | OUTPATIENT
Start: 2025-05-30

## 2025-05-30 RX ORDER — ATORVASTATIN CALCIUM 80 MG/1
80 TABLET, FILM COATED ORAL DAILY
Qty: 90 TABLET | Refills: 2 | Status: SHIPPED | OUTPATIENT
Start: 2025-05-30

## 2025-05-30 RX ORDER — CARVEDILOL 3.12 MG/1
3.12 TABLET ORAL 2 TIMES DAILY WITH MEALS
Qty: 180 TABLET | Refills: 2 | Status: SHIPPED | OUTPATIENT
Start: 2025-05-30

## 2025-05-30 NOTE — PATIENT INSTRUCTIONS
Plan:  Recheck potassium level today: CMP and Magnesium  Medications reviewed. Medications refilled as warranted.    Labs reviewed in epic and discussed with patient.   You can help decrease your swelling by:  -Weighing yourself every morning   -Following a No Added Salt/Low Sodium diet of less than 3000 mg sodium daily  -Following a Fluid Limitation of less than 2 liters (64 ounces daily)   -this also includes foods like soup, ice cream, popsicles  -Elevating your legs when sitting  -Wearing compression socks during the day    Follow up with me in 6 months.

## 2025-06-09 RX ORDER — PANTOPRAZOLE SODIUM 40 MG/1
TABLET, DELAYED RELEASE ORAL
Qty: 180 TABLET | Refills: 0 | Status: SHIPPED | OUTPATIENT
Start: 2025-06-09

## 2025-06-17 ENCOUNTER — HOSPITAL ENCOUNTER (OUTPATIENT)
Dept: LAB | Age: 70
Discharge: HOME | End: 2025-06-17
Payer: MEDICARE

## 2025-06-17 DIAGNOSIS — Z79.899 MEDICATION MANAGEMENT: ICD-10-CM

## 2025-06-17 LAB
ANION GAP SERPL CALCULATED.3IONS-SCNC: 10 MMOL/L (ref 3–16)
BUN SERPL-MCNC: 9 MG/DL (ref 7–20)
CALCIUM SERPL-MCNC: 9.1 MG/DL (ref 8.3–10.6)
CHLORIDE SERPL-SCNC: 101 MMOL/L (ref 99–110)
CO2 SERPL-SCNC: 31 MMOL/L (ref 21–32)
CREAT SERPL-MCNC: 0.7 MG/DL (ref 0.6–1.2)
GFR SERPLBLD CREATININE-BSD FMLA CKD-EPI: >90 ML/MIN/{1.73_M2}
GLUCOSE SERPL-MCNC: 116 MG/DL (ref 70–99)
POTASSIUM SERPL-SCNC: 4.7 MMOL/L (ref 3.5–5.1)
SODIUM SERPL-SCNC: 142 MMOL/L (ref 136–145)

## 2025-06-17 PROCEDURE — 80048 BASIC METABOLIC PNL TOTAL CA: CPT

## 2025-06-17 PROCEDURE — 36415 COLL VENOUS BLD VENIPUNCTURE: CPT

## 2025-06-18 ENCOUNTER — RESULTS FOLLOW-UP (OUTPATIENT)
Dept: CARDIOLOGY | Age: 70
End: 2025-06-18

## 2025-06-22 ENCOUNTER — APPOINTMENT (OUTPATIENT)
Dept: GENERAL RADIOLOGY | Age: 70
End: 2025-06-22
Payer: MEDICARE

## 2025-06-22 ENCOUNTER — HOSPITAL ENCOUNTER (INPATIENT)
Age: 70
LOS: 6 days | Discharge: HOME HEALTH | End: 2025-06-28
Attending: EMERGENCY MEDICINE | Admitting: INTERNAL MEDICINE
Payer: MEDICARE

## 2025-06-22 DIAGNOSIS — J44.1 COPD EXACERBATION (HCC): ICD-10-CM

## 2025-06-22 DIAGNOSIS — I50.9 CONGESTIVE HEART FAILURE, UNSPECIFIED HF CHRONICITY, UNSPECIFIED HEART FAILURE TYPE (HCC): ICD-10-CM

## 2025-06-22 DIAGNOSIS — A41.9 SEPSIS WITHOUT ACUTE ORGAN DYSFUNCTION, DUE TO UNSPECIFIED ORGANISM (HCC): ICD-10-CM

## 2025-06-22 DIAGNOSIS — J96.11 CHRONIC RESPIRATORY FAILURE WITH HYPOXIA (HCC): ICD-10-CM

## 2025-06-22 DIAGNOSIS — R06.09 DOE (DYSPNEA ON EXERTION): ICD-10-CM

## 2025-06-22 DIAGNOSIS — J18.9 PNEUMONIA OF LEFT LOWER LOBE DUE TO INFECTIOUS ORGANISM: Primary | ICD-10-CM

## 2025-06-22 DIAGNOSIS — Z78.9 FAILURE OF OUTPATIENT TREATMENT: ICD-10-CM

## 2025-06-22 DIAGNOSIS — R53.1 GENERALIZED WEAKNESS: ICD-10-CM

## 2025-06-22 PROBLEM — R06.02 SOB (SHORTNESS OF BREATH): Status: ACTIVE | Noted: 2025-06-22

## 2025-06-22 LAB
ALBUMIN SERPL-MCNC: 4 G/DL (ref 3.4–5)
ALBUMIN/GLOB SERPL: 1.3 {RATIO} (ref 1.1–2.2)
ALP SERPL-CCNC: 94 U/L (ref 40–129)
ALT SERPL-CCNC: 16 U/L (ref 10–40)
ANION GAP SERPL CALCULATED.3IONS-SCNC: 12 MMOL/L (ref 3–16)
AST SERPL-CCNC: 28 U/L (ref 15–37)
BASE EXCESS BLDV CALC-SCNC: 5.8 MMOL/L (ref -3–3)
BASOPHILS # BLD: 0 K/UL (ref 0–0.2)
BASOPHILS NFR BLD: 0.3 %
BILIRUB SERPL-MCNC: 0.6 MG/DL (ref 0–1)
BILIRUB UR QL STRIP.AUTO: NEGATIVE
BUN SERPL-MCNC: 15 MG/DL (ref 7–20)
CALCIUM SERPL-MCNC: 9.2 MG/DL (ref 8.3–10.6)
CHLORIDE SERPL-SCNC: 98 MMOL/L (ref 99–110)
CLARITY UR: CLEAR
CO2 BLDV-SCNC: 34 MMOL/L
CO2 SERPL-SCNC: 33 MMOL/L (ref 21–32)
COHGB MFR BLDV: 2.3 % (ref 0–1.5)
COLOR UR: ABNORMAL
CREAT SERPL-MCNC: 0.7 MG/DL (ref 0.6–1.2)
DEPRECATED RDW RBC AUTO: 15.5 % (ref 12.4–15.4)
EKG ATRIAL RATE: 71 BPM
EKG DIAGNOSIS: NORMAL
EKG P AXIS: 76 DEGREES
EKG P-R INTERVAL: 144 MS
EKG Q-T INTERVAL: 378 MS
EKG QRS DURATION: 68 MS
EKG QTC CALCULATION (BAZETT): 410 MS
EKG R AXIS: -70 DEGREES
EKG T AXIS: 65 DEGREES
EKG VENTRICULAR RATE: 71 BPM
EOSINOPHIL # BLD: 0 K/UL (ref 0–0.6)
EOSINOPHIL NFR BLD: 0.1 %
EPI CELLS #/AREA URNS HPF: ABNORMAL /HPF (ref 0–5)
FLUAV RNA RESP QL NAA+PROBE: NOT DETECTED
FLUBV RNA RESP QL NAA+PROBE: NOT DETECTED
GFR SERPLBLD CREATININE-BSD FMLA CKD-EPI: >90 ML/MIN/{1.73_M2}
GLUCOSE SERPL-MCNC: 93 MG/DL (ref 70–99)
GLUCOSE UR STRIP.AUTO-MCNC: NEGATIVE MG/DL
HCO3 BLDV-SCNC: 32.3 MMOL/L (ref 23–29)
HCT VFR BLD AUTO: 44.5 % (ref 36–48)
HGB BLD-MCNC: 14.6 G/DL (ref 12–16)
HGB UR QL STRIP.AUTO: NEGATIVE
KETONES UR STRIP.AUTO-MCNC: NEGATIVE MG/DL
LACTATE BLDV-SCNC: 1.1 MMOL/L (ref 0.4–2)
LEUKOCYTE ESTERASE UR QL STRIP.AUTO: ABNORMAL
LYMPHOCYTES # BLD: 1.3 K/UL (ref 1–5.1)
LYMPHOCYTES NFR BLD: 10 %
MAGNESIUM SERPL-MCNC: 2.08 MG/DL (ref 1.8–2.4)
MCH RBC QN AUTO: 29.3 PG (ref 26–34)
MCHC RBC AUTO-ENTMCNC: 32.9 G/DL (ref 31–36)
MCV RBC AUTO: 89 FL (ref 80–100)
METHGB MFR BLDV: 0.1 %
MONOCYTES # BLD: 1.5 K/UL (ref 0–1.3)
MONOCYTES NFR BLD: 11 %
NEUTROPHILS # BLD: 10.5 K/UL (ref 1.7–7.7)
NEUTROPHILS NFR BLD: 78.6 %
NITRITE UR QL STRIP.AUTO: NEGATIVE
NT-PROBNP SERPL-MCNC: 81 PG/ML (ref 0–124)
O2 THERAPY: ABNORMAL
PCO2 BLDV: 53.7 MMHG (ref 40–50)
PH BLDV: 7.4 [PH] (ref 7.35–7.45)
PH UR STRIP.AUTO: 8.5 [PH] (ref 5–8)
PLATELET # BLD AUTO: 225 K/UL (ref 135–450)
PMV BLD AUTO: 9.9 FL (ref 5–10.5)
PO2 BLDV: 32.9 MMHG (ref 25–40)
POTASSIUM SERPL-SCNC: 3.8 MMOL/L (ref 3.5–5.1)
PROCALCITONIN SERPL IA-MCNC: 0.04 NG/ML (ref 0–0.15)
PROT SERPL-MCNC: 7.1 G/DL (ref 6.4–8.2)
PROT UR STRIP.AUTO-MCNC: NEGATIVE MG/DL
RBC # BLD AUTO: 5 M/UL (ref 4–5.2)
RBC #/AREA URNS HPF: ABNORMAL /HPF (ref 0–4)
SAO2 % BLDV: 62 %
SARS-COV-2 RNA RESP QL NAA+PROBE: NOT DETECTED
SODIUM SERPL-SCNC: 143 MMOL/L (ref 136–145)
SP GR UR STRIP.AUTO: 1.01 (ref 1–1.03)
TROPONIN, HIGH SENSITIVITY: 8 NG/L (ref 0–14)
TROPONIN, HIGH SENSITIVITY: 9 NG/L (ref 0–14)
UA COMPLETE W REFLEX CULTURE PNL UR: YES
UA DIPSTICK W REFLEX MICRO PNL UR: YES
URN SPEC COLLECT METH UR: ABNORMAL
UROBILINOGEN UR STRIP-ACNC: 0.2 E.U./DL
WBC # BLD AUTO: 13.3 K/UL (ref 4–11)
WBC #/AREA URNS HPF: ABNORMAL /HPF (ref 0–5)

## 2025-06-22 PROCEDURE — 6370000000 HC RX 637 (ALT 250 FOR IP): Performed by: INTERNAL MEDICINE

## 2025-06-22 PROCEDURE — 87040 BLOOD CULTURE FOR BACTERIA: CPT

## 2025-06-22 PROCEDURE — 6360000002 HC RX W HCPCS: Performed by: INTERNAL MEDICINE

## 2025-06-22 PROCEDURE — 87086 URINE CULTURE/COLONY COUNT: CPT

## 2025-06-22 PROCEDURE — 80053 COMPREHEN METABOLIC PANEL: CPT

## 2025-06-22 PROCEDURE — 83605 ASSAY OF LACTIC ACID: CPT

## 2025-06-22 PROCEDURE — 94761 N-INVAS EAR/PLS OXIMETRY MLT: CPT

## 2025-06-22 PROCEDURE — 93005 ELECTROCARDIOGRAM TRACING: CPT | Performed by: EMERGENCY MEDICINE

## 2025-06-22 PROCEDURE — 2500000003 HC RX 250 WO HCPCS: Performed by: INTERNAL MEDICINE

## 2025-06-22 PROCEDURE — 87186 SC STD MICRODIL/AGAR DIL: CPT

## 2025-06-22 PROCEDURE — 84145 PROCALCITONIN (PCT): CPT

## 2025-06-22 PROCEDURE — 84484 ASSAY OF TROPONIN QUANT: CPT

## 2025-06-22 PROCEDURE — 96374 THER/PROPH/DIAG INJ IV PUSH: CPT

## 2025-06-22 PROCEDURE — 2700000000 HC OXYGEN THERAPY PER DAY

## 2025-06-22 PROCEDURE — 81001 URINALYSIS AUTO W/SCOPE: CPT

## 2025-06-22 PROCEDURE — 83880 ASSAY OF NATRIURETIC PEPTIDE: CPT

## 2025-06-22 PROCEDURE — 87077 CULTURE AEROBIC IDENTIFY: CPT

## 2025-06-22 PROCEDURE — 94640 AIRWAY INHALATION TREATMENT: CPT

## 2025-06-22 PROCEDURE — 82803 BLOOD GASES ANY COMBINATION: CPT

## 2025-06-22 PROCEDURE — 87636 SARSCOV2 & INF A&B AMP PRB: CPT

## 2025-06-22 PROCEDURE — 99285 EMERGENCY DEPT VISIT HI MDM: CPT

## 2025-06-22 PROCEDURE — 93010 ELECTROCARDIOGRAM REPORT: CPT | Performed by: INTERNAL MEDICINE

## 2025-06-22 PROCEDURE — 96375 TX/PRO/DX INJ NEW DRUG ADDON: CPT

## 2025-06-22 PROCEDURE — 85025 COMPLETE CBC W/AUTO DIFF WBC: CPT

## 2025-06-22 PROCEDURE — 83735 ASSAY OF MAGNESIUM: CPT

## 2025-06-22 PROCEDURE — 71045 X-RAY EXAM CHEST 1 VIEW: CPT

## 2025-06-22 PROCEDURE — 6360000002 HC RX W HCPCS: Performed by: EMERGENCY MEDICINE

## 2025-06-22 PROCEDURE — 2580000003 HC RX 258: Performed by: EMERGENCY MEDICINE

## 2025-06-22 PROCEDURE — 6370000000 HC RX 637 (ALT 250 FOR IP): Performed by: EMERGENCY MEDICINE

## 2025-06-22 PROCEDURE — 1200000000 HC SEMI PRIVATE

## 2025-06-22 RX ORDER — SODIUM CHLORIDE 0.9 % (FLUSH) 0.9 %
5-40 SYRINGE (ML) INJECTION EVERY 12 HOURS SCHEDULED
Status: DISCONTINUED | OUTPATIENT
Start: 2025-06-22 | End: 2025-06-28 | Stop reason: HOSPADM

## 2025-06-22 RX ORDER — POTASSIUM CHLORIDE 7.45 MG/ML
10 INJECTION INTRAVENOUS PRN
Status: DISCONTINUED | OUTPATIENT
Start: 2025-06-22 | End: 2025-06-28 | Stop reason: HOSPADM

## 2025-06-22 RX ORDER — TORSEMIDE 20 MG/1
50 TABLET ORAL DAILY
Status: DISCONTINUED | OUTPATIENT
Start: 2025-06-22 | End: 2025-06-28 | Stop reason: HOSPADM

## 2025-06-22 RX ORDER — 0.9 % SODIUM CHLORIDE 0.9 %
500 INTRAVENOUS SOLUTION INTRAVENOUS ONCE
Status: COMPLETED | OUTPATIENT
Start: 2025-06-22 | End: 2025-06-22

## 2025-06-22 RX ORDER — POLYETHYLENE GLYCOL 3350 17 G/17G
17 POWDER, FOR SOLUTION ORAL DAILY PRN
Status: DISCONTINUED | OUTPATIENT
Start: 2025-06-22 | End: 2025-06-28 | Stop reason: HOSPADM

## 2025-06-22 RX ORDER — SENNA AND DOCUSATE SODIUM 50; 8.6 MG/1; MG/1
1 TABLET, FILM COATED ORAL 2 TIMES DAILY
Status: DISCONTINUED | OUTPATIENT
Start: 2025-06-22 | End: 2025-06-28 | Stop reason: HOSPADM

## 2025-06-22 RX ORDER — IPRATROPIUM BROMIDE AND ALBUTEROL SULFATE 2.5; .5 MG/3ML; MG/3ML
1 SOLUTION RESPIRATORY (INHALATION) ONCE
Status: COMPLETED | OUTPATIENT
Start: 2025-06-22 | End: 2025-06-22

## 2025-06-22 RX ORDER — PANTOPRAZOLE SODIUM 40 MG/1
40 TABLET, DELAYED RELEASE ORAL
Status: DISCONTINUED | OUTPATIENT
Start: 2025-06-22 | End: 2025-06-28 | Stop reason: HOSPADM

## 2025-06-22 RX ORDER — OXYCODONE AND ACETAMINOPHEN 10; 325 MG/1; MG/1
1 TABLET ORAL 3 TIMES DAILY PRN
Refills: 0 | Status: DISCONTINUED | OUTPATIENT
Start: 2025-06-22 | End: 2025-06-28 | Stop reason: HOSPADM

## 2025-06-22 RX ORDER — SODIUM CHLORIDE 9 MG/ML
INJECTION, SOLUTION INTRAVENOUS PRN
Status: DISCONTINUED | OUTPATIENT
Start: 2025-06-22 | End: 2025-06-27 | Stop reason: SDUPTHER

## 2025-06-22 RX ORDER — AMLODIPINE BESYLATE 5 MG/1
5 TABLET ORAL DAILY
Status: DISCONTINUED | OUTPATIENT
Start: 2025-06-22 | End: 2025-06-28 | Stop reason: HOSPADM

## 2025-06-22 RX ORDER — DEXAMETHASONE SODIUM PHOSPHATE 10 MG/ML
8 INJECTION, SOLUTION INTRA-ARTICULAR; INTRALESIONAL; INTRAMUSCULAR; INTRAVENOUS; SOFT TISSUE ONCE
Status: COMPLETED | OUTPATIENT
Start: 2025-06-22 | End: 2025-06-22

## 2025-06-22 RX ORDER — GABAPENTIN 400 MG/1
400 CAPSULE ORAL 3 TIMES DAILY
Status: DISCONTINUED | OUTPATIENT
Start: 2025-06-22 | End: 2025-06-28 | Stop reason: HOSPADM

## 2025-06-22 RX ORDER — MAGNESIUM SULFATE IN WATER 40 MG/ML
2000 INJECTION, SOLUTION INTRAVENOUS PRN
Status: DISCONTINUED | OUTPATIENT
Start: 2025-06-22 | End: 2025-06-28 | Stop reason: HOSPADM

## 2025-06-22 RX ORDER — CARVEDILOL 3.12 MG/1
3.12 TABLET ORAL 2 TIMES DAILY WITH MEALS
Status: DISCONTINUED | OUTPATIENT
Start: 2025-06-22 | End: 2025-06-28 | Stop reason: HOSPADM

## 2025-06-22 RX ORDER — ASPIRIN 81 MG/1
81 TABLET, CHEWABLE ORAL DAILY
Status: DISCONTINUED | OUTPATIENT
Start: 2025-06-22 | End: 2025-06-28 | Stop reason: HOSPADM

## 2025-06-22 RX ORDER — POTASSIUM CHLORIDE 1500 MG/1
40 TABLET, EXTENDED RELEASE ORAL PRN
Status: DISCONTINUED | OUTPATIENT
Start: 2025-06-22 | End: 2025-06-28 | Stop reason: HOSPADM

## 2025-06-22 RX ORDER — ATORVASTATIN CALCIUM 80 MG/1
80 TABLET, FILM COATED ORAL DAILY
Status: DISCONTINUED | OUTPATIENT
Start: 2025-06-22 | End: 2025-06-28 | Stop reason: HOSPADM

## 2025-06-22 RX ORDER — IPRATROPIUM BROMIDE AND ALBUTEROL SULFATE 2.5; .5 MG/3ML; MG/3ML
1 SOLUTION RESPIRATORY (INHALATION)
Status: DISCONTINUED | OUTPATIENT
Start: 2025-06-22 | End: 2025-06-22

## 2025-06-22 RX ORDER — ONDANSETRON 2 MG/ML
4 INJECTION INTRAMUSCULAR; INTRAVENOUS EVERY 6 HOURS PRN
Status: DISCONTINUED | OUTPATIENT
Start: 2025-06-22 | End: 2025-06-28 | Stop reason: HOSPADM

## 2025-06-22 RX ORDER — ONDANSETRON 4 MG/1
4 TABLET, ORALLY DISINTEGRATING ORAL EVERY 8 HOURS PRN
Status: DISCONTINUED | OUTPATIENT
Start: 2025-06-22 | End: 2025-06-28 | Stop reason: HOSPADM

## 2025-06-22 RX ORDER — BUDESONIDE AND FORMOTEROL FUMARATE DIHYDRATE 80; 4.5 UG/1; UG/1
2 AEROSOL RESPIRATORY (INHALATION)
Status: DISCONTINUED | OUTPATIENT
Start: 2025-06-22 | End: 2025-06-24

## 2025-06-22 RX ORDER — IPRATROPIUM BROMIDE AND ALBUTEROL SULFATE 2.5; .5 MG/3ML; MG/3ML
1 SOLUTION RESPIRATORY (INHALATION)
Status: DISCONTINUED | OUTPATIENT
Start: 2025-06-23 | End: 2025-06-28 | Stop reason: HOSPADM

## 2025-06-22 RX ORDER — LEVOFLOXACIN 5 MG/ML
750 INJECTION, SOLUTION INTRAVENOUS ONCE
Status: COMPLETED | OUTPATIENT
Start: 2025-06-22 | End: 2025-06-22

## 2025-06-22 RX ORDER — SODIUM CHLORIDE 0.9 % (FLUSH) 0.9 %
5-40 SYRINGE (ML) INJECTION PRN
Status: DISCONTINUED | OUTPATIENT
Start: 2025-06-22 | End: 2025-06-28 | Stop reason: HOSPADM

## 2025-06-22 RX ADMIN — SENNOSIDES AND DOCUSATE SODIUM 1 TABLET: 50; 8.6 TABLET ORAL at 11:11

## 2025-06-22 RX ADMIN — ASPIRIN 81 MG: 81 TABLET, CHEWABLE ORAL at 11:11

## 2025-06-22 RX ADMIN — SODIUM CHLORIDE, PRESERVATIVE FREE 10 ML: 5 INJECTION INTRAVENOUS at 11:12

## 2025-06-22 RX ADMIN — OXYCODONE AND ACETAMINOPHEN 1 TABLET: 325; 10 TABLET ORAL at 18:36

## 2025-06-22 RX ADMIN — Medication 2 PUFF: at 20:50

## 2025-06-22 RX ADMIN — APIXABAN 5 MG: 5 TABLET, FILM COATED ORAL at 11:11

## 2025-06-22 RX ADMIN — PANTOPRAZOLE SODIUM 40 MG: 40 TABLET, DELAYED RELEASE ORAL at 15:00

## 2025-06-22 RX ADMIN — DEXAMETHASONE SODIUM PHOSPHATE 8 MG: 10 INJECTION INTRAMUSCULAR; INTRAVENOUS at 08:32

## 2025-06-22 RX ADMIN — SENNOSIDES AND DOCUSATE SODIUM 1 TABLET: 50; 8.6 TABLET ORAL at 20:45

## 2025-06-22 RX ADMIN — ONDANSETRON 4 MG: 2 INJECTION, SOLUTION INTRAMUSCULAR; INTRAVENOUS at 10:41

## 2025-06-22 RX ADMIN — OXYCODONE AND ACETAMINOPHEN 1 TABLET: 325; 10 TABLET ORAL at 11:11

## 2025-06-22 RX ADMIN — LEVOFLOXACIN 750 MG: 5 INJECTION, SOLUTION INTRAVENOUS at 08:38

## 2025-06-22 RX ADMIN — IPRATROPIUM BROMIDE AND ALBUTEROL SULFATE 1 DOSE: .5; 2.5 SOLUTION RESPIRATORY (INHALATION) at 15:28

## 2025-06-22 RX ADMIN — APIXABAN 5 MG: 5 TABLET, FILM COATED ORAL at 20:45

## 2025-06-22 RX ADMIN — IPRATROPIUM BROMIDE AND ALBUTEROL SULFATE 1 DOSE: .5; 2.5 SOLUTION RESPIRATORY (INHALATION) at 11:30

## 2025-06-22 RX ADMIN — ATORVASTATIN CALCIUM 80 MG: 80 TABLET, FILM COATED ORAL at 11:11

## 2025-06-22 RX ADMIN — WATER 40 MG: 1 INJECTION INTRAMUSCULAR; INTRAVENOUS; SUBCUTANEOUS at 20:46

## 2025-06-22 RX ADMIN — Medication 2 PUFF: at 11:30

## 2025-06-22 RX ADMIN — GABAPENTIN 400 MG: 400 CAPSULE ORAL at 20:45

## 2025-06-22 RX ADMIN — AMLODIPINE BESYLATE 5 MG: 5 TABLET ORAL at 11:11

## 2025-06-22 RX ADMIN — SODIUM CHLORIDE 500 ML: 0.9 INJECTION, SOLUTION INTRAVENOUS at 08:36

## 2025-06-22 RX ADMIN — CARVEDILOL 3.12 MG: 3.12 TABLET, FILM COATED ORAL at 11:11

## 2025-06-22 RX ADMIN — GABAPENTIN 400 MG: 400 CAPSULE ORAL at 11:11

## 2025-06-22 RX ADMIN — GABAPENTIN 400 MG: 400 CAPSULE ORAL at 15:00

## 2025-06-22 RX ADMIN — IPRATROPIUM BROMIDE AND ALBUTEROL SULFATE 1 DOSE: .5; 2.5 SOLUTION RESPIRATORY (INHALATION) at 20:47

## 2025-06-22 RX ADMIN — TIOTROPIUM BROMIDE INHALATION SPRAY 5 MCG: 3.12 SPRAY, METERED RESPIRATORY (INHALATION) at 11:30

## 2025-06-22 RX ADMIN — TORSEMIDE 50 MG: 20 TABLET ORAL at 11:08

## 2025-06-22 RX ADMIN — SODIUM CHLORIDE, PRESERVATIVE FREE 10 ML: 5 INJECTION INTRAVENOUS at 20:46

## 2025-06-22 RX ADMIN — IPRATROPIUM BROMIDE AND ALBUTEROL SULFATE 1 DOSE: .5; 2.5 SOLUTION RESPIRATORY (INHALATION) at 07:26

## 2025-06-22 ASSESSMENT — PAIN DESCRIPTION - LOCATION
LOCATION: BACK
LOCATION: BACK

## 2025-06-22 ASSESSMENT — LIFESTYLE VARIABLES
HOW MANY STANDARD DRINKS CONTAINING ALCOHOL DO YOU HAVE ON A TYPICAL DAY: PATIENT DOES NOT DRINK
HOW OFTEN DO YOU HAVE A DRINK CONTAINING ALCOHOL: NEVER

## 2025-06-22 ASSESSMENT — PAIN - FUNCTIONAL ASSESSMENT: PAIN_FUNCTIONAL_ASSESSMENT: 0-10

## 2025-06-22 ASSESSMENT — PAIN SCALES - GENERAL
PAINLEVEL_OUTOF10: 4
PAINLEVEL_OUTOF10: 6
PAINLEVEL_OUTOF10: 6
PAINLEVEL_OUTOF10: 4

## 2025-06-22 NOTE — PROGRESS NOTES
Pt admitted from ED to R 102. A&Ox4. VSS- currently on 2L/min O2 with 92% saturation. Pt oriented to room and asked for assistance. Verbalizes understanding. Admission completed. Pt has a morphine pump for chronic back pain. Call light within reach, bed in lowest position, wheels locked. Bed alarm on and audible.

## 2025-06-22 NOTE — H&P
[facility] Medicine History & Physical    V 5.1    Date of Admission: 6/22/2025    Date of Service:  Pt seen/examined on 6/22/25     [x]Admitted to Inpatient with expected LOS greater than two midnights due to medical therapy.  []Placed in Observation status.    Chief Admission Complaint:  my copd    Presenting Admission History:      69 y.o. female with chronic resp failure/severe copd who presented to [facility] with worsening sob. Pt notes ongoing issues since Tues and tried her nebs and even had to use an emergency kit(that contained prednisone and azithro) which she completed yesterday.  She woke up last night with difficulty breathing and tried to ambulate to her nebulizer but couldn't make it. She ended up calling EMS around 5am today. She did not mention having f/chills but noted some nausea but no emesis/diarrhea.  She notes ongoing productive cough(normally clear but yellowish of late) and wheezing. No chest pain. She lives at [facility] and believes there may have been some sick contacts. No changes in meds and she is compliant. [place] [place]     ER course: given duoneb, decadron, levaquin, ivf bolus    Assessment/Plan:      Worsening SOB-- possibly copd exacerbation, noted chronic leukocytosis of late  -iv steroids started  -duonebs scheduled  -iv abx started in ER, reevalulate in AM  -checked procal  -bcx obtained  -consider pulm eval if not improving    Chronic resp failure- with very severe COPD, baseline 2L, with hx of DEVI(does not tolerate wearing mask at home)  -on Dulera(sub symbicort)  -on Spiriva, held here while on nebs    Chronic diastolic HF-appears compensated  -on Torsemide  -Takes metolozone prn  -Asa    Afib- controlled  -on coreg and eliquis  -on tele    Gerd- ppi continued    HTN-stable  -Norvasc    Chronic pain-  per emr  -on Gabapentin  -on Prn prercocet    HLD-statin continued      Discussed management and the need for Hospitalization of the patient

## 2025-06-22 NOTE — ED NOTES
Patient brief changed at this time after the purewick leaked. Rita-care provided. Getting her ready to be brought to her new room.

## 2025-06-22 NOTE — ED PROVIDER NOTES
[facility] EMERGENCY DEPARTMENT  EMERGENCY DEPARTMENT ENCOUNTER        Pt Name: Serina Rubin  MRN: [id number]  Birthdate 1955  Date of evaluation: 6/22/2025  Provider: Ruperto Ortega MD  PCP: Elvira Hunter MD      CHIEF COMPLAINT       Chief Complaint   Patient presents with    Shortness of Breath     The patient presents from [facility] with c/o SOB for about 3 days. Hx of COPD; wears 2LNC at baseline. EMS found her at 90% on her 2L. Cough noted       HISTORY OFPRESENT ILLNESS   (Location/Symptom, Timing/Onset, Context/Setting, Quality, Duration, Modifying Factors,Severity)  Note limiting factors.     Serina Rubin is a 69 y.o. female presenting today due to concern for increased shortness of breath over the last 6 days starting on Tuesday.  She states that she started feeling bad at that point and has an emergency pack at home for shortness of breath where she took her steroids and a Z-Fred.  She reportedly wears 2 L nasal cannula at baseline.  EMS reports initially when they arrived she was 90% on her baseline oxygen and therefore they gave her a DuoNeb and she went up to 99%.  She reports having a low-grade temperature of 99 °F at home but no true fever.  She denies any chest pain.  She has chronic leg swelling but denies anything out of the ordinary.  She denies any recent trauma.  She denies any headache.  No reported vomiting or diarrhea.  No reported unilateral numbness or weakness.  She has a history of COPD.  She reports chronic back pain but nothing out of the ordinary today.  Due to worsening cough and shortness of breath and not improving from the nursing home, she was brought to the ED by EMS for further evaluation.  She reports the last time she had to be admitted was related to low potassium and this was in April 2025 based on chart review.                Review of Systems:     Positives and Pertinent negatives as per HPI.      PASTMEDICAL HISTORY     Past Medical  examinations, lab and x-ray interpretation, charting, treating for pneumonia with sepsis with IV antibiotics and IV fluids  Excludes separate billable procedures.  Patient at risk for serious decompensation if not treated for this life-threatening condition.            CONSULTS: I paged the hospitalist for admission.    IP CONSULT TO HOSPITALIST    EMERGENCY DEPARTMENT COURSE and DIFFERENTIAL DIAGNOSIS/MDM:   Vitals:    Vitals:    06/22/25 0637 06/22/25 0727   BP: (!) 146/75 135/68   Pulse: 67 66   Resp: 22 20   Temp: 98.2 °F (36.8 °C)    SpO2: 100% 96%   Weight: 68 kg (150 lb)    Height: 1.575 m (5' 2\")        Patient was given the following medications:  Medications   levoFLOXacin (LEVAQUIN) 750 MG/150ML infusion 750 mg (has no administration in time range)   sodium chloride 0.9 % bolus 500 mL (has no administration in time range)   dexAMETHasone (DECADRON) injection 8 mg (has no administration in time range)   ipratropium 0.5 mg-albuterol 2.5 mg (DUONEB) nebulizer solution 1 Dose (1 Dose Inhalation Given 6/22/25 0726)     Patient was evaluated due to concern for increased shortness of breath over the last 6 days where she tried antibiotics with azithromycin and prednisone at home but was worsening so she called EMS today.  She did receive a DuoNeb prior to arrival with some improvement.  She does have a history of COPD.  She reports a low-grade temperature in the 99 °F range.  She had no fever on arrival to the ED.  She did have mild respiratory distress on arrival.  I did order a repeat DuoNeb to see if this helps.  Initial chest x-ray was concerning for left lower lobe pneumonia.  Troponin was negative and story is not suggestive of acute coronary syndrome.  BNP was normal range and I do not suspect CHF exacerbation.  Lactic acid was normal range at 1.1.  After repeat DuoNeb in the ED, she was still complaining of significant shortness of breath and generalized weakness.  I did order IV antibiotics and IV

## 2025-06-22 NOTE — PROGRESS NOTES
4 Eyes Skin Assessment     The patient is being assess for  Admission    I agree that 2 RN's have performed a thorough Head to Toe Skin Assessment on the patient. ALL assessment sites listed below have been assessed.       Areas assessed by both nurses:   [x]   Head, Face, and Ears   [x]   Shoulders, Back, and Chest  [x]   Arms, Elbows, and Hands   [x]   Coccyx, Sacrum, and Ischum  [x]   Legs, Feet, and Heels        Does the Patient have Skin Breakdown?  Redness to bilateral heels, old scar left lower leg        Caleb Prevention initiated:  Yes   Wound Care Orders initiated:  No      WOC nurse consulted for Pressure Injury (Stage 3,4, Unstageable, DTI, NWPT, and Complex wounds):  No      Nurse 1 eSignature: Electronically signed by Korin Lewis RN on 6/22/25 at 10:32 AM EDT    **SHARE this note so that the co-signing nurse is able to place an eSignature**    Nurse 2 eSignature: Electronically signed by Rosanna Obrien RN on 6/22/25 at 10:32 AM EDT

## 2025-06-22 NOTE — ED NOTES
Serina Rubin is a 69 y.o. female admitted for  Principal Problem:    SOB (shortness of breath)  Resolved Problems:    * No resolved hospital problems. *  .   Patient Assisted Living --independent side of the [facility] via EMS transportation with   Chief Complaint   Patient presents with    Shortness of Breath     The patient presents from [facility] with c/o SOB for about 3 days. Hx of COPD; wears 2LNC at baseline. EMS found her at 90% on her 2L. Cough noted   .  Patient is alert and Person, Place, Time, and Situation  Patient's baseline mobility: Baseline Mobility: Walker  Code Status: Prior   Cardiac Rhythm:    O2 Flow Rate (L/min): 2 L/min  Is patient on baseline Oxygen: yes, uses oxygen at night and intermittently through the day how many Liters2:   Abnormal Assessment Findings: sob, cough, chills    Isolation: None      NIH Score:    C-SSRS: Risk of Suicide: No Risk  Bedside swallow:        Active LDA's:   Peripheral IV 06/22/25 Left Forearm (Active)   Site Assessment Clean, dry & intact 06/22/25 0654     Patient admitted with a rubi: no If the rubi is chronic was it exchanged:  Reason for rubi:   Patient admitted with Central Line:  . PICC line placement confirmed: YES OR NO:[id number]}   Reason for Central line:   Was central line Inserted from an outside facility:        Family/Caregiver Present no Any Concerns: no   Restraints no  Sitter no         Vitals: MEWS Score: 2    Vitals:    06/22/25 0637 06/22/25 0727 06/22/25 0830   BP: (!) 146/75 135/68 134/63   Pulse: 67 66 65   Resp: 22 20 20   Temp: 98.2 °F (36.8 °C)     SpO2: 100% 96% 94%   Weight: 68 kg (150 lb)     Height: 1.575 m (5' 2\")         Last documented pain score (0-10 scale) Pain Level: 4  Pain medication administered No.    Pertinent or High Risk Medications/Drips: No.    Pending Blood Product Administration: no    Abnormal labs:   Abnormal Labs Reviewed   CBC WITH AUTO DIFFERENTIAL - Abnormal; Notable for the following components:       Result

## 2025-06-22 NOTE — PLAN OF CARE
Problem: Chronic Conditions and Co-morbidities  Goal: Patient's chronic conditions and co-morbidity symptoms are monitored and maintained or improved  Outcome: Progressing     Problem: Pain  Goal: Verbalizes/displays adequate comfort level or baseline comfort level  Outcome: Progressing  Flowsheets (Taken 6/22/2025 8705)  Verbalizes/displays adequate comfort level or baseline comfort level:   Encourage patient to monitor pain and request assistance   Assess pain using appropriate pain scale   Administer analgesics based on type and severity of pain and evaluate response   Implement non-pharmacological measures as appropriate and evaluate response

## 2025-06-23 LAB
ANION GAP SERPL CALCULATED.3IONS-SCNC: 14 MMOL/L (ref 3–16)
BASOPHILS # BLD: 0 K/UL (ref 0–0.2)
BASOPHILS NFR BLD: 0.2 %
BUN SERPL-MCNC: 14 MG/DL (ref 7–20)
CALCIUM SERPL-MCNC: 8.6 MG/DL (ref 8.3–10.6)
CHLORIDE SERPL-SCNC: 97 MMOL/L (ref 99–110)
CO2 SERPL-SCNC: 29 MMOL/L (ref 21–32)
CREAT SERPL-MCNC: 0.7 MG/DL (ref 0.6–1.2)
DEPRECATED RDW RBC AUTO: 15.3 % (ref 12.4–15.4)
EOSINOPHIL # BLD: 0 K/UL (ref 0–0.6)
EOSINOPHIL NFR BLD: 0.1 %
GFR SERPLBLD CREATININE-BSD FMLA CKD-EPI: >90 ML/MIN/{1.73_M2}
GLUCOSE SERPL-MCNC: 135 MG/DL (ref 70–99)
HCT VFR BLD AUTO: 39 % (ref 36–48)
HGB BLD-MCNC: 13.1 G/DL (ref 12–16)
LYMPHOCYTES # BLD: 0.8 K/UL (ref 1–5.1)
LYMPHOCYTES NFR BLD: 6.4 %
MCH RBC QN AUTO: 29.8 PG (ref 26–34)
MCHC RBC AUTO-ENTMCNC: 33.4 G/DL (ref 31–36)
MCV RBC AUTO: 89.2 FL (ref 80–100)
MONOCYTES # BLD: 0.6 K/UL (ref 0–1.3)
MONOCYTES NFR BLD: 5.2 %
NEUTROPHILS # BLD: 10.3 K/UL (ref 1.7–7.7)
NEUTROPHILS NFR BLD: 88.1 %
PLATELET # BLD AUTO: 246 K/UL (ref 135–450)
PMV BLD AUTO: 10.4 FL (ref 5–10.5)
POTASSIUM SERPL-SCNC: 3.3 MMOL/L (ref 3.5–5.1)
POTASSIUM SERPL-SCNC: ABNORMAL MMOL/L (ref 3.5–5.1)
RBC # BLD AUTO: 4.38 M/UL (ref 4–5.2)
REASON FOR REJECTION: NORMAL
REJECTED TEST: NORMAL
SODIUM SERPL-SCNC: 140 MMOL/L (ref 136–145)
WBC # BLD AUTO: 11.7 K/UL (ref 4–11)

## 2025-06-23 PROCEDURE — 1200000000 HC SEMI PRIVATE

## 2025-06-23 PROCEDURE — 80048 BASIC METABOLIC PNL TOTAL CA: CPT

## 2025-06-23 PROCEDURE — 2700000000 HC OXYGEN THERAPY PER DAY

## 2025-06-23 PROCEDURE — 2500000003 HC RX 250 WO HCPCS: Performed by: INTERNAL MEDICINE

## 2025-06-23 PROCEDURE — 94761 N-INVAS EAR/PLS OXIMETRY MLT: CPT

## 2025-06-23 PROCEDURE — 94669 MECHANICAL CHEST WALL OSCILL: CPT

## 2025-06-23 PROCEDURE — 36415 COLL VENOUS BLD VENIPUNCTURE: CPT

## 2025-06-23 PROCEDURE — 85025 COMPLETE CBC W/AUTO DIFF WBC: CPT

## 2025-06-23 PROCEDURE — 6370000000 HC RX 637 (ALT 250 FOR IP): Performed by: INTERNAL MEDICINE

## 2025-06-23 PROCEDURE — 94640 AIRWAY INHALATION TREATMENT: CPT

## 2025-06-23 PROCEDURE — 6360000002 HC RX W HCPCS: Performed by: INTERNAL MEDICINE

## 2025-06-23 RX ORDER — GUAIFENESIN 600 MG/1
600 TABLET, EXTENDED RELEASE ORAL 2 TIMES DAILY
Status: DISCONTINUED | OUTPATIENT
Start: 2025-06-23 | End: 2025-06-28 | Stop reason: HOSPADM

## 2025-06-23 RX ORDER — LEVOFLOXACIN 5 MG/ML
500 INJECTION, SOLUTION INTRAVENOUS EVERY 24 HOURS
Status: COMPLETED | OUTPATIENT
Start: 2025-06-23 | End: 2025-06-26

## 2025-06-23 RX ADMIN — SODIUM CHLORIDE, PRESERVATIVE FREE 10 ML: 5 INJECTION INTRAVENOUS at 08:13

## 2025-06-23 RX ADMIN — TORSEMIDE 50 MG: 20 TABLET ORAL at 09:42

## 2025-06-23 RX ADMIN — AMLODIPINE BESYLATE 5 MG: 5 TABLET ORAL at 09:42

## 2025-06-23 RX ADMIN — OXYCODONE AND ACETAMINOPHEN 1 TABLET: 325; 10 TABLET ORAL at 05:47

## 2025-06-23 RX ADMIN — ONDANSETRON 4 MG: 2 INJECTION, SOLUTION INTRAMUSCULAR; INTRAVENOUS at 08:13

## 2025-06-23 RX ADMIN — GABAPENTIN 400 MG: 400 CAPSULE ORAL at 09:43

## 2025-06-23 RX ADMIN — IPRATROPIUM BROMIDE AND ALBUTEROL SULFATE 1 DOSE: 2.5; .5 SOLUTION RESPIRATORY (INHALATION) at 19:41

## 2025-06-23 RX ADMIN — OXYCODONE AND ACETAMINOPHEN 1 TABLET: 325; 10 TABLET ORAL at 19:44

## 2025-06-23 RX ADMIN — OXYCODONE AND ACETAMINOPHEN 1 TABLET: 325; 10 TABLET ORAL at 15:24

## 2025-06-23 RX ADMIN — ATORVASTATIN CALCIUM 80 MG: 80 TABLET, FILM COATED ORAL at 09:43

## 2025-06-23 RX ADMIN — IPRATROPIUM BROMIDE AND ALBUTEROL SULFATE 1 DOSE: 2.5; .5 SOLUTION RESPIRATORY (INHALATION) at 08:24

## 2025-06-23 RX ADMIN — IPRATROPIUM BROMIDE AND ALBUTEROL SULFATE 1 DOSE: 2.5; .5 SOLUTION RESPIRATORY (INHALATION) at 12:36

## 2025-06-23 RX ADMIN — GUAIFENESIN 600 MG: 600 TABLET, MULTILAYER, EXTENDED RELEASE ORAL at 11:32

## 2025-06-23 RX ADMIN — PANTOPRAZOLE SODIUM 40 MG: 40 TABLET, DELAYED RELEASE ORAL at 05:44

## 2025-06-23 RX ADMIN — IPRATROPIUM BROMIDE AND ALBUTEROL SULFATE 1 DOSE: 2.5; .5 SOLUTION RESPIRATORY (INHALATION) at 01:09

## 2025-06-23 RX ADMIN — SENNOSIDES AND DOCUSATE SODIUM 1 TABLET: 50; 8.6 TABLET ORAL at 19:43

## 2025-06-23 RX ADMIN — GABAPENTIN 400 MG: 400 CAPSULE ORAL at 15:24

## 2025-06-23 RX ADMIN — PANTOPRAZOLE SODIUM 40 MG: 40 TABLET, DELAYED RELEASE ORAL at 15:24

## 2025-06-23 RX ADMIN — APIXABAN 5 MG: 5 TABLET, FILM COATED ORAL at 09:44

## 2025-06-23 RX ADMIN — GABAPENTIN 400 MG: 400 CAPSULE ORAL at 19:43

## 2025-06-23 RX ADMIN — WATER 40 MG: 1 INJECTION INTRAMUSCULAR; INTRAVENOUS; SUBCUTANEOUS at 09:42

## 2025-06-23 RX ADMIN — Medication 2 PUFF: at 08:25

## 2025-06-23 RX ADMIN — SODIUM CHLORIDE, PRESERVATIVE FREE 10 ML: 5 INJECTION INTRAVENOUS at 19:45

## 2025-06-23 RX ADMIN — CARVEDILOL 3.12 MG: 3.12 TABLET, FILM COATED ORAL at 18:37

## 2025-06-23 RX ADMIN — LEVOFLOXACIN 500 MG: 5 INJECTION, SOLUTION INTRAVENOUS at 18:40

## 2025-06-23 RX ADMIN — APIXABAN 5 MG: 5 TABLET, FILM COATED ORAL at 19:44

## 2025-06-23 RX ADMIN — IPRATROPIUM BROMIDE AND ALBUTEROL SULFATE 1 DOSE: 2.5; .5 SOLUTION RESPIRATORY (INHALATION) at 15:44

## 2025-06-23 RX ADMIN — IPRATROPIUM BROMIDE AND ALBUTEROL SULFATE 1 DOSE: 2.5; .5 SOLUTION RESPIRATORY (INHALATION) at 23:32

## 2025-06-23 RX ADMIN — Medication 2 PUFF: at 19:44

## 2025-06-23 RX ADMIN — GUAIFENESIN 600 MG: 600 TABLET, MULTILAYER, EXTENDED RELEASE ORAL at 19:44

## 2025-06-23 RX ADMIN — ASPIRIN 81 MG: 81 TABLET, CHEWABLE ORAL at 09:42

## 2025-06-23 RX ADMIN — SENNOSIDES AND DOCUSATE SODIUM 1 TABLET: 50; 8.6 TABLET ORAL at 09:42

## 2025-06-23 RX ADMIN — IPRATROPIUM BROMIDE AND ALBUTEROL SULFATE 1 DOSE: 2.5; .5 SOLUTION RESPIRATORY (INHALATION) at 03:40

## 2025-06-23 ASSESSMENT — PAIN DESCRIPTION - DESCRIPTORS: DESCRIPTORS: ACHING;THROBBING

## 2025-06-23 ASSESSMENT — PAIN SCALES - GENERAL
PAINLEVEL_OUTOF10: 3
PAINLEVEL_OUTOF10: 6

## 2025-06-23 ASSESSMENT — PAIN DESCRIPTION - LOCATION: LOCATION: BACK

## 2025-06-23 NOTE — CARE COORDINATION
Case Management Assessment  Initial Evaluation    Date/Time of Evaluation: 6/23/2025 4:43 PM  Assessment Completed by: GOKUL White    If patient is discharged prior to next notation, then this note serves as note for discharge by case management.    Patient Name: Serina Rubin                   YOB: 1955  Diagnosis: SOB (shortness of breath) [R06.02]  REIS (dyspnea on exertion) [R06.09]  Generalized weakness [R53.1]  COPD exacerbation (HCC) [J44.1]  Chronic respiratory failure with hypoxia (HCC) [J96.11]  Failure of outpatient treatment [Z78.9]  Pneumonia of left lower lobe due to infectious organism [J18.9]  Sepsis without acute organ dysfunction, due to unspecified organism (HCC) [A41.9]                   Date / Time: 6/22/2025  6:34 AM    Patient Admission Status: Inpatient   Readmission Risk (Low < 19, Mod (19-27), High > 27): Readmission Risk Score: 16.5    Current PCP: Elvira Hunter MD  PCP verified by CM? Yes    Chart Reviewed: Yes      History Provided by: Patient  Patient Orientation: Alert and Oriented, Person, Place, Situation, Self    Patient Cognition: Alert    Hospitalization in the last 30 days (Readmission):  No    If yes, Readmission Assessment in CM Navigator will be completed.    Advance Directives:      Code Status: Full Code   Patient's Primary Decision Maker is: Named in Scanned ACP Document    Primary Decision Maker: Shant Rubin - Child - [phone]    Discharge Planning:    Patient lives with: Alone Type of Home: Independent Living  Primary Care Giver: Self (family available to assist; lives with brother in law)  Patient Support Systems include: Family Members, Children   Current Financial resources: Medicare  Current community resources: Lodging, Housing (Lives in [facility])  Current services prior to admission: None            Current DME:              Type of Home Care services:  None    ADLS  Prior functional level: Independent in
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
## 2025-06-24 PROCEDURE — 1200000000 HC SEMI PRIVATE

## 2025-06-24 PROCEDURE — 94669 MECHANICAL CHEST WALL OSCILL: CPT

## 2025-06-24 PROCEDURE — 2700000000 HC OXYGEN THERAPY PER DAY

## 2025-06-24 PROCEDURE — 94640 AIRWAY INHALATION TREATMENT: CPT

## 2025-06-24 PROCEDURE — 6360000002 HC RX W HCPCS: Performed by: INTERNAL MEDICINE

## 2025-06-24 PROCEDURE — 6370000000 HC RX 637 (ALT 250 FOR IP): Performed by: INTERNAL MEDICINE

## 2025-06-24 PROCEDURE — 97162 PT EVAL MOD COMPLEX 30 MIN: CPT

## 2025-06-24 PROCEDURE — 99223 1ST HOSP IP/OBS HIGH 75: CPT | Performed by: INTERNAL MEDICINE

## 2025-06-24 PROCEDURE — 97166 OT EVAL MOD COMPLEX 45 MIN: CPT

## 2025-06-24 PROCEDURE — 6370000000 HC RX 637 (ALT 250 FOR IP): Performed by: NURSE PRACTITIONER

## 2025-06-24 PROCEDURE — 97116 GAIT TRAINING THERAPY: CPT

## 2025-06-24 PROCEDURE — 94667 MNPJ CHEST WALL 1ST: CPT

## 2025-06-24 PROCEDURE — 97535 SELF CARE MNGMENT TRAINING: CPT

## 2025-06-24 PROCEDURE — 97530 THERAPEUTIC ACTIVITIES: CPT

## 2025-06-24 PROCEDURE — 94761 N-INVAS EAR/PLS OXIMETRY MLT: CPT

## 2025-06-24 PROCEDURE — 2500000003 HC RX 250 WO HCPCS: Performed by: INTERNAL MEDICINE

## 2025-06-24 RX ORDER — ARFORMOTEROL TARTRATE 15 UG/2ML
15 SOLUTION RESPIRATORY (INHALATION)
Status: DISCONTINUED | OUTPATIENT
Start: 2025-06-24 | End: 2025-06-28 | Stop reason: HOSPADM

## 2025-06-24 RX ORDER — BUDESONIDE 0.5 MG/2ML
0.5 INHALANT ORAL
Status: DISCONTINUED | OUTPATIENT
Start: 2025-06-24 | End: 2025-06-28 | Stop reason: HOSPADM

## 2025-06-24 RX ORDER — ACETYLCYSTEINE 200 MG/ML
600 SOLUTION ORAL; RESPIRATORY (INHALATION)
Status: DISCONTINUED | OUTPATIENT
Start: 2025-06-24 | End: 2025-06-28 | Stop reason: HOSPADM

## 2025-06-24 RX ORDER — GUAIFENESIN/DEXTROMETHORPHAN 100-10MG/5
5 SYRUP ORAL EVERY 4 HOURS PRN
Status: DISCONTINUED | OUTPATIENT
Start: 2025-06-24 | End: 2025-06-28 | Stop reason: HOSPADM

## 2025-06-24 RX ADMIN — ACETYLCYSTEINE 600 MG: 200 INHALANT RESPIRATORY (INHALATION) at 20:22

## 2025-06-24 RX ADMIN — AMLODIPINE BESYLATE 5 MG: 5 TABLET ORAL at 09:03

## 2025-06-24 RX ADMIN — IPRATROPIUM BROMIDE AND ALBUTEROL SULFATE 1 DOSE: 2.5; .5 SOLUTION RESPIRATORY (INHALATION) at 08:54

## 2025-06-24 RX ADMIN — OXYCODONE AND ACETAMINOPHEN 1 TABLET: 325; 10 TABLET ORAL at 12:20

## 2025-06-24 RX ADMIN — ATORVASTATIN CALCIUM 80 MG: 80 TABLET, FILM COATED ORAL at 09:03

## 2025-06-24 RX ADMIN — SENNOSIDES AND DOCUSATE SODIUM 1 TABLET: 50; 8.6 TABLET ORAL at 19:07

## 2025-06-24 RX ADMIN — CARVEDILOL 3.12 MG: 3.12 TABLET, FILM COATED ORAL at 17:58

## 2025-06-24 RX ADMIN — BUDESONIDE 500 MCG: 0.5 SUSPENSION RESPIRATORY (INHALATION) at 20:22

## 2025-06-24 RX ADMIN — APIXABAN 5 MG: 5 TABLET, FILM COATED ORAL at 19:07

## 2025-06-24 RX ADMIN — IPRATROPIUM BROMIDE AND ALBUTEROL SULFATE 1 DOSE: 2.5; .5 SOLUTION RESPIRATORY (INHALATION) at 16:03

## 2025-06-24 RX ADMIN — OXYCODONE AND ACETAMINOPHEN 1 TABLET: 325; 10 TABLET ORAL at 20:26

## 2025-06-24 RX ADMIN — GUAIFENESIN 600 MG: 600 TABLET, MULTILAYER, EXTENDED RELEASE ORAL at 19:07

## 2025-06-24 RX ADMIN — GUAIFENESIN 600 MG: 600 TABLET, MULTILAYER, EXTENDED RELEASE ORAL at 09:04

## 2025-06-24 RX ADMIN — CARVEDILOL 3.12 MG: 3.12 TABLET, FILM COATED ORAL at 09:04

## 2025-06-24 RX ADMIN — ASPIRIN 81 MG: 81 TABLET, CHEWABLE ORAL at 09:03

## 2025-06-24 RX ADMIN — IPRATROPIUM BROMIDE AND ALBUTEROL SULFATE 1 DOSE: 2.5; .5 SOLUTION RESPIRATORY (INHALATION) at 20:22

## 2025-06-24 RX ADMIN — APIXABAN 5 MG: 5 TABLET, FILM COATED ORAL at 09:03

## 2025-06-24 RX ADMIN — SENNOSIDES AND DOCUSATE SODIUM 1 TABLET: 50; 8.6 TABLET ORAL at 09:04

## 2025-06-24 RX ADMIN — GABAPENTIN 400 MG: 400 CAPSULE ORAL at 19:07

## 2025-06-24 RX ADMIN — SODIUM CHLORIDE, PRESERVATIVE FREE 10 ML: 5 INJECTION INTRAVENOUS at 09:03

## 2025-06-24 RX ADMIN — LEVOFLOXACIN 500 MG: 5 INJECTION, SOLUTION INTRAVENOUS at 18:03

## 2025-06-24 RX ADMIN — PANTOPRAZOLE SODIUM 40 MG: 40 TABLET, DELAYED RELEASE ORAL at 16:01

## 2025-06-24 RX ADMIN — SODIUM CHLORIDE, PRESERVATIVE FREE 10 ML: 5 INJECTION INTRAVENOUS at 19:08

## 2025-06-24 RX ADMIN — IPRATROPIUM BROMIDE AND ALBUTEROL SULFATE 1 DOSE: 2.5; .5 SOLUTION RESPIRATORY (INHALATION) at 11:27

## 2025-06-24 RX ADMIN — ARFORMOTEROL TARTRATE 15 MCG: 15 SOLUTION RESPIRATORY (INHALATION) at 20:22

## 2025-06-24 RX ADMIN — GABAPENTIN 400 MG: 400 CAPSULE ORAL at 09:03

## 2025-06-24 RX ADMIN — IPRATROPIUM BROMIDE AND ALBUTEROL SULFATE 1 DOSE: 2.5; .5 SOLUTION RESPIRATORY (INHALATION) at 04:09

## 2025-06-24 RX ADMIN — OXYCODONE AND ACETAMINOPHEN 1 TABLET: 325; 10 TABLET ORAL at 03:48

## 2025-06-24 RX ADMIN — WATER 40 MG: 1 INJECTION INTRAMUSCULAR; INTRAVENOUS; SUBCUTANEOUS at 09:03

## 2025-06-24 RX ADMIN — PANTOPRAZOLE SODIUM 40 MG: 40 TABLET, DELAYED RELEASE ORAL at 09:03

## 2025-06-24 RX ADMIN — GUAIFENESIN AND DEXTROMETHORPHAN 5 ML: 100; 10 SYRUP ORAL at 04:03

## 2025-06-24 RX ADMIN — TORSEMIDE 50 MG: 20 TABLET ORAL at 09:03

## 2025-06-24 RX ADMIN — Medication 2 PUFF: at 08:54

## 2025-06-24 RX ADMIN — GABAPENTIN 400 MG: 400 CAPSULE ORAL at 16:01

## 2025-06-24 NOTE — CARE COORDINATION
Hospital day 2: Patient on A1 re SOB care managed by IM and pending consult to Pulmonology. Patient from [facility], they have therapy that cab be added thru [facility]. Patient on 3L 02, baseline is HS 02. Patient with 02 thru [employer] with portability if needed . Blood cx NGTD.  Patient on IV ATB and steroids. SW following.MICHELLE Stephens
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
## 2025-06-24 NOTE — PROGRESS NOTES
Hospital Medicine Progress Note  V 5.17      Date of Admission: 6/22/2025    Hospital Day: 3      Chief Admission Complaint:  my copd    Subjective:  resting in bed, inc'd to 4L now from baseline 2L,  still notes some difficulty with expectorating but has improved, no overnight events noted    Presenting Admission History:         69 y.o. female with chronic resp failure/severe copd who presented to [facility] with worsening sob. Pt notes ongoing issues since Tues and tried her nebs and even had to use an emergency kit(that contained prednisone and azithro) which she completed yesterday.  She woke up last night with difficulty breathing and tried to ambulate to her nebulizer but couldn't make it. She ended up calling EMS around 5am today. She did not mention having f/chills but noted some nausea but no emesis/diarrhea.  She notes ongoing productive cough(normally clear but yellowish of late) and wheezing. No chest pain. She lives at [facility] and believes there may have been some sick contacts. No changes in meds and she is compliant. She sees Dr. Jefferson([facility]),      ER course: given duoneb, decadron, levaquin, ivf bolus    Assessment/Plan:        Worsening SOB-- likely copd exacerbation, noted chronic leukocytosis of late  -iv steroids started  -duonebs scheduled  -iv abx started in ER, reevalulate in AM and continued  -checked procal  -bcx obtained  -asked for pulm eval on 6/24   -added chest vest/IS/Acapella/mucinex    Chronic resp failure- with very severe COPD, baseline 2L, with hx of DEVI(does not tolerate wearing mask at home)  -on Dulera(sub symbicort)  -on Spiriva, held here while on nebs     Chronic diastolic HF-appears compensated  -on Torsemide  -Takes metolozone prn  -Asa     Abn UA- noted 6/23, pt denied any burning/dysuria but notes some polyuria which she attributed to her diuretic  -suspect asymptomatic bacteruria  -ordered Ucx and noted 75k enterococcus, sens pending    Afib-  anticoagulants in special circumstances)    [] Cardiac Medications (IV Amiodarone/Diltiazem, Tikosyn, etc)    [] Hemodialysis    [] Other -    [] Change in code status    [] Decision to escalate care    [] Major surgery/procedure with associated risk factors    --------------------------------------------------  C. Data (any 2)    [x] Data Review (any 3)    [x] Consultant/subspecialist notes from yesterday/today    [x] All available current labs reviewed interpreted for clinical significance    [x] Appropriate follow-up labs were ordered  [] Collateral history obtained     [x] Independent Interpretation of tests (any 1)    [x] Telemetry (Rhythm Strip) personally reviewed and interpreted        [] Imaging personally reviewed and interpreted     [x] Discussion (any 1)  [x] Multi-Disciplinary Rounds with Case Management  [] Discussed management of the case with           Labs:  Personally reviewed on 6/24/2025 and interpreted for clinical significance as documented above.     Recent Labs     06/22/25 0655 06/23/25  0632   WBC 13.3* 11.7*   HGB 14.6 13.1   HCT 44.5 39.0    246     Recent Labs     06/22/25  0655 06/23/25  0632 06/23/25  0804    140  --    K 3.8 see below 3.3*   CL 98* 97*  --    CO2 33* 29  --    BUN 15 14  --    CREATININE 0.7 0.7  --    CALCIUM 9.2 8.6  --    MG 2.08  --   --      Recent Labs     06/22/25  0655 06/22/25  0831   PROBNP 81  --    TROPHS 8 9     No results for input(s): \"LABA1C\" in the last 72 hours.  Recent Labs     06/22/25  0655   AST 28   ALT 16   BILITOT 0.6   ALKPHOS 94     Recent Labs     06/22/25  0655   LACTA 1.1       Urine Cultures:   Lab Results   Component Value Date/Time    LABURIN  06/22/2025 10:00 AM     75,000 CFU/ml  Sensitivity to follow  Repeating sensitivity       Blood Cultures:   Lab Results   Component Value Date/Time    BC  06/22/2025 08:31 AM     No Growth to date.  Any change in status will be called.     Lab Results   Component Value Date/Time

## 2025-06-24 NOTE — CONSULTS
PULMONARY AND CRITICAL CARE INPATIENT NOTE        Serina Rubin   : 1955  MRN: [id number]     Admitting Physician: Abundio Wright MD  Attending Physician: Abundio Wright MD  PCP: Elvira Hunter MD    Date of Service: 2025  Admission date:2025   LOS: Hospital Day: 3   Code:Full Code      ASSESSMENT & PLAN       69 y.o. female patient was admitted on 2025 with  Chief Complaint   Patient presents with    Shortness of Breath     The patient presents from [facility] with c/o SOB for about 3 days. Hx of COPD; wears 2LNC at baseline. EMS found her at 90% on her 2L. Cough noted       Acute on chronic hypoxic respiratory failure.  On 2 L at baseline.  AECOPD.  Failed outpatient steroids and antibiotics.  Recent exacerbation in April and admission at [place].  On Dulera/Spiriva  HFpEF.  On torsemide  Mucous plugging of airways  Enterococcus faecalis UTI?  Recurrent admissions to the hospital with AECOPD/decompensated heart failure    Co-morbidities: DEVI intolerant to CPAP therapy, A-fib on anticoagulation, HTN, ex-smoker stopped many years ago      Plan:              Brovana, Pulmicort with DuoNebs while inpatient.  Can resume Dulera and Spiriva discharge or try Trelegy 200 mcg if patient prefers and insurance covers.  Mucomyst with vibrating vest  Finish Levaquin course  Continue systemic steroids  Continue diuresis with torsemide with electrolyte replacement  Follow-up micro workup  Follow-up echo results  If the patient does not improve we will consider CT scan and bronchoscopy if needed  Aspiration precautions  DVT ppx measures.  On apixaban        Subjective/Objective           CC/Reason for Consult:     resp failure, copd exacerbation, unable to expectorate, inc'g o2 req today         HPI: 2025  69-year-old female patient with PMH of CHF, A-fib, COPD following with Dr. Jefferson who was admitted on  with worsening shortness of breath for last few days prior to  apparent distress.  HEENT: Anicteric.  Cardiac: No loud murmur.  Lungs: Decreased air entry over the lung bases.  Rhonchorous breathing sounds  Abdomen: Soft.  Back & Extremities: No clubbing.  No signs of acute ischemia.  Neurological: No focal deficit.      ________________________________________________________  Electronically signed by:  Ivette Amor MD,FACP    6/24/2025    4:18 PM.     [facility]  [address], [place] [address]   Available on [employer]  Phone (office): [phone]
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
## 2025-06-25 ENCOUNTER — APPOINTMENT (OUTPATIENT)
Age: 70
End: 2025-06-25
Attending: INTERNAL MEDICINE
Payer: MEDICARE

## 2025-06-25 ENCOUNTER — ANESTHESIA EVENT (OUTPATIENT)
Dept: ENDOSCOPY | Age: 70
End: 2025-06-25
Payer: MEDICARE

## 2025-06-25 LAB
BACTERIA UR CULT: ABNORMAL
BACTERIA UR CULT: ABNORMAL
ECHO AO ASC DIAM: 2.7 CM
ECHO AO ASCENDING AORTA INDEX: 1.65 CM/M2
ECHO AO ROOT DIAM: 2.5 CM
ECHO AO ROOT INDEX: 1.52 CM/M2
ECHO AV AREA PEAK VELOCITY: 2.7 CM2
ECHO AV AREA VTI: 2.7 CM2
ECHO AV AREA/BSA PEAK VELOCITY: 1.6 CM2/M2
ECHO AV AREA/BSA VTI: 1.6 CM2/M2
ECHO AV MEAN GRADIENT: 4 MMHG
ECHO AV MEAN VELOCITY: 0.9 M/S
ECHO AV PEAK GRADIENT: 8 MMHG
ECHO AV PEAK VELOCITY: 1.4 M/S
ECHO AV VELOCITY RATIO: 0.86
ECHO AV VTI: 32.1 CM
ECHO BSA: 1.67 M2
ECHO EST RA PRESSURE: 8 MMHG
ECHO LA AREA 2C: 13 CM2
ECHO LA AREA 4C: 12.2 CM2
ECHO LA MAJOR AXIS: 5.3 CM
ECHO LA MINOR AXIS: 6 CM
ECHO LA VOL BP: 25 ML (ref 22–52)
ECHO LA VOL MOD A2C: 23 ML (ref 22–52)
ECHO LA VOL MOD A4C: 23 ML (ref 22–52)
ECHO LA VOL/BSA BIPLANE: 15 ML/M2 (ref 16–34)
ECHO LA VOLUME INDEX MOD A2C: 14 ML/M2 (ref 16–34)
ECHO LA VOLUME INDEX MOD A4C: 14 ML/M2 (ref 16–34)
ECHO LV E' LATERAL VELOCITY: 10.9 CM/S
ECHO LV E' SEPTAL VELOCITY: 5.55 CM/S
ECHO LV EDV A2C: 72 ML
ECHO LV EDV A4C: 73 ML
ECHO LV EDV INDEX A4C: 45 ML/M2
ECHO LV EDV NDEX A2C: 44 ML/M2
ECHO LV EF PHYSICIAN: 63 %
ECHO LV EJECTION FRACTION A2C: 66 %
ECHO LV EJECTION FRACTION A4C: 61 %
ECHO LV EJECTION FRACTION BIPLANE: 62 % (ref 55–100)
ECHO LV ESV A2C: 25 ML
ECHO LV ESV A4C: 29 ML
ECHO LV ESV INDEX A2C: 15 ML/M2
ECHO LV ESV INDEX A4C: 18 ML/M2
ECHO LVOT AREA: 3.1 CM2
ECHO LVOT AV VTI INDEX: 0.86
ECHO LVOT DIAM: 2 CM
ECHO LVOT MEAN GRADIENT: 3 MMHG
ECHO LVOT PEAK GRADIENT: 6 MMHG
ECHO LVOT PEAK VELOCITY: 1.2 M/S
ECHO LVOT STROKE VOLUME INDEX: 53 ML/M2
ECHO LVOT SV: 87 ML
ECHO LVOT VTI: 27.7 CM
ECHO MV A VELOCITY: 0.78 M/S
ECHO MV E DECELERATION TIME (DT): 259 MS
ECHO MV E VELOCITY: 0.67 M/S
ECHO MV E/A RATIO: 0.86
ECHO MV E/E' LATERAL: 6.15
ECHO MV E/E' RATIO (AVERAGED): 9.11
ECHO MV E/E' SEPTAL: 12.07
ECHO PV MAX VELOCITY: 0.8 M/S
ECHO PV PEAK GRADIENT: 3 MMHG
ECHO RA AREA 4C: 17 CM2
ECHO RA END SYSTOLIC VOLUME APICAL 4 CHAMBER INDEX BSA: 29 ML/M2
ECHO RA VOLUME: 47 ML
ECHO RIGHT VENTRICULAR SYSTOLIC PRESSURE (RVSP): 40 MMHG
ECHO RV BASAL DIMENSION: 4.4 CM
ECHO RV FREE WALL PEAK S': 11.5 CM/S
ECHO RV LONGITUDINAL DIMENSION: 7.4 CM
ECHO RV MID DIMENSION: 3.6 CM
ECHO RV TAPSE: 2.4 CM (ref 1.7–?)
ECHO TV REGURGITANT MAX VELOCITY: 2.83 M/S
ECHO TV REGURGITANT PEAK GRADIENT: 32 MMHG
ORGANISM: ABNORMAL

## 2025-06-25 PROCEDURE — 94640 AIRWAY INHALATION TREATMENT: CPT

## 2025-06-25 PROCEDURE — 2500000003 HC RX 250 WO HCPCS: Performed by: INTERNAL MEDICINE

## 2025-06-25 PROCEDURE — 2700000000 HC OXYGEN THERAPY PER DAY

## 2025-06-25 PROCEDURE — 6360000002 HC RX W HCPCS: Performed by: INTERNAL MEDICINE

## 2025-06-25 PROCEDURE — 6370000000 HC RX 637 (ALT 250 FOR IP): Performed by: INTERNAL MEDICINE

## 2025-06-25 PROCEDURE — 1200000000 HC SEMI PRIVATE

## 2025-06-25 PROCEDURE — C8929 TTE W OR WO FOL WCON,DOPPLER: HCPCS

## 2025-06-25 PROCEDURE — 99233 SBSQ HOSP IP/OBS HIGH 50: CPT | Performed by: INTERNAL MEDICINE

## 2025-06-25 PROCEDURE — 93306 TTE W/DOPPLER COMPLETE: CPT | Performed by: INTERNAL MEDICINE

## 2025-06-25 PROCEDURE — 94669 MECHANICAL CHEST WALL OSCILL: CPT

## 2025-06-25 PROCEDURE — 6360000004 HC RX CONTRAST MEDICATION: Performed by: INTERNAL MEDICINE

## 2025-06-25 RX ADMIN — GUAIFENESIN 600 MG: 600 TABLET, MULTILAYER, EXTENDED RELEASE ORAL at 21:33

## 2025-06-25 RX ADMIN — CARVEDILOL 3.12 MG: 3.12 TABLET, FILM COATED ORAL at 07:50

## 2025-06-25 RX ADMIN — IPRATROPIUM BROMIDE AND ALBUTEROL SULFATE 1 DOSE: 2.5; .5 SOLUTION RESPIRATORY (INHALATION) at 23:07

## 2025-06-25 RX ADMIN — OXYCODONE AND ACETAMINOPHEN 1 TABLET: 325; 10 TABLET ORAL at 04:19

## 2025-06-25 RX ADMIN — AMLODIPINE BESYLATE 5 MG: 5 TABLET ORAL at 07:50

## 2025-06-25 RX ADMIN — BUDESONIDE 500 MCG: 0.5 SUSPENSION RESPIRATORY (INHALATION) at 19:26

## 2025-06-25 RX ADMIN — IPRATROPIUM BROMIDE AND ALBUTEROL SULFATE 1 DOSE: 2.5; .5 SOLUTION RESPIRATORY (INHALATION) at 08:02

## 2025-06-25 RX ADMIN — ACETYLCYSTEINE 600 MG: 200 INHALANT RESPIRATORY (INHALATION) at 19:17

## 2025-06-25 RX ADMIN — SODIUM CHLORIDE, PRESERVATIVE FREE 10 ML: 5 INJECTION INTRAVENOUS at 21:32

## 2025-06-25 RX ADMIN — GABAPENTIN 400 MG: 400 CAPSULE ORAL at 15:15

## 2025-06-25 RX ADMIN — LEVOFLOXACIN 500 MG: 5 INJECTION, SOLUTION INTRAVENOUS at 17:10

## 2025-06-25 RX ADMIN — SENNOSIDES AND DOCUSATE SODIUM 1 TABLET: 50; 8.6 TABLET ORAL at 07:50

## 2025-06-25 RX ADMIN — ONDANSETRON 4 MG: 2 INJECTION, SOLUTION INTRAMUSCULAR; INTRAVENOUS at 07:51

## 2025-06-25 RX ADMIN — ARFORMOTEROL TARTRATE 15 MCG: 15 SOLUTION RESPIRATORY (INHALATION) at 08:02

## 2025-06-25 RX ADMIN — GUAIFENESIN 600 MG: 600 TABLET, MULTILAYER, EXTENDED RELEASE ORAL at 07:50

## 2025-06-25 RX ADMIN — IPRATROPIUM BROMIDE AND ALBUTEROL SULFATE 1 DOSE: 2.5; .5 SOLUTION RESPIRATORY (INHALATION) at 11:18

## 2025-06-25 RX ADMIN — ARFORMOTEROL TARTRATE 15 MCG: 15 SOLUTION RESPIRATORY (INHALATION) at 19:23

## 2025-06-25 RX ADMIN — ATORVASTATIN CALCIUM 80 MG: 80 TABLET, FILM COATED ORAL at 07:50

## 2025-06-25 RX ADMIN — PANTOPRAZOLE SODIUM 40 MG: 40 TABLET, DELAYED RELEASE ORAL at 06:26

## 2025-06-25 RX ADMIN — BUDESONIDE 500 MCG: 0.5 SUSPENSION RESPIRATORY (INHALATION) at 08:02

## 2025-06-25 RX ADMIN — CARVEDILOL 3.12 MG: 3.12 TABLET, FILM COATED ORAL at 17:08

## 2025-06-25 RX ADMIN — WATER 40 MG: 1 INJECTION INTRAMUSCULAR; INTRAVENOUS; SUBCUTANEOUS at 07:51

## 2025-06-25 RX ADMIN — OXYCODONE AND ACETAMINOPHEN 1 TABLET: 325; 10 TABLET ORAL at 23:21

## 2025-06-25 RX ADMIN — SENNOSIDES AND DOCUSATE SODIUM 1 TABLET: 50; 8.6 TABLET ORAL at 21:33

## 2025-06-25 RX ADMIN — IPRATROPIUM BROMIDE AND ALBUTEROL SULFATE 1 DOSE: 2.5; .5 SOLUTION RESPIRATORY (INHALATION) at 15:51

## 2025-06-25 RX ADMIN — ASPIRIN 81 MG: 81 TABLET, CHEWABLE ORAL at 07:50

## 2025-06-25 RX ADMIN — SODIUM CHLORIDE, PRESERVATIVE FREE 10 ML: 5 INJECTION INTRAVENOUS at 07:51

## 2025-06-25 RX ADMIN — OXYCODONE AND ACETAMINOPHEN 1 TABLET: 325; 10 TABLET ORAL at 15:15

## 2025-06-25 RX ADMIN — ACETYLCYSTEINE 600 MG: 200 INHALANT RESPIRATORY (INHALATION) at 08:02

## 2025-06-25 RX ADMIN — APIXABAN 5 MG: 5 TABLET, FILM COATED ORAL at 07:50

## 2025-06-25 RX ADMIN — GABAPENTIN 400 MG: 400 CAPSULE ORAL at 07:50

## 2025-06-25 RX ADMIN — PANTOPRAZOLE SODIUM 40 MG: 40 TABLET, DELAYED RELEASE ORAL at 15:15

## 2025-06-25 RX ADMIN — GABAPENTIN 400 MG: 400 CAPSULE ORAL at 21:33

## 2025-06-25 RX ADMIN — SULFUR HEXAFLUORIDE 2 ML: 60.7; .19; .19 INJECTION, POWDER, LYOPHILIZED, FOR SUSPENSION INTRAVENOUS; INTRAVESICAL at 09:18

## 2025-06-25 RX ADMIN — IPRATROPIUM BROMIDE AND ALBUTEROL SULFATE 1 DOSE: 2.5; .5 SOLUTION RESPIRATORY (INHALATION) at 19:17

## 2025-06-25 RX ADMIN — POLYETHYLENE GLYCOL 3350 17 G: 17 POWDER, FOR SOLUTION ORAL at 22:43

## 2025-06-25 ASSESSMENT — PAIN DESCRIPTION - LOCATION: LOCATION: BACK

## 2025-06-25 ASSESSMENT — PAIN SCALES - GENERAL
PAINLEVEL_OUTOF10: 0
PAINLEVEL_OUTOF10: 5

## 2025-06-25 ASSESSMENT — PAIN DESCRIPTION - DESCRIPTORS: DESCRIPTORS: ACHING

## 2025-06-25 NOTE — PROGRESS NOTES
Hospital Medicine Progress Note  V 5.17      Date of Admission: 6/22/2025    Hospital Day: 4      Chief Admission Complaint:  my copd    Subjective:  resting in bed, down to 3L now(from 4L yesterday) and with baseline 2L,  still notes some difficulty with expectorating but has improved, notes some nausea today and wheezing,  no major overnight events noted    Presenting Admission History:         69 y.o. female with chronic resp failure/severe copd who presented to [facility] with worsening sob. Pt notes ongoing issues since Tues and tried her nebs and even had to use an emergency kit(that contained prednisone and azithro) which she completed yesterday.  She woke up last night with difficulty breathing and tried to ambulate to her nebulizer but couldn't make it. She ended up calling EMS around 5am today. She did not mention having f/chills but noted some nausea but no emesis/diarrhea.  She notes ongoing productive cough(normally clear but yellowish of late) and wheezing. No chest pain. She lives at [facility] and believes there may have been some sick contacts. No changes in meds and she is compliant. She sees Dr. Jefferson([facility]),      ER course: given duoneb, decadron, levaquin, ivf bolus    Assessment/Plan:        Worsening SOB-- likely copd exacerbation, noted chronic leukocytosis of late  -iv steroids started  -duonebs scheduled  -iv abx started in ER, reevalulate in AM and continued  -checked procal  -bcx obtained  -asked for pulm eval on 6/24, mucomyst added   -added chest vest/IS/Acapella/mucinex  -bronch planned 6/26    Chronic resp failure- with very severe COPD, baseline 2L, with hx of DEVI(does not tolerate wearing mask at home)  -on Dulera(sub symbicort)  -on Spiriva, held here while on nebs     Chronic diastolic HF-appears compensated  -on Torsemide  -Takes metolozone prn  -Asa   -pulm ordered echo, done 6/25( ef 60%, nl wm, rv mod dilated, trace mr, mild pulm htn)    Abn UA- noted  monitoring    [] IV Narcotic analgesia    [] Aggressive IV diuresis    [] Hypertonic Saline    [] Critical electrolyte abnormalities requiring IV replacement    [] Insulin - Scheduled/SSI or Insulin gtt    [] Anticoagulation (Heparin gtt or Coumadin - other anticoagulants in special circumstances)    [] Cardiac Medications (IV Amiodarone/Diltiazem, Tikosyn, etc)    [] Hemodialysis    [] Other -    [] Change in code status    [] Decision to escalate care    [] Major surgery/procedure with associated risk factors    --------------------------------------------------  C. Data (any 2)    [x] Data Review (any 3)    [x] Consultant/subspecialist notes from yesterday/today    [x] All available current labs reviewed interpreted for clinical significance    [x] Appropriate follow-up labs were ordered  [] Collateral history obtained     [x] Independent Interpretation of tests (any 1)    [x] Telemetry (Rhythm Strip) personally reviewed and interpreted        [] Imaging personally reviewed and interpreted     [x] Discussion (any 1)  [x] Multi-Disciplinary Rounds with Case Management  [] Discussed management of the case with           Labs:  Personally reviewed on 6/25/2025 and interpreted for clinical significance as documented above.     Recent Labs     06/23/25  0632   WBC 11.7*   HGB 13.1   HCT 39.0        Recent Labs     06/23/25  0632 06/23/25  0804     --    K see below 3.3*   CL 97*  --    CO2 29  --    BUN 14  --    CREATININE 0.7  --    CALCIUM 8.6  --      No results for input(s): \"PROBNP\", \"TROPHS\" in the last 72 hours.    No results for input(s): \"LABA1C\" in the last 72 hours.  No results for input(s): \"AST\", \"ALT\", \"BILIDIR\", \"BILITOT\", \"ALKPHOS\" in the last 72 hours.    No results for input(s): \"INR\", \"LACTA\", \"TSH\" in the last 72 hours.      Urine Cultures:   Lab Results   Component Value Date/Time    LABURIN  06/22/2025 10:00 AM     75,000 CFU/ml  Sensitivity to follow  Repeating sensitivity

## 2025-06-25 NOTE — PROGRESS NOTES
PULMONARY AND CRITICAL CARE INPATIENT NOTE        Serina Rubin   : 1955  MRN: [id number]     Admitting Physician: Abundio Wright MD  Attending Physician: Abundio Wright MD  PCP: Elvira Hunter MD    Date of Service: 2025  Admission date:2025   LOS: Hospital Day: 4   Code:Full Code      ASSESSMENT & PLAN       69 y.o. female patient was admitted on 2025 with  Chief Complaint   Patient presents with    Shortness of Breath     The patient presents from [facility] with c/o SOB for about 3 days. Hx of COPD; wears 2LNC at baseline. EMS found her at 90% on her 2L. Cough noted       Acute on chronic hypoxic respiratory failure.  On 2 L at baseline.  AECOPD.  Failed outpatient steroids and antibiotics.  Recent exacerbation in April and admission at [place].  On Dulera/Spiriva  HFpEF.  On torsemide  Mucous plugging of airways  Enterococcus faecalis UTI?  Recurrent admissions to the hospital with AECOPD/decompensated heart failure    Co-morbidities: DEVI intolerant to CPAP therapy, A-fib on anticoagulation, HTN, ex-smoker stopped many years ago      Plan:              Keep patient n.p.o. for bronchoscopy tomorrow due to lack of improvement despite noninvasive measures.  If the patient improves overnight we will cancel the procedure tomorrow  Keep n.p.o. after midnight.  Hold apixaban  Brovana, Pulmicort with DuoNebs while inpatient.  Can resume Dulera and Spiriva at discharge or try Trelegy 200 mcg if patient prefers and insurance covers.  Mucomyst with vibrating vest  Finish Levaquin course  Continue systemic steroids  Continue diuresis with torsemide with electrolyte replacement  Follow-up micro workup  Follow-up echo results  If the patient does not improve we will consider CT scan and bronchoscopy if needed  Aspiration precautions  DVT ppx measures.  On apixaban        Subjective/Objective     Interval History: Encounter 2025  []Patient remained afebrile, hemodynamically  stable on 3 L of oxygen  I's and O's -500 cc  No CBC or chemistry today  Echocardiogram completed but not reported yet  Patient remained on Mucomyst, apixaban, Brovana, Pulmicort, DuoNebs, Mucinex, Levaquin, Solu-Medrol, torsemide        CC/Reason for Consult:     resp failure, copd exacerbation, unable to expectorate, inc'g o2 req today         HPI: June 24, 2025  69-year-old female patient with PMH of CHF, A-fib, COPD following with Dr. Jefferson who was admitted on June 22 with worsening shortness of breath for last few days prior to admission.  Patient is on 2 L at baseline and was admitted last at [facility] in April with AECOPD and acute hypoxic respiratory failure.  She was seen by Dr. Jefferson and was treated with steroids, antibiotics.  Follow-up CT scan in 1 year recommended for lung nodule.  Patient presented this time after failing outpatient azithromycin and prednisone which she completed the day before admission.  She continued to have difficulties with breathing especially with ambulation with productive cough and yellowish phlegm.  She had continued wheezing.    Patient was admitted at [facility] and was started on steroids, DuoNebs, antibiotics.  Patient also has history of sleep apnea but could not tolerate CPAP therapy.  Patient was continued on diuretics for her CHF with diastolic dysfunction    The following portions of the patient's history were reviewed: past medical history, surgical history, family history, social history, current medications & allergies.     ROS:   Relevant systems reviewed and the only positive symptoms are mentioned in the history present illness.      Objective    Test Results:  Imaging:  I have reviewed radiology images personally.        Cardiology:      Labs reviewed:  CBC:   Recent Labs     06/23/25  0632   WBC 11.7*   HGB 13.1          BMP:   Recent Labs     06/23/25  0632 06/23/25  0804     --    K see below 3.3*   CL 97*  --    CO2 29  --    BUN 14

## 2025-06-25 NOTE — CARE COORDINATION
Hospital day 3: Patient on A 1 re SOB care managed by IM and Pulmonology. Patient from [facility]. Patient seen by therapy recs home with  and [facility]. [place] agree[place] to therapy [place] apartment. Niece will transport when dc ready. Patient seen this am reports feeling worse, than when saw yesterday afternoon. Patient on IV ATB and steroids. Pending ECHO. Blood CX NGTD. Patient 97%on 3L 02, baseline is home with HS and PRN in day- provider [employer]. Pulmonology entertaining possible CT and bronch. Following.MICHELLE Stephens

## 2025-06-26 ENCOUNTER — ANESTHESIA (OUTPATIENT)
Dept: ENDOSCOPY | Age: 70
End: 2025-06-26
Payer: MEDICARE

## 2025-06-26 LAB
ALBUMIN SERPL-MCNC: 3.4 G/DL (ref 3.4–5)
ANION GAP SERPL CALCULATED.3IONS-SCNC: 12 MMOL/L (ref 3–16)
BACTERIA BLD CULT ORG #2: NORMAL
BACTERIA BLD CULT: NORMAL
BASOPHILS # BLD: 0 K/UL (ref 0–0.2)
BASOPHILS NFR BLD: 0.1 %
BUN SERPL-MCNC: 23 MG/DL (ref 7–20)
CALCIUM SERPL-MCNC: 8.8 MG/DL (ref 8.3–10.6)
CHLORIDE SERPL-SCNC: 97 MMOL/L (ref 99–110)
CO2 SERPL-SCNC: 32 MMOL/L (ref 21–32)
CREAT SERPL-MCNC: 0.8 MG/DL (ref 0.6–1.2)
DEPRECATED RDW RBC AUTO: 15.6 % (ref 12.4–15.4)
EOSINOPHIL # BLD: 0.1 K/UL (ref 0–0.6)
EOSINOPHIL NFR BLD: 0.7 %
GFR SERPLBLD CREATININE-BSD FMLA CKD-EPI: 80 ML/MIN/{1.73_M2}
GLUCOSE SERPL-MCNC: 93 MG/DL (ref 70–99)
HCT VFR BLD AUTO: 41.5 % (ref 36–48)
HGB BLD-MCNC: 13.8 G/DL (ref 12–16)
LYMPHOCYTES # BLD: 1.6 K/UL (ref 1–5.1)
LYMPHOCYTES NFR BLD: 14.9 %
MAGNESIUM SERPL-MCNC: 2.53 MG/DL (ref 1.8–2.4)
MCH RBC QN AUTO: 29.6 PG (ref 26–34)
MCHC RBC AUTO-ENTMCNC: 33.2 G/DL (ref 31–36)
MCV RBC AUTO: 89 FL (ref 80–100)
MONOCYTES # BLD: 1.2 K/UL (ref 0–1.3)
MONOCYTES NFR BLD: 11.3 %
NEUTROPHILS # BLD: 8 K/UL (ref 1.7–7.7)
NEUTROPHILS NFR BLD: 73 %
PHOSPHATE SERPL-MCNC: 3.5 MG/DL (ref 2.5–4.9)
PLATELET # BLD AUTO: 283 K/UL (ref 135–450)
PMV BLD AUTO: 10 FL (ref 5–10.5)
POTASSIUM SERPL-SCNC: 3.7 MMOL/L (ref 3.5–5.1)
RBC # BLD AUTO: 4.66 M/UL (ref 4–5.2)
SODIUM SERPL-SCNC: 141 MMOL/L (ref 136–145)
WBC # BLD AUTO: 10.9 K/UL (ref 4–11)

## 2025-06-26 PROCEDURE — 88312 SPECIAL STAINS GROUP 1: CPT

## 2025-06-26 PROCEDURE — 94669 MECHANICAL CHEST WALL OSCILL: CPT

## 2025-06-26 PROCEDURE — 6360000002 HC RX W HCPCS: Performed by: NURSE ANESTHETIST, CERTIFIED REGISTERED

## 2025-06-26 PROCEDURE — 94761 N-INVAS EAR/PLS OXIMETRY MLT: CPT

## 2025-06-26 PROCEDURE — 99233 SBSQ HOSP IP/OBS HIGH 50: CPT | Performed by: INTERNAL MEDICINE

## 2025-06-26 PROCEDURE — 2709999900 HC NON-CHARGEABLE SUPPLY: Performed by: INTERNAL MEDICINE

## 2025-06-26 PROCEDURE — 6370000000 HC RX 637 (ALT 250 FOR IP): Performed by: INTERNAL MEDICINE

## 2025-06-26 PROCEDURE — 3700000000 HC ANESTHESIA ATTENDED CARE: Performed by: INTERNAL MEDICINE

## 2025-06-26 PROCEDURE — 2700000000 HC OXYGEN THERAPY PER DAY

## 2025-06-26 PROCEDURE — 88305 TISSUE EXAM BY PATHOLOGIST: CPT

## 2025-06-26 PROCEDURE — 2500000003 HC RX 250 WO HCPCS: Performed by: INTERNAL MEDICINE

## 2025-06-26 PROCEDURE — 1200000000 HC SEMI PRIVATE

## 2025-06-26 PROCEDURE — 80069 RENAL FUNCTION PANEL: CPT

## 2025-06-26 PROCEDURE — 2580000003 HC RX 258: Performed by: STUDENT IN AN ORGANIZED HEALTH CARE EDUCATION/TRAINING PROGRAM

## 2025-06-26 PROCEDURE — 88112 CYTOPATH CELL ENHANCE TECH: CPT

## 2025-06-26 PROCEDURE — 85025 COMPLETE CBC W/AUTO DIFF WBC: CPT

## 2025-06-26 PROCEDURE — 7100000001 HC PACU RECOVERY - ADDTL 15 MIN: Performed by: INTERNAL MEDICINE

## 2025-06-26 PROCEDURE — 94640 AIRWAY INHALATION TREATMENT: CPT

## 2025-06-26 PROCEDURE — 6360000002 HC RX W HCPCS: Performed by: INTERNAL MEDICINE

## 2025-06-26 PROCEDURE — 3700000001 HC ADD 15 MINUTES (ANESTHESIA): Performed by: INTERNAL MEDICINE

## 2025-06-26 PROCEDURE — 87205 SMEAR GRAM STAIN: CPT

## 2025-06-26 PROCEDURE — 0B9M8ZX DRAINAGE OF BILATERAL LUNGS, VIA NATURAL OR ARTIFICIAL OPENING ENDOSCOPIC, DIAGNOSTIC: ICD-10-PCS | Performed by: INTERNAL MEDICINE

## 2025-06-26 PROCEDURE — 6370000000 HC RX 637 (ALT 250 FOR IP): Performed by: STUDENT IN AN ORGANIZED HEALTH CARE EDUCATION/TRAINING PROGRAM

## 2025-06-26 PROCEDURE — 3609010800 HC BRONCHOSCOPY ALVEOLAR LAVAGE: Performed by: INTERNAL MEDICINE

## 2025-06-26 PROCEDURE — 36415 COLL VENOUS BLD VENIPUNCTURE: CPT

## 2025-06-26 PROCEDURE — 0B9C8ZX DRAINAGE OF RIGHT UPPER LUNG LOBE, VIA NATURAL OR ARTIFICIAL OPENING ENDOSCOPIC, DIAGNOSTIC: ICD-10-PCS | Performed by: INTERNAL MEDICINE

## 2025-06-26 PROCEDURE — 2500000003 HC RX 250 WO HCPCS: Performed by: STUDENT IN AN ORGANIZED HEALTH CARE EDUCATION/TRAINING PROGRAM

## 2025-06-26 PROCEDURE — 7100000000 HC PACU RECOVERY - FIRST 15 MIN: Performed by: INTERNAL MEDICINE

## 2025-06-26 PROCEDURE — 31624 DX BRONCHOSCOPE/LAVAGE: CPT | Performed by: INTERNAL MEDICINE

## 2025-06-26 PROCEDURE — 83735 ASSAY OF MAGNESIUM: CPT

## 2025-06-26 PROCEDURE — 87070 CULTURE OTHR SPECIMN AEROBIC: CPT

## 2025-06-26 RX ORDER — NALOXONE HYDROCHLORIDE 0.4 MG/ML
INJECTION, SOLUTION INTRAMUSCULAR; INTRAVENOUS; SUBCUTANEOUS PRN
Status: DISCONTINUED | OUTPATIENT
Start: 2025-06-26 | End: 2025-06-27

## 2025-06-26 RX ORDER — OXYCODONE HYDROCHLORIDE 5 MG/1
10 TABLET ORAL PRN
Status: COMPLETED | OUTPATIENT
Start: 2025-06-26 | End: 2025-06-26

## 2025-06-26 RX ORDER — LIDOCAINE HYDROCHLORIDE 20 MG/ML
INJECTION, SOLUTION EPIDURAL; INFILTRATION; INTRACAUDAL; PERINEURAL
Status: DISCONTINUED | OUTPATIENT
Start: 2025-06-26 | End: 2025-06-26 | Stop reason: SDUPTHER

## 2025-06-26 RX ORDER — SODIUM CHLORIDE 0.9 % (FLUSH) 0.9 %
5-40 SYRINGE (ML) INJECTION EVERY 12 HOURS SCHEDULED
Status: DISCONTINUED | OUTPATIENT
Start: 2025-06-26 | End: 2025-06-27

## 2025-06-26 RX ORDER — IPRATROPIUM BROMIDE AND ALBUTEROL SULFATE 2.5; .5 MG/3ML; MG/3ML
1 SOLUTION RESPIRATORY (INHALATION)
Status: DISCONTINUED | OUTPATIENT
Start: 2025-06-26 | End: 2025-06-27

## 2025-06-26 RX ORDER — IPRATROPIUM BROMIDE AND ALBUTEROL SULFATE 2.5; .5 MG/3ML; MG/3ML
1 SOLUTION RESPIRATORY (INHALATION)
Status: DISCONTINUED | OUTPATIENT
Start: 2025-06-26 | End: 2025-06-26 | Stop reason: HOSPADM

## 2025-06-26 RX ORDER — LABETALOL HYDROCHLORIDE 5 MG/ML
10 INJECTION, SOLUTION INTRAVENOUS
Status: DISCONTINUED | OUTPATIENT
Start: 2025-06-26 | End: 2025-06-27

## 2025-06-26 RX ORDER — DIPHENHYDRAMINE HYDROCHLORIDE 50 MG/ML
12.5 INJECTION, SOLUTION INTRAMUSCULAR; INTRAVENOUS
Status: DISCONTINUED | OUTPATIENT
Start: 2025-06-26 | End: 2025-06-27

## 2025-06-26 RX ORDER — MIDAZOLAM HYDROCHLORIDE 1 MG/ML
2 INJECTION, SOLUTION INTRAMUSCULAR; INTRAVENOUS
Status: DISCONTINUED | OUTPATIENT
Start: 2025-06-26 | End: 2025-06-26 | Stop reason: HOSPADM

## 2025-06-26 RX ORDER — FENTANYL CITRATE 50 UG/ML
INJECTION, SOLUTION INTRAMUSCULAR; INTRAVENOUS
Status: DISCONTINUED | OUTPATIENT
Start: 2025-06-26 | End: 2025-06-26 | Stop reason: SDUPTHER

## 2025-06-26 RX ORDER — PHENYLEPHRINE HCL IN 0.9% NACL 1 MG/10 ML
SYRINGE (ML) INTRAVENOUS
Status: DISCONTINUED | OUTPATIENT
Start: 2025-06-26 | End: 2025-06-26 | Stop reason: SDUPTHER

## 2025-06-26 RX ORDER — SODIUM CHLORIDE 0.9 % (FLUSH) 0.9 %
5-40 SYRINGE (ML) INJECTION EVERY 12 HOURS SCHEDULED
Status: DISCONTINUED | OUTPATIENT
Start: 2025-06-26 | End: 2025-06-26 | Stop reason: HOSPADM

## 2025-06-26 RX ORDER — LIDOCAINE HYDROCHLORIDE 10 MG/ML
INJECTION, SOLUTION EPIDURAL; INFILTRATION; INTRACAUDAL; PERINEURAL PRN
Status: DISCONTINUED | OUTPATIENT
Start: 2025-06-26 | End: 2025-06-26 | Stop reason: ALTCHOICE

## 2025-06-26 RX ORDER — DROPERIDOL 2.5 MG/ML
0.62 INJECTION, SOLUTION INTRAMUSCULAR; INTRAVENOUS
Status: DISCONTINUED | OUTPATIENT
Start: 2025-06-26 | End: 2025-06-27

## 2025-06-26 RX ORDER — EPHEDRINE SULFATE 50 MG/ML
INJECTION, SOLUTION INTRAVENOUS
Status: DISCONTINUED | OUTPATIENT
Start: 2025-06-26 | End: 2025-06-26 | Stop reason: SDUPTHER

## 2025-06-26 RX ORDER — SODIUM CHLORIDE, SODIUM LACTATE, POTASSIUM CHLORIDE, CALCIUM CHLORIDE 600; 310; 30; 20 MG/100ML; MG/100ML; MG/100ML; MG/100ML
INJECTION, SOLUTION INTRAVENOUS CONTINUOUS
Status: DISCONTINUED | OUTPATIENT
Start: 2025-06-26 | End: 2025-06-26 | Stop reason: HOSPADM

## 2025-06-26 RX ORDER — PROPOFOL 10 MG/ML
INJECTION, EMULSION INTRAVENOUS
Status: DISCONTINUED | OUTPATIENT
Start: 2025-06-26 | End: 2025-06-26 | Stop reason: SDUPTHER

## 2025-06-26 RX ORDER — SODIUM CHLORIDE 9 MG/ML
INJECTION, SOLUTION INTRAVENOUS PRN
Status: DISCONTINUED | OUTPATIENT
Start: 2025-06-26 | End: 2025-06-26 | Stop reason: HOSPADM

## 2025-06-26 RX ORDER — OXYCODONE HYDROCHLORIDE 5 MG/1
5 TABLET ORAL PRN
Status: COMPLETED | OUTPATIENT
Start: 2025-06-26 | End: 2025-06-26

## 2025-06-26 RX ORDER — SODIUM CHLORIDE 9 MG/ML
INJECTION, SOLUTION INTRAVENOUS PRN
Status: DISCONTINUED | OUTPATIENT
Start: 2025-06-26 | End: 2025-06-27

## 2025-06-26 RX ORDER — SODIUM CHLORIDE 0.9 % (FLUSH) 0.9 %
5-40 SYRINGE (ML) INJECTION PRN
Status: DISCONTINUED | OUTPATIENT
Start: 2025-06-26 | End: 2025-06-26 | Stop reason: HOSPADM

## 2025-06-26 RX ORDER — LIDOCAINE HYDROCHLORIDE 10 MG/ML
1 INJECTION, SOLUTION EPIDURAL; INFILTRATION; INTRACAUDAL; PERINEURAL
Status: DISCONTINUED | OUTPATIENT
Start: 2025-06-26 | End: 2025-06-26 | Stop reason: HOSPADM

## 2025-06-26 RX ORDER — SODIUM CHLORIDE 0.9 % (FLUSH) 0.9 %
5-40 SYRINGE (ML) INJECTION PRN
Status: DISCONTINUED | OUTPATIENT
Start: 2025-06-26 | End: 2025-06-27

## 2025-06-26 RX ORDER — MAGNESIUM HYDROXIDE 1200 MG/15ML
LIQUID ORAL CONTINUOUS PRN
Status: DISCONTINUED | OUTPATIENT
Start: 2025-06-26 | End: 2025-06-26 | Stop reason: HOSPADM

## 2025-06-26 RX ADMIN — TORSEMIDE 50 MG: 20 TABLET ORAL at 09:28

## 2025-06-26 RX ADMIN — ASPIRIN 81 MG: 81 TABLET, CHEWABLE ORAL at 09:29

## 2025-06-26 RX ADMIN — LIDOCAINE HYDROCHLORIDE 40 MG: 20 INJECTION, SOLUTION EPIDURAL; INFILTRATION; INTRACAUDAL; PERINEURAL at 12:24

## 2025-06-26 RX ADMIN — AMLODIPINE BESYLATE 5 MG: 5 TABLET ORAL at 09:28

## 2025-06-26 RX ADMIN — BUDESONIDE 500 MCG: 0.5 SUSPENSION RESPIRATORY (INHALATION) at 08:46

## 2025-06-26 RX ADMIN — APIXABAN 5 MG: 5 TABLET, FILM COATED ORAL at 19:12

## 2025-06-26 RX ADMIN — IPRATROPIUM BROMIDE AND ALBUTEROL SULFATE 1 DOSE: 2.5; .5 SOLUTION RESPIRATORY (INHALATION) at 21:02

## 2025-06-26 RX ADMIN — PANTOPRAZOLE SODIUM 40 MG: 40 TABLET, DELAYED RELEASE ORAL at 05:12

## 2025-06-26 RX ADMIN — GABAPENTIN 400 MG: 400 CAPSULE ORAL at 15:39

## 2025-06-26 RX ADMIN — PROPOFOL 130 MG: 10 INJECTION, EMULSION INTRAVENOUS at 12:24

## 2025-06-26 RX ADMIN — SENNOSIDES AND DOCUSATE SODIUM 1 TABLET: 50; 8.6 TABLET ORAL at 19:12

## 2025-06-26 RX ADMIN — ACETYLCYSTEINE 600 MG: 200 INHALANT RESPIRATORY (INHALATION) at 21:02

## 2025-06-26 RX ADMIN — FENTANYL CITRATE 25 MCG: 50 INJECTION, SOLUTION INTRAMUSCULAR; INTRAVENOUS at 12:24

## 2025-06-26 RX ADMIN — ATORVASTATIN CALCIUM 80 MG: 80 TABLET, FILM COATED ORAL at 09:28

## 2025-06-26 RX ADMIN — IPRATROPIUM BROMIDE AND ALBUTEROL SULFATE 1 DOSE: 2.5; .5 SOLUTION RESPIRATORY (INHALATION) at 11:40

## 2025-06-26 RX ADMIN — SODIUM CHLORIDE, SODIUM LACTATE, POTASSIUM CHLORIDE, AND CALCIUM CHLORIDE: .6; .31; .03; .02 INJECTION, SOLUTION INTRAVENOUS at 12:16

## 2025-06-26 RX ADMIN — PANTOPRAZOLE SODIUM 40 MG: 40 TABLET, DELAYED RELEASE ORAL at 15:39

## 2025-06-26 RX ADMIN — BUDESONIDE 500 MCG: 0.5 SUSPENSION RESPIRATORY (INHALATION) at 21:14

## 2025-06-26 RX ADMIN — EPHEDRINE SULFATE 10 MG: 50 INJECTION, SOLUTION INTRAVENOUS at 12:31

## 2025-06-26 RX ADMIN — Medication 100 MCG: at 12:27

## 2025-06-26 RX ADMIN — GABAPENTIN 400 MG: 400 CAPSULE ORAL at 19:12

## 2025-06-26 RX ADMIN — POLYETHYLENE GLYCOL 3350 17 G: 17 POWDER, FOR SOLUTION ORAL at 15:47

## 2025-06-26 RX ADMIN — WATER 40 MG: 1 INJECTION INTRAMUSCULAR; INTRAVENOUS; SUBCUTANEOUS at 09:29

## 2025-06-26 RX ADMIN — SODIUM CHLORIDE, PRESERVATIVE FREE 10 ML: 5 INJECTION INTRAVENOUS at 19:13

## 2025-06-26 RX ADMIN — LEVOFLOXACIN 500 MG: 5 INJECTION, SOLUTION INTRAVENOUS at 17:20

## 2025-06-26 RX ADMIN — OXYCODONE AND ACETAMINOPHEN 1 TABLET: 325; 10 TABLET ORAL at 09:28

## 2025-06-26 RX ADMIN — ARFORMOTEROL TARTRATE 15 MCG: 15 SOLUTION RESPIRATORY (INHALATION) at 21:08

## 2025-06-26 RX ADMIN — ARFORMOTEROL TARTRATE 15 MCG: 15 SOLUTION RESPIRATORY (INHALATION) at 08:44

## 2025-06-26 RX ADMIN — GUAIFENESIN 600 MG: 600 TABLET, MULTILAYER, EXTENDED RELEASE ORAL at 09:28

## 2025-06-26 RX ADMIN — ACETYLCYSTEINE 600 MG: 200 INHALANT RESPIRATORY (INHALATION) at 08:38

## 2025-06-26 RX ADMIN — GUAIFENESIN 600 MG: 600 TABLET, MULTILAYER, EXTENDED RELEASE ORAL at 19:12

## 2025-06-26 RX ADMIN — IPRATROPIUM BROMIDE AND ALBUTEROL SULFATE 1 DOSE: 2.5; .5 SOLUTION RESPIRATORY (INHALATION) at 15:44

## 2025-06-26 RX ADMIN — OXYCODONE 10 MG: 5 TABLET ORAL at 17:17

## 2025-06-26 RX ADMIN — SENNOSIDES AND DOCUSATE SODIUM 1 TABLET: 50; 8.6 TABLET ORAL at 09:29

## 2025-06-26 RX ADMIN — CARVEDILOL 3.12 MG: 3.12 TABLET, FILM COATED ORAL at 17:15

## 2025-06-26 RX ADMIN — SODIUM CHLORIDE, PRESERVATIVE FREE 10 ML: 5 INJECTION INTRAVENOUS at 09:29

## 2025-06-26 RX ADMIN — GABAPENTIN 400 MG: 400 CAPSULE ORAL at 09:28

## 2025-06-26 RX ADMIN — IPRATROPIUM BROMIDE AND ALBUTEROL SULFATE 1 DOSE: 2.5; .5 SOLUTION RESPIRATORY (INHALATION) at 08:38

## 2025-06-26 ASSESSMENT — PAIN - FUNCTIONAL ASSESSMENT
PAIN_FUNCTIONAL_ASSESSMENT: NONE - DENIES PAIN
PAIN_FUNCTIONAL_ASSESSMENT: 0-10

## 2025-06-26 ASSESSMENT — ENCOUNTER SYMPTOMS: SHORTNESS OF BREATH: 1

## 2025-06-26 NOTE — PLAN OF CARE
Pt ambulated 100ft in [place] on 3L/NC, tolerated fairly well. Back to bed at this time.       Problem: Chronic Conditions and Co-morbidities  Goal: Patient's chronic conditions and co-morbidity symptoms are monitored and maintained or improved  6/25/2025 2015 by Nory Rowland RN  Outcome: Progressing  6/25/2025 2015 by Nory Rowland RN  Outcome: Progressing  6/25/2025 0934 by Tanisha Franco RN  Outcome: Progressing     Problem: Discharge Planning  Goal: Discharge to home or other facility with appropriate resources  6/25/2025 2015 by Nory Rowland RN  Outcome: Progressing  6/25/2025 2015 by Nory Rowland RN  Outcome: Progressing  6/25/2025 0934 by Tanisha Franco RN  Outcome: Progressing     Problem: Pain  Goal: Verbalizes/displays adequate comfort level or baseline comfort level  6/25/2025 2015 by Nory Rowland RN  Outcome: Progressing  6/25/2025 2015 by Nory Rowland RN  Outcome: Progressing  6/25/2025 0934 by Tanisha Franco RN  Outcome: Progressing     Problem: Safety - Adult  Goal: Free from fall injury  6/25/2025 2015 by Nory Rowland RN  Outcome: Progressing  6/25/2025 2015 by Nory Rowland RN  Outcome: Progressing  6/25/2025 0934 by Tanisha Franco RN  Outcome: Progressing

## 2025-06-26 NOTE — ANESTHESIA PRE PROCEDURE
Department of Anesthesiology  Preprocedure Note       Name:  Serina Rubin   Age:  69 y.o.  :  1955                                          MRN:  [id number]         Date:  2025      Surgeon: Surgeon(s):  Ivette Amor MD    Procedure: Procedure(s):  BRONCHOSCOPY DIAGNOSTIC OR CELL WASH ONLY    Medications prior to admission:   Prior to Admission medications    Medication Sig Start Date End Date Taking? Authorizing Provider   pantoprazole (PROTONIX) 40 MG tablet TAKE 1 TABLET BY MOUTH IN THE MORNING AND IN THE EVENING DAILY 25   Elvira Hunter MD   amLODIPine (NORVASC) 5 MG tablet Take 1 tablet by mouth daily 25   Rajinder Garcia MD   atorvastatin (LIPITOR) 80 MG tablet Take 1 tablet by mouth daily 25   Rajinder Garcia MD   carvedilol (COREG) 3.125 MG tablet Take 1 tablet by mouth 2 times daily (with meals) 25   Rajinder Garcia MD   ELIQUIS 5 MG TABS tablet TAKE 1 TABLET BY MOUTH 2 TIMES A DAY 25   To Coreas MD   potassium chloride (KLOR-CON M10) 10 MEQ extended release tablet Take 5 tablets by mouth daily 25   Manasa Chinchilla APRN - CNP   albuterol (PROVENTIL) (2.5 MG/3ML) 0.083% nebulizer solution Take 3 mLs by nebulization every 6 hours as needed for Wheezing 25   Elvira Hunter MD   predniSONE (DELTASONE) 10 MG tablet 4 tabs for 3 days 3 tabs for 3 days 2 tabs for 3 days 1 tabs for 3 days 25   Elvira Hunter MD   torsemide (DEMADEX) 100 MG tablet TAKE 1/2 TABLET BY MOUTH EVERY DAY 25   Rajinder Garcia MD   ondansetron (ZOFRAN-ODT) 4 MG disintegrating tablet Take 1 tablet by mouth 3 times daily as needed for Nausea or Vomiting 24   Elvira Hunter MD   tiotropium (SPIRIVA RESPIMAT) 2.5 MCG/ACT AERS inhaler Inhale 2 puffs into the lungs daily 10/10/23   Alhaji Jefferson MD   albuterol sulfate HFA (PROAIR HFA) 108 (90 Base) MCG/ACT inhaler Inhale 2 puffs into the lungs every 6 hours as needed

## 2025-06-26 NOTE — PROCEDURES
PROCEDURE NOTE  Date: 6/26/2025   Name: Serina Rubin  YOB: 1955    Procedures    PRE-PROCEDURE  DIAGNOSIS: Persistent chest congestion  POST-PROCEDURE  DIAGNOSIS: Persistent chest congestion    PRE-PROCEDURE ASSESSMENT AND CONSENT:   A full history and physical was performed. The patient is 69-year-old female patient with persistent chest congestion, no improvement on AECOPD despite multiple courses of antibiotics and steroids.  The patient's medications, allergies and sensitivities have been reviewed. The risks and benefits of the procedure were discussed with the patient. All questions were answered and informed consent was obtained.     PHYSICAL EXAM PRIOR TO PROCEDURE:  Airway Examination: Mallampati Class 2.  Decreased air entry bilaterally with expiratory wheezes and rhonchi.  No concerning findings on cardiovascular exam. ASA Grade Assessment: 3.     After reviewing the risks and benefits, the patient was deemed in satisfactory condition to undergo the procedure (benefits: more diagnostic info, risks: bleeding, infection, pneumothorax, death). The anesthesia plan was to use general anesthesia.     Immediately prior to administration of medications, the patient was re-assessed for adequacy to receive sedatives. The heart rate, respiratory rate, oxygen saturations, blood pressure, adequacy of pulmonary ventilation, and response to care were monitored throughout the procedure.     MEDICATIONS:   Endobronchial normal saline: 180 ml.   Mucomyst 20% 0 ml.  Lidocaine 2%, 14 ml  Sedation per anesthesia     PROCEDURE AND FINDINGS:  A time-out was called verifying the patient's identification immediately before the procedure, then, the scope was advanced through the LMA to the trachea and lower airways.  Several splashes of lidocaine to the lower airways.     The vocal cords looked normal. Trachea was normal in caliber. The arymundo was sharp. The tracheobronchial tree of the bilateral lungs was  examined.   No concerning endobronchial lesions.  No significant secretions.     The bronchoalveolar lavage (BAL) was collected from the right upper lobe.  150 ml of normal saline instilled and ~20 ml of returned fluid collected.     Endobronchial washings collected from both lungs.     The procedure was accomplished without difficulty. The patient tolerated the procedure well with no immediate complications.  Estimated blood loss was less than 5 ml.       IMPRESSION:   Normal bronchial tree anatomy.  No concerning endobronchial lesions.  BAL obtained from right upper lobe.  Endobronchial washings collected from both lungs.       RECOMMENDATIONS:  Await test results.  Continue bronchial hygiene measures.  Okay to resume apixaban today.    _______________________________  Electronically signed by:  Ivette Amor MD,FACP    6/26/2025    12:36 PM.     [facility]  [address], [place] [address]   Phone (office): [phone]

## 2025-06-26 NOTE — PROGRESS NOTES
Physical Therapy/ Occupational Therapy    PT attempted to see patient, chart reviewed, pt off floor at this time for endo procedure. PT will continue to follow up as pt is medically stable and schedule allows. Thank you.     John Paz, PT, DPT.  Xiomara Hendrix, OTR/L

## 2025-06-26 NOTE — PROGRESS NOTES
PULMONARY AND CRITICAL CARE INPATIENT NOTE        Serina Rubin   : 1955  MRN: [id number]     Admitting Physician: Abundio Wright MD  Attending Physician: Amrit Garcia MD  PCP: Elvira Hunter MD    Date of Service: 2025  Admission date:2025   LOS: Hospital Day: 5   Code:Full Code      ASSESSMENT & PLAN       69 y.o. female patient was admitted on 2025 with  Chief Complaint   Patient presents with    Shortness of Breath     The patient presents from [facility] with c/o SOB for about 3 days. Hx of COPD; wears 2LNC at baseline. EMS found her at 90% on her 2L. Cough noted       Acute on chronic hypoxic respiratory failure.  On 2 L at baseline.  AECOPD.  Failed outpatient steroids and antibiotics.  Recent exacerbation in April and admission at [place].  On Dulera/Spiriva  HFpEF.  On torsemide  Mucous plugging of airways  Enterococcus faecalis UTI?  Recurrent admissions to the hospital with AECOPD/decompensated heart failure  Mild pulm hypertension.  RVSP 40    Co-morbidities: DEVI intolerant to CPAP therapy, A-fib on anticoagulation, HTN, ex-smoker stopped many years ago      Plan:              Keep patient n.p.o. for bronchoscopy today due to lack of improvement despite noninvasive measures.  If the patient improves overnight we will cancel the procedure tomorrow  Hold apixaban  Brovana, Pulmicort with DuoNebs while inpatient.  Can resume Dulera and Spiriva at discharge or try Trelegy 200 mcg if patient prefers and insurance covers.  Mucomyst with vibrating vest  Finish Levaquin course  Continue systemic steroids  Continue diuresis with torsemide with electrolyte replacement  Echo results reviewed  Aspiration precautions  DVT ppx measures.        Subjective/Objective     Interval History: Encounter 2025  Patient remained afebrile and hemodynamically on 3 L of oxygen  I's and O's -1.9 L  CBC and chemistry remained relatively stable  Patient had echocardiogram that revealed

## 2025-06-26 NOTE — PROGRESS NOTES
Patient awake alert no complaints assessment unchanged to room with transport. Oxygen placed from wall to tank and tank with adequate amount of oxygen in tank for transport.

## 2025-06-26 NOTE — CARE COORDINATION
Patient here with acute on chronic respiratory failure.  Care managed by IM and Pulmonology. Pt with bronchoscopy today due to lack of improvement despite non invasive measures.      Per MD regarding bronch today:      \"IMPRESSION:   Normal bronchial tree anatomy.  No concerning endobronchial lesions.  BAL obtained from right upper lobe.  Endobronchial washings collected from both lungs.        RECOMMENDATIONS:  Await test results.  Continue bronchial hygiene measures.\"    Patient from [facility] and has stated previously she would like to return home at [place]. Pt seen with prior therapy recs home with  and [facility]. [place] agreeable to therapy in [place] apartment. Niece will transport when dc ready. Patient on IV ATB and steroids.  Patient currently on 4L O2;  baseline is home with HS and PRN in day- provider [employer].        Will follow for PT/OT updates once patient able to participate; need recs to confirm care plan.     Savana Smiley MSW LSW

## 2025-06-26 NOTE — PROGRESS NOTES
[facility] Medicine Progress Note  V 5.17      Date of Admission: 6/22/2025    Hospital Day: 5      Chief Admission Complaint:  my copd    Subjective:  bronch today    Presenting Admission History:         69 y.o. female with chronic resp failure/severe copd who presented to [facility] with worsening sob. Pt notes ongoing issues since Tues and tried her nebs and even had to use an emergency kit(that contained prednisone and azithro) which she completed yesterday.  She woke up last night with difficulty breathing and tried to ambulate to her nebulizer but couldn't make it. She ended up calling EMS around 5am today. She did not mention having f/chills but noted some nausea but no emesis/diarrhea.  She notes ongoing productive cough(normally clear but yellowish of late) and wheezing. No chest pain. She lives at [facility] and believes there may have been some sick contacts. No changes in meds and she is compliant. She sees Dr. Jefferson([facility]),      ER course: given duoneb, decadron, levaquin, ivf bolus    Assessment/Plan:        Worsening SOB-- likely copd exacerbation, noted chronic leukocytosis of late  -iv steroids started  -duonebs scheduled  -iv abx started in ER, reevalulate in AM and continued  -checked procal  -bcx obtained  -asked for pulm eval on 6/24, mucomyst added   -added chest vest/IS/Acapella/mucinex  -bronch planned 6/26    Chronic resp failure- with very severe COPD, baseline 2L, with hx of DEVI(does not tolerate wearing mask at home)  -on Dulera(sub symbicort)  -on Spiriva, held here while on nebs     Chronic diastolic HF-appears compensated  -on Torsemide  -Takes metolozone prn  -Asa   -pulm ordered echo, done 6/25( ef 60%, nl wm, rv mod dilated, trace mr, mild pulm htn)    Abn UA- noted 6/23, pt denied any burning/dysuria but notes some polyuria which she attributed to her diuretic  -suspect asymptomatic bacteruria  -ordered Ucx and noted 75k enterococcus, sens pending    Afib-  06/22/2025 10:00 AM     <10,000 CFU/ml mixed skin/urogenital gena. No further workup    LABURIN 75,000 CFU/ml 06/22/2025 10:00 AM     Blood Cultures:   Lab Results   Component Value Date/Time    BC No Growth after 4 days of incubation. 06/22/2025 08:31 AM     Lab Results   Component Value Date/Time    BLOODCULT2 No Growth after 4 days of incubation. 06/22/2025 08:31 AM     Organism:   Lab Results   Component Value Date/Time    ORG Enterococcus faecalis 06/22/2025 10:00 AM         Amrit Garcia MD

## 2025-06-26 NOTE — PROGRESS NOTES
Physician Progress Note      PATIENT:               STEPHANIE KING  CSN #:                  [id number]  :                       1955  ADMIT DATE:       2025 6:34 AM  DISCH DATE:  RESPONDING  PROVIDER #:        Abundio Wright MD          QUERY TEXT:    Based on your medical judgment, please clarify these findings and document if   any of the following are being evaluated and/or treated:    The clinical indicators include:  Patient presents with SOB  69 year old female K/c/O afib on anticoagulant, Chronic HTN and CHF    \"Afib- controlled, on coreg and Eliquis, on tele\" (IM PN by Abundio Wright MD at 2025)    \"Co-morbidities: DEVI intolerant to CPAP therapy, A-fib on anticoagulation,   HTN, ex-smoker stopped many years ago\" (Pulmo consult by Ivette Amor MD   at 2025)    CHADS VASc of 4.    _ apixaban (ELIQUIS) tablet 5 mg ,carvedilol (COREG) tablet 3.125 mg,   monitoring,  consult    Anant NAJERA  [facility], CDS  Options provided:  -- Secondary hypercoagulable state in a patient with atrial fibrillation  -- Other - I will add my own diagnosis  -- Disagree - Not applicable / Not valid  -- Disagree - Clinically unable to determine / Unknown  -- Refer to Clinical Documentation Reviewer    PROVIDER RESPONSE TEXT:    Provider disagreed with this query.    Query created by: Davey Pierce on 2025 7:24 AM      Electronically signed by:  Abundio Wright MD 2025 10:12 PM

## 2025-06-26 NOTE — ANESTHESIA POSTPROCEDURE EVALUATION
Department of Anesthesiology  Postprocedure Note    Patient: Serina Rubin  MRN: [id number]  YOB: 1955  Date of evaluation: 6/26/2025    Procedure Summary       Date: 06/26/25 Room / Location: [place] [address] / [facility]    Anesthesia Start: 1216 Anesthesia Stop: 1250    Procedure: BRONCHOSCOPY ALVEOLAR LAVAGE Diagnosis:       Mucus plugging of bronchi      (Mucus plugging of bronchi [T17.500A])    Surgeons: Ivette Amor MD Responsible Provider: Matty Rogers MD    Anesthesia Type: general ASA Status: 4            Anesthesia Type: No value filed.    Angelica Phase I: Angelica Score: 9    Angelica Phase II:      Anesthesia Post Evaluation    Comments: Postoperative Anesthesia Note    Name:    Serina Rubin  MRN:      [id number]    Patient Vitals in the past 12 hrs:  06/26/25 1315, BP:127/66, Pulse:75, Resp:15, SpO2:94 %  06/26/25 1305, BP:131/62, Pulse:76, Resp:12, SpO2:90 %  06/26/25 1300, BP:127/64, Temp:97.5 °F (36.4 °C), Pulse:75, Resp:14, SpO2:92 %  06/26/25 1255, BP:(!) 158/62, Pulse:76, Resp:12, SpO2:92 %  06/26/25 1247, BP:134/61, Temp:97.8 °F (36.6 °C), Temp src:Oral, Pulse:73, Resp:14, SpO2:92 %  06/26/25 1045, BP:136/71, Temp:98.4 °F (36.9 °C), Temp src:Oral, Pulse:60, Resp:20, SpO2:94 %  06/26/25 0915, BP:107/67, Temp:97.6 °F (36.4 °C), Temp src:Oral, Pulse:62, Resp:16, SpO2:98 %  06/26/25 0841, SpO2:97 %  06/26/25 0512, BP:129/66, Temp:97.5 °F (36.4 °C), Temp src:Oral, Pulse:62, Resp:16, SpO2:90 %, Weight:64.2 kg (141 lb 9.6 oz)     LABS:    CBC  Lab Results       Component                Value               Date/Time                  WBC                      10.9                06/26/2025 06:20 AM        HGB                      13.8                06/26/2025 06:20 AM        HCT                      41.5                06/26/2025 06:20 AM        PLT                      283                 06/26/2025 06:20 AM   RENAL  Lab Results       Component                Value

## 2025-06-27 LAB
ALBUMIN SERPL-MCNC: 3.5 G/DL (ref 3.4–5)
ANION GAP SERPL CALCULATED.3IONS-SCNC: 8 MMOL/L (ref 3–16)
BASOPHILS # BLD: 0 K/UL (ref 0–0.2)
BASOPHILS NFR BLD: 0.3 %
BUN SERPL-MCNC: 16 MG/DL (ref 7–20)
CALCIUM SERPL-MCNC: 9 MG/DL (ref 8.3–10.6)
CHLORIDE SERPL-SCNC: 100 MMOL/L (ref 99–110)
CO2 SERPL-SCNC: 31 MMOL/L (ref 21–32)
CREAT SERPL-MCNC: 0.5 MG/DL (ref 0.6–1.2)
DEPRECATED RDW RBC AUTO: 15.5 % (ref 12.4–15.4)
EOSINOPHIL # BLD: 0 K/UL (ref 0–0.6)
EOSINOPHIL NFR BLD: 0.4 %
GFR SERPLBLD CREATININE-BSD FMLA CKD-EPI: >90 ML/MIN/{1.73_M2}
GLUCOSE SERPL-MCNC: 103 MG/DL (ref 70–99)
HCT VFR BLD AUTO: 38.7 % (ref 36–48)
HGB BLD-MCNC: 13.1 G/DL (ref 12–16)
LYMPHOCYTES # BLD: 1.3 K/UL (ref 1–5.1)
LYMPHOCYTES NFR BLD: 10.5 %
MAGNESIUM SERPL-MCNC: 2.61 MG/DL (ref 1.8–2.4)
MCH RBC QN AUTO: 30.1 PG (ref 26–34)
MCHC RBC AUTO-ENTMCNC: 33.9 G/DL (ref 31–36)
MCV RBC AUTO: 88.6 FL (ref 80–100)
MONOCYTES # BLD: 1.3 K/UL (ref 0–1.3)
MONOCYTES NFR BLD: 10.1 %
NEUTROPHILS # BLD: 10.1 K/UL (ref 1.7–7.7)
NEUTROPHILS NFR BLD: 78.7 %
PHOSPHATE SERPL-MCNC: 2.6 MG/DL (ref 2.5–4.9)
PLATELET # BLD AUTO: 243 K/UL (ref 135–450)
PMV BLD AUTO: 9.4 FL (ref 5–10.5)
POTASSIUM SERPL-SCNC: 3.7 MMOL/L (ref 3.5–5.1)
RBC # BLD AUTO: 4.36 M/UL (ref 4–5.2)
SODIUM SERPL-SCNC: 139 MMOL/L (ref 136–145)
WBC # BLD AUTO: 12.8 K/UL (ref 4–11)

## 2025-06-27 PROCEDURE — 1200000000 HC SEMI PRIVATE

## 2025-06-27 PROCEDURE — 97535 SELF CARE MNGMENT TRAINING: CPT

## 2025-06-27 PROCEDURE — 94640 AIRWAY INHALATION TREATMENT: CPT

## 2025-06-27 PROCEDURE — 97530 THERAPEUTIC ACTIVITIES: CPT

## 2025-06-27 PROCEDURE — 6370000000 HC RX 637 (ALT 250 FOR IP): Performed by: INTERNAL MEDICINE

## 2025-06-27 PROCEDURE — 94669 MECHANICAL CHEST WALL OSCILL: CPT

## 2025-06-27 PROCEDURE — 2500000003 HC RX 250 WO HCPCS: Performed by: INTERNAL MEDICINE

## 2025-06-27 PROCEDURE — 97116 GAIT TRAINING THERAPY: CPT

## 2025-06-27 PROCEDURE — 94761 N-INVAS EAR/PLS OXIMETRY MLT: CPT

## 2025-06-27 PROCEDURE — 36415 COLL VENOUS BLD VENIPUNCTURE: CPT

## 2025-06-27 PROCEDURE — 2500000003 HC RX 250 WO HCPCS: Performed by: STUDENT IN AN ORGANIZED HEALTH CARE EDUCATION/TRAINING PROGRAM

## 2025-06-27 PROCEDURE — 83735 ASSAY OF MAGNESIUM: CPT

## 2025-06-27 PROCEDURE — 99233 SBSQ HOSP IP/OBS HIGH 50: CPT | Performed by: INTERNAL MEDICINE

## 2025-06-27 PROCEDURE — 80069 RENAL FUNCTION PANEL: CPT

## 2025-06-27 PROCEDURE — 85025 COMPLETE CBC W/AUTO DIFF WBC: CPT

## 2025-06-27 PROCEDURE — 2700000000 HC OXYGEN THERAPY PER DAY

## 2025-06-27 PROCEDURE — 6360000002 HC RX W HCPCS: Performed by: INTERNAL MEDICINE

## 2025-06-27 PROCEDURE — 97110 THERAPEUTIC EXERCISES: CPT

## 2025-06-27 RX ORDER — PREDNISONE 20 MG/1
40 TABLET ORAL DAILY
Status: DISCONTINUED | OUTPATIENT
Start: 2025-06-27 | End: 2025-06-28 | Stop reason: HOSPADM

## 2025-06-27 RX ADMIN — CARVEDILOL 3.12 MG: 3.12 TABLET, FILM COATED ORAL at 16:45

## 2025-06-27 RX ADMIN — ATORVASTATIN CALCIUM 80 MG: 80 TABLET, FILM COATED ORAL at 07:53

## 2025-06-27 RX ADMIN — APIXABAN 5 MG: 5 TABLET, FILM COATED ORAL at 19:58

## 2025-06-27 RX ADMIN — APIXABAN 5 MG: 5 TABLET, FILM COATED ORAL at 07:53

## 2025-06-27 RX ADMIN — GUAIFENESIN 600 MG: 600 TABLET, MULTILAYER, EXTENDED RELEASE ORAL at 19:58

## 2025-06-27 RX ADMIN — IPRATROPIUM BROMIDE AND ALBUTEROL SULFATE 1 DOSE: 2.5; .5 SOLUTION RESPIRATORY (INHALATION) at 04:08

## 2025-06-27 RX ADMIN — AMLODIPINE BESYLATE 5 MG: 5 TABLET ORAL at 07:53

## 2025-06-27 RX ADMIN — GABAPENTIN 400 MG: 400 CAPSULE ORAL at 07:53

## 2025-06-27 RX ADMIN — IPRATROPIUM BROMIDE AND ALBUTEROL SULFATE 1 DOSE: 2.5; .5 SOLUTION RESPIRATORY (INHALATION) at 09:13

## 2025-06-27 RX ADMIN — GABAPENTIN 400 MG: 400 CAPSULE ORAL at 19:58

## 2025-06-27 RX ADMIN — ARFORMOTEROL TARTRATE 15 MCG: 15 SOLUTION RESPIRATORY (INHALATION) at 09:13

## 2025-06-27 RX ADMIN — OXYCODONE AND ACETAMINOPHEN 1 TABLET: 325; 10 TABLET ORAL at 21:54

## 2025-06-27 RX ADMIN — PANTOPRAZOLE SODIUM 40 MG: 40 TABLET, DELAYED RELEASE ORAL at 05:48

## 2025-06-27 RX ADMIN — OXYCODONE AND ACETAMINOPHEN 1 TABLET: 325; 10 TABLET ORAL at 01:10

## 2025-06-27 RX ADMIN — PANTOPRAZOLE SODIUM 40 MG: 40 TABLET, DELAYED RELEASE ORAL at 16:45

## 2025-06-27 RX ADMIN — BUDESONIDE 500 MCG: 0.5 SUSPENSION RESPIRATORY (INHALATION) at 20:54

## 2025-06-27 RX ADMIN — SENNOSIDES AND DOCUSATE SODIUM 1 TABLET: 50; 8.6 TABLET ORAL at 19:58

## 2025-06-27 RX ADMIN — ACETYLCYSTEINE 600 MG: 200 INHALANT RESPIRATORY (INHALATION) at 09:13

## 2025-06-27 RX ADMIN — TORSEMIDE 50 MG: 20 TABLET ORAL at 07:53

## 2025-06-27 RX ADMIN — OXYCODONE AND ACETAMINOPHEN 1 TABLET: 325; 10 TABLET ORAL at 13:55

## 2025-06-27 RX ADMIN — IPRATROPIUM BROMIDE AND ALBUTEROL SULFATE 1 DOSE: 2.5; .5 SOLUTION RESPIRATORY (INHALATION) at 00:00

## 2025-06-27 RX ADMIN — IPRATROPIUM BROMIDE AND ALBUTEROL SULFATE 1 DOSE: 2.5; .5 SOLUTION RESPIRATORY (INHALATION) at 20:54

## 2025-06-27 RX ADMIN — SODIUM CHLORIDE, PRESERVATIVE FREE 10 ML: 5 INJECTION INTRAVENOUS at 07:54

## 2025-06-27 RX ADMIN — GABAPENTIN 400 MG: 400 CAPSULE ORAL at 13:55

## 2025-06-27 RX ADMIN — IPRATROPIUM BROMIDE AND ALBUTEROL SULFATE 1 DOSE: 2.5; .5 SOLUTION RESPIRATORY (INHALATION) at 15:55

## 2025-06-27 RX ADMIN — BUDESONIDE 500 MCG: 0.5 SUSPENSION RESPIRATORY (INHALATION) at 09:14

## 2025-06-27 RX ADMIN — IPRATROPIUM BROMIDE AND ALBUTEROL SULFATE 1 DOSE: 2.5; .5 SOLUTION RESPIRATORY (INHALATION) at 12:18

## 2025-06-27 RX ADMIN — ACETYLCYSTEINE 600 MG: 200 INHALANT RESPIRATORY (INHALATION) at 20:54

## 2025-06-27 RX ADMIN — ASPIRIN 81 MG: 81 TABLET, CHEWABLE ORAL at 07:53

## 2025-06-27 RX ADMIN — SENNOSIDES AND DOCUSATE SODIUM 1 TABLET: 50; 8.6 TABLET ORAL at 07:53

## 2025-06-27 RX ADMIN — CARVEDILOL 3.12 MG: 3.12 TABLET, FILM COATED ORAL at 07:53

## 2025-06-27 RX ADMIN — SODIUM CHLORIDE, PRESERVATIVE FREE 10 ML: 5 INJECTION INTRAVENOUS at 19:59

## 2025-06-27 RX ADMIN — GUAIFENESIN 600 MG: 600 TABLET, MULTILAYER, EXTENDED RELEASE ORAL at 07:53

## 2025-06-27 RX ADMIN — PREDNISONE 40 MG: 20 TABLET ORAL at 07:53

## 2025-06-27 ASSESSMENT — PAIN DESCRIPTION - DESCRIPTORS: DESCRIPTORS: DISCOMFORT

## 2025-06-27 ASSESSMENT — PAIN SCALES - GENERAL
PAINLEVEL_OUTOF10: 2
PAINLEVEL_OUTOF10: 5

## 2025-06-27 ASSESSMENT — PAIN - FUNCTIONAL ASSESSMENT: PAIN_FUNCTIONAL_ASSESSMENT: ACTIVITIES ARE NOT PREVENTED

## 2025-06-27 ASSESSMENT — PAIN DESCRIPTION - ORIENTATION: ORIENTATION: LOWER

## 2025-06-27 ASSESSMENT — PAIN DESCRIPTION - LOCATION: LOCATION: BACK

## 2025-06-27 NOTE — CARE COORDINATION
Therapy rec's of home care noted.    Spoke with patient at bedside for introduction.   Patient is from [facility].  Discussed therapy rec's of home care and she states she is already active with [facility] which is the in-house home care agency for [facility] (per patient).    Will resume with [facility] at discharge.

## 2025-06-27 NOTE — PROGRESS NOTES
06/27/25 1558   Encounter Summary   Encounter Overview/Reason Spiritual/Emotional Needs   Service Provided For Patient   Referral/Consult From Rounding   Support System Unknown   Last Encounter  06/27/25  ( visited with patient. DS)   Complexity of Encounter Low   Begin Time 1545   End Time  1550   Total Time Calculated 5 min   Assessment/Intervention/Outcome   Assessment Hopeful   Intervention Active listening   Outcome Engaged in conversation;Expressed feelings of Onelia, Peace and/or Love;Expressed Gratitude

## 2025-06-27 NOTE — PROGRESS NOTES
PULMONARY AND CRITICAL CARE INPATIENT NOTE        Serina Rubin   : 1955  MRN: [id number]     Admitting Physician: Abundio Wright MD  Attending Physician: Amrit Garcia MD  PCP: Elvira Hunter MD    Date of Service: 2025  Admission date:2025   LOS: Hospital Day: 6   Code:Full Code      ASSESSMENT & PLAN       69 y.o. female patient was admitted on 2025 with  Chief Complaint   Patient presents with    Shortness of Breath     The patient presents from [facility] with c/o SOB for about 3 days. Hx of COPD; wears 2LNC at baseline. EMS found her at 90% on her 2L. Cough noted       Acute on chronic hypoxic respiratory failure.  On 2 L at baseline.  AECOPD.  Failed outpatient steroids and antibiotics.  Recent exacerbation in April and admission at [place].  On Dulera/Spiriva  HFpEF.  On torsemide  Mucous plugging of airways  Enterococcus faecalis UTI?  Recurrent admissions to the hospital with AECOPD/decompensated heart failure  Mild pulm hypertension.  RVSP 40    Co-morbidities: DEVI intolerant to CPAP therapy, A-fib on anticoagulation, HTN, ex-smoker stopped many years ago      Plan:              Patient completed Levaquin course  Will monitor results of bronchoscopy samples  Apixaban resumed  Brovana, Pulmicort with DuoNebs while inpatient.  Can resume Dulera and Spiriva at discharge.  Mucomyst with vibrating vest  Solu-Medrol switched to prednisone.  Taper at discharge  Recommended to use nebulizer every 4-6 hours after discharge and wean self off slowly  Continue diuresis with torsemide with electrolyte replacement  Aspiration precautions  Rest and exercise oximetry.  If the patient feels comfortable going home today it is okay from pulmonary standpoint otherwise can wait another day.  Patient will need to follow-up with Dr. Jefferson in 4 to 6 weeks after discharge.      Subjective/Objective     Interval History: Encounter 2025  Patient underwent bronchoscopy successfully

## 2025-06-27 NOTE — PROGRESS NOTES
Occupational Therapy  Facility/Department: [place] [address] [facility]  Daily Treatment Note  NAME: Serina Rubin  : 1955  MRN: [id number]    Date of Service: 2025    Discharge Recommendations:  24 hour supervision or assist, Home with Home health OT  OT Equipment Recommendations  Equipment Needed: No    If pt is unable to be seen after this session, please let this note serve as discharge summary.  Please see case management note for discharge disposition.  Thank you.     Patient Diagnosis(es): The primary encounter diagnosis was Pneumonia of left lower lobe due to infectious organism. Diagnoses of Sepsis without acute organ dysfunction, due to unspecified organism (HCC), REIS (dyspnea on exertion), Generalized weakness, Failure of outpatient treatment, Chronic respiratory failure with hypoxia (HCC), COPD exacerbation (HCC), and Congestive heart failure, unspecified HF chronicity, unspecified heart failure type (HCC) were also pertinent to this visit.     Assessment   Assessment: Pt seated in chair at start of session. Pt tolerated OT session well. Pt completed shower grossly w/ SUP - pt required modA to complete LB bathing however she reports using a long handled sponge at home. Pt completes UB dressing w/ SUP and LB dressing and footwear management w/ modA and maxA however pt reports using reacher and sock aide at baseline. Pt demos mild SOB throughout session and initiates seated rest breaks appropriately. Pt is limited by decreased endurance, generalized weakness , and impaired balance - OT recommends home w/ 24hr assist and HHOT to increase pt's independence with ADLs/ mobility. Pt will continue to benefit from skilled OT services at this time.      Activity Tolerance: Patient tolerated treatment well;Patient limited by endurance  Discharge Recommendations: 24 hour supervision or assist;Home with Home health OT  Equipment Needed: No     Plan  Occupational Therapy Plan  Times Per Week:

## 2025-06-27 NOTE — PROGRESS NOTES
Physical Therapy  Facility/Department: [place] [address] [facility]  Daily Treatment Note  NAME: Serina Rubin  : 1955  MRN: [id number]    Date of Service: 2025    Discharge Recommendations:  24 hour supervision or assist, Home with Home health PT   PT Equipment Recommendations  Equipment Needed: No  Other: pt has 4WW at home    Patient Diagnosis(es): The primary encounter diagnosis was Pneumonia of left lower lobe due to infectious organism. Diagnoses of Sepsis without acute organ dysfunction, due to unspecified organism (HCC), REIS (dyspnea on exertion), Generalized weakness, Failure of outpatient treatment, Chronic respiratory failure with hypoxia (HCC), COPD exacerbation (HCC), and Congestive heart failure, unspecified HF chronicity, unspecified heart failure type (HCC) were also pertinent to this visit.    Assessment  Assessment: Pt seen for PT session this morning focusing on functional transfers and ambulation using RW. Pt does demo improved activity tolerance and ambulation distance this date compared to previous session. Pt able to complete bed mobility with supervision, functional transfers with supervision, and ambulate 100ft with SBA using RW. O2 sats 91% on 2L immediately after amb, with pt reporting mild SOB. Pt does demo good awareness of energy conservation techniques and need for seated rest breaks. Continue to recommend 24 hr assist and HHPT upon d/c.  Activity Tolerance: Patient tolerated treatment well;Patient limited by endurance  Equipment Needed: No  Other: pt has 4WW at home    Plan  Physical Therapy Plan  General Plan: 3-5 times per week  Current Treatment Recommendations: Strengthening;Balance training;Functional mobility training;Transfer training;Endurance training;Gait training;Home exercise program;Safety education & training;Therapeutic activities;Patient/Caregiver education & training;Equipment evaluation, education, &

## 2025-06-27 NOTE — PROGRESS NOTES
[facility] Medicine Progress Note  V 5.17      Date of Admission: 6/22/2025    Hospital Day: 6      Chief Admission Complaint:  my copd    Subjective:  bronch 6/26 with mild improvement in symptoms. Continue steroids and abx.    Presenting Admission History:         69 y.o. female with chronic resp failure/severe copd who presented to [facility] with worsening sob. Pt notes ongoing issues since Tues and tried her nebs and even had to use an emergency kit(that contained prednisone and azithro) which she completed yesterday.  She woke up last night with difficulty breathing and tried to ambulate to her nebulizer but couldn't make it. She ended up calling EMS around 5am today. She did not mention having f/chills but noted some nausea but no emesis/diarrhea.  She notes ongoing productive cough(normally clear but yellowish of late) and wheezing. No chest pain. She lives at [facility] and believes there may have been some sick contacts. No changes in meds and she is compliant. [place] Jefferson([facility]      ER course: given duoneb, decadron, levaquin, ivf bolus    Assessment/Plan:        Worsening SOB-- likely copd exacerbation, noted chronic leukocytosis of late  -iv steroids started  -duonebs scheduled  -iv abx started in ER, reevalulate in AM and continued  -checked procal  -bcx obtained  -asked for pulm eval on 6/24, mucomyst added   -added chest vest/IS/Acapella/mucinex  -bronch planned 6/26    Chronic resp failure- with very severe COPD, baseline 2L, with hx of DEVI(does not tolerate wearing mask at home)  -on Dulera(sub symbicort)  -on Spiriva, held here while on nebs     Chronic diastolic HF-appears compensated  -on Torsemide  -Takes metolozone prn  -Asa   -pulm ordered echo, done 6/25( ef 60%, nl wm, rv mod dilated, trace mr, mild pulm htn)    Abn UA- noted 6/23, pt denied any burning/dysuria but notes some polyuria which she attributed to her diuretic  -suspect asymptomatic

## 2025-06-28 VITALS
RESPIRATION RATE: 16 BRPM | TEMPERATURE: 97.8 F | DIASTOLIC BLOOD PRESSURE: 67 MMHG | SYSTOLIC BLOOD PRESSURE: 137 MMHG | HEART RATE: 60 BPM | OXYGEN SATURATION: 94 % | HEIGHT: 62 IN | WEIGHT: 141.6 LBS | BODY MASS INDEX: 26.06 KG/M2

## 2025-06-28 LAB
ALBUMIN SERPL-MCNC: 3.3 G/DL (ref 3.4–5)
ANION GAP SERPL CALCULATED.3IONS-SCNC: 12 MMOL/L (ref 3–16)
BACTERIA SPEC RESP CULT: NORMAL
BACTERIA SPEC RESP CULT: NORMAL
BASOPHILS # BLD: 0 K/UL (ref 0–0.2)
BASOPHILS NFR BLD: 0.2 %
BUN SERPL-MCNC: 19 MG/DL (ref 7–20)
CALCIUM SERPL-MCNC: 8.9 MG/DL (ref 8.3–10.6)
CHLORIDE SERPL-SCNC: 98 MMOL/L (ref 99–110)
CO2 SERPL-SCNC: 30 MMOL/L (ref 21–32)
CREAT SERPL-MCNC: 0.7 MG/DL (ref 0.6–1.2)
DEPRECATED RDW RBC AUTO: 15.1 % (ref 12.4–15.4)
EOSINOPHIL # BLD: 0.1 K/UL (ref 0–0.6)
EOSINOPHIL NFR BLD: 0.5 %
GFR SERPLBLD CREATININE-BSD FMLA CKD-EPI: >90 ML/MIN/{1.73_M2}
GLUCOSE SERPL-MCNC: 138 MG/DL (ref 70–99)
GRAM STN SPEC: NORMAL
GRAM STN SPEC: NORMAL
HCT VFR BLD AUTO: 40.8 % (ref 36–48)
HGB BLD-MCNC: 13.3 G/DL (ref 12–16)
LYMPHOCYTES # BLD: 1.7 K/UL (ref 1–5.1)
LYMPHOCYTES NFR BLD: 13.3 %
MAGNESIUM SERPL-MCNC: 2.34 MG/DL (ref 1.8–2.4)
MCH RBC QN AUTO: 29 PG (ref 26–34)
MCHC RBC AUTO-ENTMCNC: 32.6 G/DL (ref 31–36)
MCV RBC AUTO: 88.8 FL (ref 80–100)
MONOCYTES # BLD: 1.4 K/UL (ref 0–1.3)
MONOCYTES NFR BLD: 10.7 %
NEUTROPHILS # BLD: 9.6 K/UL (ref 1.7–7.7)
NEUTROPHILS NFR BLD: 75.3 %
PHOSPHATE SERPL-MCNC: 3.5 MG/DL (ref 2.5–4.9)
PLATELET # BLD AUTO: 243 K/UL (ref 135–450)
PMV BLD AUTO: 9.9 FL (ref 5–10.5)
POTASSIUM SERPL-SCNC: 3.2 MMOL/L (ref 3.5–5.1)
RBC # BLD AUTO: 4.6 M/UL (ref 4–5.2)
SODIUM SERPL-SCNC: 140 MMOL/L (ref 136–145)
WBC # BLD AUTO: 12.8 K/UL (ref 4–11)

## 2025-06-28 PROCEDURE — 85025 COMPLETE CBC W/AUTO DIFF WBC: CPT

## 2025-06-28 PROCEDURE — 6370000000 HC RX 637 (ALT 250 FOR IP): Performed by: INTERNAL MEDICINE

## 2025-06-28 PROCEDURE — 94640 AIRWAY INHALATION TREATMENT: CPT

## 2025-06-28 PROCEDURE — 6360000002 HC RX W HCPCS: Performed by: INTERNAL MEDICINE

## 2025-06-28 PROCEDURE — 94669 MECHANICAL CHEST WALL OSCILL: CPT

## 2025-06-28 PROCEDURE — 80069 RENAL FUNCTION PANEL: CPT

## 2025-06-28 PROCEDURE — 36415 COLL VENOUS BLD VENIPUNCTURE: CPT

## 2025-06-28 PROCEDURE — 94761 N-INVAS EAR/PLS OXIMETRY MLT: CPT

## 2025-06-28 PROCEDURE — 99233 SBSQ HOSP IP/OBS HIGH 50: CPT | Performed by: INTERNAL MEDICINE

## 2025-06-28 PROCEDURE — 2500000003 HC RX 250 WO HCPCS: Performed by: INTERNAL MEDICINE

## 2025-06-28 PROCEDURE — 2700000000 HC OXYGEN THERAPY PER DAY

## 2025-06-28 PROCEDURE — 83735 ASSAY OF MAGNESIUM: CPT

## 2025-06-28 RX ORDER — PREDNISONE 20 MG/1
40 TABLET ORAL DAILY
Qty: 16 TABLET | Refills: 0 | Status: SHIPPED | OUTPATIENT
Start: 2025-06-29 | End: 2025-07-02

## 2025-06-28 RX ORDER — POTASSIUM CHLORIDE 1500 MG/1
40 TABLET, EXTENDED RELEASE ORAL ONCE
Status: DISCONTINUED | OUTPATIENT
Start: 2025-06-28 | End: 2025-06-28 | Stop reason: HOSPADM

## 2025-06-28 RX ORDER — GUAIFENESIN 600 MG/1
600 TABLET, EXTENDED RELEASE ORAL 2 TIMES DAILY
Qty: 14 TABLET | Refills: 0 | Status: SHIPPED | OUTPATIENT
Start: 2025-06-28 | End: 2025-07-05

## 2025-06-28 RX ADMIN — ACETYLCYSTEINE 600 MG: 200 INHALANT RESPIRATORY (INHALATION) at 08:08

## 2025-06-28 RX ADMIN — GUAIFENESIN 600 MG: 600 TABLET, MULTILAYER, EXTENDED RELEASE ORAL at 07:38

## 2025-06-28 RX ADMIN — SODIUM CHLORIDE, PRESERVATIVE FREE 10 ML: 5 INJECTION INTRAVENOUS at 07:40

## 2025-06-28 RX ADMIN — IPRATROPIUM BROMIDE AND ALBUTEROL SULFATE 1 DOSE: 2.5; .5 SOLUTION RESPIRATORY (INHALATION) at 03:52

## 2025-06-28 RX ADMIN — ASPIRIN 81 MG: 81 TABLET, CHEWABLE ORAL at 07:39

## 2025-06-28 RX ADMIN — BUDESONIDE 500 MCG: 0.5 SUSPENSION RESPIRATORY (INHALATION) at 08:08

## 2025-06-28 RX ADMIN — SENNOSIDES AND DOCUSATE SODIUM 1 TABLET: 50; 8.6 TABLET ORAL at 07:40

## 2025-06-28 RX ADMIN — PREDNISONE 40 MG: 20 TABLET ORAL at 07:38

## 2025-06-28 RX ADMIN — APIXABAN 5 MG: 5 TABLET, FILM COATED ORAL at 07:40

## 2025-06-28 RX ADMIN — OXYCODONE AND ACETAMINOPHEN 1 TABLET: 325; 10 TABLET ORAL at 05:26

## 2025-06-28 RX ADMIN — PANTOPRAZOLE SODIUM 40 MG: 40 TABLET, DELAYED RELEASE ORAL at 05:26

## 2025-06-28 RX ADMIN — GABAPENTIN 400 MG: 400 CAPSULE ORAL at 07:38

## 2025-06-28 RX ADMIN — AMLODIPINE BESYLATE 5 MG: 5 TABLET ORAL at 07:39

## 2025-06-28 RX ADMIN — CARVEDILOL 3.12 MG: 3.12 TABLET, FILM COATED ORAL at 07:39

## 2025-06-28 RX ADMIN — ARFORMOTEROL TARTRATE 15 MCG: 15 SOLUTION RESPIRATORY (INHALATION) at 08:08

## 2025-06-28 RX ADMIN — IPRATROPIUM BROMIDE AND ALBUTEROL SULFATE 1 DOSE: 2.5; .5 SOLUTION RESPIRATORY (INHALATION) at 00:05

## 2025-06-28 RX ADMIN — IPRATROPIUM BROMIDE AND ALBUTEROL SULFATE 1 DOSE: 2.5; .5 SOLUTION RESPIRATORY (INHALATION) at 08:08

## 2025-06-28 RX ADMIN — POTASSIUM CHLORIDE 40 MEQ: 1500 TABLET, EXTENDED RELEASE ORAL at 07:38

## 2025-06-28 RX ADMIN — ATORVASTATIN CALCIUM 80 MG: 80 TABLET, FILM COATED ORAL at 07:39

## 2025-06-28 ASSESSMENT — PAIN SCALES - GENERAL
PAINLEVEL_OUTOF10: 0
PAINLEVEL_OUTOF10: 3
PAINLEVEL_OUTOF10: 7

## 2025-06-28 ASSESSMENT — PAIN - FUNCTIONAL ASSESSMENT: PAIN_FUNCTIONAL_ASSESSMENT: PREVENTS OR INTERFERES SOME ACTIVE ACTIVITIES AND ADLS

## 2025-06-28 ASSESSMENT — PAIN DESCRIPTION - LOCATION: LOCATION: BACK

## 2025-06-28 ASSESSMENT — PAIN DESCRIPTION - ORIENTATION: ORIENTATION: LOWER

## 2025-06-28 ASSESSMENT — PAIN DESCRIPTION - DESCRIPTORS: DESCRIPTORS: ACHING

## 2025-06-28 NOTE — PROGRESS NOTES
PULMONARY AND CRITICAL CARE INPATIENT NOTE        Serina Rubin   : 1955  MRN: [id number]     Admitting Physician: Abundio Wright MD  Attending Physician: Dave Lan MD  PCP: Elvira Hunter MD    Date of Service: 2025  Admission date:2025   LOS: Hospital Day: 7   Code:Full Code      ASSESSMENT & PLAN       69 y.o. female patient was admitted on 2025 with  Chief Complaint   Patient presents with    Shortness of Breath     The patient presents from [facility] with c/o SOB for about 3 days. Hx of COPD; wears 2LNC at baseline. EMS found her at 90% on her 2L. Cough noted       Acute on chronic hypoxic respiratory failure.  On 2 L at baseline.  AECOPD.  Failed outpatient steroids and antibiotics.  Recent exacerbation in April and admission at [place].  On Dulera/Spiriva  HFpEF.  On torsemide  Mucous plugging of airways  Enterococcus faecalis UTI?  Recurrent admissions to the hospital with AECOPD/decompensated heart failure  Mild pulm hypertension.  RVSP 40    Co-morbidities: DEVI intolerant to CPAP therapy, A-fib on anticoagulation, HTN, ex-smoker stopped many years ago      Plan:              Patient completed Levaquin course  Brovana, Pulmicort with DuoNebs while inpatient.  Resume Dulera and Spiriva at discharge.  Mucomyst with vibrating vest  Taper prednisone at discharge  Recommended to use nebulizer every 4-6 hours after discharge and wean self off slowly  Consider buying a PEP Ronal device to practice pursed lip breathing more frequently and help with dyspnea.  Continue diuresis with torsemide with electrolyte replacement  Aspiration precautions  Patient benefits from pulm rehab referral through the clinic  Patient will need to follow-up with Dr. Jefferson in 4 to 6 weeks after discharge.  Okay to discharge from pulmonary standpoint    We will sign off. Please let us know if you have any questions.  Available on [employer].   Thank you for involving us in this patient's  13.3    [phone]       BMP:   Recent Labs     06/26/25  0620 06/27/25  0636 06/28/25  0613    139 140   K 3.7 3.7 3.2*   CL 97* 100 98*   CO2 32 31 30   PHOS 3.5 2.6 3.5   BUN 23* 16 19   CREATININE 0.8 0.5* 0.7       LIVER PROFILE:   No results for input(s): \"AST\", \"ALT\", \"LIPASE\", \"AMYLASE\", \"BILIDIR\", \"BILITOT\", \"ALKPHOS\", \"PROTIME\", \"INR\" in the last 72 hours.    Invalid input(s): \"ALB\", \"D-DIMER\"      Acid-Base:   Recent Labs     06/26/25  0620 06/27/25  0636 06/28/25  0613   CO2 32 31 30   ALBUMIN 3.4 3.5 3.3*   CREATININE 0.8 0.5* 0.7   GLUCOSE 93 103* 138*         Cardiac:   Lab Results   Component Value Date    TROPONINI <0.01 07/29/2022          Vitals:    06/28/25 0556   BP:    Pulse:    Resp: 16   Temp:    SpO2:        Physical Exam:  General appearance: In no apparent distress.  HEENT: Anicteric.  Cardiac: No loud murmur.  Lungs: Decreased air entry over the lung bases.  Expiratory wheezes  Abdomen: Soft.  Back & Extremities: No clubbing.  No signs of acute ischemia.  Neurological: No focal deficit.      ________________________________________________________  Electronically signed by:  Ivette Amor MD,FACP    6/28/2025    7:24 AM.     [facility]  [address], [place] [address]   Available on [employer]  Phone (office): [phone]

## 2025-06-28 NOTE — FLOWSHEET NOTE
06/27/25 1957   Vital Signs   Temp 98.1 °F (36.7 °C)   Temp Source Oral   Pulse 76   Heart Rate Source Monitor   Respirations 16   /85   MAP (Calculated) 96   Pain Assessment   Pain Assessment None - Denies Pain   Oxygen Therapy   SpO2 94 %   O2 Device Nasal cannula   O2 Flow Rate (L/min) 2 L/min     PM assessment complete. Medications given as ordered. Vital signs stable. Pt with expiratory wheezes and rhonchi. States she feels much better still than prior to bronch. Plan of care reviewed. No other needs identified at this time.

## 2025-06-28 NOTE — DISCHARGE SUMMARY
Hospital Medicine Discharge Summary    Patient: Serina Rubin   : 1955     Hospital:  [facility]  Admit Date: 2025   Discharge Date:   ***  Disposition:  Home w/ C   Code status:  {Code Status:40698}  Condition at Discharge: {Discharge Condition:69874}  Primary Care Provider: Elvira Hunter MD    Admitting Provider: Abundio Wright MD  Discharge Provider: Dave Lan MD     Discharge Diagnoses:      Active Hospital Problems    Diagnosis     SOB (shortness of breath) [R06.02]        Presenting Admission History:      ***     Assessment/Plan:      ***K replaced , completed abx , tapering steroids , ok with pulm    Physical Exam Performed:      /67   Pulse 60   Temp 97.8 °F (36.6 °C) (Oral)   Resp 16   Ht 1.575 m (5' 2\")   Wt 64.2 kg (141 lb 9.6 oz)   SpO2 94%   BMI 25.90 kg/m²       General appearance:  No apparent distress, appears stated age and cooperative.  Respiratory:  Normal respiratory effort.   Cardiovascular:  Regular rate and rhythm.  Abdomen:  Soft, non-tender, non-distended.  Musculoskeletal:  No edema  Neurologic:  Non-focal  Psychiatric:  Alert and oriented    Patient Discharge Instructions:      Follow up:    1.  Primary Care Provider Elvira Hunter MD in the next 1-2 weeks.      The patient was seen and examined on day of discharge and this discharge summary is in conjunction with any daily progress note from day of discharge. Time spent on discharge: 3*** minutes in the examination, evaluation, counseling and review of medications and discharge plan.    ------------------------------------------------------------------------------------------------------------------------------------------------------    Discharge Medications:   Current Discharge Medication List        START taking these medications    Details   guaiFENesin (MUCINEX) 600 MG extended release tablet Take 1 tablet by mouth 2 times daily for 7 days  Qty: 14  dilated, trace mr, mild pulm htn)     Abn UA- noted 6/23, pt denied any burning/dysuria but notes some polyuria which she attributed to her diuretic  -suspect asymptomatic bacteruria  -ordered Ucx and noted 75k enterococcus,  - no need for rx ,but received abx for URI     Afib- controlled  -on coreg and eliquis  -on tele     Gerd- ppi continued    HTN-stable  -Norvasc     Chronic pain-  per emr  -on Gabapentin  -on Prn prercocet     HLD-statin continued        Low K -K replaced ,  Physical Exam Performed:      /67   Pulse 60   Temp 97.8 °F (36.6 °C) (Oral)   Resp 16   Ht 1.575 m (5' 2\")   Wt 64.2 kg (141 lb 9.6 oz)   SpO2 94%   BMI 25.90 kg/m²       General appearance:  No apparent distress, appears stated age and cooperative.  Respiratory:  Normal respiratory effort. On o2 , reduced AE  Cardiovascular:  Regular rate and rhythm.  Abdomen:  Soft, non-tender, non-distended.  Musculoskeletal:  No edema  Neurologic:  Non-focal  Psychiatric:  Alert and oriented    Patient Discharge Instructions:      Follow up:    1.  Primary Care Provider Elvira Hunter MD in the next 1-2 weeks.  pulm    The patient was seen and examined on day of discharge and this discharge summary is in conjunction with any daily progress note from day of discharge. Time spent on discharge: 38  minutes in the examination, evaluation, counseling and review of medications and discharge plan.    ------------------------------------------------------------------------------------------------------------------------------------------------------    Discharge Medications:   Discharge Medication List as of 6/28/2025  9:58 AM        START taking these medications    Details   guaiFENesin (MUCINEX) 600 MG extended release tablet Take 1 tablet by mouth 2 times daily for 7 days, Disp-14 tablet, R-0Normal           Discharge Medication List as of 6/28/2025  9:58 AM        CONTINUE these medications which have CHANGED    Details

## 2025-06-28 NOTE — PLAN OF CARE
Problem: Chronic Conditions and Co-morbidities  Goal: Patient's chronic conditions and co-morbidity symptoms are monitored and maintained or improved  6/28/2025 0922 by Rosanna Obrien RN  Outcome: Progressing     Problem: Discharge Planning  Goal: Discharge to home or other facility with appropriate resources  6/28/2025 0922 by Rosanna Obrien RN  Outcome: Progressing     Problem: Pain  Goal: Verbalizes/displays adequate comfort level or baseline comfort level  6/28/2025 0922 by Rosanna Obrien RN  Outcome: Progressing     Problem: Safety - Adult  Goal: Free from fall injury  6/28/2025 0922 by Rosanna Obrien RN  Outcome: Progressing

## 2025-06-28 NOTE — PLAN OF CARE
Problem: Chronic Conditions and Co-morbidities  Goal: Patient's chronic conditions and co-morbidity symptoms are monitored and maintained or improved  6/27/2025 2003 by Nuvia Zhou RN  Outcome: Progressing  6/27/2025 1035 by Tanisha Franco RN  Outcome: Progressing     Problem: Discharge Planning  Goal: Discharge to home or other facility with appropriate resources  6/27/2025 2003 by Nuvia Zhou RN  Outcome: Progressing  6/27/2025 1035 by Tanisha Franco RN  Outcome: Progressing     Problem: Pain  Goal: Verbalizes/displays adequate comfort level or baseline comfort level  6/27/2025 2003 by Nuvia Zhou RN  Outcome: Progressing  6/27/2025 1035 by Tanisha Franco RN  Outcome: Progressing     Problem: Safety - Adult  Goal: Free from fall injury  6/27/2025 2003 by Nuvia Zhou RN  Outcome: Progressing  6/27/2025 1035 by Tanisha Franco RN  Outcome: Progressing

## 2025-06-28 NOTE — CARE COORDINATION
CASE MANAGEMENT DISCHARGE SUMMARY      [place]charge to: [place] [facility]    Precertification completed:   Hospital Exemption Notification (HENS) completed:     IMM given: (date)     New Durable Medical Equipment ordered/agency: na    Transportation:    Family/car: private    Confirmed discharge plan with: RN, [facility]     Patient: yes/ patient notified family      Family:  yes/no    Name: Contact number:     Facility/Agency, name:  [facility] faxed [phone]   Phone number for report to facility:      RN, name: Rosanna    Note: Discharging nurse to complete [facility], reconcile [facility], and place final copy with patient's discharge packet. RN to ensure that written prescriptions for  Level II medications are sent with patient to the facility as per protocol.

## 2025-06-28 NOTE — PROGRESS NOTES
[facility] Medicine Progress Note  V 5.17      Date of Admission: 6/22/2025    Hospital Day: 7      Chief Admission Complaint:  my copd    Subjective:  bronch 6/26 with mild improvement in symptoms. Continue steroids and abx.    Presenting Admission History:         69 y.o. female with chronic resp failure/severe copd who presented to [facility] with worsening sob. Pt notes ongoing issues since Tues and tried her nebs and even had to use an emergency kit(that contained prednisone and azithro) which she completed yesterday.  She woke up last night with difficulty breathing and tried to ambulate to her nebulizer but couldn't make it. She ended up calling EMS around 5am today. She did not mention having f/chills but noted some nausea but no emesis/diarrhea.  She notes ongoing productive cough(normally clear but yellowish of late) and wheezing. No chest pain. She lives at [facility] and believes there may have been some sick contacts. No changes in meds and she is compliant. She sees Dr. Jefferson([facility]      ER course: given duoneb, decadron, levaquin, ivf bolus    Assessment/Plan:        Worsening SOB-- likely copd exacerbation, noted chronic leukocytosis of late  -iv steroids started  -duonebs scheduled  -iv abx started in ER, reevalulate in AM and continued  -checked procal  -bcx obtained  -asked for pulm eval on 6/24, mucomyst added   -added chest vest/IS/Acapella/mucinex  -bronch planned 6/26    Chronic resp failure- with very severe COPD, baseline 2L, with hx of DEVI(does not tolerate wearing mask at home)  -on Dulera(sub symbicort)  -on Spiriva, held here while on nebs     Chronic diastolic HF-appears compensated  -on Torsemide  -Takes metolozone prn  -Asa   -pulm ordered echo, done 6/25( ef 60%, nl wm, rv mod dilated, trace mr, mild pulm htn)    Abn UA- noted 6/23, pt denied any burning/dysuria but notes some polyuria which she attributed to her diuretic  -suspect asymptomatic

## 2025-06-28 NOTE — DISCHARGE INSTR - COC
Continuity of Care Form    Patient Name: Serina Rubin   :  1955  MRN:  [id number]    Admit date:  2025  Discharge date:  ***    Code Status Order: Full Code   Advance Directives:    Date/Time Healthcare Directive Type of Healthcare Directive Copy in Chart Healthcare Agent Appointed Healthcare Agent's Name Healthcare Agent's Phone Number    25 1103 No, patient does not have an advance directive for healthcare treatment  --  --  --  --  --             Admitting Physician:  Abundio Wright MD  PCP: Elvira Hunter MD    Discharging Nurse: ***  Discharging Hospital Unit/Room#: 0102/0102-01  Discharging Unit Phone Number: ***    Emergency Contact:   Extended Emergency Contact Information  Primary Emergency Contact: DanielShant anders  Address: [address] [place] [address]  Home Phone: [phone]  Mobile Phone: [phone]  Relation: Child  Secondary Emergency Contact: ZiaDonna hurtado  Address: [address] [place] [address]  Home Phone: [phone]  Mobile Phone: [phone]  Relation: Other Relative    Past Surgical History:  Past Surgical History:   Procedure Laterality Date    BREAST BIOPSY      BREAST SURGERY      BRONCHOSCOPY N/A 2025    BRONCHOSCOPY ALVEOLAR LAVAGE performed by Ivette Amor MD at [facility] ENDOSCOPY    CARDIAC CATHETERIZATION      stents x3     COLONOSCOPY      DENTAL SURGERY  16    multiple teeth extracted/ Removal of BRANDIN    FRACTURE SURGERY Right     femur    HYSTERECTOMY (CERVIX STATUS UNKNOWN)      JOINT REPLACEMENT Bilateral     hips    NOSE SURGERY      for a broken nose    OTHER SURGICAL HISTORY      INTRATHECAL PAIN PUMP IMPLANT    OVARY REMOVAL         Immunization History:   Immunization History   Administered Date(s) Administered    COVID-19, MODERNA BLUE border, Primary or Immunocompromised, (age 12y+), IM, 100 mcg/0.5mL 2021, 2021    COVID-19,

## 2025-06-28 NOTE — PROGRESS NOTES
Discharge order received. Discharge instructions reviewed with pt with no questions or concerns noted. PIV removed, gauze and tega derm applied, no complications noted. Tele box removed, cmu notified. Pt assisted to family member's car via wheelchair with all personal belongings including medications from outpatient pharmacy.

## 2025-06-30 ENCOUNTER — CARE COORDINATION (OUTPATIENT)
Dept: CASE MANAGEMENT | Age: 70
End: 2025-06-30

## 2025-06-30 ENCOUNTER — TELEPHONE (OUTPATIENT)
Dept: INTERNAL MEDICINE CLINIC | Age: 70
End: 2025-06-30

## 2025-06-30 DIAGNOSIS — J44.1 ACUTE EXACERBATION OF CHRONIC OBSTRUCTIVE PULMONARY DISEASE (COPD) (HCC): Primary | ICD-10-CM

## 2025-06-30 PROCEDURE — 1111F DSCHRG MED/CURRENT MED MERGE: CPT | Performed by: INTERNAL MEDICINE

## 2025-06-30 NOTE — TELEPHONE ENCOUNTER
----- Message from Dr. Elvira Hunter MD sent at 6/30/2025  1:22 PM EDT -----  Contact: MEME [phone]  ok  ----- Message -----  From: Joyce Franco  Sent: 6/30/2025  12:23 PM EDT  To: MD Meme Goodson with [facility] requesting a verbal that you will sign and follow orders for PT, OT, and Skilled nursing following the patients recent hospital discharge 6/29 due to COPD. Please advise

## 2025-06-30 NOTE — CARE COORDINATION
Care Transitions Note    Initial Call - Call within 2 business days of discharge: Yes    Patient Current Location:  Home: [address] [place] [address] Rd  [address]  [facility] [address]    Care Transition Nurse contacted the patient by telephone to perform post hospital discharge assessment, verified name and  as identifiers.  Provided introduction to self, and explanation of the Care Transition Nurse role.    Patient: Serina Rubni  Patient : 1955   MRN: [id number]    Reason for Admission: [facility] x 6 days (failed outpt tx) -> SOB likely aeCOPD, chronic leukocytosis, chronic resp failure on baseline O2 2L, hx DEVI, chronic dCHF, abd UA, A Fib on Eliquis, GERD, HTN-stable, chronic pain, HLD -> home to [facility]  Discharge Date: 25  RURS: Readmission Risk Score: 16.9    Last Discharge Facility       Date Complaint Diagnosis Description Type Department Provider    25 Shortness of Breath Pneumonia of left lower lobe due to infectious organism ... ED to Hosp-Admission (Discharged) (ADMITTED) [place] A1 Dave Lan MD; Johana Ortega..     Was this an external facility discharge? No    Additional needs identified to be addressed with provider   No needs identified         Method of communication with provider: none.    Patients top risk factors for readmission: functional physical ability, medical condition- , and polypharmacy    Interventions to address risk factors:   Review of patient management of conditions/medications:    Home Health: [facility] and [facility]     Care Summary Note:   Feeling unsure about how she is doing since home. Has a pain pump with morphine and unsure why morphine removed from med list. This is managed by Purnima Edouard. Taking prednisone taper and Mucinex. Wearing O2 at 2L continuous with SaO2 94-95%. She endorses cough, but not productive. Using nebulizer with albuterol q4h. She c/o left lower back pain. Denies injury,

## 2025-07-02 ENCOUNTER — CARE COORDINATION (OUTPATIENT)
Dept: CASE MANAGEMENT | Age: 70
End: 2025-07-02

## 2025-07-02 NOTE — CARE COORDINATION
HFU:   Future Appointments   Date Time Provider Department Center   7/3/2025  2:40 PM Kyler Mir MD [facility] None   8/4/2025  9:30 AM Elvira Hunter MD [facility] None   10/2/2025  9:30 AM MHC CT MAIN [facility]   10/9/2025 10:45 AM Alhaji Jefferson MD [facility] PULM [facility]   12/4/2025  9:15 AM Rajinder Garcia MD P [facility]

## 2025-07-03 ENCOUNTER — OFFICE VISIT (OUTPATIENT)
Dept: INTERNAL MEDICINE CLINIC | Age: 70
End: 2025-07-03

## 2025-07-03 VITALS
DIASTOLIC BLOOD PRESSURE: 64 MMHG | HEIGHT: 62 IN | HEART RATE: 72 BPM | SYSTOLIC BLOOD PRESSURE: 132 MMHG | BODY MASS INDEX: 26.31 KG/M2 | WEIGHT: 143 LBS

## 2025-07-03 DIAGNOSIS — J44.1 COPD WITH ACUTE EXACERBATION (HCC): Primary | ICD-10-CM

## 2025-07-03 DIAGNOSIS — J96.11 CHRONIC RESPIRATORY FAILURE WITH HYPOXIA (HCC): ICD-10-CM

## 2025-07-03 DIAGNOSIS — Z09 HOSPITAL DISCHARGE FOLLOW-UP: ICD-10-CM

## 2025-07-03 PROCEDURE — 99496 TRANSJ CARE MGMT HIGH F2F 7D: CPT | Performed by: INTERNAL MEDICINE

## 2025-07-03 PROCEDURE — 1111F DSCHRG MED/CURRENT MED MERGE: CPT | Performed by: INTERNAL MEDICINE

## 2025-07-03 NOTE — PROGRESS NOTES
Post-Discharge Transitional Care  Follow Up      Serina Rubin   YOB: 1955    Date of Office Visit:  7/3/2025  Date of Hospital Admission: 6/22/25  Date of Hospital Discharge: 6/28/25  Risk of hospital readmission (high >=14%. Medium >=10%) :Readmission Risk Score: 16.9      Care management risk score Rising risk (score 2-5) and Complex Care (Scores >=6): No Risk Score On File     Non face to face  following discharge, date last encounter closed (first attempt may have been earlier): 06/30/2025    Call initiated 2 business days of discharge: Yes    ASSESSMENT/PLAN:   COPD with acute exacerbation (HCC)  Chronic respiratory failure with hypoxia (HCC)      Medical Decision Making: moderate complexity  No follow-ups on file.           Subjective:   HPI:  Follow up of Hospital problems/diagnosis(es):    Diagnosis Orders   1. COPD with acute exacerbation (HCC)        2. Chronic respiratory failure with hypoxia (HCC)          Admitted with acute copd exacerbation and acute on chronic resp failure  CXR normal.  S/p bronchoscopy   Discharged on pred taper.    Now doing well.  Back on 2 L of O2    Chronic diastolic CHF  Stable.  Continue diuretics       Inpatient course: Discharge summary reviewed- see chart.    Interval history/Current status: fair     Patient Active Problem List   Diagnosis    Acute respiratory failure with hypoxemia (HCC)    Coronary artery disease involving native coronary artery of native heart without angina pectoris    General weakness    H/O oral surgery    Chronic pain    Bradycardia    PAF (paroxysmal atrial fibrillation) (HCC)    Chronic diastolic CHF (congestive heart failure) (HCC)    History of CVA (cerebrovascular accident)    Dysarthria    Ataxia    Right sided weakness    DEVI (obstructive sleep apnea)    TIA involving left internal carotid artery    Aneurysm    HTN (hypertension), benign    Chronic respiratory failure with hypoxia (HCC)    COPD, severe (HCC)    Anxiety

## 2025-07-07 ENCOUNTER — CARE COORDINATION (OUTPATIENT)
Dept: CASE MANAGEMENT | Age: 70
End: 2025-07-07

## 2025-07-07 NOTE — CARE COORDINATION
Care Transitions Note    Follow Up Call     Patient: Serina Rubin                  Patient : 1955   MRN: [id number]                             Reason for Admission: [facility] x 6 days (failed outpt tx) -> SOB likely aeCOPD, chronic leukocytosis, chronic resp failure on baseline O2 2L, hx DEVI, chronic dCHF, abd UA, A Fib on Eliquis, GERD, HTN-stable, chronic pain, HLD -> home to [facility]  Discharge Date: 25       RURS: Readmission Risk Score: 16.9    Attempted to reach patient for transitions of care follow up.  Unable to reach patient.      Outreach Attempts:   HIPAA compliant voicemail left for patient.     Care Summary Note: completed HFU with PCP     Follow Up Appointment:   Future Appointments         Provider Specialty Dept Phone    2025 9:30 AM Elvira Hunter MD Internal Medicine [phone]    10/2/2025 9:30 AM (Arrive by 9:00 AM) [facility] CT MAIN Radiology [phone]    10/9/2025 10:45 AM Alhaji Jefferson MD Pulmonology [phone]    2025 9:15 AM Rajinder Garcia MD Cardiology [phone]          Plan for follow-up call in 2-5 days based on severity of symptoms and risk factors. Plan for next call: symptom management-2nd A JOI -     Evelin Jordan RN

## 2025-07-08 ENCOUNTER — TELEPHONE (OUTPATIENT)
Dept: INTERNAL MEDICINE CLINIC | Age: 70
End: 2025-07-08

## 2025-07-08 NOTE — PROGRESS NOTES
Physician Progress Note      PATIENT:               STEPHANIE KING  CSN #:                  [id number]  :                       1955  ADMIT DATE:       2025 6:34 AM  DISCH DATE:        2025 11:16 AM  RESPONDING  PROVIDER #:        Ivette Amor MD          QUERY TEXT:    \"Enterococcus faecalis UTI?\" is documented in Pulmonology progress Notes by   Ivette Amor MD at 2025 with no mention of continuous antibiotics.   Please provide additional clinical indicators supportive of your   documentation. Or please document if the diagnosis of UTI has been ruled out   after study.    The clinical indicators include:  Patient is presented with worsening SOB    k/c/o Polyuria, Long term use of anticoagulants,chronic leukocytosis    \"Enterococcus faecalis UTI?\" (Pulmonology progress Notes by Ivette Amor MD at 2025)    Abn UA- noted , pt denied any burning/dysuria but notes some polyuria   which she attributed to her diuretic, suspect asymptomatic bacteriuria,   ordered Ucx and noted 75k enterococcus, sens pending (IM Progress Notes by   Amrit Garcia MD at 2025)    As per EPIC    UA 2025 as per EPIC  Urine Culture :Enterococcus faecalis Abnormal <10,000 CFU/ml mixed   skin/urogenital gena. No further workup Abnormal  Ur LE- small  Ur Nitrite-Negative  Ur Bacteria -NA  Ur WBC: 10-20  Anant NAJERA  [facility], CDS  Options provided:  -- UTI  present as evidenced by, Please document evidence.  -- UTI was ruled out after study  -- Other - I will add my own diagnosis  -- Disagree - Not applicable / Not valid  -- Disagree - Clinically unable to determine / Unknown  -- Refer to Clinical Documentation Reviewer    PROVIDER RESPONSE TEXT:    UTI was ruled out after study.    Query created by: Davey Pierce on 2025 12:37 AM      Electronically signed by:  Ivette Amor MD 2025 11:58 AM

## 2025-07-08 NOTE — TELEPHONE ENCOUNTER
----- Message from Anna Marie GRIER sent at 7/1/2025  2:42 PM EDT -----  Contact: Patrica [phone]  Waiting for Dr Hunter to sign  ----- Message -----  From: Elvira Hunter MD  Sent: 7/1/2025   2:22 PM EDT  To: Inez regalado  ----- Message -----  From: Chetna Posada  Sent: 7/1/2025  11:13 AM EDT  To: Elvira Hunter MD    Caller the [facility], states patient is currently staying at the facility and requesting orders for OT for patient. Please advise     Fax# [phone]

## 2025-07-09 NOTE — PROGRESS NOTES
Physician Progress Note      PATIENT:               STEPHANIE KING  CSN #:                  [id number]  :                       1955  ADMIT DATE:       2025 6:34 AM  DISCH DATE:        2025 11:16 AM  RESPONDING  PROVIDER #:        Abundio Wright MD          QUERY TEXT:    The attending physician is required to clarify conflicting documentation in   the medical record.  Noted documentation of Chronic respiratory failure on IM   PN by Abundio Wright MD at 2025  and Acute on chronic hypoxic   respiratory failure Pulmonology Progress Notes by Ivette Amor MD at   2025.  Based on your medical judgement, please clarify the following:    The clinical indicators include:  The patient presents with SOB    69 year old female with COPD exacerbation, chronic hfpef, DEVI, Afib , on home   o2, lung nodule    \"Acute on chronic hypoxic respiratory failure.  On 2 L at baseline.AECOPD.    Failed outpatient steroids and antibiotics.  Recent exacerbation in April and   admission at [place].  On Dulera/Spiriva  She continued to have difficulties with breathing especially with ambulation   with productive cough and yellowish phlegm.  She had continued wheezing.\"   (Pulmonology Progress Notes by Ivette Amor MD at 2025)    Chronic resp failure- with very severe COPD, baseline 2L, with hx of DEVI(does   not tolerate wearing mask at home),on Dulera(sub symbicort), on Spiriva, held   here while on nebs (IM PN by Abundio Wright MD at 2025 )    Lungs: Decreased air entry over the lung bases.  Rhonchorous breathing sounds    Pulmonary Effort: Tachypnea and respiratory distress (mild) present. No   bradypnea, accessory muscle usage, prolonged expiration or   retractions.Wheezing and rhonchi present (ED note by Ruperto Ortega MD at   2025 )    As per EPIC    On   RR:22, SPo2:89-96%, pCO2: 53.7  on   RR: 18 ,SPo2:93-96%,  on   RR:16,  Spo2: 91-98%  on  RR:16,

## 2025-07-10 DIAGNOSIS — J44.9 COPD, SEVERE (HCC): ICD-10-CM

## 2025-07-11 ENCOUNTER — CARE COORDINATION (OUTPATIENT)
Dept: CASE MANAGEMENT | Age: 70
End: 2025-07-11

## 2025-07-11 NOTE — CARE COORDINATION
Care Transitions Note    Follow Up Call     Patient: Serina Rubin                  Patient : 1955   MRN: [id number]                             Reason for Admission: [facility] x 6 days (failed outpt tx) -> SOB likely aeCOPD, chronic leukocytosis, chronic resp failure on baseline O2 2L, hx DEVI, chronic dCHF, abd UA, A Fib on Eliquis, GERD, HTN-stable, chronic pain, HLD -> home to [facility]  Discharge Date: 25       RURS: Readmission Risk Score: 16.9    Attempted to reach patient for transitions of care follow up.  Unable to reach patient.      Outreach Attempts:   HIPAA compliant voicemail left for patient.     Care Summary Note: NA    Follow Up Appointment:   Future Appointments         Provider Specialty Dept Phone    2025 9:30 AM Elvira Hunter MD Internal Medicine [phone]    10/2/2025 9:30 AM (Arrive by 9:00 AM) [facility] [place] [place] CT MAIN Radiology [phone]    10/9/2025 10:45 AM Alhaji Jefferson MD Pulmonology [phone]    2025 9:15 AM Rajinder Garcia MD Cardiology [phone]          Care transition program closed on 25 (unable to reach).     Evelin Jordan RN

## 2025-07-12 RX ORDER — TIOTROPIUM BROMIDE INHALATION SPRAY 3.12 UG/1
SPRAY, METERED RESPIRATORY (INHALATION)
Qty: 4 G | Refills: 0 | Status: SHIPPED | OUTPATIENT
Start: 2025-07-12

## 2025-07-12 RX ORDER — MOMETASONE FUROATE AND FORMOTEROL FUMARATE DIHYDRATE 100; 5 UG/1; UG/1
AEROSOL RESPIRATORY (INHALATION)
Qty: 13 G | Refills: 0 | Status: SHIPPED | OUTPATIENT
Start: 2025-07-12

## 2025-07-17 DIAGNOSIS — I50.32 CHRONIC DIASTOLIC CHF (CONGESTIVE HEART FAILURE) (HCC): ICD-10-CM

## 2025-07-17 RX ORDER — POTASSIUM CHLORIDE 750 MG/1
50 TABLET, EXTENDED RELEASE ORAL DAILY
Qty: 450 TABLET | Refills: 0 | Status: SHIPPED | OUTPATIENT
Start: 2025-07-17

## 2025-07-17 RX ORDER — AMLODIPINE BESYLATE 5 MG/1
5 TABLET ORAL DAILY
Qty: 90 TABLET | Refills: 2 | Status: SHIPPED | OUTPATIENT
Start: 2025-07-17

## 2025-07-17 RX ORDER — ATORVASTATIN CALCIUM 80 MG/1
80 TABLET, FILM COATED ORAL DAILY
Qty: 90 TABLET | Refills: 2 | Status: SHIPPED | OUTPATIENT
Start: 2025-07-17

## 2025-07-17 RX ORDER — PANTOPRAZOLE SODIUM 40 MG/1
TABLET, DELAYED RELEASE ORAL
Qty: 180 TABLET | Refills: 0 | Status: SHIPPED | OUTPATIENT
Start: 2025-07-17

## 2025-07-17 RX ORDER — CARVEDILOL 3.12 MG/1
3.12 TABLET ORAL 2 TIMES DAILY WITH MEALS
Qty: 180 TABLET | Refills: 2 | Status: SHIPPED | OUTPATIENT
Start: 2025-07-17

## 2025-07-17 RX ORDER — METOLAZONE 2.5 MG/1
2.5 TABLET ORAL SEE ADMIN INSTRUCTIONS
Qty: 45 TABLET | Refills: 2 | Status: SHIPPED | OUTPATIENT
Start: 2025-07-17

## 2025-07-17 RX ORDER — NITROGLYCERIN 0.4 MG/1
0.4 TABLET SUBLINGUAL EVERY 5 MIN PRN
Qty: 30 TABLET | Refills: 2 | Status: SHIPPED | OUTPATIENT
Start: 2025-07-17

## 2025-07-17 RX ORDER — POTASSIUM CHLORIDE 7.45 MG/ML
INJECTION INTRAVENOUS
Refills: 0 | OUTPATIENT
Start: 2025-07-17

## 2025-07-17 RX ORDER — TORSEMIDE 100 MG/1
50 TABLET ORAL DAILY
Qty: 45 TABLET | Refills: 1 | Status: SHIPPED | OUTPATIENT
Start: 2025-07-17

## 2025-07-18 DIAGNOSIS — J44.9 COPD, SEVERE (HCC): ICD-10-CM

## 2025-07-21 RX ORDER — ALBUTEROL SULFATE 90 UG/1
2 INHALANT RESPIRATORY (INHALATION) EVERY 6 HOURS PRN
Qty: 1 EACH | Refills: 3 | Status: SHIPPED | OUTPATIENT
Start: 2025-07-21

## 2025-08-19 ENCOUNTER — OFFICE VISIT (OUTPATIENT)
Dept: INTERNAL MEDICINE CLINIC | Age: 70
End: 2025-08-19

## 2025-08-19 VITALS
HEIGHT: 62 IN | WEIGHT: 151 LBS | SYSTOLIC BLOOD PRESSURE: 128 MMHG | BODY MASS INDEX: 27.79 KG/M2 | RESPIRATION RATE: 12 BRPM | HEART RATE: 68 BPM | DIASTOLIC BLOOD PRESSURE: 80 MMHG

## 2025-08-19 DIAGNOSIS — I50.32 CHRONIC DIASTOLIC CHF (CONGESTIVE HEART FAILURE) (HCC): ICD-10-CM

## 2025-08-19 DIAGNOSIS — D68.69 SECONDARY HYPERCOAGULABLE STATE: ICD-10-CM

## 2025-08-19 DIAGNOSIS — I25.10 CORONARY ARTERY DISEASE INVOLVING NATIVE CORONARY ARTERY OF NATIVE HEART WITHOUT ANGINA PECTORIS: Primary | ICD-10-CM

## 2025-08-19 DIAGNOSIS — I25.10 CORONARY ARTERY DISEASE INVOLVING NATIVE CORONARY ARTERY OF NATIVE HEART WITHOUT ANGINA PECTORIS: ICD-10-CM

## 2025-08-19 DIAGNOSIS — Z12.31 ENCOUNTER FOR SCREENING MAMMOGRAM FOR MALIGNANT NEOPLASM OF BREAST: ICD-10-CM

## 2025-08-19 DIAGNOSIS — G47.33 OSA (OBSTRUCTIVE SLEEP APNEA): ICD-10-CM

## 2025-08-19 DIAGNOSIS — Z86.73 HISTORY OF CVA (CEREBROVASCULAR ACCIDENT): ICD-10-CM

## 2025-08-19 DIAGNOSIS — E78.2 MIXED HYPERLIPIDEMIA: ICD-10-CM

## 2025-08-19 DIAGNOSIS — I48.0 PAF (PAROXYSMAL ATRIAL FIBRILLATION) (HCC): ICD-10-CM

## 2025-08-19 DIAGNOSIS — I10 HTN (HYPERTENSION), BENIGN: ICD-10-CM

## 2025-08-19 DIAGNOSIS — J44.9 COPD, SEVERE (HCC): ICD-10-CM

## 2025-08-19 PROCEDURE — 3074F SYST BP LT 130 MM HG: CPT | Performed by: INTERNAL MEDICINE

## 2025-08-19 PROCEDURE — 3079F DIAST BP 80-89 MM HG: CPT | Performed by: INTERNAL MEDICINE

## 2025-08-19 PROCEDURE — 1159F MED LIST DOCD IN RCRD: CPT | Performed by: INTERNAL MEDICINE

## 2025-08-19 PROCEDURE — 1123F ACP DISCUSS/DSCN MKR DOCD: CPT | Performed by: INTERNAL MEDICINE

## 2025-08-19 PROCEDURE — 99214 OFFICE O/P EST MOD 30 MIN: CPT | Performed by: INTERNAL MEDICINE

## 2025-08-19 PROCEDURE — 1160F RVW MEDS BY RX/DR IN RCRD: CPT | Performed by: INTERNAL MEDICINE

## 2025-08-19 ASSESSMENT — ENCOUNTER SYMPTOMS
VOMITING: 0
SHORTNESS OF BREATH: 0
RHINORRHEA: 0
NAUSEA: 0
ABDOMINAL PAIN: 0
WHEEZING: 0

## 2025-08-20 LAB
CHOLEST SERPL-MCNC: 136 MG/DL (ref 0–199)
HDLC SERPL-MCNC: 55 MG/DL (ref 40–60)
LDLC SERPL CALC-MCNC: 67 MG/DL
TRIGL SERPL-MCNC: 69 MG/DL (ref 0–150)
VLDLC SERPL CALC-MCNC: 14 MG/DL

## (undated) DEVICE — TUBING, SUCTION, 3/16" X 6', STRAIGHT: Brand: MEDLINE

## (undated) DEVICE — SYRINGE MED 10ML SLIP TIP BLNT FILL AND LUERLOCK DISP

## (undated) DEVICE — CONTAINER,SPEC,PNEUM TUBE,3OZ,STRL PATH: Brand: MEDLINE

## (undated) DEVICE — CONMED SCOPE SAVER BITE BLOCK, 20X27 MM: Brand: SCOPE SAVER

## (undated) DEVICE — CANNULA,OXY,ADULT,SUPERSOFT,W/7'TUB,SC: Brand: MEDLINE INDUSTRIES, INC.

## (undated) DEVICE — YANKAUER,BULB TIP,W/O VENT,RIGID,STERILE: Brand: MEDLINE

## (undated) DEVICE — SINGLE USE BIOPSY VALVE MAJ-210: Brand: SINGLE USE BIOPSY VALVE (STERILE)

## (undated) DEVICE — KIT COLON W/ 1.1OZ LUB AND 2 END

## (undated) DEVICE — BOWL MED M 16OZ PLAS CAP GRAD

## (undated) DEVICE — SINGLE USE SUCTION VALVE MAJ-209: Brand: SINGLE USE SUCTION VALVE (STERILE)

## (undated) DEVICE — TUBING, SUCTION, 3/16" X 12', STRAIGHT: Brand: MEDLINE

## (undated) DEVICE — TRAP,MUCUS SPECIMEN, 80CC: Brand: MEDLINE

## (undated) DEVICE — ELECTRODE,ECG,STRESS,FOAM,3PK: Brand: MEDLINE

## (undated) DEVICE — GOWN, COVER, POLYCTD,KNITCF,OVRHD,BL,L: Brand: MEDLINE

## (undated) DEVICE — SYRINGE MED 50ML LUERLOCK TIP